# Patient Record
Sex: FEMALE | Race: WHITE | Employment: OTHER | URBAN - METROPOLITAN AREA
[De-identification: names, ages, dates, MRNs, and addresses within clinical notes are randomized per-mention and may not be internally consistent; named-entity substitution may affect disease eponyms.]

---

## 2015-05-21 LAB — TSH SERPL DL<=0.05 MIU/L-ACNC: NORMAL UIU/ML

## 2018-04-04 ENCOUNTER — OFFICE VISIT (OUTPATIENT)
Dept: CARDIOLOGY CLINIC | Age: 78
End: 2018-04-04
Payer: MEDICARE

## 2018-04-04 VITALS
DIASTOLIC BLOOD PRESSURE: 72 MMHG | HEART RATE: 78 BPM | HEIGHT: 62 IN | RESPIRATION RATE: 14 BRPM | WEIGHT: 185 LBS | BODY MASS INDEX: 34.04 KG/M2 | SYSTOLIC BLOOD PRESSURE: 134 MMHG

## 2018-04-04 DIAGNOSIS — I51.9 HEART PROBLEM: Primary | ICD-10-CM

## 2018-04-04 PROCEDURE — 93000 ELECTROCARDIOGRAM COMPLETE: CPT | Performed by: INTERNAL MEDICINE

## 2018-04-04 PROCEDURE — G8400 PT W/DXA NO RESULTS DOC: HCPCS | Performed by: INTERNAL MEDICINE

## 2018-04-04 PROCEDURE — 4040F PNEUMOC VAC/ADMIN/RCVD: CPT | Performed by: INTERNAL MEDICINE

## 2018-04-04 PROCEDURE — 99213 OFFICE O/P EST LOW 20 MIN: CPT | Performed by: INTERNAL MEDICINE

## 2018-04-04 PROCEDURE — 1036F TOBACCO NON-USER: CPT | Performed by: INTERNAL MEDICINE

## 2018-04-04 PROCEDURE — G8417 CALC BMI ABV UP PARAM F/U: HCPCS | Performed by: INTERNAL MEDICINE

## 2018-04-04 PROCEDURE — 3017F COLORECTAL CA SCREEN DOC REV: CPT | Performed by: INTERNAL MEDICINE

## 2018-04-04 PROCEDURE — G8427 DOCREV CUR MEDS BY ELIG CLIN: HCPCS | Performed by: INTERNAL MEDICINE

## 2018-04-04 PROCEDURE — 1090F PRES/ABSN URINE INCON ASSESS: CPT | Performed by: INTERNAL MEDICINE

## 2018-04-04 PROCEDURE — 1123F ACP DISCUSS/DSCN MKR DOCD: CPT | Performed by: INTERNAL MEDICINE

## 2018-04-04 RX ORDER — NITROGLYCERIN 0.4 MG/1
TABLET SUBLINGUAL
Refills: 1 | COMMUNITY
Start: 2018-03-29 | End: 2020-04-20 | Stop reason: SDUPTHER

## 2018-10-08 NOTE — PROGRESS NOTES
NORA PATIÑO [facility]  Paolo Singh M.D. Nir Ortega. Andrea Ramires M.D. Marcus Whitmore Long   1940  Doyle Maria MD      This 77-year-old woman is seen for cardiac follow-up. Her history includes hypertension, diabetes mellitus and aortic valve stenosis. An echo in 01/2018 was consistent with low gradient, severe aortic valve stenosis. She states that she goes that all her activities without any chest discomfort, dyspnea or dizziness. Medical History:  1. Diabetes mellitus. 2. Hypertension. 3. Obesity. BMI 32.78 - 04/2018. 4. Breast biopsy, benign. 5. Surgeries: Hysterectomy and dental extraction. 6. No history of MI, CHF, CVA. 7. Lipid panel unavailable. 8. Lexiscan MPS, 03/31/2017, normal perfusion. Normal ejection fraction. Low risk/low probability. [address] [place] [address],[facility] 01/27/17: Normal EF. Stage I diastolic dysfunction. Moderate aortic stenosis and mild AI. Mean gradient 18. No ISRAEL supplied an report (Dr. Jason Miller). 10. Echocardiogram, 01/19/18. LVEF normal, Stage II diastolic dysfunction. Mild MR. Aortic valve sclerotic. Peak velocity 2.8M/S. AV DI = 0.32. ISRAEL=0.9. 11. Lexiscan stress perfusion study, [facility], 03/23/18. Functional capacity was not assessed. The radiology report indicates \"mild ischemia of the apex\". There is normal wall motion and ejection fraction (64%). Review of Systems:  Constitutional: negative for fever and chills  Respiratory: negative for cough and hemoptysis  Cardiovascular:   Gastrointestinal: negative for abdominal pain, diarrhea, nausea and vomiting  Genitourinary:negative for dysuria and hematuria  Derm: negative for rash and skin lesion(s)  Neurological: negative for seizures and tremors  Endocrine: negative for diabetic symptoms including polydipsia and polyuria  Musculoskeletal: negative for CTD  Psychiatric: negative for psychosis and major depression    On examination, alert, pleasant, oriented and in no distress.    Skin is warm and

## 2018-10-10 ENCOUNTER — OFFICE VISIT (OUTPATIENT)
Dept: CARDIOLOGY CLINIC | Age: 78
End: 2018-10-10
Payer: MEDICARE

## 2018-10-10 VITALS
WEIGHT: 177 LBS | HEIGHT: 62 IN | SYSTOLIC BLOOD PRESSURE: 133 MMHG | BODY MASS INDEX: 32.57 KG/M2 | HEART RATE: 84 BPM | DIASTOLIC BLOOD PRESSURE: 55 MMHG | RESPIRATION RATE: 16 BRPM

## 2018-10-10 DIAGNOSIS — I51.9 HEART PROBLEM: Primary | ICD-10-CM

## 2018-10-10 PROCEDURE — 1101F PT FALLS ASSESS-DOCD LE1/YR: CPT | Performed by: INTERNAL MEDICINE

## 2018-10-10 PROCEDURE — 3017F COLORECTAL CA SCREEN DOC REV: CPT | Performed by: INTERNAL MEDICINE

## 2018-10-10 PROCEDURE — 4040F PNEUMOC VAC/ADMIN/RCVD: CPT | Performed by: INTERNAL MEDICINE

## 2018-10-10 PROCEDURE — 99213 OFFICE O/P EST LOW 20 MIN: CPT | Performed by: INTERNAL MEDICINE

## 2018-10-10 PROCEDURE — 1123F ACP DISCUSS/DSCN MKR DOCD: CPT | Performed by: INTERNAL MEDICINE

## 2018-10-10 PROCEDURE — 1090F PRES/ABSN URINE INCON ASSESS: CPT | Performed by: INTERNAL MEDICINE

## 2018-10-10 PROCEDURE — G8484 FLU IMMUNIZE NO ADMIN: HCPCS | Performed by: INTERNAL MEDICINE

## 2018-10-10 PROCEDURE — 93000 ELECTROCARDIOGRAM COMPLETE: CPT | Performed by: INTERNAL MEDICINE

## 2018-10-10 PROCEDURE — 1036F TOBACCO NON-USER: CPT | Performed by: INTERNAL MEDICINE

## 2018-10-10 PROCEDURE — G8427 DOCREV CUR MEDS BY ELIG CLIN: HCPCS | Performed by: INTERNAL MEDICINE

## 2018-10-10 PROCEDURE — G8400 PT W/DXA NO RESULTS DOC: HCPCS | Performed by: INTERNAL MEDICINE

## 2018-10-10 PROCEDURE — G8417 CALC BMI ABV UP PARAM F/U: HCPCS | Performed by: INTERNAL MEDICINE

## 2019-02-11 LAB — DIABETIC RETINOPATHY: NEGATIVE

## 2019-02-13 LAB
CHOLESTEROL, TOTAL: NORMAL MG/DL
CHOLESTEROL/HDL RATIO: NORMAL
HDLC SERPL-MCNC: NORMAL MG/DL (ref 35–70)
LDL CHOLESTEROL CALCULATED: NORMAL MG/DL (ref 0–160)
TRIGL SERPL-MCNC: NORMAL MG/DL
VLDLC SERPL CALC-MCNC: NORMAL MG/DL

## 2019-03-20 ENCOUNTER — OFFICE VISIT (OUTPATIENT)
Dept: CARDIOLOGY CLINIC | Age: 79
End: 2019-03-20
Payer: MEDICARE

## 2019-03-20 VITALS
DIASTOLIC BLOOD PRESSURE: 80 MMHG | HEART RATE: 85 BPM | BODY MASS INDEX: 33.31 KG/M2 | HEIGHT: 62 IN | RESPIRATION RATE: 14 BRPM | SYSTOLIC BLOOD PRESSURE: 142 MMHG | WEIGHT: 181 LBS

## 2019-03-20 DIAGNOSIS — R06.02 SOB (SHORTNESS OF BREATH): Primary | ICD-10-CM

## 2019-03-20 PROCEDURE — G8427 DOCREV CUR MEDS BY ELIG CLIN: HCPCS | Performed by: INTERNAL MEDICINE

## 2019-03-20 PROCEDURE — 93000 ELECTROCARDIOGRAM COMPLETE: CPT | Performed by: INTERNAL MEDICINE

## 2019-03-20 PROCEDURE — G8484 FLU IMMUNIZE NO ADMIN: HCPCS | Performed by: INTERNAL MEDICINE

## 2019-03-20 PROCEDURE — 4040F PNEUMOC VAC/ADMIN/RCVD: CPT | Performed by: INTERNAL MEDICINE

## 2019-03-20 PROCEDURE — G8417 CALC BMI ABV UP PARAM F/U: HCPCS | Performed by: INTERNAL MEDICINE

## 2019-03-20 PROCEDURE — 1101F PT FALLS ASSESS-DOCD LE1/YR: CPT | Performed by: INTERNAL MEDICINE

## 2019-03-20 PROCEDURE — G8400 PT W/DXA NO RESULTS DOC: HCPCS | Performed by: INTERNAL MEDICINE

## 2019-03-20 PROCEDURE — 99213 OFFICE O/P EST LOW 20 MIN: CPT | Performed by: INTERNAL MEDICINE

## 2019-03-20 PROCEDURE — 1090F PRES/ABSN URINE INCON ASSESS: CPT | Performed by: INTERNAL MEDICINE

## 2019-03-20 PROCEDURE — 1123F ACP DISCUSS/DSCN MKR DOCD: CPT | Performed by: INTERNAL MEDICINE

## 2019-03-20 PROCEDURE — 1036F TOBACCO NON-USER: CPT | Performed by: INTERNAL MEDICINE

## 2019-03-20 RX ORDER — RISEDRONATE SODIUM 35 MG/1
TABLET, FILM COATED ORAL
COMMUNITY
Start: 2019-03-16 | End: 2019-05-22

## 2019-05-22 ENCOUNTER — OFFICE VISIT (OUTPATIENT)
Dept: FAMILY MEDICINE CLINIC | Age: 79
End: 2019-05-22
Payer: MEDICARE

## 2019-05-22 ENCOUNTER — HOSPITAL ENCOUNTER (OUTPATIENT)
Age: 79
Discharge: HOME | End: 2019-05-24
Payer: MEDICARE

## 2019-05-22 VITALS
TEMPERATURE: 98 F | HEIGHT: 60 IN | WEIGHT: 180 LBS | BODY MASS INDEX: 35.34 KG/M2 | SYSTOLIC BLOOD PRESSURE: 128 MMHG | DIASTOLIC BLOOD PRESSURE: 78 MMHG | HEART RATE: 76 BPM | RESPIRATION RATE: 12 BRPM

## 2019-05-22 DIAGNOSIS — E04.9 GOITER: ICD-10-CM

## 2019-05-22 DIAGNOSIS — E11.9 TYPE 2 DIABETES MELLITUS WITHOUT COMPLICATION, WITHOUT LONG-TERM CURRENT USE OF INSULIN (HCC): ICD-10-CM

## 2019-05-22 DIAGNOSIS — E11.9 TYPE 2 DIABETES MELLITUS WITHOUT COMPLICATION, WITHOUT LONG-TERM CURRENT USE OF INSULIN (HCC): Primary | ICD-10-CM

## 2019-05-22 DIAGNOSIS — I10 ESSENTIAL HYPERTENSION: ICD-10-CM

## 2019-05-22 DIAGNOSIS — E78.5 HYPERLIPIDEMIA, UNSPECIFIED HYPERLIPIDEMIA TYPE: ICD-10-CM

## 2019-05-22 LAB
ANION GAP SERPL CALCULATED.3IONS-SCNC: 9 MMOL/L (ref 7–16)
BUN BLDV-MCNC: 19 MG/DL (ref 8–23)
CALCIUM SERPL-MCNC: 9.6 MG/DL (ref 8.6–10.2)
CHLORIDE BLD-SCNC: 104 MMOL/L (ref 98–107)
CO2: 28 MMOL/L (ref 22–29)
CREAT SERPL-MCNC: 1.4 MG/DL (ref 0.5–1)
GFR AFRICAN AMERICAN: 44
GFR NON-AFRICAN AMERICAN: 37 ML/MIN/1.73
GLUCOSE BLD-MCNC: 86 MG/DL (ref 74–99)
HBA1C MFR BLD: 7.4 % (ref 4–5.6)
POTASSIUM SERPL-SCNC: 4.8 MMOL/L (ref 3.5–5)
SODIUM BLD-SCNC: 141 MMOL/L (ref 132–146)

## 2019-05-22 PROCEDURE — 99214 OFFICE O/P EST MOD 30 MIN: CPT | Performed by: INTERNAL MEDICINE

## 2019-05-22 PROCEDURE — 36415 COLL VENOUS BLD VENIPUNCTURE: CPT

## 2019-05-22 PROCEDURE — 80048 BASIC METABOLIC PNL TOTAL CA: CPT

## 2019-05-22 PROCEDURE — 83036 HEMOGLOBIN GLYCOSYLATED A1C: CPT

## 2019-05-22 RX ORDER — OXYCODONE HYDROCHLORIDE AND ACETAMINOPHEN 5; 325 MG/1; MG/1
1 TABLET ORAL EVERY 6 HOURS PRN
COMMUNITY
End: 2019-05-31

## 2019-05-22 RX ORDER — LANOLIN ALCOHOL/MO/W.PET/CERES
1000 CREAM (GRAM) TOPICAL EVERY OTHER DAY
COMMUNITY

## 2019-05-22 RX ORDER — AMLODIPINE BESYLATE 5 MG/1
5 TABLET ORAL DAILY
COMMUNITY
End: 2019-06-06 | Stop reason: ALTCHOICE

## 2019-05-22 ASSESSMENT — ENCOUNTER SYMPTOMS
SHORTNESS OF BREATH: 0
VOMITING: 0
RHINORRHEA: 0
NAUSEA: 0
CONSTIPATION: 0
DIARRHEA: 0
WHEEZING: 0
BLOOD IN STOOL: 0
ABDOMINAL PAIN: 0
COUGH: 0
SINUS PAIN: 0
BACK PAIN: 0

## 2019-05-22 ASSESSMENT — PATIENT HEALTH QUESTIONNAIRE - PHQ9
1. LITTLE INTEREST OR PLEASURE IN DOING THINGS: 0
SUM OF ALL RESPONSES TO PHQ QUESTIONS 1-9: 0
SUM OF ALL RESPONSES TO PHQ QUESTIONS 1-9: 0
2. FEELING DOWN, DEPRESSED OR HOPELESS: 0
SUM OF ALL RESPONSES TO PHQ9 QUESTIONS 1 & 2: 0

## 2019-05-22 NOTE — PROGRESS NOTES
[facility]     19  Yvonne Covington : 1940 Sex: female  Age: 70 y.o. Chief Complaint   Patient presents with    Hypertension    Hyperlipidemia    Diabetes   Multiple medical problems follow-up    HPI patient presents today for follow-up visit on problems listed reviewed last note. She was started on Actonel for osteoporosis which she again does not tolerate and stopped. She did not tolerate alendronate. She has had her gallbladder removed since I saw her last and is doing much better. Abdominal standpoint. I reviewed that pathology report. She did have a trip and fall recently bruised up her right elbow and high. She is healing doing better at this point. This was an extrinsic fall. She is up-to-date with consultants including cardiology ophthalmology endocrine for thyroid renal and ENT related to her thyroid. Blood pressures that she shows me up in clinic. Blood sugars most part outside of one week when she wasn't feeling well also look good. Weight has not changed. Review of Systems   Constitutional: Negative for activity change, appetite change, chills, diaphoresis, fatigue, fever and unexpected weight change. HENT: Negative for congestion, ear pain, hearing loss, postnasal drip, rhinorrhea and sinus pain. Respiratory: Negative for cough, shortness of breath and wheezing. Cardiovascular: Negative for chest pain, palpitations and leg swelling. Gastrointestinal: Negative for abdominal pain, blood in stool, constipation, diarrhea, nausea and vomiting. Symptoms will improve post cholecystectomy. Endocrine: Negative. Genitourinary: Negative for difficulty urinating, dysuria, frequency, hematuria and urgency. Musculoskeletal: Positive for arthralgias. Negative for back pain, gait problem and myalgias. She was complaining of muscle pain prior to stopping the Actonel   Skin: Negative.     Allergic/Immunologic: Negative for environmental allergies and immunocompromised state. Neurological: Negative for dizziness, weakness, light-headedness, numbness and headaches. Hematological: Negative. REST OF PERTINENT ROS GONE OVER AND WAS NEGATIVE. PMH:  Problem List: Knee pain, Non-toxic nodular goiter, Essential hypertension, Goiter, Hyperlipidemia, Benign  essential hypertension, Type II diabetes mellitus uncontrolled  Health Maintenance:  Bone Density Test Screening - (12/3/2018)  Bone Density Scan - (12/3/2018)  Influenza Vaccination - (10/29/2018)  Colonoscopy - (2018)  -due 23  Colonoscopy Screening - (2018)  Couseled on Home Safety - (2018)  Pneumonia Vaccination - (2018)  Mammogram - (2012)  Mammogram Screening - (2012)  Declining further  Tetanus Immunization - (2017)  Rectal Exam - 4/10,  Breast Exam - 4/10,  Hemmocult Cards - -neg x 3,-neg x3, -neg x 3  EKG - ,, 5/15, 3/17,,3/18  2D ECHO -  ,   Carotid Artery Study - -30-49% occlusion bilateral,   Stress Test - -neg,3/18-pos  Zoster/Shingles Vaccine -   Medical Problems:  Non Insulin Dependent Diabetes, Hypertension, Hyperlipidemia  Goiter - multinodular-bx neg-dr hooper  GERD, Hypothyroidism  Osteoporosis - Bisphosphonate intolerant  Glaucoma, Renal Insufficiency  Aortic And Mitral Valve Disorders - 2D echo-  VHD  Pulmonary HTN - mild  Macular Degeneration  WBCs in urine - Workup by urology-  Coronary Artery Disease (CAD) - Positive stress test 3/18  Diverticulosis, Diabetic Neuropathy, Vitamin B12 deficiency  Follows with - Cardiology, nephrology, ophthalmology, endocrine  Surgical Hx:  AKIN-right salpingo-oophorectomy - Fibroids  Appendectomy  Right cataract surgery - left also  Left knee arthroscopy, Removal of Gallbladder  FH:  Father:  MI.  Mother:  Cerebrovascular Accident (CVA). Brother 1:  Coronary Artery Disease (CAD) - 52's.   3 other brothers, 1 sister   SH:  Marital: grammatical errors.

## 2019-05-23 ENCOUNTER — TELEPHONE (OUTPATIENT)
Dept: FAMILY MEDICINE CLINIC | Age: 79
End: 2019-05-23

## 2019-05-29 NOTE — PROGRESS NOTES
[facility]O [facility]  [facility]. Gerson Oliva M.D., .A.C.C. [address]   1940  Mitesh Herman MD      This 77-year-old woman had outpatient cardiac follow-up today. She states that she feels well and is without abnormal activities. She denies chest pain or dyspnea. She has a history of obesity, hypertension, diabetes mellitus and aortic valve stenosis. An echo in January 2018 was consistent with low velocity severe aortic valve stenosis. She states there is been no change in her symptoms are functional capacity over the past year. She states she had a cholecystectomy earlier this year. Medical History:  1. Diabetes mellitus. 2. Hypertension. 3. Obesity. BMI 32.74 - 06/2019.  4. Breast biopsy, benign. 5. Surgeries: Hysterectomy and dental extraction. 6. No history of MI, CHF, CVA. 7. Lipid panel unavailable. 8. Lexiscan MPS, 03/31/2017, normal perfusion. Normal ejection fraction. Low risk/low probability. [address] [place] [address],[place] [address] 01/27/17: Normal EF. Stage I diastolic dysfunction. Moderate aortic stenosis and mild AI. Mean gradient 18. No [place]RA[place] supplied an report (Dr. Kristin Clemons). 10. Echocardiogram, 01/19/18. LVEF normal, Stage II diastolic dysfunction. Mild MR. Aortic valve sclerotic. Peak velocity 2.8M/S. AV DI = 0.32. ISRAEL=0.9. [address] [facility], 03/23/18. . The radiology report indicates \"mild ischemia of the apex\". There is normal wall motion and ejection fraction (64%).     Review of Systems:  Constitutional: negative for fever and chills  Respiratory: negative for cough and hemoptysis  Cardiovascular:   Gastrointestinal: negative for abdominal pain, diarrhea, nausea and vomiting  Genitourinary:negative for dysuria and hematuria  Derm: negative for rash and skin lesion(s)  Neurological: negative for seizures and tremors  Endocrine: negative for diabetic symptoms including polydipsia and polyuria  Musculoskeletal: negative for CTD  Psychiatric: negative for psychosis and major depression    On examination, she is an obese, elderly woman in no distress. Skin is warm and dry. Respirations are unlabored. /72   Pulse 80   Resp 14   Ht 5' 2\" (1.575 m)   Wt 179 lb (81.2 kg)   BMI 32.74 kg/m² . HEENT negative for scleral icterus. Extraocular muscles intact. No facial asymmetry or central cyanosis. Neck without masses or goiter. No bruit or JVD. Cardiac apex not displaced. Rhythm regular. Grade 2/3/6 LUIS upper sternal border. S2 intact. Diastole sign  Abdomen normal.  Extremities without edema. Lungs are clear. EKG today shows sinus rhythm 80 per minute. EKG normal.    The patient is a not particularly active and therefore somewhat difficult to assess. However, she denies any symptoms to suggest critical aortic valve stenosis. Exam also suggested critical aortic valve stenosis is not present. Therefore no medication changes are made today. She'll be asked to have an echocardiogram in 6 months. At completion of today's visit, medications include the following:    Current Outpatient Medications:     vitamin D (CHOLECALCIFEROL) 1000 UNIT TABS tablet, Take 1,000 Units by mouth daily, Disp: , Rfl:     vitamin B-12 (CYANOCOBALAMIN) 100 MCG tablet, Take 50 mcg by mouth daily, Disp: , Rfl:     nitroGLYCERIN (NITROSTAT) 0.4 MG SL tablet, DISSOLVE ONE TABLET UNDER TONGUE AS NEEDED FOR CHEST PAINS. MAY REPEAT IN 5 MINUTES.  IF SYMPTOMS PERSISTS CALL 911, Disp: , Rfl: 1    ramipril (ALTACE) 10 MG capsule, Take 10 mg by mouth daily, Disp: , Rfl:     pioglitazone (ACTOS) 30 MG tablet, Take 30 mg by mouth daily, Disp: , Rfl:     glipiZIDE (GLUCOTROL XL) 10 MG extended release tablet, Take 10 mg by mouth 2 times daily , Disp: , Rfl:     metFORMIN (GLUCOPHAGE) 1000 MG tablet, Take 1,000 mg by mouth 2 times daily (with meals) , Disp: , Rfl:     pravastatin (PRAVACHOL) 40 MG tablet, Take 40 mg by mouth every other day , Disp: , Rfl:     JANUVIA 50 MG tablet, Take 50 mg by mouth daily , Disp: , Rfl:     meclizine (ANTIVERT) 25 MG tablet, 1 po bid prn vertigo, Disp: 60 tablet, Rfl: 0      Yovanny Urena MD

## 2019-05-31 ENCOUNTER — OFFICE VISIT (OUTPATIENT)
Dept: FAMILY MEDICINE CLINIC | Age: 79
End: 2019-05-31
Payer: MEDICARE

## 2019-05-31 VITALS
HEART RATE: 92 BPM | SYSTOLIC BLOOD PRESSURE: 128 MMHG | TEMPERATURE: 98.4 F | RESPIRATION RATE: 18 BRPM | BODY MASS INDEX: 33.13 KG/M2 | OXYGEN SATURATION: 97 % | WEIGHT: 180 LBS | DIASTOLIC BLOOD PRESSURE: 64 MMHG | HEIGHT: 62 IN

## 2019-05-31 DIAGNOSIS — S62.101A CLOSED FRACTURE OF RIGHT WRIST, INITIAL ENCOUNTER: ICD-10-CM

## 2019-05-31 DIAGNOSIS — M79.674 PAIN OF RIGHT GREAT TOE: Primary | ICD-10-CM

## 2019-05-31 DIAGNOSIS — S41.101A WOUND OF RIGHT UPPER EXTREMITY, INITIAL ENCOUNTER: ICD-10-CM

## 2019-05-31 DIAGNOSIS — M25.531 PAIN IN RIGHT WRIST: ICD-10-CM

## 2019-05-31 PROCEDURE — 99213 OFFICE O/P EST LOW 20 MIN: CPT | Performed by: PHYSICIAN ASSISTANT

## 2019-05-31 NOTE — PROGRESS NOTES
Date: 19     Lorenza Patel   : 1940 Sex: female  Age: 70 y.o. Subjective:  Chief Complaint   Patient presents with    Toe Pain     right big toe pain.  Abrasion     tore skin on right hand       HPI:  Patient reports that she fell last night. Patient was in her house. She states she started to trip walking down the hallway and then put her arms out to catch herself. Patient complains of right great toe pain as well as right wrist pain. Patient also sustained an abrasion on her right upper wrist. Patient has been cleaning it and putting antibiotic ointment on it. Patient's uncertain when her last tetanus was. Patient  denies that she hit her head. She denies any loss consciousness neck pain nausea vomiting visual disturbance. Patient states this was a mechanical fall as she started to trip over something in her hallway. It was carpeted. Patient has not taken anything for the pain. She states she is able to ambulate without too much difficulty. Has been using her wrist but movement does cause increase pain. Patient denies other symptoms and presents for evaluation Patient comes to the urgent care for evaluation. ROS:  Positive and pertinent negatives as per HPI. All other systems are reviewed and negative. Current Outpatient Medications:     Tetanus-Diphth-Acell Pertussis (BOOSTRIX) 5-2.5-18.5 LF-MCG/0.5 injection, Inject 0.5 mLs into the muscle once for 1 dose, Disp: 1 each, Rfl: 0    vitamin D (CHOLECALCIFEROL) 1000 UNIT TABS tablet, Take 1,000 Units by mouth daily, Disp: , Rfl:     vitamin B-12 (CYANOCOBALAMIN) 100 MCG tablet, Take 50 mcg by mouth daily, Disp: , Rfl:     nitroGLYCERIN (NITROSTAT) 0.4 MG SL tablet, DISSOLVE ONE TABLET UNDER TONGUE AS NEEDED FOR CHEST PAINS. MAY REPEAT IN 5 MINUTES.  IF SYMPTOMS PERSISTS CALL 911, Disp: , Rfl: 1    ramipril (ALTACE) 10 MG capsule, Take 10 mg by mouth daily, Disp: , Rfl:     pioglitazone (ACTOS) 30 MG tablet, Take 30 mg by mouth daily, Disp: , Rfl:     glipiZIDE (GLUCOTROL XL) 10 MG extended release tablet, Take 10 mg by mouth 2 times daily , Disp: , Rfl:     meclizine (ANTIVERT) 25 MG tablet, Take 25 mg by mouth 3 times daily as needed , Disp: , Rfl:     metFORMIN (GLUCOPHAGE) 1000 MG tablet, Take 1,000 mg by mouth 2 times daily (with meals) , Disp: , Rfl:     pravastatin (PRAVACHOL) 40 MG tablet, Take 40 mg by mouth every other day , Disp: , Rfl:     JANUVIA 50 MG tablet, Take 50 mg by mouth daily , Disp: , Rfl:     amLODIPine (NORVASC) 5 MG tablet, Take 5 mg by mouth daily, Disp: , Rfl:    Allergies   Allergen Reactions    Naproxen     Pcn [Penicillins]         Past Medical History:   Diagnosis Date    Chest pain     Diabetes ([facility] [address]     Goiter     Hyperlipidemia     Hypertension      Family History   Problem Relation Age of Onset    Heart Disease Father 79        MI    Heart Disease Brother     Stroke Brother     Stroke Brother       Past Surgical History:   Procedure Laterality Date    BREAST BIOPSY Right     neg    HYSTERECTOMY      TOOTH EXTRACTION        Social History     Socioeconomic History    Marital status:      Spouse name: Not on file    Number of children: Not on file    Years of education: Not on file    Highest education level: Not on file   Occupational History    Not on file   Social Needs    Financial resource strain: Not on file    Food insecurity:     Worry: Not on file     Inability: Not on file    Transportation needs:     Medical: Not on file     Non-medical: Not on file   Tobacco Use    Smoking status: Never Smoker    Smokeless tobacco: Never Used   Substance and Sexual Activity    Alcohol use: No    Drug use: Not on file    Sexual activity: Not on file   Lifestyle    Physical activity:     Days per week: Not on file     Minutes per session: Not on file    Stress: Not on file   Relationships    Social connections:     Talks on phone: Not on file     Gets together: Not on file     Attends Sabianist service: Not on file     Active member of club or organization: Not on file     Attends meetings of clubs or organizations: Not on file     Relationship status: Not on file    Intimate partner violence:     Fear of current or ex partner: Not on file     Emotionally abused: Not on file     Physically abused: Not on file     Forced sexual activity: Not on file   Other Topics Concern    Not on file   Social History Narrative    Not on file        Objective:  Vitals:    05/31/19 1528   BP: 128/64   Site: Left Upper Arm   Position: Sitting   Cuff Size: Large Adult   Pulse: 92   Resp: 18   Temp: 98.4 °F (36.9 °C)   TempSrc: Temporal   SpO2: 97%   Weight: 180 lb (81.6 kg)   Height: 5' 2\" (1.575 m)        Exam:  Const: Appears healthy and well developed. No signs of acute distress present. Vitals reviewed per triage. Head/Face: Normocephalic, atraumatic. Facies is symmetric. ENMT:  Nares are patent. Buccal mucosa is moist.    Neck: Trachea midline. No cervical spine tenderness. Resp: No respiratory distress. Musculo: Gait is intact. Patient does have pain over her right great toe. No pain over the foot or the ankle. Full range of motion is noted. Neurovascularly she is intact. Patient with ecchymosis and bruising over the distal radius and ulna. Full range of motion of the hand and wrist wihtout difficulty. No obvious deformity.  strength is 5 out of 5. Neurovascular she is intact. Patient has sustained Two small skin tears over her posterior wrist as well. Skin: Skin is warm and dry. No Rashes. No erythema, heat. Neuro: Alert and oriented x3. Speech is articulate and fluent. Psych: Mood/Affect: Patient's mood and affect is appropriate to situation. [facility] was seen today for toe pain and abrasion. Diagnoses and all orders for this visit:    Pain of right great toe  -     XR TOE RIGHT (MIN 2 VIEWS);  Future    Wound of right upper extremity, initial encounter  - Cancel: Tetanus vaccine IM  Pts skin tears cleaned, steri strip applied    Pain in right wrist  -     XR WRIST RIGHT (MIN 3 VIEWS); Future    Other orders  -     Tetanus-Diphth-Acell Pertussis (BOOSTRIX) 5-2.5-18.5 LF-MCG/0.5 injection; Inject 0.5 mLs into the muscle once for 1 dose        Toe xray was negative for fracture, however wrist xray shows likely scaphoid fracture. Unfortunately since pt did not come through [facility] until after 3:30, I did not get xray results back until 5 pm from out patient radiology when all walk in was closed for weekend. Pt instructed only available option for wrist splinting at this time was through ED. Pt will follow up with orthopedics, referral will be done for her. .     Seen By:    Farhana Box PA-C

## 2019-06-03 ENCOUNTER — TELEPHONE (OUTPATIENT)
Dept: FAMILY MEDICINE CLINIC | Age: 79
End: 2019-06-03

## 2019-06-05 ENCOUNTER — OFFICE VISIT (OUTPATIENT)
Dept: CARDIOLOGY CLINIC | Age: 79
End: 2019-06-05
Payer: MEDICARE

## 2019-06-05 ENCOUNTER — TELEPHONE (OUTPATIENT)
Dept: ORTHOPEDIC SURGERY | Age: 79
End: 2019-06-05

## 2019-06-05 VITALS
HEIGHT: 62 IN | BODY MASS INDEX: 32.94 KG/M2 | SYSTOLIC BLOOD PRESSURE: 122 MMHG | HEART RATE: 80 BPM | RESPIRATION RATE: 14 BRPM | WEIGHT: 179 LBS | DIASTOLIC BLOOD PRESSURE: 72 MMHG

## 2019-06-05 DIAGNOSIS — R06.02 SOB (SHORTNESS OF BREATH): Primary | ICD-10-CM

## 2019-06-05 DIAGNOSIS — R42 VERTIGO: Primary | ICD-10-CM

## 2019-06-05 PROCEDURE — 4040F PNEUMOC VAC/ADMIN/RCVD: CPT | Performed by: INTERNAL MEDICINE

## 2019-06-05 PROCEDURE — G8417 CALC BMI ABV UP PARAM F/U: HCPCS | Performed by: INTERNAL MEDICINE

## 2019-06-05 PROCEDURE — 1036F TOBACCO NON-USER: CPT | Performed by: INTERNAL MEDICINE

## 2019-06-05 PROCEDURE — G8427 DOCREV CUR MEDS BY ELIG CLIN: HCPCS | Performed by: INTERNAL MEDICINE

## 2019-06-05 PROCEDURE — 93000 ELECTROCARDIOGRAM COMPLETE: CPT | Performed by: INTERNAL MEDICINE

## 2019-06-05 PROCEDURE — 1090F PRES/ABSN URINE INCON ASSESS: CPT | Performed by: INTERNAL MEDICINE

## 2019-06-05 PROCEDURE — 99213 OFFICE O/P EST LOW 20 MIN: CPT | Performed by: INTERNAL MEDICINE

## 2019-06-05 PROCEDURE — 1123F ACP DISCUSS/DSCN MKR DOCD: CPT | Performed by: INTERNAL MEDICINE

## 2019-06-05 PROCEDURE — G8400 PT W/DXA NO RESULTS DOC: HCPCS | Performed by: INTERNAL MEDICINE

## 2019-06-06 RX ORDER — MECLIZINE HYDROCHLORIDE 25 MG/1
TABLET ORAL
Qty: 60 TABLET | Refills: 0 | Status: SHIPPED | OUTPATIENT
Start: 2019-06-05 | End: 2019-08-22 | Stop reason: SDUPTHER

## 2019-08-21 ENCOUNTER — TELEPHONE (OUTPATIENT)
Dept: ADMINISTRATIVE | Age: 79
End: 2019-08-21

## 2019-08-22 ENCOUNTER — HOSPITAL ENCOUNTER (OUTPATIENT)
Age: 79
Discharge: HOME | End: 2019-08-24
Payer: MEDICARE

## 2019-08-22 ENCOUNTER — OFFICE VISIT (OUTPATIENT)
Dept: FAMILY MEDICINE CLINIC | Age: 79
End: 2019-08-22
Payer: MEDICARE

## 2019-08-22 ENCOUNTER — TELEPHONE (OUTPATIENT)
Dept: FAMILY MEDICINE CLINIC | Age: 79
End: 2019-08-22

## 2019-08-22 VITALS
WEIGHT: 181 LBS | HEART RATE: 92 BPM | DIASTOLIC BLOOD PRESSURE: 74 MMHG | BODY MASS INDEX: 33.11 KG/M2 | SYSTOLIC BLOOD PRESSURE: 136 MMHG | OXYGEN SATURATION: 97 %

## 2019-08-22 DIAGNOSIS — E11.9 TYPE 2 DIABETES MELLITUS WITHOUT COMPLICATION, WITHOUT LONG-TERM CURRENT USE OF INSULIN (HCC): ICD-10-CM

## 2019-08-22 DIAGNOSIS — R42 VERTIGO: ICD-10-CM

## 2019-08-22 DIAGNOSIS — D75.89 MACROCYTOSIS WITHOUT ANEMIA: Primary | ICD-10-CM

## 2019-08-22 DIAGNOSIS — I10 ESSENTIAL HYPERTENSION: Primary | ICD-10-CM

## 2019-08-22 DIAGNOSIS — E04.9 GOITER: ICD-10-CM

## 2019-08-22 DIAGNOSIS — M81.0 OSTEOPOROSIS, UNSPECIFIED OSTEOPOROSIS TYPE, UNSPECIFIED PATHOLOGICAL FRACTURE PRESENCE: ICD-10-CM

## 2019-08-22 DIAGNOSIS — E78.5 HYPERLIPIDEMIA, UNSPECIFIED HYPERLIPIDEMIA TYPE: ICD-10-CM

## 2019-08-22 LAB
ALBUMIN SERPL-MCNC: 4.1 G/DL (ref 3.5–5.2)
ALP BLD-CCNC: 76 U/L (ref 35–104)
ALT SERPL-CCNC: 13 U/L (ref 0–32)
ANION GAP SERPL CALCULATED.3IONS-SCNC: 13 MMOL/L (ref 7–16)
AST SERPL-CCNC: 17 U/L (ref 0–31)
BASOPHILS ABSOLUTE: 0.03 E9/L (ref 0–0.2)
BASOPHILS RELATIVE PERCENT: 0.6 % (ref 0–2)
BILIRUB SERPL-MCNC: 0.6 MG/DL (ref 0–1.2)
BUN BLDV-MCNC: 17 MG/DL (ref 8–23)
CALCIUM SERPL-MCNC: 9.5 MG/DL (ref 8.6–10.2)
CHLORIDE BLD-SCNC: 104 MMOL/L (ref 98–107)
CO2: 24 MMOL/L (ref 22–29)
CREAT SERPL-MCNC: 1.4 MG/DL (ref 0.5–1)
CREATININE URINE: 104 MG/DL (ref 29–226)
EOSINOPHILS ABSOLUTE: 0.2 E9/L (ref 0.05–0.5)
EOSINOPHILS RELATIVE PERCENT: 3.8 % (ref 0–6)
GFR AFRICAN AMERICAN: 44
GFR NON-AFRICAN AMERICAN: 36 ML/MIN/1.73
GLUCOSE BLD-MCNC: 130 MG/DL (ref 74–99)
HBA1C MFR BLD: 7.5 % (ref 4–5.6)
HCT VFR BLD CALC: 38.9 % (ref 34–48)
HEMOGLOBIN: 11.9 G/DL (ref 11.5–15.5)
IMMATURE GRANULOCYTES #: 0.01 E9/L
IMMATURE GRANULOCYTES %: 0.2 % (ref 0–5)
LYMPHOCYTES ABSOLUTE: 1.59 E9/L (ref 1.5–4)
LYMPHOCYTES RELATIVE PERCENT: 30.3 % (ref 20–42)
MCH RBC QN AUTO: 32.2 PG (ref 26–35)
MCHC RBC AUTO-ENTMCNC: 30.6 % (ref 32–34.5)
MCV RBC AUTO: 105.4 FL (ref 80–99.9)
MICROALBUMIN UR-MCNC: <12 MG/L
MICROALBUMIN/CREAT UR-RTO: ABNORMAL (ref 0–30)
MONOCYTES ABSOLUTE: 0.52 E9/L (ref 0.1–0.95)
MONOCYTES RELATIVE PERCENT: 9.9 % (ref 2–12)
NEUTROPHILS ABSOLUTE: 2.89 E9/L (ref 1.8–7.3)
NEUTROPHILS RELATIVE PERCENT: 55.2 % (ref 43–80)
PDW BLD-RTO: 14.4 FL (ref 11.5–15)
PLATELET # BLD: 228 E9/L (ref 130–450)
PMV BLD AUTO: 11.4 FL (ref 7–12)
POTASSIUM SERPL-SCNC: 4.6 MMOL/L (ref 3.5–5)
RBC # BLD: 3.69 E12/L (ref 3.5–5.5)
SODIUM BLD-SCNC: 141 MMOL/L (ref 132–146)
TOTAL PROTEIN: 7.3 G/DL (ref 6.4–8.3)
WBC # BLD: 5.2 E9/L (ref 4.5–11.5)

## 2019-08-22 PROCEDURE — 82044 UR ALBUMIN SEMIQUANTITATIVE: CPT

## 2019-08-22 PROCEDURE — 36415 COLL VENOUS BLD VENIPUNCTURE: CPT

## 2019-08-22 PROCEDURE — 85025 COMPLETE CBC W/AUTO DIFF WBC: CPT

## 2019-08-22 PROCEDURE — 99214 OFFICE O/P EST MOD 30 MIN: CPT | Performed by: INTERNAL MEDICINE

## 2019-08-22 PROCEDURE — 82570 ASSAY OF URINE CREATININE: CPT

## 2019-08-22 PROCEDURE — 83036 HEMOGLOBIN GLYCOSYLATED A1C: CPT

## 2019-08-22 PROCEDURE — 80053 COMPREHEN METABOLIC PANEL: CPT

## 2019-08-22 RX ORDER — SITAGLIPTIN 50 MG/1
50 TABLET, FILM COATED ORAL DAILY
Qty: 90 TABLET | Refills: 1 | Status: SHIPPED
Start: 2019-08-22 | End: 2020-02-26 | Stop reason: SDUPTHER

## 2019-08-22 RX ORDER — CALCITONIN SALMON 200 [IU]/.09ML
1 SPRAY, METERED NASAL DAILY
Qty: 1 BOTTLE | Refills: 5 | Status: SHIPPED
Start: 2019-08-22 | End: 2020-09-21 | Stop reason: ALTCHOICE

## 2019-08-22 RX ORDER — PRAVASTATIN SODIUM 40 MG
40 TABLET ORAL EVERY OTHER DAY
Qty: 45 TABLET | Refills: 1 | Status: SHIPPED | OUTPATIENT
Start: 2019-08-22 | End: 2019-11-25 | Stop reason: SDUPTHER

## 2019-08-22 RX ORDER — MECLIZINE HYDROCHLORIDE 25 MG/1
TABLET ORAL
Qty: 60 TABLET | Refills: 1 | Status: SHIPPED
Start: 2019-08-22 | End: 2020-05-22 | Stop reason: SDUPTHER

## 2019-08-22 RX ORDER — RAMIPRIL 10 MG/1
10 CAPSULE ORAL DAILY
Qty: 90 CAPSULE | Refills: 1 | Status: SHIPPED
Start: 2019-08-22 | End: 2020-02-26 | Stop reason: SDUPTHER

## 2019-08-22 RX ORDER — AMLODIPINE BESYLATE 5 MG/1
5 TABLET ORAL DAILY
COMMUNITY
End: 2019-11-25 | Stop reason: ALTCHOICE

## 2019-08-24 NOTE — PROGRESS NOTES
[facility]     19  Jose Covington : 1940 Sex: female  Age: 70 y.o. Chief Complaint   Patient presents with    Diabetes    Hypertension    Hyperlipidemia       HPI  Patient presents today for 3-month follow-up visit on her multiple medical problems. Since I have seen her last she has been seen for wrist pain secondary to fall initially thought it was fractured subsequently did not. It has healed. She has seen cardiology in the interim and will be seen again in a few months for repeat 2D echo related to significant aortic stenosis. Tells me blood pressures have been good at home shows me numbers to the same effect. Blood sugars have been good. Last hemoglobin A1c was mid 7 range. We did review her bone density status again and her intolerance of bisphosphonates. After discussion we elected to start her on Miacalcin nasal spray. She was not interested in injections with Prolia. Follow chronically with urology for her valvular heart disease, ophthalmology, endocrine for thyroid, renal for chronic kidney disease and ENT related to her thyroid (referred by endocrine). Review of Systems     Constitutional: Negative for activity change, appetite change, chills, diaphoresis, fatigue, fever and unexpected weight change. HENT: Negative for congestion, ear pain, hearing loss, postnasal drip, rhinorrhea and sinus pain. Respiratory: Negative for cough, shortness of breath and wheezing. Cardiovascular: Negative for chest pain, palpitations and leg swelling. Gastrointestinal: Negative for abdominal pain, blood in stool, constipation, diarrhea, nausea and vomiting. Symptoms  improved post cholecystectomy. Endocrine:  Type 2 diabetes, goiter   Genitourinary: Negative for difficulty urinating, dysuria, frequency, hematuria and urgency. Musculoskeletal: Positive for arthralgias. Negative for back pain, gait problem and myalgias.         She was complaining of muscle pain Relation Age of Onset    Heart Disease Father 79        MI    Heart Disease Brother     Stroke Brother     Stroke Brother      Social History     Socioeconomic History    Marital status:      Spouse name: Not on file    Number of children: Not on file    Years of education: Not on file    Highest education level: Not on file   Occupational History    Not on file   Social Needs    Financial resource strain: Not on file    Food insecurity:     Worry: Not on file     Inability: Not on file    Transportation needs:     Medical: Not on file     Non-medical: Not on file   Tobacco Use    Smoking status: Never Smoker    Smokeless tobacco: Never Used   Substance and Sexual Activity    Alcohol use: No    Drug use: Not on file    Sexual activity: Not on file   Lifestyle    Physical activity:     Days per week: Not on file     Minutes per session: Not on file    Stress: Not on file   Relationships    Social connections:     Talks on phone: Not on file     Gets together: Not on file     Attends Sabianist service: Not on file     Active member of club or organization: Not on file     Attends meetings of clubs or organizations: Not on file     Relationship status: Not on file    Intimate partner violence:     Fear of current or ex partner: Not on file     Emotionally abused: Not on file     Physically abused: Not on file     Forced sexual activity: Not on file   Other Topics Concern    Not on file   Social History Narrative    Not on file       Vitals:    08/22/19 1254   BP: 136/74   Pulse: 92   SpO2: 97%   Weight: 181 lb (82.1 kg)       Physical Exam  Constitutional: She is oriented to person, place, and time. She appears well-developed and well-nourished. No distress. Eyes: Pupils are equal, round, and reactive to light. Conjunctivae and EOM are normal.   She did have some periorbital bruising on the right side. Vision good   Neck: Normal range of motion. Neck supple. Thyromegaly present.

## 2019-08-26 NOTE — TELEPHONE ENCOUNTER
Unable to add specimen - has to be in light sensitive container. Would you like her to come in to be redrawn? Or check at next appt?

## 2019-08-27 NOTE — TELEPHONE ENCOUNTER
Alise calling to get a new script for Actos sent to [facility]. It got missed when she was in.

## 2019-08-28 ENCOUNTER — HOSPITAL ENCOUNTER (OUTPATIENT)
Age: 79
Discharge: HOME | End: 2019-08-30
Payer: MEDICARE

## 2019-08-28 ENCOUNTER — TELEPHONE (OUTPATIENT)
Dept: FAMILY MEDICINE CLINIC | Age: 79
End: 2019-08-28

## 2019-08-28 DIAGNOSIS — Z12.39 BREAST CANCER SCREENING: Primary | ICD-10-CM

## 2019-08-28 LAB
FOLATE: 8.2 NG/ML (ref 4.8–24.2)
VITAMIN B-12: 1488 PG/ML (ref 211–946)

## 2019-08-28 PROCEDURE — 82607 VITAMIN B-12: CPT

## 2019-08-28 PROCEDURE — 82746 ASSAY OF FOLIC ACID SERUM: CPT

## 2019-08-28 PROCEDURE — 36415 COLL VENOUS BLD VENIPUNCTURE: CPT

## 2019-08-28 RX ORDER — PIOGLITAZONEHYDROCHLORIDE 30 MG/1
30 TABLET ORAL DAILY
Qty: 90 TABLET | Refills: 1 | Status: SHIPPED | OUTPATIENT
Start: 2019-08-28 | End: 2019-11-25 | Stop reason: SDUPTHER

## 2019-09-13 LAB
BUN BLDV-MCNC: NORMAL MG/DL
CALCIUM SERPL-MCNC: NORMAL MG/DL
CHLORIDE BLD-SCNC: NORMAL MMOL/L
CO2: NORMAL MMOL/L
CREAT SERPL-MCNC: NORMAL MG/DL
GFR CALCULATED: NORMAL
GLUCOSE BLD-MCNC: NORMAL MG/DL
POTASSIUM SERPL-SCNC: NORMAL MMOL/L
SODIUM BLD-SCNC: NORMAL MMOL/L

## 2019-09-23 ENCOUNTER — HOSPITAL ENCOUNTER (OUTPATIENT)
Dept: MAMMOGRAPHY | Age: 79
Discharge: HOME | End: 2019-09-25
Payer: MEDICARE

## 2019-09-23 DIAGNOSIS — Z12.39 BREAST CANCER SCREENING: ICD-10-CM

## 2019-09-23 PROCEDURE — 77063 BREAST TOMOSYNTHESIS BI: CPT

## 2019-11-06 RX ORDER — LANCETS 30 GAUGE
1 EACH MISCELLANEOUS 2 TIMES DAILY
Qty: 200 EACH | Refills: 5 | Status: SHIPPED | OUTPATIENT
Start: 2019-11-06 | End: 2020-09-21 | Stop reason: SDUPTHER

## 2019-11-06 RX ORDER — GLUCOSAMINE HCL/CHONDROITIN SU 500-400 MG
CAPSULE ORAL
Qty: 200 STRIP | Refills: 5 | Status: SHIPPED | OUTPATIENT
Start: 2019-11-06 | End: 2020-09-21 | Stop reason: SDUPTHER

## 2019-11-25 ENCOUNTER — HOSPITAL ENCOUNTER (OUTPATIENT)
Age: 79
Discharge: HOME | End: 2019-11-27
Payer: MEDICARE

## 2019-11-25 ENCOUNTER — OFFICE VISIT (OUTPATIENT)
Dept: FAMILY MEDICINE CLINIC | Age: 79
End: 2019-11-25
Payer: MEDICARE

## 2019-11-25 VITALS
HEART RATE: 86 BPM | SYSTOLIC BLOOD PRESSURE: 146 MMHG | OXYGEN SATURATION: 96 % | BODY MASS INDEX: 32.19 KG/M2 | WEIGHT: 176 LBS | DIASTOLIC BLOOD PRESSURE: 80 MMHG

## 2019-11-25 DIAGNOSIS — N18.30 CHRONIC KIDNEY DISEASE, STAGE 3 (MODERATE): ICD-10-CM

## 2019-11-25 DIAGNOSIS — E78.5 HYPERLIPIDEMIA, UNSPECIFIED HYPERLIPIDEMIA TYPE: ICD-10-CM

## 2019-11-25 DIAGNOSIS — E04.9 GOITER: ICD-10-CM

## 2019-11-25 DIAGNOSIS — E11.40 TYPE 2 DIABETES MELLITUS WITH DIABETIC NEUROPATHY, WITHOUT LONG-TERM CURRENT USE OF INSULIN (HCC): ICD-10-CM

## 2019-11-25 DIAGNOSIS — M81.0 OSTEOPOROSIS, UNSPECIFIED OSTEOPOROSIS TYPE, UNSPECIFIED PATHOLOGICAL FRACTURE PRESENCE: ICD-10-CM

## 2019-11-25 DIAGNOSIS — I10 ESSENTIAL HYPERTENSION: Primary | ICD-10-CM

## 2019-11-25 DIAGNOSIS — I10 ESSENTIAL HYPERTENSION: ICD-10-CM

## 2019-11-25 LAB
ALBUMIN SERPL-MCNC: 4 G/DL (ref 3.5–5.2)
ALP BLD-CCNC: 76 U/L (ref 35–104)
ALT SERPL-CCNC: 12 U/L (ref 0–32)
ANION GAP SERPL CALCULATED.3IONS-SCNC: 13 MMOL/L (ref 7–16)
AST SERPL-CCNC: 16 U/L (ref 0–31)
BACTERIA: ABNORMAL /HPF
BASOPHILS ABSOLUTE: 0.02 E9/L (ref 0–0.2)
BASOPHILS RELATIVE PERCENT: 0.4 % (ref 0–2)
BILIRUB SERPL-MCNC: 0.7 MG/DL (ref 0–1.2)
BILIRUBIN URINE: NEGATIVE
BLOOD, URINE: ABNORMAL
BUN BLDV-MCNC: 23 MG/DL (ref 8–23)
CALCIUM SERPL-MCNC: 9.8 MG/DL (ref 8.6–10.2)
CHLORIDE BLD-SCNC: 106 MMOL/L (ref 98–107)
CHOLESTEROL, TOTAL: 199 MG/DL (ref 0–199)
CLARITY: CLEAR
CO2: 24 MMOL/L (ref 22–29)
COLOR: YELLOW
CREAT SERPL-MCNC: 1.5 MG/DL (ref 0.5–1)
CREATININE URINE: 123 MG/DL (ref 29–226)
CRYSTALS, UA: ABNORMAL
EOSINOPHILS ABSOLUTE: 0.18 E9/L (ref 0.05–0.5)
EOSINOPHILS RELATIVE PERCENT: 3.3 % (ref 0–6)
EPITHELIAL CELLS, UA: ABNORMAL /HPF
GFR AFRICAN AMERICAN: 41
GFR NON-AFRICAN AMERICAN: 34 ML/MIN/1.73
GLUCOSE BLD-MCNC: 92 MG/DL (ref 74–99)
GLUCOSE URINE: NEGATIVE MG/DL
HBA1C MFR BLD: 7.4 % (ref 4–5.6)
HCT VFR BLD CALC: 39.5 % (ref 34–48)
HDLC SERPL-MCNC: 58 MG/DL
HEMOGLOBIN: 12.4 G/DL (ref 11.5–15.5)
IMMATURE GRANULOCYTES #: 0.02 E9/L
IMMATURE GRANULOCYTES %: 0.4 % (ref 0–5)
KETONES, URINE: NEGATIVE MG/DL
LDL CHOLESTEROL CALCULATED: 118 MG/DL (ref 0–99)
LEUKOCYTE ESTERASE, URINE: ABNORMAL
LYMPHOCYTES ABSOLUTE: 2.02 E9/L (ref 1.5–4)
LYMPHOCYTES RELATIVE PERCENT: 37.1 % (ref 20–42)
MCH RBC QN AUTO: 32.3 PG (ref 26–35)
MCHC RBC AUTO-ENTMCNC: 31.4 % (ref 32–34.5)
MCV RBC AUTO: 102.9 FL (ref 80–99.9)
MICROALBUMIN UR-MCNC: <12 MG/L
MICROALBUMIN/CREAT UR-RTO: ABNORMAL (ref 0–30)
MONOCYTES ABSOLUTE: 0.55 E9/L (ref 0.1–0.95)
MONOCYTES RELATIVE PERCENT: 10.1 % (ref 2–12)
NEUTROPHILS ABSOLUTE: 2.65 E9/L (ref 1.8–7.3)
NEUTROPHILS RELATIVE PERCENT: 48.7 % (ref 43–80)
NITRITE, URINE: NEGATIVE
PDW BLD-RTO: 14.1 FL (ref 11.5–15)
PH UA: 5.5 (ref 5–9)
PLATELET # BLD: 238 E9/L (ref 130–450)
PMV BLD AUTO: 11.4 FL (ref 7–12)
POTASSIUM SERPL-SCNC: 4.3 MMOL/L (ref 3.5–5)
PROTEIN UA: NEGATIVE MG/DL
RBC # BLD: 3.84 E12/L (ref 3.5–5.5)
RBC UA: ABNORMAL /HPF (ref 0–2)
SODIUM BLD-SCNC: 143 MMOL/L (ref 132–146)
SPECIFIC GRAVITY UA: 1.02 (ref 1–1.03)
TOTAL PROTEIN: 7.5 G/DL (ref 6.4–8.3)
TRIGL SERPL-MCNC: 113 MG/DL (ref 0–149)
UROBILINOGEN, URINE: 0.2 E.U./DL
VLDLC SERPL CALC-MCNC: 23 MG/DL
WBC # BLD: 5.4 E9/L (ref 4.5–11.5)
WBC UA: ABNORMAL /HPF (ref 0–5)

## 2019-11-25 PROCEDURE — 4040F PNEUMOC VAC/ADMIN/RCVD: CPT | Performed by: INTERNAL MEDICINE

## 2019-11-25 PROCEDURE — 81003 URINALYSIS AUTO W/O SCOPE: CPT | Performed by: INTERNAL MEDICINE

## 2019-11-25 PROCEDURE — 85025 COMPLETE CBC W/AUTO DIFF WBC: CPT

## 2019-11-25 PROCEDURE — G8427 DOCREV CUR MEDS BY ELIG CLIN: HCPCS | Performed by: INTERNAL MEDICINE

## 2019-11-25 PROCEDURE — 83036 HEMOGLOBIN GLYCOSYLATED A1C: CPT

## 2019-11-25 PROCEDURE — 81001 URINALYSIS AUTO W/SCOPE: CPT

## 2019-11-25 PROCEDURE — G8484 FLU IMMUNIZE NO ADMIN: HCPCS | Performed by: INTERNAL MEDICINE

## 2019-11-25 PROCEDURE — 1123F ACP DISCUSS/DSCN MKR DOCD: CPT | Performed by: INTERNAL MEDICINE

## 2019-11-25 PROCEDURE — 82044 UR ALBUMIN SEMIQUANTITATIVE: CPT

## 2019-11-25 PROCEDURE — 82570 ASSAY OF URINE CREATININE: CPT

## 2019-11-25 PROCEDURE — G8400 PT W/DXA NO RESULTS DOC: HCPCS | Performed by: INTERNAL MEDICINE

## 2019-11-25 PROCEDURE — 1090F PRES/ABSN URINE INCON ASSESS: CPT | Performed by: INTERNAL MEDICINE

## 2019-11-25 PROCEDURE — 80061 LIPID PANEL: CPT

## 2019-11-25 PROCEDURE — 36415 COLL VENOUS BLD VENIPUNCTURE: CPT

## 2019-11-25 PROCEDURE — G8417 CALC BMI ABV UP PARAM F/U: HCPCS | Performed by: INTERNAL MEDICINE

## 2019-11-25 PROCEDURE — 1036F TOBACCO NON-USER: CPT | Performed by: INTERNAL MEDICINE

## 2019-11-25 PROCEDURE — 80053 COMPREHEN METABOLIC PANEL: CPT

## 2019-11-25 PROCEDURE — 99214 OFFICE O/P EST MOD 30 MIN: CPT | Performed by: INTERNAL MEDICINE

## 2019-11-25 RX ORDER — GLIPIZIDE 10 MG/1
10 TABLET, FILM COATED, EXTENDED RELEASE ORAL 2 TIMES DAILY
Qty: 180 TABLET | Refills: 1 | Status: SHIPPED
Start: 2019-11-25 | End: 2020-05-22 | Stop reason: SDUPTHER

## 2019-11-25 RX ORDER — PIOGLITAZONEHYDROCHLORIDE 30 MG/1
30 TABLET ORAL DAILY
Qty: 90 TABLET | Refills: 1 | Status: SHIPPED
Start: 2019-11-25 | End: 2020-02-26 | Stop reason: SDUPTHER

## 2019-11-25 RX ORDER — PRAVASTATIN SODIUM 40 MG
40 TABLET ORAL EVERY OTHER DAY
Qty: 45 TABLET | Refills: 1 | Status: SHIPPED
Start: 2019-11-25 | End: 2020-02-26 | Stop reason: SDUPTHER

## 2019-11-26 ENCOUNTER — TELEPHONE (OUTPATIENT)
Dept: FAMILY MEDICINE CLINIC | Age: 79
End: 2019-11-26

## 2019-11-26 DIAGNOSIS — R31.29 MICROSCOPIC HEMATURIA: Primary | ICD-10-CM

## 2019-12-10 ENCOUNTER — HOSPITAL ENCOUNTER (OUTPATIENT)
Age: 79
Discharge: HOME | End: 2019-12-12
Payer: MEDICARE

## 2019-12-10 ENCOUNTER — TELEPHONE (OUTPATIENT)
Dept: FAMILY MEDICINE CLINIC | Age: 79
End: 2019-12-10

## 2019-12-10 DIAGNOSIS — R31.29 MICROSCOPIC HEMATURIA: ICD-10-CM

## 2019-12-10 LAB
BILIRUBIN URINE: NEGATIVE
BLOOD, URINE: NEGATIVE
CLARITY: CLEAR
COLOR: YELLOW
GLUCOSE URINE: NEGATIVE MG/DL
KETONES, URINE: NEGATIVE MG/DL
LEUKOCYTE ESTERASE, URINE: ABNORMAL
NITRITE, URINE: NEGATIVE
PH UA: 6 (ref 5–9)
PROTEIN UA: NEGATIVE MG/DL
SPECIFIC GRAVITY UA: 1.02 (ref 1–1.03)
UROBILINOGEN, URINE: 0.2 E.U./DL

## 2019-12-10 PROCEDURE — 81001 URINALYSIS AUTO W/SCOPE: CPT

## 2019-12-11 LAB
BACTERIA: ABNORMAL /HPF
RBC UA: ABNORMAL /HPF (ref 0–2)
WBC UA: ABNORMAL /HPF (ref 0–5)

## 2019-12-18 ENCOUNTER — TELEPHONE (OUTPATIENT)
Dept: FAMILY MEDICINE CLINIC | Age: 79
End: 2019-12-18

## 2019-12-18 DIAGNOSIS — R82.90 ABNORMAL URINALYSIS: Primary | ICD-10-CM

## 2019-12-27 ENCOUNTER — TELEPHONE (OUTPATIENT)
Dept: FAMILY MEDICINE CLINIC | Age: 79
End: 2019-12-27

## 2020-01-03 ENCOUNTER — TELEPHONE (OUTPATIENT)
Dept: FAMILY MEDICINE CLINIC | Age: 80
End: 2020-01-03

## 2020-01-03 NOTE — TELEPHONE ENCOUNTER
Patient called in to inform you that she has an appointment with Dr. Thomas Griffin on 1/7/20 stated that was the soonest they could get her in.

## 2020-02-14 LAB — DIABETIC RETINOPATHY: NEGATIVE

## 2020-02-26 ENCOUNTER — OFFICE VISIT (OUTPATIENT)
Dept: FAMILY MEDICINE CLINIC | Age: 80
End: 2020-02-26
Payer: MEDICARE

## 2020-02-26 VITALS
BODY MASS INDEX: 34.45 KG/M2 | SYSTOLIC BLOOD PRESSURE: 140 MMHG | HEART RATE: 66 BPM | HEIGHT: 62 IN | DIASTOLIC BLOOD PRESSURE: 78 MMHG | TEMPERATURE: 97.9 F | OXYGEN SATURATION: 99 % | WEIGHT: 187.2 LBS

## 2020-02-26 LAB — HBA1C MFR BLD: 7.4 %

## 2020-02-26 PROCEDURE — 1090F PRES/ABSN URINE INCON ASSESS: CPT | Performed by: INTERNAL MEDICINE

## 2020-02-26 PROCEDURE — 4040F PNEUMOC VAC/ADMIN/RCVD: CPT | Performed by: INTERNAL MEDICINE

## 2020-02-26 PROCEDURE — 99214 OFFICE O/P EST MOD 30 MIN: CPT | Performed by: INTERNAL MEDICINE

## 2020-02-26 PROCEDURE — G8484 FLU IMMUNIZE NO ADMIN: HCPCS | Performed by: INTERNAL MEDICINE

## 2020-02-26 PROCEDURE — G8427 DOCREV CUR MEDS BY ELIG CLIN: HCPCS | Performed by: INTERNAL MEDICINE

## 2020-02-26 PROCEDURE — 1123F ACP DISCUSS/DSCN MKR DOCD: CPT | Performed by: INTERNAL MEDICINE

## 2020-02-26 PROCEDURE — 83036 HEMOGLOBIN GLYCOSYLATED A1C: CPT | Performed by: INTERNAL MEDICINE

## 2020-02-26 PROCEDURE — 1036F TOBACCO NON-USER: CPT | Performed by: INTERNAL MEDICINE

## 2020-02-26 PROCEDURE — 3051F HG A1C>EQUAL 7.0%<8.0%: CPT | Performed by: INTERNAL MEDICINE

## 2020-02-26 PROCEDURE — G8400 PT W/DXA NO RESULTS DOC: HCPCS | Performed by: INTERNAL MEDICINE

## 2020-02-26 PROCEDURE — G8417 CALC BMI ABV UP PARAM F/U: HCPCS | Performed by: INTERNAL MEDICINE

## 2020-02-26 RX ORDER — PRAVASTATIN SODIUM 40 MG
40 TABLET ORAL EVERY OTHER DAY
Qty: 45 TABLET | Refills: 1 | Status: SHIPPED
Start: 2020-02-26 | End: 2020-05-22 | Stop reason: SDUPTHER

## 2020-02-26 RX ORDER — RISEDRONATE SODIUM 35 MG/1
35 TABLET, FILM COATED ORAL
Qty: 4 TABLET | Refills: 3 | Status: SHIPPED
Start: 2020-02-26 | End: 2020-05-22 | Stop reason: SDUPTHER

## 2020-02-26 RX ORDER — AMPICILLIN TRIHYDRATE 250 MG
1 CAPSULE ORAL DAILY
COMMUNITY

## 2020-02-26 RX ORDER — RAMIPRIL 10 MG/1
10 CAPSULE ORAL DAILY
Qty: 90 CAPSULE | Refills: 1 | Status: SHIPPED
Start: 2020-02-26 | End: 2020-05-22 | Stop reason: SDUPTHER

## 2020-02-26 RX ORDER — SITAGLIPTIN 50 MG/1
50 TABLET, FILM COATED ORAL DAILY
Qty: 90 TABLET | Refills: 1 | Status: SHIPPED
Start: 2020-02-26 | End: 2020-05-22 | Stop reason: SDUPTHER

## 2020-02-26 RX ORDER — PIOGLITAZONEHYDROCHLORIDE 30 MG/1
30 TABLET ORAL DAILY
Qty: 90 TABLET | Refills: 1 | Status: SHIPPED
Start: 2020-02-26 | End: 2020-05-22 | Stop reason: SDUPTHER

## 2020-02-26 ASSESSMENT — PATIENT HEALTH QUESTIONNAIRE - PHQ9
SUM OF ALL RESPONSES TO PHQ QUESTIONS 1-9: 0
SUM OF ALL RESPONSES TO PHQ9 QUESTIONS 1 & 2: 0
SUM OF ALL RESPONSES TO PHQ QUESTIONS 1-9: 0
1. LITTLE INTEREST OR PLEASURE IN DOING THINGS: 0
2. FEELING DOWN, DEPRESSED OR HOPELESS: 0

## 2020-04-20 RX ORDER — NITROGLYCERIN 0.4 MG/1
TABLET SUBLINGUAL
Qty: 25 TABLET | Refills: 1 | Status: SHIPPED | OUTPATIENT
Start: 2020-04-20

## 2020-04-20 NOTE — TELEPHONE ENCOUNTER
Last Appointment:  2/26/2020  Future Appointments   Date Time Provider Dioni Darling   5/20/2020  2:45 PM Honey Buck  [address]

## 2020-05-22 ENCOUNTER — OFFICE VISIT (OUTPATIENT)
Dept: FAMILY MEDICINE CLINIC | Age: 80
End: 2020-05-22
Payer: MEDICARE

## 2020-05-22 ENCOUNTER — HOSPITAL ENCOUNTER (OUTPATIENT)
Age: 80
Discharge: HOME | End: 2020-05-24
Payer: MEDICARE

## 2020-05-22 VITALS
SYSTOLIC BLOOD PRESSURE: 134 MMHG | WEIGHT: 182 LBS | DIASTOLIC BLOOD PRESSURE: 72 MMHG | OXYGEN SATURATION: 98 % | TEMPERATURE: 98.6 F | HEART RATE: 78 BPM | HEIGHT: 62 IN | BODY MASS INDEX: 33.49 KG/M2

## 2020-05-22 LAB
ALBUMIN SERPL-MCNC: 4.2 G/DL (ref 3.5–5.2)
ALP BLD-CCNC: 74 U/L (ref 35–104)
ALT SERPL-CCNC: 11 U/L (ref 0–32)
ANION GAP SERPL CALCULATED.3IONS-SCNC: 13 MMOL/L (ref 7–16)
AST SERPL-CCNC: 15 U/L (ref 0–31)
BASOPHILS ABSOLUTE: 0.04 E9/L (ref 0–0.2)
BASOPHILS RELATIVE PERCENT: 0.6 % (ref 0–2)
BILIRUB SERPL-MCNC: 0.5 MG/DL (ref 0–1.2)
BUN BLDV-MCNC: 23 MG/DL (ref 8–23)
CALCIUM SERPL-MCNC: 9.8 MG/DL (ref 8.6–10.2)
CHLORIDE BLD-SCNC: 104 MMOL/L (ref 98–107)
CHOLESTEROL, TOTAL: 201 MG/DL (ref 0–199)
CO2: 23 MMOL/L (ref 22–29)
CREAT SERPL-MCNC: 1.4 MG/DL (ref 0.5–1)
CREATININE URINE: 124 MG/DL (ref 29–226)
EOSINOPHILS ABSOLUTE: 0.38 E9/L (ref 0.05–0.5)
EOSINOPHILS RELATIVE PERCENT: 6.1 % (ref 0–6)
GFR AFRICAN AMERICAN: 44
GFR NON-AFRICAN AMERICAN: 36 ML/MIN/1.73
GLUCOSE BLD-MCNC: 116 MG/DL (ref 74–99)
HBA1C MFR BLD: 7.6 % (ref 4–5.6)
HCT VFR BLD CALC: 39.2 % (ref 34–48)
HDLC SERPL-MCNC: 58 MG/DL
HEMOGLOBIN: 12.1 G/DL (ref 11.5–15.5)
IMMATURE GRANULOCYTES #: 0.03 E9/L
IMMATURE GRANULOCYTES %: 0.5 % (ref 0–5)
LDL CHOLESTEROL CALCULATED: 115 MG/DL (ref 0–99)
LYMPHOCYTES ABSOLUTE: 1.86 E9/L (ref 1.5–4)
LYMPHOCYTES RELATIVE PERCENT: 30 % (ref 20–42)
MCH RBC QN AUTO: 31.5 PG (ref 26–35)
MCHC RBC AUTO-ENTMCNC: 30.9 % (ref 32–34.5)
MCV RBC AUTO: 102.1 FL (ref 80–99.9)
MICROALBUMIN UR-MCNC: <12 MG/L
MICROALBUMIN/CREAT UR-RTO: ABNORMAL (ref 0–30)
MONOCYTES ABSOLUTE: 0.62 E9/L (ref 0.1–0.95)
MONOCYTES RELATIVE PERCENT: 10 % (ref 2–12)
NEUTROPHILS ABSOLUTE: 3.28 E9/L (ref 1.8–7.3)
NEUTROPHILS RELATIVE PERCENT: 52.8 % (ref 43–80)
PDW BLD-RTO: 14.5 FL (ref 11.5–15)
PLATELET # BLD: 244 E9/L (ref 130–450)
PMV BLD AUTO: 11.8 FL (ref 7–12)
POTASSIUM SERPL-SCNC: 4.9 MMOL/L (ref 3.5–5)
RBC # BLD: 3.84 E12/L (ref 3.5–5.5)
SODIUM BLD-SCNC: 140 MMOL/L (ref 132–146)
T4 FREE: 1.43 NG/DL (ref 0.93–1.7)
TOTAL PROTEIN: 7.4 G/DL (ref 6.4–8.3)
TRIGL SERPL-MCNC: 140 MG/DL (ref 0–149)
TSH SERPL DL<=0.05 MIU/L-ACNC: 0.34 UIU/ML (ref 0.27–4.2)
VITAMIN D 25-HYDROXY: 42 NG/ML (ref 30–100)
VLDLC SERPL CALC-MCNC: 28 MG/DL
WBC # BLD: 6.2 E9/L (ref 4.5–11.5)

## 2020-05-22 PROCEDURE — 82570 ASSAY OF URINE CREATININE: CPT

## 2020-05-22 PROCEDURE — 82306 VITAMIN D 25 HYDROXY: CPT

## 2020-05-22 PROCEDURE — 84439 ASSAY OF FREE THYROXINE: CPT

## 2020-05-22 PROCEDURE — 82044 UR ALBUMIN SEMIQUANTITATIVE: CPT

## 2020-05-22 PROCEDURE — G8427 DOCREV CUR MEDS BY ELIG CLIN: HCPCS | Performed by: INTERNAL MEDICINE

## 2020-05-22 PROCEDURE — 1123F ACP DISCUSS/DSCN MKR DOCD: CPT | Performed by: INTERNAL MEDICINE

## 2020-05-22 PROCEDURE — 84443 ASSAY THYROID STIM HORMONE: CPT

## 2020-05-22 PROCEDURE — 1036F TOBACCO NON-USER: CPT | Performed by: INTERNAL MEDICINE

## 2020-05-22 PROCEDURE — G8400 PT W/DXA NO RESULTS DOC: HCPCS | Performed by: INTERNAL MEDICINE

## 2020-05-22 PROCEDURE — 80053 COMPREHEN METABOLIC PANEL: CPT

## 2020-05-22 PROCEDURE — 4040F PNEUMOC VAC/ADMIN/RCVD: CPT | Performed by: INTERNAL MEDICINE

## 2020-05-22 PROCEDURE — G8417 CALC BMI ABV UP PARAM F/U: HCPCS | Performed by: INTERNAL MEDICINE

## 2020-05-22 PROCEDURE — 36415 COLL VENOUS BLD VENIPUNCTURE: CPT

## 2020-05-22 PROCEDURE — 99214 OFFICE O/P EST MOD 30 MIN: CPT | Performed by: INTERNAL MEDICINE

## 2020-05-22 PROCEDURE — 3051F HG A1C>EQUAL 7.0%<8.0%: CPT | Performed by: INTERNAL MEDICINE

## 2020-05-22 PROCEDURE — 85025 COMPLETE CBC W/AUTO DIFF WBC: CPT

## 2020-05-22 PROCEDURE — 83036 HEMOGLOBIN GLYCOSYLATED A1C: CPT

## 2020-05-22 PROCEDURE — 1090F PRES/ABSN URINE INCON ASSESS: CPT | Performed by: INTERNAL MEDICINE

## 2020-05-22 PROCEDURE — 80061 LIPID PANEL: CPT

## 2020-05-22 RX ORDER — RAMIPRIL 10 MG/1
10 CAPSULE ORAL DAILY
Qty: 90 CAPSULE | Refills: 1 | Status: SHIPPED
Start: 2020-05-22 | End: 2020-09-21 | Stop reason: SDUPTHER

## 2020-05-22 RX ORDER — MECLIZINE HYDROCHLORIDE 25 MG/1
TABLET ORAL
Qty: 60 TABLET | Refills: 1 | Status: ON HOLD
Start: 2020-05-22 | End: 2021-06-17 | Stop reason: HOSPADM

## 2020-05-22 RX ORDER — MAGNESIUM CHLORIDE 71.5 MG
2 TABLET, DELAYED RELEASE (ENTERIC COATED) ORAL DAILY
Qty: 90 TABLET | Refills: 1 | Status: SHIPPED
Start: 2020-05-22 | End: 2020-09-21 | Stop reason: SDUPTHER

## 2020-05-22 RX ORDER — SITAGLIPTIN 50 MG/1
50 TABLET, FILM COATED ORAL DAILY
Qty: 90 TABLET | Refills: 1 | Status: SHIPPED
Start: 2020-05-22 | End: 2020-09-21 | Stop reason: SDUPTHER

## 2020-05-22 RX ORDER — GLIPIZIDE 10 MG/1
10 TABLET, FILM COATED, EXTENDED RELEASE ORAL 2 TIMES DAILY
Qty: 180 TABLET | Refills: 1 | Status: SHIPPED
Start: 2020-05-22 | End: 2020-09-21 | Stop reason: SDUPTHER

## 2020-05-22 RX ORDER — PIOGLITAZONEHYDROCHLORIDE 30 MG/1
30 TABLET ORAL DAILY
Qty: 90 TABLET | Refills: 1 | Status: SHIPPED
Start: 2020-05-22 | End: 2020-09-21 | Stop reason: SDUPTHER

## 2020-05-22 RX ORDER — PRAVASTATIN SODIUM 40 MG
40 TABLET ORAL EVERY OTHER DAY
Qty: 45 TABLET | Refills: 1 | Status: SHIPPED
Start: 2020-05-22 | End: 2020-09-21 | Stop reason: SDUPTHER

## 2020-05-22 RX ORDER — RISEDRONATE SODIUM 35 MG/1
35 TABLET, FILM COATED ORAL
Qty: 4 TABLET | Refills: 3 | Status: SHIPPED
Start: 2020-05-22 | End: 2020-09-21 | Stop reason: SDUPTHER

## 2020-05-23 ENCOUNTER — TELEPHONE (OUTPATIENT)
Dept: FAMILY MEDICINE CLINIC | Age: 80
End: 2020-05-23

## 2020-05-23 NOTE — TELEPHONE ENCOUNTER
Hemoglobin A1c and rest of her labs are stable.   Send copy of these labs to her kidney doctor and endocrinologist.

## 2020-05-25 NOTE — PROGRESS NOTES
 Smokeless tobacco: Never Used   Substance and Sexual Activity    Alcohol use: No    Drug use: Not on file    Sexual activity: Not on file   Lifestyle    Physical activity     Days per week: Not on file     Minutes per session: Not on file    Stress: Not on file   Relationships    Social connections     Talks on phone: Not on file     Gets together: Not on file     Attends Hinduism service: Not on file     Active member of club or organization: Not on file     Attends meetings of clubs or organizations: Not on file     Relationship status: Not on file    Intimate partner violence     Fear of current or ex partner: Not on file     Emotionally abused: Not on file     Physically abused: Not on file     Forced sexual activity: Not on file   Other Topics Concern    Not on file   Social History Narrative    Not on file       Vitals:    05/22/20 1436   BP: 134/72   Pulse: 78   Temp: 98.6 °F (37 °C)   TempSrc: Temporal   SpO2: 98%   Weight: 182 lb (82.6 kg)   Height: 5' 2\" (1.575 m)       Physical Exam    Constitutional: She is oriented to person, place, and time. She appears well-developed and well-nourished. No distress. Eyes: Pupils are equal, round, and reactive to light. Conjunctivae and EOM are normal.      Neck: Normal range of motion. Neck supple. Thyromegaly present. Positive for goiter   Cardiovascular: Normal rate, regular rhythm and intact distal pulses. Exam reveals no gallop and no friction rub.   Murmur heard. Pulmonary/Chest: Effort normal and breath sounds normal. No respiratory distress. She has no wheezes. She has no rales. Abdominal: Soft. Bowel sounds are normal. She exhibits no distension and no mass. There is no tenderness.   Musculoskeletal: Normal range of motion.-Bilateral foot examination was performed.  Pulses were palpable.  She did have minimal decreased sensation to light touch to both feet.  She did have a corn to left second toe.   Lymphadenopathy:     She has no cervical adenopathy.     She has no axillary adenopathy.        Right: No inguinal adenopathy present.        Left: No inguinal adenopathy present. Neurological: She is alert and oriented to person, place, and time. She displays normal reflexes. No sensory deficit. She exhibits normal muscle tone. Coordination normal.   Skin: Skin is warm and dry. No rash noted. No erythema.   Psychiatric: She has a normal mood and affect. Her behavior is normal. Judgment and thought content normal.   Nursing note and vitals reviewed         Assessment and Plan: Micki was seen today for hypertension. Diagnoses and all orders for this visit:    Vitamin D deficiency  -     Vitamin D 25 Hydroxy; Future    Type 2 diabetes mellitus with diabetic neuropathy, without long-term current use of insulin (HCC)  -     glipiZIDE (GLUCOTROL XL) 10 MG extended release tablet; Take 1 tablet by mouth 2 times daily  -     pioglitazone (ACTOS) 30 MG tablet; Take 1 tablet by mouth daily  -     magnesium cl-calcium carbonate (NU-MAG) 71.5-119 MG TBEC tablet; Take 2 tablets by mouth daily  -     Hemoglobin A1C; Future  -     Microalbumin / Creatinine Urine Ratio; Future  Stable on current oral regimen    Vertigo  -     meclizine (ANTIVERT) 25 MG tablet; 1 po bid prn vertigo  Stable and controlled at this point on as needed meclizine. Was seen by audiology without much in the way of recommendations. Essential hypertension  -     ramipril (ALTACE) 10 MG capsule; Take 1 capsule by mouth daily  -     CBC Auto Differential; Future  -     Comprehensive Metabolic Panel; Future  Stable on medication    Hyperlipidemia, unspecified hyperlipidemia type  -     pravastatin (PRAVACHOL) 40 MG tablet; Take 1 tablet by mouth every other day  -     Lipid Panel; Future  Stable on statin    Osteoporosis without current pathological fracture, unspecified osteoporosis type  -     risedronate (ACTONEL) 35 MG tablet;  Take 1 tablet by mouth every 7 days Take once per week in the

## 2020-07-06 RX ORDER — MAGNESIUM CHLORIDE 71.5 MG
TABLET, DELAYED RELEASE (ENTERIC COATED) ORAL
Qty: 90 TABLET | Refills: 1 | OUTPATIENT
Start: 2020-07-06

## 2020-09-21 ENCOUNTER — OFFICE VISIT (OUTPATIENT)
Dept: FAMILY MEDICINE CLINIC | Age: 80
End: 2020-09-21
Payer: MEDICARE

## 2020-09-21 ENCOUNTER — HOSPITAL ENCOUNTER (OUTPATIENT)
Age: 80
Discharge: HOME | End: 2020-09-23
Payer: MEDICARE

## 2020-09-21 VITALS
OXYGEN SATURATION: 98 % | WEIGHT: 178.2 LBS | HEIGHT: 62 IN | TEMPERATURE: 97.4 F | BODY MASS INDEX: 32.79 KG/M2 | SYSTOLIC BLOOD PRESSURE: 124 MMHG | HEART RATE: 78 BPM | DIASTOLIC BLOOD PRESSURE: 60 MMHG

## 2020-09-21 LAB
ALBUMIN SERPL-MCNC: 4.1 G/DL (ref 3.5–5.2)
ALP BLD-CCNC: 77 U/L (ref 35–104)
ALT SERPL-CCNC: 14 U/L (ref 0–32)
ANION GAP SERPL CALCULATED.3IONS-SCNC: 11 MMOL/L (ref 7–16)
AST SERPL-CCNC: 18 U/L (ref 0–31)
BACTERIA: ABNORMAL /HPF
BASOPHILS ABSOLUTE: 0.02 E9/L (ref 0–0.2)
BASOPHILS RELATIVE PERCENT: 0.4 % (ref 0–2)
BILIRUB SERPL-MCNC: 0.6 MG/DL (ref 0–1.2)
BILIRUBIN URINE: NEGATIVE
BLOOD, URINE: NEGATIVE
BUN BLDV-MCNC: 19 MG/DL (ref 8–23)
CALCIUM SERPL-MCNC: 9.6 MG/DL (ref 8.6–10.2)
CHLORIDE BLD-SCNC: 100 MMOL/L (ref 98–107)
CHOLESTEROL, TOTAL: 200 MG/DL (ref 0–199)
CLARITY: CLEAR
CO2: 24 MMOL/L (ref 22–29)
COLOR: YELLOW
CREAT SERPL-MCNC: 1.5 MG/DL (ref 0.5–1)
CREATININE URINE: 176 MG/DL (ref 29–226)
EOSINOPHILS ABSOLUTE: 0.18 E9/L (ref 0.05–0.5)
EOSINOPHILS RELATIVE PERCENT: 3.2 % (ref 0–6)
GFR AFRICAN AMERICAN: 41
GFR NON-AFRICAN AMERICAN: 34 ML/MIN/1.73
GLUCOSE BLD-MCNC: 148 MG/DL (ref 74–99)
GLUCOSE URINE: NEGATIVE MG/DL
HBA1C MFR BLD: 7.2 % (ref 4–5.6)
HCT VFR BLD CALC: 39.1 % (ref 34–48)
HDLC SERPL-MCNC: 57 MG/DL
HEMOGLOBIN: 12.1 G/DL (ref 11.5–15.5)
IMMATURE GRANULOCYTES #: 0.02 E9/L
IMMATURE GRANULOCYTES %: 0.4 % (ref 0–5)
KETONES, URINE: NEGATIVE MG/DL
LDL CHOLESTEROL CALCULATED: 110 MG/DL (ref 0–99)
LEUKOCYTE ESTERASE, URINE: ABNORMAL
LYMPHOCYTES ABSOLUTE: 1.82 E9/L (ref 1.5–4)
LYMPHOCYTES RELATIVE PERCENT: 32.6 % (ref 20–42)
MCH RBC QN AUTO: 32.2 PG (ref 26–35)
MCHC RBC AUTO-ENTMCNC: 30.9 % (ref 32–34.5)
MCV RBC AUTO: 104 FL (ref 80–99.9)
MICROALBUMIN UR-MCNC: <12 MG/L
MICROALBUMIN/CREAT UR-RTO: ABNORMAL (ref 0–30)
MONOCYTES ABSOLUTE: 0.62 E9/L (ref 0.1–0.95)
MONOCYTES RELATIVE PERCENT: 11.1 % (ref 2–12)
NEUTROPHILS ABSOLUTE: 2.93 E9/L (ref 1.8–7.3)
NEUTROPHILS RELATIVE PERCENT: 52.3 % (ref 43–80)
NITRITE, URINE: NEGATIVE
PDW BLD-RTO: 14.4 FL (ref 11.5–15)
PH UA: 5 (ref 5–9)
PLATELET # BLD: 249 E9/L (ref 130–450)
PMV BLD AUTO: 11.7 FL (ref 7–12)
POTASSIUM SERPL-SCNC: 4.4 MMOL/L (ref 3.5–5)
PROTEIN UA: NEGATIVE MG/DL
RBC # BLD: 3.76 E12/L (ref 3.5–5.5)
RBC UA: ABNORMAL /HPF (ref 0–2)
SODIUM BLD-SCNC: 135 MMOL/L (ref 132–146)
SPECIFIC GRAVITY UA: >=1.03 (ref 1–1.03)
TOTAL PROTEIN: 7.2 G/DL (ref 6.4–8.3)
TRIGL SERPL-MCNC: 166 MG/DL (ref 0–149)
TSH SERPL DL<=0.05 MIU/L-ACNC: 0.41 UIU/ML (ref 0.27–4.2)
UROBILINOGEN, URINE: 0.2 E.U./DL
VLDLC SERPL CALC-MCNC: 33 MG/DL
WBC # BLD: 5.6 E9/L (ref 4.5–11.5)
WBC UA: ABNORMAL /HPF (ref 0–5)

## 2020-09-21 PROCEDURE — G8400 PT W/DXA NO RESULTS DOC: HCPCS | Performed by: INTERNAL MEDICINE

## 2020-09-21 PROCEDURE — 84443 ASSAY THYROID STIM HORMONE: CPT

## 2020-09-21 PROCEDURE — 1036F TOBACCO NON-USER: CPT | Performed by: INTERNAL MEDICINE

## 2020-09-21 PROCEDURE — 80061 LIPID PANEL: CPT

## 2020-09-21 PROCEDURE — 1090F PRES/ABSN URINE INCON ASSESS: CPT | Performed by: INTERNAL MEDICINE

## 2020-09-21 PROCEDURE — 3051F HG A1C>EQUAL 7.0%<8.0%: CPT | Performed by: INTERNAL MEDICINE

## 2020-09-21 PROCEDURE — 81001 URINALYSIS AUTO W/SCOPE: CPT

## 2020-09-21 PROCEDURE — 4040F PNEUMOC VAC/ADMIN/RCVD: CPT | Performed by: INTERNAL MEDICINE

## 2020-09-21 PROCEDURE — 80053 COMPREHEN METABOLIC PANEL: CPT

## 2020-09-21 PROCEDURE — 36415 COLL VENOUS BLD VENIPUNCTURE: CPT

## 2020-09-21 PROCEDURE — 81003 URINALYSIS AUTO W/O SCOPE: CPT | Performed by: INTERNAL MEDICINE

## 2020-09-21 PROCEDURE — 83036 HEMOGLOBIN GLYCOSYLATED A1C: CPT

## 2020-09-21 PROCEDURE — G8417 CALC BMI ABV UP PARAM F/U: HCPCS | Performed by: INTERNAL MEDICINE

## 2020-09-21 PROCEDURE — 99214 OFFICE O/P EST MOD 30 MIN: CPT | Performed by: INTERNAL MEDICINE

## 2020-09-21 PROCEDURE — 82044 UR ALBUMIN SEMIQUANTITATIVE: CPT

## 2020-09-21 PROCEDURE — 82570 ASSAY OF URINE CREATININE: CPT

## 2020-09-21 PROCEDURE — G8427 DOCREV CUR MEDS BY ELIG CLIN: HCPCS | Performed by: INTERNAL MEDICINE

## 2020-09-21 PROCEDURE — 85025 COMPLETE CBC W/AUTO DIFF WBC: CPT

## 2020-09-21 PROCEDURE — 1123F ACP DISCUSS/DSCN MKR DOCD: CPT | Performed by: INTERNAL MEDICINE

## 2020-09-21 RX ORDER — RISEDRONATE SODIUM 35 MG/1
35 TABLET, FILM COATED ORAL
Qty: 4 TABLET | Refills: 3 | Status: SHIPPED
Start: 2020-09-21 | End: 2021-03-22 | Stop reason: ALTCHOICE

## 2020-09-21 RX ORDER — GLIPIZIDE 10 MG/1
10 TABLET, FILM COATED, EXTENDED RELEASE ORAL 2 TIMES DAILY
Qty: 180 TABLET | Refills: 1 | Status: SHIPPED
Start: 2020-09-21 | End: 2021-03-15

## 2020-09-21 RX ORDER — SITAGLIPTIN 50 MG/1
50 TABLET, FILM COATED ORAL DAILY
Qty: 90 TABLET | Refills: 1 | Status: SHIPPED
Start: 2020-09-21 | End: 2020-09-22 | Stop reason: SDUPTHER

## 2020-09-21 RX ORDER — PRAVASTATIN SODIUM 40 MG
40 TABLET ORAL EVERY OTHER DAY
Qty: 45 TABLET | Refills: 1 | Status: SHIPPED
Start: 2020-09-21 | End: 2021-03-22 | Stop reason: SDUPTHER

## 2020-09-21 RX ORDER — LANCETS 30 GAUGE
1 EACH MISCELLANEOUS 2 TIMES DAILY
Qty: 200 EACH | Refills: 5 | Status: SHIPPED
Start: 2020-09-21 | End: 2021-01-25 | Stop reason: SDUPTHER

## 2020-09-21 RX ORDER — RAMIPRIL 10 MG/1
10 CAPSULE ORAL DAILY
Qty: 90 CAPSULE | Refills: 1 | Status: SHIPPED
Start: 2020-09-21 | End: 2020-12-21 | Stop reason: SDUPTHER

## 2020-09-21 RX ORDER — PIOGLITAZONEHYDROCHLORIDE 30 MG/1
30 TABLET ORAL DAILY
Qty: 90 TABLET | Refills: 1 | Status: SHIPPED
Start: 2020-09-21 | End: 2020-12-21 | Stop reason: SDUPTHER

## 2020-09-21 RX ORDER — GLUCOSAMINE HCL/CHONDROITIN SU 500-400 MG
CAPSULE ORAL
Qty: 200 STRIP | Refills: 5 | Status: SHIPPED
Start: 2020-09-21 | End: 2020-09-30 | Stop reason: SDUPTHER

## 2020-09-21 RX ORDER — MECLIZINE HYDROCHLORIDE 25 MG/1
TABLET ORAL
Qty: 60 TABLET | Refills: 1 | Status: CANCELLED | OUTPATIENT
Start: 2020-09-21

## 2020-09-21 RX ORDER — MAGNESIUM CHLORIDE 71.5 MG
2 TABLET, DELAYED RELEASE (ENTERIC COATED) ORAL DAILY
Qty: 90 TABLET | Refills: 1 | Status: SHIPPED
Start: 2020-09-21 | End: 2020-12-14

## 2020-09-21 NOTE — PROGRESS NOTES
myalgias.        She was complaining of muscle pain prior to stopping the Actonel-did not tolerate oral bisphosphonates-did wish to try Actonel 1 more time for now-was successful  Skin: Negative.    Allergic/Immunologic: Negative for environmental allergies and immunocompromised state. Neurological: Negative for dizziness, weakness, light-headedness, numbness and headaches. Hematological: Negative.         REST OF PERTINENT ROS GONE OVER AND WAS NEGATIVE.      PMH:  Problem List: Knee pain, Non-toxic nodular goiter, Essential hypertension, Goiter, Hyperlipidemia, Benign  essential hypertension, Type II diabetes mellitus uncontrolled  Health Maintenance:  Bone Density Test Screening - (12/3/2018)  Bone Density Scan - (12/3/2018)  Influenza Vaccination - (10/29/2018)  Colonoscopy - (7/25/2018)  7/18-due 23  Colonoscopy Screening - (7/25/2018)  Couseled on Home Safety - (6/13/2018)  Pneumonia Vaccination - (2/5/2018)  Mammogram - (12/20/2012)  Mammogram Screening - (12/20/2012)  Declining further  Tetanus Immunization - (1/18/2017)  Rectal Exam - 4/10,4/11  Breast Exam - 4/10,4/11  Hemmocult Cards - 9/12-neg x 3,5/14-neg x3, 6/16-neg x 3  EKG - 5/11,, 5/15, 3/17,5/17,3/18  2D ECHO - 1/17 , 1/18  Carotid Artery Study - 1/17-30-49% occlusion bilateral, 2/18  Stress Test - 4/17-neg,3/18-pos  Zoster/Shingles Vaccine - 2016  Medical Problems:  Non Insulin Dependent Diabetes, Hypertension, Hyperlipidemia  Goiter - multinodular-bx neg-dr hooper  GERD, Hypothyroidism  Osteoporosis - Bisphosphonate intolerant  Glaucoma, Renal Insufficiency  Aortic And Mitral Valve Disorders - 2D echo-1/17  VHD  Pulmonary HTN - mild  Macular Degeneration  WBCs in urine - Workup by urology-2017  Coronary Artery Disease (CAD) - Positive stress test 3/18  Diverticulosis, Diabetic Neuropathy, Vitamin B12 deficiency  Follows with - Cardiology, nephrology, ophthalmology, endocrine  Surgical Hx:  AKIN-right salpingo-oophorectomy - Fibroids  Appendectomy  Right cataract surgery - left also  Left knee arthroscopy, Removal of Gallbladder  FH:  Father:  MI.  Mother:  Cerebrovascular Accident (CVA). Brother 1:  Coronary Artery Disease (CAD) - 52's. 3 other brothers, 1 sister   SH:  Marital: . Personal Habits: Cigarette Use: Negative For current cigarette smoker. Alcohol: does not use  alcohol. 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 , Disp: 4 tablet, Rfl: 3    ramipril (ALTACE) 10 MG capsule, Take 1 capsule by mouth daily, Disp: 90 capsule, Rfl: 1    pravastatin (PRAVACHOL) 40 MG tablet, Take 1 tablet by mouth every other day, Disp: 45 tablet, Rfl: 1    pioglitazone (ACTOS) 30 MG tablet, Take 1 tablet by mouth daily, Disp: 90 tablet, Rfl: 1    metFORMIN (GLUCOPHAGE) 1000 MG tablet, Take 1 tablet by mouth 2 times daily (with meals), Disp: 180 tablet, Rfl: 1    magnesium cl-calcium carbonate (NU-MAG) 71.5-119 MG TBEC tablet, Take 2 tablets by mouth daily, Disp: 90 tablet, Rfl: 1    JANUVIA 50 MG tablet, Take 1 tablet by mouth daily, Disp: 90 tablet, Rfl: 1    glipiZIDE (GLUCOTROL XL) 10 MG extended release tablet, Take 1 tablet by mouth 2 times daily, Disp: 180 tablet, Rfl: 1    blood glucose monitor strips, Test 2 times a day & as needed for symptoms of irregular blood glucose. DX:  Type 2 DM, Disp: 200 strip, Rfl: 5    Lancets MISC, 1 each by Does not apply route 2 times daily Type 2 DM, Disp: 200 each, Rfl: 5    meclizine (ANTIVERT) 25 MG tablet, 1 po bid prn vertigo, Disp: 60 tablet, Rfl: 1    nitroGLYCERIN (NITROSTAT) 0.4 MG SL tablet, DISSOLVE ONE TABLET UNDER TONGUE AS NEEDED FOR CHEST PAINS.  MAY REPEAT IN 5 MINUTES.  IF SYMPTOMS PERSISTS CALL 911, Disp: 25 tablet, Rfl: 1    Cinnamon 500 MG CAPS, Take 1 capsule by mouth daily, Disp: , Rfl:     aspirin 81 MG tablet, Take 81 mg by mouth daily, Disp: , Rfl:     vitamin D (CHOLECALCIFEROL) 1000 UNIT TABS tablet, Take 1,000 Units by mouth daily, Disp: , Rfl:     vitamin B-12 (CYANOCOBALAMIN) 100 MCG tablet, Take 1,000 mcg by mouth daily , Disp: , Rfl:   Allergies   Allergen Reactions    Naproxen     Penicillins        Past Medical History:   Diagnosis Date    Aortic valve disorder     CAD (coronary artery disease)     Cancer [facility] [address]     glaucoma    Chest pain     [facility]     Diverticulosis     GERD (gastroesophageal reflux disease)     Goiter     Hyperlipidemia     Hypertension     Hypothyroidism     Macular degeneration     Mitral valve disorder     Neuropathy     Osteoporosis     VHD (valvular heart disease)     Vitamin B12 deficiency      Past Surgical History:   Procedure Laterality Date    APPENDECTOMY      BREAST BIOPSY Right     neg    CHOLECYSTECTOMY      EYE SURGERY Bilateral     cataract    HYSTERECTOMY      KNEE ARTHROPLASTY Left     TOOTH EXTRACTION       Family History   Problem Relation Age of Onset    Stroke Mother     Heart Disease Father 79        MI    Heart Attack Father     Heart Disease Brother     Coronary Art Dis Brother     Stroke Brother     Stroke Brother      Social History     Socioeconomic History    Marital status:      Spouse name: Not on file    Number of children: Not on file    Years of education: Not on file    Highest education level: Not on file   Occupational History    Not on file   Social Needs    Financial resource strain: Not on file    Food insecurity     Worry: Not on file     Inability: Not on file    Transportation needs     Medical: Not on file     Non-medical: Not on file   Tobacco Use    Smoking status: Never Smoker    Smokeless tobacco: Never Used Substance and Sexual Activity    Alcohol use: No    Drug use: Not on file    Sexual activity: Not on file   Lifestyle    Physical activity     Days per week: Not on file     Minutes per session: Not on file    Stress: Not on file   Relationships    Social connections     Talks on phone: Not on file     Gets together: Not on file     Attends Roman Catholic service: Not on file     Active member of club or organization: Not on file     Attends meetings of clubs or organizations: Not on file     Relationship status: Not on file    Intimate partner violence     Fear of current or ex partner: Not on file     Emotionally abused: Not on file     Physically abused: Not on file     Forced sexual activity: Not on file   Other Topics Concern    Not on file   Social History Narrative    Not on file       Vitals:    09/21/20 1423   BP: 124/60   Pulse: 78   Temp: 97.4 °F (36.3 °C)   TempSrc: Temporal   SpO2: 98%   Weight: 178 lb 3.2 oz (80.8 kg)   Height: 5' 2\" (1.575 m)       Physical Exam  Constitutional: She is oriented to person, place, and time. She appears well-developed and well-nourished. No distress. Eyes: Pupils are equal, round, and reactive to light. Conjunctivae and EOM are normal.      Neck: Normal range of motion. Neck supple. Thyromegaly present. Positive for goiter   Cardiovascular: Normal rate, regular rhythm and intact distal pulses. Exam reveals no gallop and no friction rub.   Murmur heard. Pulmonary/Chest: Effort normal and breath sounds normal. No respiratory distress. She has no wheezes. She has no rales. Abdominal: Soft. Bowel sounds are normal. She exhibits no distension and no mass. There is no tenderness.   Musculoskeletal: Normal range of motion.-Bilateral foot examination was performed.  Pulses were palpable.  She did have minimal decreased sensation to light touch to both feet.  She did have a corn to left second toe.   Lymphadenopathy:     She has no cervical adenopathy.     She has no axillary adenopathy.        Right: No inguinal adenopathy present.        Left: No inguinal adenopathy present. Neurological: She is alert and oriented to person, place, and time. She displays normal reflexes. No sensory deficit. She exhibits normal muscle tone. Coordination normal.   Skin: Skin is warm and dry. No rash noted. No erythema.   Psychiatric: She has a normal mood and affect. Her behavior is normal. Judgment and thought content normal.   Nursing note and vitals reviewed           Assessment and Plan: Marielle Ahmadi was seen today for hypertension. Diagnoses and all orders for this visit:    Type 2 diabetes mellitus with diabetic neuropathy, without long-term current use of insulin ([facility])  -     pioglitazone (ACTOS) 30 MG tablet; Take 1 tablet by mouth daily  -     magnesium cl-calcium carbonate (NU-MAG) 71.5-119 MG TBEC tablet; Take 2 tablets by mouth daily  -     glipiZIDE (GLUCOTROL XL) 10 MG extended release tablet; Take 1 tablet by mouth 2 times daily  -     Hemoglobin A1C; Future  -     Microalbumin / Creatinine Urine Ratio; Future    Essential hypertension  -     ramipril (ALTACE) 10 MG capsule; Take 1 capsule by mouth daily  -     CBC Auto Differential; Future  -     Comprehensive Metabolic Panel; Future    Osteoporosis without current pathological fracture, unspecified osteoporosis type  -     risedronate (ACTONEL) 35 MG tablet; Take 1 tablet by mouth every 7 days Take once per week in the morning with a full glass of water, on an empty stomach, and do not take anything else by mouth or lie down for the next 30 minutes. Hyperlipidemia, unspecified hyperlipidemia type  -     pravastatin (PRAVACHOL) 40 MG tablet; Take 1 tablet by mouth every other day  -     Lipid Panel; Future    Chronic kidney disease, stage 3 (moderate) ([facility])    Goiter  -     Cancel: T4, Free; Future  -     TSH without Reflex; Future    Other orders  -     metFORMIN (GLUCOPHAGE) 1000 MG tablet;  Take 1 tablet by mouth 2 times daily

## 2020-09-22 ENCOUNTER — TELEPHONE (OUTPATIENT)
Dept: FAMILY MEDICINE CLINIC | Age: 80
End: 2020-09-22

## 2020-09-22 RX ORDER — SITAGLIPTIN 50 MG/1
50 TABLET, FILM COATED ORAL DAILY
Qty: 90 TABLET | Refills: 1 | Status: SHIPPED
Start: 2020-09-22 | End: 2020-12-21 | Stop reason: SDUPTHER

## 2020-09-22 NOTE — TELEPHONE ENCOUNTER
Hemoglobin A1c down from 7.6-7.2. Rest of labs appear to be stable. Urine demonstrating site esterase and a few WBCs. Notify me if symptomatic.   Copy of labs to Dr. Juany Tomlin of endocrine and Dr. Tico Soto of renal

## 2020-09-22 NOTE — TELEPHONE ENCOUNTER
Tj Banda called about the script for Jakeuvia. It was sent to [facility] and needs to go to [facility]. I have this ready to send.

## 2020-09-30 ENCOUNTER — TELEPHONE (OUTPATIENT)
Dept: FAMILY MEDICINE CLINIC | Age: 80
End: 2020-09-30

## 2020-09-30 RX ORDER — GLUCOSAMINE HCL/CHONDROITIN SU 500-400 MG
CAPSULE ORAL
Qty: 200 STRIP | Refills: 5 | Status: SHIPPED
Start: 2020-09-30 | End: 2020-10-05 | Stop reason: SDUPTHER

## 2020-09-30 NOTE — TELEPHONE ENCOUNTER
Rizwan Thompson called about the glucose testing supplies that was sent to [facility]. She wants to get them from [facility] instead.

## 2020-10-05 RX ORDER — GLUCOSAMINE HCL/CHONDROITIN SU 500-400 MG
CAPSULE ORAL
Qty: 200 STRIP | Refills: 5 | Status: SHIPPED | OUTPATIENT
Start: 2020-10-05 | End: 2020-10-30 | Stop reason: SDUPTHER

## 2020-10-30 RX ORDER — GLUCOSAMINE HCL/CHONDROITIN SU 500-400 MG
CAPSULE ORAL
Qty: 100 STRIP | Refills: 5 | Status: SHIPPED | OUTPATIENT
Start: 2020-10-30 | End: 2021-01-25 | Stop reason: SDUPTHER

## 2020-12-21 ENCOUNTER — OFFICE VISIT (OUTPATIENT)
Dept: FAMILY MEDICINE CLINIC | Age: 80
End: 2020-12-21
Payer: MEDICARE

## 2020-12-21 VITALS
BODY MASS INDEX: 33.71 KG/M2 | HEIGHT: 62 IN | OXYGEN SATURATION: 98 % | WEIGHT: 183.2 LBS | DIASTOLIC BLOOD PRESSURE: 60 MMHG | SYSTOLIC BLOOD PRESSURE: 116 MMHG | HEART RATE: 84 BPM | TEMPERATURE: 98.1 F

## 2020-12-21 PROCEDURE — 1090F PRES/ABSN URINE INCON ASSESS: CPT | Performed by: INTERNAL MEDICINE

## 2020-12-21 PROCEDURE — 3051F HG A1C>EQUAL 7.0%<8.0%: CPT | Performed by: INTERNAL MEDICINE

## 2020-12-21 PROCEDURE — G8417 CALC BMI ABV UP PARAM F/U: HCPCS | Performed by: INTERNAL MEDICINE

## 2020-12-21 PROCEDURE — G8400 PT W/DXA NO RESULTS DOC: HCPCS | Performed by: INTERNAL MEDICINE

## 2020-12-21 PROCEDURE — G8484 FLU IMMUNIZE NO ADMIN: HCPCS | Performed by: INTERNAL MEDICINE

## 2020-12-21 PROCEDURE — 1123F ACP DISCUSS/DSCN MKR DOCD: CPT | Performed by: INTERNAL MEDICINE

## 2020-12-21 PROCEDURE — G0008 ADMIN INFLUENZA VIRUS VAC: HCPCS | Performed by: INTERNAL MEDICINE

## 2020-12-21 PROCEDURE — 99214 OFFICE O/P EST MOD 30 MIN: CPT | Performed by: INTERNAL MEDICINE

## 2020-12-21 PROCEDURE — G8427 DOCREV CUR MEDS BY ELIG CLIN: HCPCS | Performed by: INTERNAL MEDICINE

## 2020-12-21 PROCEDURE — 4040F PNEUMOC VAC/ADMIN/RCVD: CPT | Performed by: INTERNAL MEDICINE

## 2020-12-21 PROCEDURE — 1036F TOBACCO NON-USER: CPT | Performed by: INTERNAL MEDICINE

## 2020-12-21 PROCEDURE — 90694 VACC AIIV4 NO PRSRV 0.5ML IM: CPT | Performed by: INTERNAL MEDICINE

## 2020-12-21 RX ORDER — SITAGLIPTIN 50 MG/1
50 TABLET, FILM COATED ORAL DAILY
Qty: 90 TABLET | Refills: 1 | Status: SHIPPED
Start: 2020-12-21 | End: 2021-10-15 | Stop reason: SDUPTHER

## 2020-12-21 RX ORDER — RAMIPRIL 10 MG/1
10 CAPSULE ORAL DAILY
Qty: 90 CAPSULE | Refills: 1 | Status: ON HOLD
Start: 2020-12-21 | End: 2021-06-17 | Stop reason: HOSPADM

## 2020-12-21 RX ORDER — PIOGLITAZONEHYDROCHLORIDE 30 MG/1
30 TABLET ORAL DAILY
Qty: 90 TABLET | Refills: 1 | Status: SHIPPED
Start: 2020-12-21 | End: 2021-06-21 | Stop reason: SDUPTHER

## 2020-12-22 NOTE — PROGRESS NOTES
[address] [facility]     20  Daphne Covington : 1940 Sex: female  Age: 68 y.o. Chief Complaint   Patient presents with    Diabetes    Hypertension       HPI  Patient presents today for 3-month follow-up visit on her multiple medical problems. She is accompanied by her  today as she usually is. She brings in a list of her blood sugars and blood pressures which look to be in good range. Last hemoglobin A1c had come down to 7.2. This was one of her better numbers in a long time. She tends to run anywhere from 7.3-7.6 for quite some time now. I told her at this stage in time I felt that was adequate given age and other medical issues. Her lipids have been in a good range on her statin medication. She is tolerating this okay. Weight unfortunately is up 5 pounds from last visit. Her eyes and feet are up-to-date with her diabetes.  No retinopathy no foot sores  She is up-to-date with consultants including renal for chronic kidney disease, ophthalmology, endocrine for her thyroid, cardiology for her aortic stenosis and abnormal stress test for which they are recommending medical management onlly, and previously with ear nose and throat who recommended no thyroid biopsy at that point but simply ultrasound follow-up. She actually does need to see cardiology who she has not seen for a while       Review of Systems   Constitutional: Negative for activity change, appetite change, chills, diaphoresis, fatigue, fever and unexpected weight change. HENT: Negative for congestion, ear pain, hearing loss, postnasal drip, rhinorrhea and sinus pain.    Respiratory: Negative for cough, shortness of breath and wheezing.    Cardiovascular: Negative for chest pain, palpitations and leg swelling.    Gastrointestinal: Negative for abdominal pain, blood in stool, constipation, diarrhea, nausea and vomiting.        Symptoms  improved post cholecystectomy.   Endocrine:  Type 2 diabetes, goiter  Genitourinary: Negative for difficulty urinating, dysuria, frequency, hematuria and urgency. Musculoskeletal: Positive for arthralgias. Negative for back pain, gait problem and myalgias.        She was complaining of muscle pain prior to stopping the Actonel-did not tolerate oral bisphosphonates-did wish to try Actonel 1 more time for now-was successful  Skin: Negative.    Allergic/Immunologic: Negative for environmental allergies and immunocompromised state. Neurological: Negative for dizziness, weakness, light-headedness, numbness and headaches. Hematological: Negative. REST OF PERTINENT ROS GONE OVER AND WAS NEGATIVE.      PMH:  Problem List: Knee pain, Non-toxic nodular goiter, Essential hypertension, Goiter, Hyperlipidemia, Benign  essential hypertension, Type II diabetes mellitus uncontrolled  Health Maintenance:  Bone Density Test Screening - (12/3/2018)  Bone Density Scan - (12/3/2018)  Influenza Vaccination - (10/29/2018)  Colonoscopy - (7/25/2018)  7/18-due 23  Colonoscopy Screening - (7/25/2018)  Couseled on Home Safety - (6/13/2018)  Pneumonia Vaccination - (2/5/2018)  Mammogram - (12/20/2012)  Mammogram Screening - (12/20/2012)  Declining further  Tetanus Immunization - (1/18/2017)  Rectal Exam - 4/10,4/11  Breast Exam - 4/10,4/11  Hemmocult Cards - 9/12-neg x 3,5/14-neg x3, 6/16-neg x 3  EKG - 5/11,, 5/15, 3/17,5/17,3/18  2D ECHO - 1/17 , 1/18  Carotid Artery Study - 1/17-30-49% occlusion bilateral, 2/18  Stress Test - 4/17-neg,3/18-pos  Zoster/Shingles Vaccine - 2016  Medical Problems:  Non Insulin Dependent Diabetes, Hypertension, Hyperlipidemia  Goiter - multinodular-bx neg-dr hooper  GERD, Hypothyroidism  Osteoporosis - Bisphosphonate intolerant  Glaucoma, Renal Insufficiency  Aortic And Mitral Valve Disorders - 2D echo-1/17  VHD  Pulmonary HTN - mild  Macular Degeneration  WBCs in urine - Workup by urology-2017  Coronary Artery Disease (CAD) - Positive stress test 3/18 Diverticulosis, Diabetic Neuropathy, Vitamin B12 deficiency  Follows with - Cardiology, nephrology, ophthalmology, endocrine  Surgical Hx:  AKIN-right salpingo-oophorectomy - Fibroids  Appendectomy  Right cataract surgery - left also  Left knee arthroscopy, Removal of Gallbladder  FH:  Father:  MI.  Mother:  Cerebrovascular Accident (CVA). Brother 1:  Coronary Artery Disease (CAD) - 52's. 3 other brothers, 1 sister   SH:  Marital: . Personal Habits: Cigarette Use: Negative For current cigarette smoker. Alcohol: does not use  alcohol. 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               Current Outpatient Medications:     ramipril (ALTACE) 10 MG capsule, Take 1 capsule by mouth daily, Disp: 90 capsule, Rfl: 1    pioglitazone (ACTOS) 30 MG tablet, Take 1 tablet by mouth daily, Disp: 90 tablet, Rfl: 1    metFORMIN (GLUCOPHAGE) 1000 MG tablet, Take 1 tablet by mouth 2 times daily (with meals), Disp: 180 tablet, Rfl: 1    JANUVIA 50 MG tablet, Take 1 tablet by mouth daily, Disp: 90 tablet, Rfl: 1    NU-MAG 71.5-119 MG TBEC tablet, TAKE TWO TABLETS BY MOUTH DAILY, Disp: 180 tablet, Rfl: 1    blood glucose monitor strips, Test once a day & as needed for symptoms of irregular blood glucose. DX: Type 2 DM, Disp: 100 strip, Rfl: 5    risedronate (ACTONEL) 35 MG tablet, Take 1 tablet by mouth every 7 days Take once per week in the morning with a full glass of water, on an empty stomach, and do not take anything else by mouth or lie down for the next 30 minutes. , Disp: 4 tablet, Rfl: 3    pravastatin (PRAVACHOL) 40 MG tablet, Take 1 tablet by mouth every other day, Disp: 45 tablet, Rfl: 1    glipiZIDE (GLUCOTROL XL) 10 MG extended release tablet, Take 1 tablet by mouth 2 times daily, Disp: 180 tablet, Rfl: 1    Lancets MISC, 1 each by Does not apply route 2 times daily Type 2 DM, Disp: 200 each, Rfl: 5   meclizine (ANTIVERT) 25 MG tablet, 1 po bid prn vertigo, Disp: 60 tablet, Rfl: 1    nitroGLYCERIN (NITROSTAT) 0.4 MG SL tablet, DISSOLVE ONE TABLET UNDER TONGUE AS NEEDED FOR CHEST PAINS. MAY REPEAT IN 5 MINUTES.  IF SYMPTOMS PERSISTS CALL 911, Disp: 25 tablet, Rfl: 1    Cinnamon 500 MG CAPS, Take 1 capsule by mouth daily, Disp: , Rfl:     aspirin 81 MG tablet, Take 81 mg by mouth daily, Disp: , Rfl:     vitamin D (CHOLECALCIFEROL) 1000 UNIT TABS tablet, Take 1,000 Units by mouth daily, Disp: , Rfl:     vitamin B-12 (CYANOCOBALAMIN) 100 MCG tablet, Take 1,000 mcg by mouth daily , Disp: , Rfl:   Allergies   Allergen Reactions    Naproxen     Penicillins        Past Medical History:   Diagnosis Date    Aortic valve disorder     CAD (coronary artery disease)     Cancer ([facility] [address]     glaucoma    Chest pain     Diabetes ([facility]     Diverticulosis     GERD (gastroesophageal reflux disease)     Goiter     Hyperlipidemia     Hypertension     Hypothyroidism     Macular degeneration     Mitral valve disorder     Neuropathy     Osteoporosis     VHD (valvular heart disease)     Vitamin B12 deficiency      Past Surgical History:   Procedure Laterality Date    APPENDECTOMY      BREAST BIOPSY Right     neg    CHOLECYSTECTOMY      EYE SURGERY Bilateral     cataract    HYSTERECTOMY      KNEE ARTHROPLASTY Left     TOOTH EXTRACTION       Family History   Problem Relation Age of Onset    Stroke Mother     Heart Disease Father 79        MI    Heart Attack Father     Heart Disease Brother     Coronary Art Dis Brother     Stroke Brother     Stroke Brother      Social History     Socioeconomic History    Marital status:      Spouse name: Not on file    Number of children: Not on file    Years of education: Not on file    Highest education level: Not on file   Occupational History    Not on file   Social Needs    Financial resource strain: Not on file    Food insecurity Worry: Not on file     Inability: Not on file    Transportation needs     Medical: Not on file     Non-medical: Not on file   Tobacco Use    Smoking status: Never Smoker    Smokeless tobacco: Never Used   Substance and Sexual Activity    Alcohol use: No    Drug use: Not on file    Sexual activity: Not on file   Lifestyle    Physical activity     Days per week: Not on file     Minutes per session: Not on file    Stress: Not on file   Relationships    Social connections     Talks on phone: Not on file     Gets together: Not on file     Attends Spiritism service: Not on file     Active member of club or organization: Not on file     Attends meetings of clubs or organizations: Not on file     Relationship status: Not on file    Intimate partner violence     Fear of current or ex partner: Not on file     Emotionally abused: Not on file     Physically abused: Not on file     Forced sexual activity: Not on file   Other Topics Concern    Not on file   Social History Narrative    Not on file       Vitals:    12/21/20 1510   BP: 116/60   Pulse: 84   Temp: 98.1 °F (36.7 °C)   TempSrc: Temporal   SpO2: 98%   Weight: 183 lb 3.2 oz (83.1 kg)   Height: 5' 2\" (1.575 m)       Physical Exam    Constitutional: She is oriented to person, place, and time. She appears well-developed and well-nourished. No distress. Eyes: Pupils are equal, round, and reactive to light. Conjunctivae and EOM are normal.      Neck: Normal range of motion. Neck supple. Thyromegaly present. Positive for goiter   Cardiovascular: Normal rate, regular rhythm and intact distal pulses. Exam reveals no gallop and no friction rub.   Murmur heard. Pulmonary/Chest: Effort normal and breath sounds normal. No respiratory distress. She has no wheezes. She has no rales. Abdominal: Soft. Bowel sounds are normal. She exhibits no distension and no mass.  There is no tenderness.  Musculoskeletal: Normal range of motion.-Bilateral foot examination was performed.  Pulses were palpable.  She did have minimal decreased sensation to light touch to both feet.  She did have a corn to left second toe. Lymphadenopathy:     She has no cervical adenopathy.     She has no axillary adenopathy.        Right: No inguinal adenopathy present.        Left: No inguinal adenopathy present. Neurological: She is alert and oriented to person, place, and time. She displays normal reflexes. No sensory deficit. She exhibits normal muscle tone. Coordination normal.   Skin: Skin is warm and dry. No rash noted. No erythema.   Psychiatric: She has a normal mood and affect. Her behavior is normal. Judgment and thought content normal.   Nursing note and vitals reviewed           Assessment and Plan: Micki was seen today for diabetes and hypertension. Diagnoses and all orders for this visit:    Osteoporosis without current pathological fracture, unspecified osteoporosis type  Stable on bisphosphonate    Essential hypertension  -     ramipril (ALTACE) 10 MG capsule; Take 1 capsule by mouth daily  -     Cancel: CBC Auto Differential; Future  -     Cancel: Comprehensive Metabolic Panel; Future  -     CBC Auto Differential; Future  -     Comprehensive Metabolic Panel; Future  Stable on antihypertensive medication    Type 2 diabetes mellitus with diabetic neuropathy, without long-term current use of insulin (HCC)  -     pioglitazone (ACTOS) 30 MG tablet; Take 1 tablet by mouth daily  -     Cancel: Hemoglobin A1C; Future  -     Cancel: Microalbumin / Creatinine Urine Ratio; Future  -     Hemoglobin A1C; Future  -     Microalbumin / Creatinine Urine Ratio; Future  Stable on diabetic medication    Goiter  -     Cancel: TSH without Reflex; Future  -     TSH without Reflex; Future  Stable and following with endocrine    Hyperlipidemia, unspecified hyperlipidemia type  -     Cancel: Lipid Panel;  Future -     Lipid Panel; Future  Stable on statin medication    Stage 3b chronic kidney disease  Stable and following with renal    Other orders  -     metFORMIN (GLUCOPHAGE) 1000 MG tablet; Take 1 tablet by mouth 2 times daily (with meals)  -     JANUVIA 50 MG tablet; Take 1 tablet by mouth daily  -     INFLUENZA, QUADV, ADJUVANTED, 65 YRS =, IM, PF, PREFILL SYR, 0.5ML (FLUAD)    Plan: Continue to monitor blood pressure and blood sugar closely. Prescription management performed. She will have blood work done in [place] office in a couple weeks fasting to monitor disease progression and medication use. Follow-up with above consultants. I told her she needs to call cardiology and get back into see them again. I stressed compliance with this. Fall precautions. Form was filled out for her diabetic shoes also. Return in about 3 months (around 3/21/2021). Seen By:  Kalyn Russo MD      *Document was created using voice recognition software. Note was reviewed however may contain grammatical errors.

## 2021-01-06 ENCOUNTER — TELEPHONE (OUTPATIENT)
Dept: FAMILY MEDICINE CLINIC | Age: 81
End: 2021-01-06

## 2021-01-06 DIAGNOSIS — N18.9 CHRONIC KIDNEY DISEASE, UNSPECIFIED CKD STAGE: Primary | ICD-10-CM

## 2021-01-06 LAB
A/G RATIO: 1 RATIO (ref 1.1–2.2)
ALBUMIN SERPL-MCNC: 3.6 G/DL (ref 3.4–4.8)
ALP BLD-CCNC: 56 U/L (ref 42–121)
ALT SERPL-CCNC: 14 U/L (ref 10–54)
ANION GAP SERPL CALCULATED.3IONS-SCNC: 10 MEQ/L (ref 3–11)
AST SERPL-CCNC: 17 U/L (ref 10–41)
BASOPHILS ABSOLUTE: 0 K/UL (ref 0–0.2)
BASOPHILS RELATIVE PERCENT: 0.3 % (ref 0–1.5)
BILIRUB SERPL-MCNC: 1 MG/DL (ref 0.3–1.5)
BUN BLDV-MCNC: 27 MG/DL (ref 8–21)
CALCIUM SERPL-MCNC: 9 MG/DL (ref 8.5–10.5)
CHLORIDE BLD-SCNC: 103 MEQ/L (ref 98–107)
CHOLESTEROL: 222 MG/DL (ref 140–200)
CO2: 23 MEQ/L (ref 21–31)
CREAT SERPL-MCNC: 1.7 MG/DL (ref 0.4–1)
CREATININE + EGFR PANEL: 35 ML/MIN
CREATININE URINE: 123.9 MG/DL (ref 22–328)
EOSINOPHILS ABSOLUTE: 0.2 K/UL (ref 0–0.33)
EOSINOPHILS RELATIVE PERCENT: 3.7 % (ref 0–3)
ESTIMATED AVERAGE GLUCOSE: 171 MG/DL
GFR NON-AFRICAN AMERICAN: 29 ML/MIN
GLOBULIN: 3.5 G/DL (ref 1.9–3.9)
GLUCOSE BLD-MCNC: 122 MG/DL (ref 70–99)
HBA1C MFR BLD: 7.6 % (ref 4–6)
HCT VFR BLD CALC: 37.3 % (ref 36–44)
HDLC SERPL-MCNC: 56 MG/DL (ref 35–85)
HEMOGLOBIN: 12.2 G/DL (ref 12–15)
LDL CHOLESTEROL: 129 MG/DL
LDL/HDL RATIO: 2.3 RATIO
LYMPHOCYTES ABSOLUTE: 1.3 K/UL (ref 1.1–4.8)
LYMPHOCYTES RELATIVE PERCENT: 24.9 % (ref 24–44)
MCH RBC QN AUTO: 32.3 PG (ref 28–34)
MCHC RBC AUTO-ENTMCNC: 32.7 G/DL (ref 33–37)
MCV RBC AUTO: 98.8 FL (ref 80–100)
MICROALBUMIN UR-MCNC: 5.6 UG/ML
MICROALBUMIN/CREAT UR-RTO: 4.5 MG/G
MONOCYTES ABSOLUTE: 0.5 K/UL (ref 0.2–0.7)
MONOCYTES RELATIVE PERCENT: 9.6 % (ref 3.4–9)
NEUTROPHILS ABSOLUTE: 3.2 K/UL (ref 1.83–8.7)
PDW BLD-RTO: 13.8 % (ref 10.9–14.3)
PLATELET # BLD: 228 K/UL (ref 150–450)
PMV BLD AUTO: 8.9 FL (ref 7.4–10.4)
POTASSIUM SERPL-SCNC: 4.6 MEQ/L (ref 3.6–5)
RBC # BLD: 3.77 M/UL (ref 4–4.9)
SEGMENTED NEUTROPHILS RELATIVE PERCENT: 61.5 % (ref 40–74)
SODIUM BLD-SCNC: 136 MEQ/L (ref 135–145)
TOTAL PROTEIN: 7.1 G/DL (ref 5.9–7.8)
TRIGL SERPL-MCNC: 180 MG/DL (ref 41–189)
WBC # BLD: 5.2 K/UL (ref 4.5–11)

## 2021-01-16 DIAGNOSIS — M81.0 OSTEOPOROSIS WITHOUT CURRENT PATHOLOGICAL FRACTURE, UNSPECIFIED OSTEOPOROSIS TYPE: ICD-10-CM

## 2021-01-18 RX ORDER — RISEDRONATE SODIUM 35 MG/1
35 TABLET, FILM COATED ORAL
Qty: 4 TABLET | Refills: 0 | OUTPATIENT
Start: 2021-01-18

## 2021-01-18 NOTE — TELEPHONE ENCOUNTER
Last Appointment:  12/21/2020  Future Appointments   Date Time Provider Dioni Darling   3/22/2021  1:45 PM Mary Putnam  W [facility]

## 2021-01-25 ENCOUNTER — TELEPHONE (OUTPATIENT)
Dept: FAMILY MEDICINE CLINIC | Age: 81
End: 2021-01-25

## 2021-01-25 DIAGNOSIS — N18.9 CHRONIC KIDNEY DISEASE, UNSPECIFIED CKD STAGE: Primary | ICD-10-CM

## 2021-01-25 DIAGNOSIS — E11.9 TYPE 2 DIABETES MELLITUS WITHOUT COMPLICATION, UNSPECIFIED WHETHER LONG TERM INSULIN USE (HCC): ICD-10-CM

## 2021-01-25 DIAGNOSIS — N18.9 CHRONIC KIDNEY DISEASE, UNSPECIFIED CKD STAGE: ICD-10-CM

## 2021-01-25 LAB
ANION GAP SERPL CALCULATED.3IONS-SCNC: 12 MMOL/L (ref 7–16)
BUN BLDV-MCNC: 18 MG/DL (ref 8–23)
CALCIUM SERPL-MCNC: 9.5 MG/DL (ref 8.6–10.2)
CHLORIDE BLD-SCNC: 105 MMOL/L (ref 98–107)
CO2: 25 MMOL/L (ref 22–29)
CREAT SERPL-MCNC: 1.4 MG/DL (ref 0.5–1)
GFR AFRICAN AMERICAN: 44
GFR NON-AFRICAN AMERICAN: 36 ML/MIN/1.73
GLUCOSE BLD-MCNC: 183 MG/DL (ref 74–99)
MAGNESIUM: 2.2 MG/DL (ref 1.6–2.6)
POTASSIUM SERPL-SCNC: 4.5 MMOL/L (ref 3.5–5)
SODIUM BLD-SCNC: 142 MMOL/L (ref 132–146)

## 2021-01-25 RX ORDER — GLUCOSAMINE HCL/CHONDROITIN SU 500-400 MG
CAPSULE ORAL
Qty: 100 STRIP | Refills: 5 | Status: ON HOLD | OUTPATIENT
Start: 2021-01-25 | End: 2021-06-22

## 2021-01-25 RX ORDER — LANCETS 30 GAUGE
1 EACH MISCELLANEOUS 2 TIMES DAILY
Qty: 200 EACH | Refills: 5 | Status: ON HOLD | OUTPATIENT
Start: 2021-01-25 | End: 2021-06-22

## 2021-01-26 NOTE — TELEPHONE ENCOUNTER
Kidney function has improved. I will continue to keep her off of Metformin and Actonel for now.   Continue to monitor blood sugars

## 2021-02-11 ENCOUNTER — TELEPHONE (OUTPATIENT)
Dept: FAMILY MEDICINE CLINIC | Age: 81
End: 2021-02-11

## 2021-02-11 DIAGNOSIS — F41.9 ANXIETY: Primary | ICD-10-CM

## 2021-02-11 RX ORDER — ALPRAZOLAM 0.25 MG/1
0.25 TABLET ORAL 2 TIMES DAILY PRN
Qty: 30 TABLET | Refills: 0 | Status: SHIPPED | OUTPATIENT
Start: 2021-02-11 | End: 2021-03-13

## 2021-02-11 NOTE — TELEPHONE ENCOUNTER
Candie Avila calling her  Carisa Rutledge has passed away. Will you give her a short rx to take the edge off to [facility] ?

## 2021-02-25 ENCOUNTER — TELEPHONE (OUTPATIENT)
Dept: FAMILY MEDICINE CLINIC | Age: 81
End: 2021-02-25

## 2021-02-25 DIAGNOSIS — Z12.31 OTHER SCREENING MAMMOGRAM: Primary | ICD-10-CM

## 2021-02-25 NOTE — TELEPHONE ENCOUNTER
[facility] calling will you order annual mammogram? She wants to get at our office on 03/22.

## 2021-03-15 DIAGNOSIS — E11.40 TYPE 2 DIABETES MELLITUS WITH DIABETIC NEUROPATHY, WITHOUT LONG-TERM CURRENT USE OF INSULIN (HCC): ICD-10-CM

## 2021-03-15 RX ORDER — GLIPIZIDE 10 MG/1
TABLET, FILM COATED, EXTENDED RELEASE ORAL
Qty: 180 TABLET | Refills: 1 | Status: SHIPPED
Start: 2021-03-15 | End: 2021-09-27 | Stop reason: SDUPTHER

## 2021-03-15 NOTE — TELEPHONE ENCOUNTER
Last Appointment:  12/21/2020  Future Appointments   Date Time Provider Dioni Menesesi   3/22/2021  1:45 PM Eulogio Becerra  W [address] [place] [address]   3/22/2021  3:00 PM CHANELL Funez [facility]   4/8/2021  2:40 PM Makayla Garcia MD [address] [facility]

## 2021-03-22 ENCOUNTER — OFFICE VISIT (OUTPATIENT)
Dept: FAMILY MEDICINE CLINIC | Age: 81
End: 2021-03-22
Payer: MEDICARE

## 2021-03-22 VITALS
HEART RATE: 90 BPM | WEIGHT: 180 LBS | TEMPERATURE: 97.5 F | BODY MASS INDEX: 33.13 KG/M2 | DIASTOLIC BLOOD PRESSURE: 48 MMHG | SYSTOLIC BLOOD PRESSURE: 122 MMHG | OXYGEN SATURATION: 98 % | HEIGHT: 62 IN

## 2021-03-22 DIAGNOSIS — I10 ESSENTIAL HYPERTENSION: ICD-10-CM

## 2021-03-22 DIAGNOSIS — N18.9 CHRONIC KIDNEY DISEASE, UNSPECIFIED CKD STAGE: ICD-10-CM

## 2021-03-22 DIAGNOSIS — E11.40 TYPE 2 DIABETES MELLITUS WITH DIABETIC NEUROPATHY, WITHOUT LONG-TERM CURRENT USE OF INSULIN (HCC): ICD-10-CM

## 2021-03-22 DIAGNOSIS — Z00.00 ROUTINE GENERAL MEDICAL EXAMINATION AT A HEALTH CARE FACILITY: Primary | ICD-10-CM

## 2021-03-22 DIAGNOSIS — E78.5 HYPERLIPIDEMIA, UNSPECIFIED HYPERLIPIDEMIA TYPE: ICD-10-CM

## 2021-03-22 DIAGNOSIS — M81.0 OSTEOPOROSIS, UNSPECIFIED OSTEOPOROSIS TYPE, UNSPECIFIED PATHOLOGICAL FRACTURE PRESENCE: ICD-10-CM

## 2021-03-22 DIAGNOSIS — E04.9 GOITER: ICD-10-CM

## 2021-03-22 PROCEDURE — 4040F PNEUMOC VAC/ADMIN/RCVD: CPT | Performed by: INTERNAL MEDICINE

## 2021-03-22 PROCEDURE — G0438 PPPS, INITIAL VISIT: HCPCS | Performed by: INTERNAL MEDICINE

## 2021-03-22 PROCEDURE — 1123F ACP DISCUSS/DSCN MKR DOCD: CPT | Performed by: INTERNAL MEDICINE

## 2021-03-22 PROCEDURE — G8417 CALC BMI ABV UP PARAM F/U: HCPCS | Performed by: INTERNAL MEDICINE

## 2021-03-22 PROCEDURE — 1090F PRES/ABSN URINE INCON ASSESS: CPT | Performed by: INTERNAL MEDICINE

## 2021-03-22 PROCEDURE — 1036F TOBACCO NON-USER: CPT | Performed by: INTERNAL MEDICINE

## 2021-03-22 PROCEDURE — 3051F HG A1C>EQUAL 7.0%<8.0%: CPT | Performed by: INTERNAL MEDICINE

## 2021-03-22 PROCEDURE — G8427 DOCREV CUR MEDS BY ELIG CLIN: HCPCS | Performed by: INTERNAL MEDICINE

## 2021-03-22 PROCEDURE — 99214 OFFICE O/P EST MOD 30 MIN: CPT | Performed by: INTERNAL MEDICINE

## 2021-03-22 PROCEDURE — G8400 PT W/DXA NO RESULTS DOC: HCPCS | Performed by: INTERNAL MEDICINE

## 2021-03-22 PROCEDURE — G8484 FLU IMMUNIZE NO ADMIN: HCPCS | Performed by: INTERNAL MEDICINE

## 2021-03-22 RX ORDER — PRAVASTATIN SODIUM 40 MG
40 TABLET ORAL EVERY OTHER DAY
Qty: 45 TABLET | Refills: 1 | Status: ON HOLD
Start: 2021-03-22 | End: 2021-06-17 | Stop reason: HOSPADM

## 2021-03-22 ASSESSMENT — LIFESTYLE VARIABLES
HOW OFTEN DO YOU HAVE A DRINK CONTAINING ALCOHOL: NEVER
AUDIT-C TOTAL SCORE: INCOMPLETE
AUDIT TOTAL SCORE: INCOMPLETE

## 2021-03-22 ASSESSMENT — PATIENT HEALTH QUESTIONNAIRE - PHQ9
SUM OF ALL RESPONSES TO PHQ9 QUESTIONS 1 & 2: 0
1. LITTLE INTEREST OR PLEASURE IN DOING THINGS: 0
SUM OF ALL RESPONSES TO PHQ QUESTIONS 1-9: 0
SUM OF ALL RESPONSES TO PHQ9 QUESTIONS 1 & 2: 0
2. FEELING DOWN, DEPRESSED OR HOPELESS: 0
2. FEELING DOWN, DEPRESSED OR HOPELESS: 0
SUM OF ALL RESPONSES TO PHQ QUESTIONS 1-9: 0
1. LITTLE INTEREST OR PLEASURE IN DOING THINGS: 0

## 2021-03-22 NOTE — PATIENT INSTRUCTIONS
Personalized Preventive Plan for Chriss Sarabia - 3/22/2021  Medicare offers a range of preventive health benefits. Some of the tests and screenings are paid in full while other may be subject to a deductible, co-insurance, and/or copay. Some of these benefits include a comprehensive review of your medical history including lifestyle, illnesses that may run in your family, and various assessments and screenings as appropriate. After reviewing your medical record and screening and assessments performed today your provider may have ordered immunizations, labs, imaging, and/or referrals for you. A list of these orders (if applicable) as well as your Preventive Care list are included within your After Visit Summary for your review. Other Preventive Recommendations:    · A preventive eye exam performed by an eye specialist is recommended every 1-2 years to screen for glaucoma; cataracts, macular degeneration, and other eye disorders. · A preventive dental visit is recommended every 6 months. · Try to get at least 150 minutes of exercise per week or 10,000 steps per day on a pedometer . · Order or download the FREE \"Exercise & Physical Activity: Your Everyday Guide\" from [employer]. Call 3-350.519.7712 or search [employer] online. · You need 1792-9867 mg of calcium and 1380-4440 IU of vitamin D per day. It is possible to meet your calcium requirement with diet alone, but a vitamin D supplement is usually necessary to meet this goal.  · When exposed to the sun, use a sunscreen that protects against both UVA and UVB radiation with an SPF of 30 or greater. Reapply every 2 to 3 hours or after sweating, drying off with a towel, or swimming. · Always wear a seat belt when traveling in a car. Always wear a helmet when riding a bicycle or motorcycle.

## 2021-03-22 NOTE — PROGRESS NOTES
Medicare Annual Wellness Visit  Name: Rufina Carrasco Date: 3/22/2021   MRN: [id number] Sex: Female   Age: 68 y.o. Ethnicity: Non-/Non    : 1940 Race: Nora Covington is here for Medicare AWV    Screenings for behavioral, psychosocial and functional/safety risks, and cognitive dysfunction are all negative except as indicated below. These results, as well as other patient data from the [address] form, are documented in Flowsheets linked to this Encounter. Allergies   Allergen Reactions    Naproxen     Penicillins        Prior to Visit Medications    Medication Sig Taking? Authorizing Provider   pravastatin (PRAVACHOL) 40 MG tablet Take 1 tablet by mouth every other day  Dot Graves MD   glipiZIDE (GLUCOTROL XL) 10 MG extended release tablet TAKE ONE TABLET BY MOUTH TWO TIMES A DAY  oDt Graves MD   blood glucose monitor strips Test once a day & as needed for symptoms of irregular blood glucose. DX: Type 2 DM  Dot Graves MD   Lancets MISC 1 each by Does not apply route 2 times daily Type 2 DM  Dot Graves MD   ramipril (ALTACE) 10 MG capsule Take 1 capsule by mouth daily  Dot Graves MD   pioglitazone (ACTOS) 30 MG tablet Take 1 tablet by mouth daily  Dot Graves MD   JANUVIA 50 MG tablet Take 1 tablet by mouth daily  Dot Graves, MD   NU-MAG 71.5-119 MG TBEC tablet TAKE TWO TABLETS BY MOUTH DAILY  Dot Graves MD   meclizine (ANTIVERT) 25 MG tablet 1 po bid prn vertigo  Dot Graves MD   nitroGLYCERIN (NITROSTAT) 0.4 MG SL tablet DISSOLVE ONE TABLET UNDER TONGUE AS NEEDED FOR CHEST PAINS. MAY REPEAT IN 5 MINUTES.  IF SYMPTOMS PERSISTS CALL Lindsey Watt MD   Cinnamon 500 MG CAPS Take 1 capsule by mouth daily  Historical Provider, MD   aspirin 81 MG tablet Take 81 mg by mouth daily  Historical Provider, MD   vitamin D (CHOLECALCIFEROL) 1000 UNIT TABS tablet Take 1,000 Units by mouth daily  Historical Provider, MD   vitamin B-12 (CYANOCOBALAMIN) 100 MCG tablet Take 1,000 mcg by mouth daily   Historical Provider, MD       Past Medical History:   Diagnosis Date    Aortic valve disorder     CAD (coronary artery disease)     Cancer ([facility] [address]     glaucoma    Chest pain     Diabetes ([address]     Diverticulosis     GERD (gastroesophageal reflux disease)     Goiter     Hyperlipidemia     Hypertension     Hypothyroidism     Macular degeneration     Mitral valve disorder     Neuropathy     Osteoporosis     VHD (valvular heart disease)     Vitamin B12 deficiency        Past Surgical History:   Procedure Laterality Date    APPENDECTOMY      BREAST BIOPSY Right     neg    CHOLECYSTECTOMY      EYE SURGERY Bilateral     cataract    HYSTERECTOMY      KNEE ARTHROPLASTY Left     TOOTH EXTRACTION         Family History   Problem Relation Age of Onset    Stroke Mother     Heart Disease Father 79        MI    Heart Attack Father     Heart Disease Brother     Coronary Art Dis Brother     Stroke Brother     Stroke Brother        CareTeam (Including outside providers/suppliers regularly involved in providing care):   Patient Care Team:  Tian Best MD as PCP - General (Internal Medicine)  Tian Best MD as PCP - [facility] Empaneled Provider  Daisy Green MD as Consulting Physician (Cardiology)  Flora Doll MD (General Surgery)    Wt Readings from Last 3 Encounters:   03/22/21 180 lb (81.6 kg)   12/21/20 183 lb 3.2 oz (83.1 kg)   09/21/20 178 lb 3.2 oz (80.8 kg)     There were no vitals filed for this visit. There is no height or weight on file to calculate BMI. Based upon direct observation of the patient, evaluation of cognition reveals recent and remote memory intact. Patient's complete Health Risk Assessment and screening values have been reviewed and are found in Flowsheets. The following problems were reviewed today and where indicated follow up appointments were made and/or referrals ordered.     Positive Risk Factor Screenings with Interventions:          General Health and ACP:  General  In general, how would you say your health is?: Very Good  In the past 7 days, have you experienced any of the following? New or Increased Pain, New or Increased Fatigue, Loneliness, Social Isolation, Stress or Anger?: None of These  Do you get the social and emotional support that you need?: Yes  Do you have a Living Will?: Yes  Advance Directives     Power of Alis Garcia Will ACP-Advance Directive ACP-Power of     Not on File Not on File Not on File Not on File      General Health Risk Interventions:  · No issues    Health Habits/Nutrition:  Health Habits/Nutrition  Do you exercise for at least 20 minutes 2-3 times per week?: (!) No  Have you lost any weight without trying in the past 3 months?: No  Do you eat only one meal per day?: No  Have you seen the dentist within the past year?: N/A - wear dentures     Health Habits/Nutrition Interventions:  · That she will become more active and get outdoors now that the weather is getting nicer.        Personalized Preventive Plan   Current Health Maintenance Status  Immunization History   Administered Date(s) Administered    COVID-19, Moderna, PF, 100mcg/0.5mL 02/04/2021, 03/04/2021    Influenza Virus Vaccine 12/13/2005, 10/16/2008, 09/02/2009, 09/12/2012, 10/17/2013, 09/17/2014, 10/14/2015, 10/20/2016, 09/06/2017    Influenza, High Dose (Fluzone 65 yrs and older) 10/29/2018    Influenza, Quadv, adjuvanted, 65 yrs +, IM, PF (Fluad) 12/21/2020    Pneumococcal Conjugate 13-valent ([id number]) 02/01/2017    Pneumococcal Polysaccharide ([id number]) 12/13/2005, 03/06/2018    Tdap (Boostrix, Adacel) 02/01/2017, 05/31/2019    Zoster Live (Zostavax) 10/09/2012        Health Maintenance   Topic Date Due    Hepatitis C screen  Never done    Shingles Vaccine (2 of 3) 12/04/2012    Annual Wellness Visit (AWV)  Never done    Lipid screen  01/06/2022    Potassium monitoring  01/25/2022    Creatinine monitoring  01/25/2022    DTaP/Tdap/Td vaccine (3 - Td) 05/31/2029    DEXA (modify frequency per FRAX score)  Completed    Flu vaccine  Completed    Pneumococcal 65+ years Vaccine  Completed    COVID-19 Vaccine  Completed    Hepatitis A vaccine  Aged Out    Hib vaccine  Aged Out    Meningococcal (ACWY) vaccine  Aged Out     Recommendations for [facility] Due: see orders and patient instructions/AVS.  . Recommended screening schedule for the next 5-10 years is provided to the patient in written form: see Patient Instructions/AVS.    There are no diagnoses linked to this encounter.

## 2021-03-23 SDOH — ECONOMIC STABILITY: INCOME INSECURITY: HOW HARD IS IT FOR YOU TO PAY FOR THE VERY BASICS LIKE FOOD, HOUSING, MEDICAL CARE, AND HEATING?: NOT ASKED

## 2021-03-23 SDOH — ECONOMIC STABILITY: FOOD INSECURITY: WITHIN THE PAST 12 MONTHS, THE FOOD YOU BOUGHT JUST DIDN'T LAST AND YOU DIDN'T HAVE MONEY TO GET MORE.: NOT ASKED

## 2021-03-23 NOTE — PROGRESS NOTES
[address] [facility] [employer]     3/23/21  Nida Covington : 1940 Sex: female  Age: 68 y.o. Chief Complaint   Patient presents with    Diabetes       HPI    Patient presents today for 3-month follow-up visit on her multiple medical problems. Since I have seen this patient she has lost her . This has been recent. She was very teary-eyed throughout the visit understandably. She does have family support. I did discuss possible antidepressant medications and she has declined at this point. She is not suicidal.  States she is getting through this okay. She tells me her blood pressures have been in a good range. She does bring in numbers for me to visualize. Likewise blood sugars have been okay and does bring in numbers also. Her lipids have been in a good range on her statin medication which she is tolerating okay. Weight is down 3 pounds from last visit. In the interim I did hold her Metformin and Actonel related to decreased kidney function. Repeat numbers did show some improvement but I kept her off the medication for the time being. Her eyes and feet are up-to-date with her diabetes.  No retinopathy no foot sores  She is up-to-date with consultants including renal for chronic kidney disease, ophthalmology, endocrine for her thyroid, cardiology for her aortic stenosis and abnormal stress test for which they are recommending medical management onlly, and previously with ear nose and throat who recommended no thyroid biopsy at that point but simply ultrasound follow-up. She actually does need to see cardiology who she has not seen for a while     Review of Systems     Constitutional: Negative for activity change, appetite change, chills, diaphoresis, fatigue, fever and unexpected weight change.    HENT: Negative for congestion, ear pain, hearing loss, postnasal drip, rhinorrhea and sinus pain.    Respiratory: Negative for cough, shortness of breath and wheezing.    Cardiovascular: Negative for chest pain, palpitations and leg swelling. Gastrointestinal: Negative for abdominal pain, blood in stool, constipation, diarrhea, nausea and vomiting.          Endocrine:  Type 2 diabetes, goiter   Genitourinary: Negative for difficulty urinating, dysuria, frequency, hematuria and urgency. Musculoskeletal: Positive for arthralgias. Negative for back pain, gait problem and myalgias.        She was complaining of muscle pain prior to stopping the Actonel-did not tolerate oral bisphosphonates-did wish to try Actonel 1 more time for now-was successful-currently holding  Skin: Negative.    Allergic/Immunologic: Negative for environmental allergies and immunocompromised state. Neurological: Negative for dizziness, weakness, light-headedness, numbness and headaches. Hematological: Negative.         REST OF PERTINENT ROS GONE OVER AND WAS NEGATIVE.      PMH:  Problem List: Knee pain, Non-toxic nodular goiter, Essential hypertension, Goiter, Hyperlipidemia, Benign  essential hypertension, Type II diabetes mellitus uncontrolled  Health Maintenance:  Bone Density Test Screening - (12/3/2018)  Bone Density Scan - (12/3/2018)  Influenza Vaccination - (10/29/2018)  Colonoscopy - (7/25/2018)  7/18-due 23  Colonoscopy Screening - (7/25/2018)  Couseled on Home Safety - (6/13/2018)  Pneumonia Vaccination - (2/5/2018)  Mammogram - (12/20/2012)  Mammogram Screening - (12/20/2012)  Declining further  Tetanus Immunization - (1/18/2017)  Rectal Exam - 4/10,4/11  Breast Exam - 4/10,4/11  Hemmocult Cards - 9/12-neg x 3,5/14-neg x3, 6/16-neg x 3  EKG - 5/11,, 5/15, 3/17,5/17,3/18  2D ECHO - 1/17 , 1/18  Carotid Artery Study - 1/17-30-49% occlusion bilateral, 2/18  Stress Test - 4/17-neg,3/18-pos  Zoster/Shingles Vaccine - 2016  Medical Problems:  Non Insulin Dependent Diabetes, Hypertension, Hyperlipidemia  Goiter - multinodular-bx neg-dr hooper  GERD, Hypothyroidism  Osteoporosis - Bisphosphonate intolerant  Glaucoma, Renal Insufficiency  Aortic And Mitral Valve Disorders - 2D echo-  VHD  Pulmonary HTN - mild  Macular Degeneration  WBCs in urine - Workup by urology-  Coronary Artery Disease (CAD) - Positive stress test 3/18  Diverticulosis, Diabetic Neuropathy, Vitamin B12 deficiency  Follows with - Cardiology, nephrology, ophthalmology, endocrine  Surgical Hx:  AKIN-right salpingo-oophorectomy - Fibroids  Appendectomy  Right cataract surgery - left also  Left knee arthroscopy, Removal of Gallbladder  FH:  Father:  MI.  Mother:  Cerebrovascular Accident (CVA). Brother 1:  Coronary Artery Disease (CAD) - 52's. 3 other brothers, 1 sister   SH:  Marital: . Personal Habits: Cigarette Use: Negative For current cigarette smoker. Alcohol: does not use  alcohol. 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 DX: Type 2 DM, Disp: 100 strip, Rfl: 5    Lancets MISC, 1 each by Does not apply route 2 times daily Type 2 DM, Disp: 200 each, Rfl: 5    ramipril (ALTACE) 10 MG capsule, Take 1 capsule by mouth daily, Disp: 90 capsule, Rfl: 1    pioglitazone (ACTOS) 30 MG tablet, Take 1 tablet by mouth daily, Disp: 90 tablet, Rfl: 1    JANUVIA 50 MG tablet, Take 1 tablet by mouth daily, Disp: 90 tablet, Rfl: 1    NU-MAG 71.5-119 MG TBEC tablet, TAKE TWO TABLETS BY MOUTH DAILY, Disp: 180 tablet, Rfl: 1    meclizine (ANTIVERT) 25 MG tablet, 1 po bid prn vertigo, Disp: 60 tablet, Rfl: 1    nitroGLYCERIN (NITROSTAT) 0.4 MG SL tablet, DISSOLVE ONE TABLET UNDER TONGUE AS NEEDED FOR CHEST PAINS. MAY REPEAT IN 5 MINUTES.  IF SYMPTOMS PERSISTS CALL 911, Disp: 25 tablet, Rfl: 1    Cinnamon 500 MG CAPS, Take 1 capsule by mouth daily, Disp: , Rfl:     aspirin 81 MG tablet, Take 81 mg by mouth daily, Disp: , Rfl:     vitamin D (CHOLECALCIFEROL) 1000 UNIT TABS tablet, Take 1,000 Units by mouth daily, Disp: , Rfl:     vitamin B-12 (CYANOCOBALAMIN) 100 MCG tablet, Take 1,000 mcg by mouth daily , Disp: , Rfl:   Allergies   Allergen Reactions    Naproxen     Penicillins        Past Medical History:   Diagnosis Date    Aortic valve disorder     CAD (coronary artery disease)     Cancer ([facility] [address]     glaucoma    Chest pain     [facility]     Diverticulosis     GERD (gastroesophageal reflux disease)     Goiter     Hyperlipidemia     Hypertension     Hypothyroidism     Macular degeneration     Mitral valve disorder     Neuropathy     Osteoporosis     VHD (valvular heart disease)     Vitamin B12 deficiency      Past Surgical History:   Procedure Laterality Date    APPENDECTOMY      BREAST BIOPSY Right     neg    CHOLECYSTECTOMY      EYE SURGERY Bilateral     cataract    HYSTERECTOMY      KNEE ARTHROPLASTY Left     TOOTH EXTRACTION       Family History   Problem Relation Age of Onset    Stroke Mother     Heart Disease Father 79        MI    Heart Attack Father     Heart Disease Brother     Coronary Art Dis Brother     Stroke Brother     Stroke Brother      Social History     Socioeconomic History    Marital status:       Spouse name: Not on file    Number of children: Not on file    Years of education: Not on file    Highest education level: Not on file   Occupational History    Not on file   Social Needs    Financial resource strain: Not on file    Food insecurity     Worry: Not on file     Inability: Not on file    Transportation needs     Medical: Not on file     Non-medical: Not on file   Tobacco Use    Smoking status: Never Smoker    Smokeless tobacco: Never Used   Substance and Sexual Activity  Alcohol use: No    Drug use: Not on file    Sexual activity: Not on file   Lifestyle    Physical activity     Days per week: Not on file     Minutes per session: Not on file    Stress: Not on file   Relationships    Social connections     Talks on phone: Not on file     Gets together: Not on file     Attends Hinduism service: Not on file     Active member of club or organization: Not on file     Attends meetings of clubs or organizations: Not on file     Relationship status: Not on file    Intimate partner violence     Fear of current or ex partner: Not on file     Emotionally abused: Not on file     Physically abused: Not on file     Forced sexual activity: Not on file   Other Topics Concern    Not on file   Social History Narrative    Not on file       Vitals:    03/22/21 1346   BP: (!) 122/48   Pulse: 90   Temp: 97.5 °F (36.4 °C)   TempSrc: Temporal   SpO2: 98%   Weight: 180 lb (81.6 kg)   Height: 5' 2\" (1.575 m)       Physical Exam    Constitutional: She is oriented to person, place, and time. She appears well-developed and well-nourished. No distress. Eyes: Pupils are equal, round, and reactive to light. Conjunctivae and EOM are normal.      Neck: Normal range of motion. Neck supple. Thyromegaly present. Positive for goiter   Cardiovascular: Normal rate, regular rhythm and intact distal pulses. Exam reveals no gallop and no friction rub.   Murmur heard. Pulmonary/Chest: Effort normal and breath sounds normal. No respiratory distress. She has no wheezes. She has no rales. Abdominal: Soft. Bowel sounds are normal. She exhibits no distension and no mass. There is no tenderness.   Musculoskeletal: Normal range of motion. Lymphadenopathy:     She has no cervical adenopathy.     She has no axillary adenopathy.        Right: No inguinal adenopathy present.        Left: No inguinal adenopathy present. Neurological: She is alert and oriented to person, place, and time. She displays normal reflexes. depression to let me know. Return in about 3 months (around 6/22/2021). Seen By:  Odilia Herman MD      *Document was created using voice recognition software. Note was reviewed however may contain grammatical errors.

## 2021-04-06 DIAGNOSIS — E04.9 GOITER: ICD-10-CM

## 2021-04-06 DIAGNOSIS — I10 ESSENTIAL HYPERTENSION: ICD-10-CM

## 2021-04-06 DIAGNOSIS — E78.5 HYPERLIPIDEMIA, UNSPECIFIED HYPERLIPIDEMIA TYPE: ICD-10-CM

## 2021-04-06 DIAGNOSIS — E11.40 TYPE 2 DIABETES MELLITUS WITH DIABETIC NEUROPATHY, WITHOUT LONG-TERM CURRENT USE OF INSULIN (HCC): ICD-10-CM

## 2021-04-06 LAB
ALBUMIN SERPL-MCNC: 3.9 G/DL (ref 3.5–5.2)
ALP BLD-CCNC: 69 U/L (ref 35–104)
ALT SERPL-CCNC: 10 U/L (ref 0–32)
ANION GAP SERPL CALCULATED.3IONS-SCNC: 13 MMOL/L (ref 7–16)
AST SERPL-CCNC: 18 U/L (ref 0–31)
BASOPHILS ABSOLUTE: 0.03 E9/L (ref 0–0.2)
BASOPHILS RELATIVE PERCENT: 0.5 % (ref 0–2)
BILIRUB SERPL-MCNC: 0.5 MG/DL (ref 0–1.2)
BUN BLDV-MCNC: 21 MG/DL (ref 8–23)
CALCIUM SERPL-MCNC: 9.8 MG/DL (ref 8.6–10.2)
CHLORIDE BLD-SCNC: 104 MMOL/L (ref 98–107)
CHOLESTEROL, TOTAL: 198 MG/DL (ref 0–199)
CO2: 22 MMOL/L (ref 22–29)
CREAT SERPL-MCNC: 1.6 MG/DL (ref 0.5–1)
CREATININE URINE: 130 MG/DL (ref 29–226)
EOSINOPHILS ABSOLUTE: 0.2 E9/L (ref 0.05–0.5)
EOSINOPHILS RELATIVE PERCENT: 3.3 % (ref 0–6)
GFR AFRICAN AMERICAN: 38
GFR NON-AFRICAN AMERICAN: 31 ML/MIN/1.73
GLUCOSE BLD-MCNC: 99 MG/DL (ref 74–99)
HBA1C MFR BLD: 7.1 % (ref 4–5.6)
HCT VFR BLD CALC: 39.7 % (ref 34–48)
HDLC SERPL-MCNC: 49 MG/DL
HEMOGLOBIN: 11.9 G/DL (ref 11.5–15.5)
IMMATURE GRANULOCYTES #: 0.02 E9/L
IMMATURE GRANULOCYTES %: 0.3 % (ref 0–5)
LDL CHOLESTEROL CALCULATED: 121 MG/DL (ref 0–99)
LYMPHOCYTES ABSOLUTE: 1.38 E9/L (ref 1.5–4)
LYMPHOCYTES RELATIVE PERCENT: 23.1 % (ref 20–42)
MAGNESIUM: 1.9 MG/DL (ref 1.6–2.6)
MCH RBC QN AUTO: 31.6 PG (ref 26–35)
MCHC RBC AUTO-ENTMCNC: 30 % (ref 32–34.5)
MCV RBC AUTO: 105.3 FL (ref 80–99.9)
MICROALBUMIN UR-MCNC: <12 MG/L
MICROALBUMIN/CREAT UR-RTO: ABNORMAL (ref 0–30)
MONOCYTES ABSOLUTE: 0.52 E9/L (ref 0.1–0.95)
MONOCYTES RELATIVE PERCENT: 8.7 % (ref 2–12)
NEUTROPHILS ABSOLUTE: 3.83 E9/L (ref 1.8–7.3)
NEUTROPHILS RELATIVE PERCENT: 64.1 % (ref 43–80)
PDW BLD-RTO: 14.5 FL (ref 11.5–15)
PLATELET # BLD: 227 E9/L (ref 130–450)
PMV BLD AUTO: 11.5 FL (ref 7–12)
POTASSIUM SERPL-SCNC: 5.2 MMOL/L (ref 3.5–5)
RBC # BLD: 3.77 E12/L (ref 3.5–5.5)
SODIUM BLD-SCNC: 139 MMOL/L (ref 132–146)
T4 FREE: 1.48 NG/DL (ref 0.93–1.7)
TOTAL PROTEIN: 7.1 G/DL (ref 6.4–8.3)
TRIGL SERPL-MCNC: 142 MG/DL (ref 0–149)
TSH SERPL DL<=0.05 MIU/L-ACNC: 0.27 UIU/ML (ref 0.27–4.2)
VLDLC SERPL CALC-MCNC: 28 MG/DL
WBC # BLD: 6 E9/L (ref 4.5–11.5)

## 2021-04-07 ENCOUNTER — TELEPHONE (OUTPATIENT)
Dept: FAMILY MEDICINE CLINIC | Age: 81
End: 2021-04-07

## 2021-04-08 ENCOUNTER — OFFICE VISIT (OUTPATIENT)
Dept: CARDIOLOGY CLINIC | Age: 81
End: 2021-04-08
Payer: MEDICARE

## 2021-04-08 VITALS
HEIGHT: 62 IN | RESPIRATION RATE: 16 BRPM | DIASTOLIC BLOOD PRESSURE: 62 MMHG | HEART RATE: 84 BPM | SYSTOLIC BLOOD PRESSURE: 134 MMHG | BODY MASS INDEX: 33.1 KG/M2 | OXYGEN SATURATION: 94 % | WEIGHT: 179.9 LBS

## 2021-04-08 DIAGNOSIS — I35.8 AORTIC VALVE SCLEROSIS: ICD-10-CM

## 2021-04-08 DIAGNOSIS — R09.89 BILATERAL CAROTID BRUITS: ICD-10-CM

## 2021-04-08 DIAGNOSIS — I35.0 AORTIC VALVE STENOSIS DETERMINED BY IMAGING: ICD-10-CM

## 2021-04-08 DIAGNOSIS — I10 ESSENTIAL HYPERTENSION: Primary | ICD-10-CM

## 2021-04-08 DIAGNOSIS — R06.02 SHORTNESS OF BREATH: ICD-10-CM

## 2021-04-08 PROCEDURE — G8400 PT W/DXA NO RESULTS DOC: HCPCS | Performed by: INTERNAL MEDICINE

## 2021-04-08 PROCEDURE — 1090F PRES/ABSN URINE INCON ASSESS: CPT | Performed by: INTERNAL MEDICINE

## 2021-04-08 PROCEDURE — 99213 OFFICE O/P EST LOW 20 MIN: CPT | Performed by: INTERNAL MEDICINE

## 2021-04-08 PROCEDURE — 4040F PNEUMOC VAC/ADMIN/RCVD: CPT | Performed by: INTERNAL MEDICINE

## 2021-04-08 PROCEDURE — G8427 DOCREV CUR MEDS BY ELIG CLIN: HCPCS | Performed by: INTERNAL MEDICINE

## 2021-04-08 PROCEDURE — 93000 ELECTROCARDIOGRAM COMPLETE: CPT | Performed by: INTERNAL MEDICINE

## 2021-04-08 PROCEDURE — 1036F TOBACCO NON-USER: CPT | Performed by: INTERNAL MEDICINE

## 2021-04-08 PROCEDURE — 1123F ACP DISCUSS/DSCN MKR DOCD: CPT | Performed by: INTERNAL MEDICINE

## 2021-04-08 PROCEDURE — G8417 CALC BMI ABV UP PARAM F/U: HCPCS | Performed by: INTERNAL MEDICINE

## 2021-04-08 NOTE — TELEPHONE ENCOUNTER
Letter sent to patient letting her know that her kidney function remains poor but stable. Attached a copy of the results for her records. Results sent to Dr Suarez January.

## 2021-04-08 NOTE — PROGRESS NOTES
polyuria  Musculoskeletal: negative for CTD  Psychiatric: negative for psychosis and major depression    On examination, she is an obese, elderly woman in no distress. Skin is warm and dry. Respirations are unlabored. /62   Pulse 84   Resp 16   Ht 5' 2\" (1.575 m)   Wt 179 lb 14.4 oz (81.6 kg)   SpO2 94%   BMI 32.90 kg/m² . HEENT negative for scleral icterus. Extraocular muscles intact. No facial asymmetry or central cyanosis. Neck without masses or goiter. No bruit or JVD. Cardiac apex not displaced. Rhythm regular. Grade 2/3/6 LUIS upper sternal border. S2 intact. Diastole sign  Abdomen normal.  Extremities without edema. Lungs are clear. EKG today per Dr. Tanya Dominique review: Normal sinus rhythm 84/min. KS interval 110. Mild nonspecific T abnormality inferolateral    The patient is a not particularly active and therefore somewhat difficult to assess. However, she denies most symptoms to suggest critical aortic valve stenosis. Exam also suggested critical aortic valve stenosis is not present. She may have a left the cervical bruit rather than a transmitted murmur. She will be reevaluated with a echo and carotid ultrasound. .  In view of her diabetes and a moderately intense statin approach is indicated to achieve LDL in the range of 70. Consideration of a more potent statin would therefore be appropriate such as Lipitor 40 mg/day or its Crestor equivalent    At completion of today's visit, medications include the following:    Current Outpatient Medications:     pravastatin (PRAVACHOL) 40 MG tablet, Take 1 tablet by mouth every other day, Disp: 45 tablet, Rfl: 1    glipiZIDE (GLUCOTROL XL) 10 MG extended release tablet, TAKE ONE TABLET BY MOUTH TWO TIMES A DAY, Disp: 180 tablet, Rfl: 1    blood glucose monitor strips, Test once a day & as needed for symptoms of irregular blood glucose.  DX: Type 2 DM, Disp: 100 strip, Rfl: 5    Lancets MISC, 1 each by Does not apply route 2 times daily Type 2 DM, Disp: 200 each, Rfl: 5    ramipril (ALTACE) 10 MG capsule, Take 1 capsule by mouth daily, Disp: 90 capsule, Rfl: 1    pioglitazone (ACTOS) 30 MG tablet, Take 1 tablet by mouth daily, Disp: 90 tablet, Rfl: 1    JANUVIA 50 MG tablet, Take 1 tablet by mouth daily, Disp: 90 tablet, Rfl: 1    NU-MAG 71.5-119 MG TBEC tablet, TAKE TWO TABLETS BY MOUTH DAILY, Disp: 180 tablet, Rfl: 1    meclizine (ANTIVERT) 25 MG tablet, 1 po bid prn vertigo, Disp: 60 tablet, Rfl: 1    nitroGLYCERIN (NITROSTAT) 0.4 MG SL tablet, DISSOLVE ONE TABLET UNDER TONGUE AS NEEDED FOR CHEST PAINS. MAY REPEAT IN 5 MINUTES. IF SYMPTOMS PERSISTS CALL 911, Disp: 25 tablet, Rfl: 1    Cinnamon 500 MG CAPS, Take 1 capsule by mouth daily, Disp: , Rfl:     aspirin 81 MG tablet, Take 81 mg by mouth daily, Disp: , Rfl:     vitamin D (CHOLECALCIFEROL) 1000 UNIT TABS tablet, Take 1,000 Units by mouth daily, Disp: , Rfl:     vitamin B-12 (CYANOCOBALAMIN) 100 MCG tablet, Take 1,000 mcg by mouth daily , Disp: , Rfl:       Yovanny Mcintyre MD

## 2021-04-26 ENCOUNTER — TELEPHONE (OUTPATIENT)
Dept: CARDIOLOGY CLINIC | Age: 81
End: 2021-04-26

## 2021-04-26 DIAGNOSIS — I35.8 AORTIC VALVE SCLEROSIS: ICD-10-CM

## 2021-04-26 DIAGNOSIS — R09.89 BILATERAL CAROTID BRUITS: ICD-10-CM

## 2021-04-26 DIAGNOSIS — R06.02 SHORTNESS OF BREATH: ICD-10-CM

## 2021-05-06 NOTE — TELEPHONE ENCOUNTER
Left message for patient to contact office. ----- Message from Rissa Cobos MD sent at 5/4/2021  8:48 PM EDT -----      Alejandrina Aguillon tell the patient that she has mild stenosis of the carotid arteries.   Further recommendations will follow her echo thanks hopefully this can get done before next year

## 2021-05-11 ENCOUNTER — TELEPHONE (OUTPATIENT)
Dept: CARDIOLOGY | Age: 81
End: 2021-05-11

## 2021-05-11 NOTE — TELEPHONE ENCOUNTER
SCHEDULED ECHO FOR 05-21-21. REVIEWED COVID CHECKLIST WITH PATIENT.     Electronically signed by Trang Duenas on 5/11/2021 at 10:40 AM

## 2021-05-21 ENCOUNTER — HOSPITAL ENCOUNTER (OUTPATIENT)
Dept: CARDIOLOGY | Age: 81
Discharge: HOME | End: 2021-05-21
Payer: MEDICARE

## 2021-05-21 DIAGNOSIS — I35.8 AORTIC VALVE SCLEROSIS: ICD-10-CM

## 2021-05-21 DIAGNOSIS — R06.02 SHORTNESS OF BREATH: ICD-10-CM

## 2021-05-21 LAB
LV EF: 60 %
LVEF MODALITY: NORMAL

## 2021-05-21 PROCEDURE — 93306 TTE W/DOPPLER COMPLETE: CPT

## 2021-05-26 ENCOUNTER — TELEPHONE (OUTPATIENT)
Dept: CARDIOLOGY CLINIC | Age: 81
End: 2021-05-26

## 2021-05-26 NOTE — TELEPHONE ENCOUNTER
Contacted patient with results and recommendations per Dr. Eugene Argueta. She verbalized understanding. Letter forwarded to Dr. Christo Vera.    ----- Message from Neeraj Richardson MD sent at 5/25/2021  9:16 PM EDT -----    Joi Kan please tell the patient that her pumping chamber is strong. Her aortic valve is restricted but we would probably not intervene unless she had symptoms.   I would like to see her back in 6 months, but sooner should any problems develop thanks

## 2021-06-08 ENCOUNTER — HOSPITAL ENCOUNTER (INPATIENT)
Age: 81
LOS: 9 days | Discharge: HOME | DRG: 215 | End: 2021-06-17
Attending: INTERNAL MEDICINE | Admitting: INTERNAL MEDICINE
Payer: MEDICARE

## 2021-06-08 DIAGNOSIS — N18.32 STAGE 3B CHRONIC KIDNEY DISEASE (HCC): ICD-10-CM

## 2021-06-08 DIAGNOSIS — I10 ESSENTIAL HYPERTENSION: Primary | ICD-10-CM

## 2021-06-08 PROBLEM — E66.9 OBESITY (BMI 30-39.9): Status: ACTIVE | Noted: 2021-06-08

## 2021-06-08 PROBLEM — E11.9 TYPE 2 DIABETES MELLITUS, WITHOUT LONG-TERM CURRENT USE OF INSULIN (HCC): Status: ACTIVE | Noted: 2021-06-08

## 2021-06-08 PROBLEM — I35.0 AORTIC VALVE STENOSIS, UNSPECIFIED ETIOLOGY: Status: ACTIVE | Noted: 2021-06-08

## 2021-06-08 LAB — METER GLUCOSE: 153 MG/DL (ref 74–99)

## 2021-06-08 PROCEDURE — 2580000003 HC RX 258: Performed by: NURSE PRACTITIONER

## 2021-06-08 PROCEDURE — 82962 GLUCOSE BLOOD TEST: CPT

## 2021-06-08 PROCEDURE — 6370000000 HC RX 637 (ALT 250 FOR IP): Performed by: NURSE PRACTITIONER

## 2021-06-08 PROCEDURE — 2140000000 HC CCU INTERMEDIATE R&B

## 2021-06-08 RX ORDER — MECLIZINE HCL 12.5 MG/1
25 TABLET ORAL EVERY 12 HOURS PRN
Status: DISCONTINUED | OUTPATIENT
Start: 2021-06-08 | End: 2021-06-17 | Stop reason: HOSPADM

## 2021-06-08 RX ORDER — VITAMIN B COMPLEX
1000 TABLET ORAL DAILY
Status: DISCONTINUED | OUTPATIENT
Start: 2021-06-09 | End: 2021-06-17 | Stop reason: HOSPADM

## 2021-06-08 RX ORDER — SODIUM CHLORIDE 9 MG/ML
25 INJECTION, SOLUTION INTRAVENOUS PRN
Status: DISCONTINUED | OUTPATIENT
Start: 2021-06-08 | End: 2021-06-11 | Stop reason: SDUPTHER

## 2021-06-08 RX ORDER — NICOTINE POLACRILEX 4 MG
15 LOZENGE BUCCAL PRN
Status: DISCONTINUED | OUTPATIENT
Start: 2021-06-08 | End: 2021-06-17 | Stop reason: HOSPADM

## 2021-06-08 RX ORDER — SODIUM CHLORIDE 0.9 % (FLUSH) 0.9 %
5-40 SYRINGE (ML) INJECTION EVERY 12 HOURS SCHEDULED
Status: DISCONTINUED | OUTPATIENT
Start: 2021-06-08 | End: 2021-06-11 | Stop reason: SDUPTHER

## 2021-06-08 RX ORDER — HYDRALAZINE HYDROCHLORIDE 20 MG/ML
5 INJECTION INTRAMUSCULAR; INTRAVENOUS EVERY 6 HOURS PRN
Status: DISCONTINUED | OUTPATIENT
Start: 2021-06-08 | End: 2021-06-17 | Stop reason: HOSPADM

## 2021-06-08 RX ORDER — SODIUM CHLORIDE 0.9 % (FLUSH) 0.9 %
5-40 SYRINGE (ML) INJECTION PRN
Status: DISCONTINUED | OUTPATIENT
Start: 2021-06-08 | End: 2021-06-11 | Stop reason: SDUPTHER

## 2021-06-08 RX ORDER — ASPIRIN 81 MG/1
81 TABLET, CHEWABLE ORAL DAILY
Status: DISCONTINUED | OUTPATIENT
Start: 2021-06-09 | End: 2021-06-17 | Stop reason: HOSPADM

## 2021-06-08 RX ORDER — DEXTROSE MONOHYDRATE 25 G/50ML
12.5 INJECTION, SOLUTION INTRAVENOUS PRN
Status: DISCONTINUED | OUTPATIENT
Start: 2021-06-08 | End: 2021-06-17 | Stop reason: HOSPADM

## 2021-06-08 RX ORDER — DEXTROSE MONOHYDRATE 50 MG/ML
100 INJECTION, SOLUTION INTRAVENOUS PRN
Status: DISCONTINUED | OUTPATIENT
Start: 2021-06-08 | End: 2021-06-17 | Stop reason: HOSPADM

## 2021-06-08 RX ORDER — RAMIPRIL 5 MG/1
10 CAPSULE ORAL DAILY
Status: DISCONTINUED | OUTPATIENT
Start: 2021-06-08 | End: 2021-06-17 | Stop reason: HOSPADM

## 2021-06-08 RX ORDER — NITROGLYCERIN 0.4 MG/1
0.4 TABLET SUBLINGUAL EVERY 5 MIN PRN
Status: DISCONTINUED | OUTPATIENT
Start: 2021-06-08 | End: 2021-06-17 | Stop reason: HOSPADM

## 2021-06-08 RX ORDER — LANOLIN ALCOHOL/MO/W.PET/CERES
1000 CREAM (GRAM) TOPICAL DAILY
Status: DISCONTINUED | OUTPATIENT
Start: 2021-06-09 | End: 2021-06-17 | Stop reason: HOSPADM

## 2021-06-08 RX ORDER — PRAVASTATIN SODIUM 20 MG
40 TABLET ORAL EVERY OTHER DAY
Status: DISCONTINUED | OUTPATIENT
Start: 2021-06-09 | End: 2021-06-14

## 2021-06-08 RX ORDER — POLYETHYLENE GLYCOL 3350 17 G/17G
17 POWDER, FOR SOLUTION ORAL DAILY PRN
Status: DISCONTINUED | OUTPATIENT
Start: 2021-06-08 | End: 2021-06-17 | Stop reason: HOSPADM

## 2021-06-08 RX ADMIN — SODIUM CHLORIDE, PRESERVATIVE FREE 10 ML: 5 INJECTION INTRAVENOUS at 22:40

## 2021-06-08 RX ADMIN — RAMIPRIL 10 MG: 5 CAPSULE ORAL at 22:46

## 2021-06-08 ASSESSMENT — PAIN SCALES - GENERAL
PAINLEVEL_OUTOF10: 0
PAINLEVEL_OUTOF10: 0

## 2021-06-09 PROBLEM — N18.32 STAGE 3B CHRONIC KIDNEY DISEASE (HCC): Status: ACTIVE | Noted: 2019-11-25

## 2021-06-09 LAB
ALBUMIN SERPL-MCNC: 3.3 G/DL (ref 3.5–5.2)
ALP BLD-CCNC: 69 U/L (ref 35–104)
ALT SERPL-CCNC: 9 U/L (ref 0–32)
ANION GAP SERPL CALCULATED.3IONS-SCNC: 9 MMOL/L (ref 7–16)
AST SERPL-CCNC: 11 U/L (ref 0–31)
BASOPHILS ABSOLUTE: 0.02 E9/L (ref 0–0.2)
BASOPHILS RELATIVE PERCENT: 0.3 % (ref 0–2)
BILIRUB SERPL-MCNC: 0.6 MG/DL (ref 0–1.2)
BUN BLDV-MCNC: 24 MG/DL (ref 6–23)
CALCIUM SERPL-MCNC: 9.3 MG/DL (ref 8.6–10.2)
CHLORIDE BLD-SCNC: 107 MMOL/L (ref 98–107)
CHOLESTEROL, TOTAL: 182 MG/DL (ref 0–199)
CO2: 25 MMOL/L (ref 22–29)
CREAT SERPL-MCNC: 1.4 MG/DL (ref 0.5–1)
EOSINOPHILS ABSOLUTE: 0.36 E9/L (ref 0.05–0.5)
EOSINOPHILS RELATIVE PERCENT: 5.7 % (ref 0–6)
GFR AFRICAN AMERICAN: 44
GFR NON-AFRICAN AMERICAN: 36 ML/MIN/1.73
GLUCOSE BLD-MCNC: 85 MG/DL (ref 74–99)
HBA1C MFR BLD: 7.4 % (ref 4–5.6)
HCT VFR BLD CALC: 36.8 % (ref 34–48)
HDLC SERPL-MCNC: 48 MG/DL
HEMOGLOBIN: 11.6 G/DL (ref 11.5–15.5)
IMMATURE GRANULOCYTES #: 0.03 E9/L
IMMATURE GRANULOCYTES %: 0.5 % (ref 0–5)
LDL CHOLESTEROL CALCULATED: 111 MG/DL (ref 0–99)
LYMPHOCYTES ABSOLUTE: 2.12 E9/L (ref 1.5–4)
LYMPHOCYTES RELATIVE PERCENT: 33.5 % (ref 20–42)
MCH RBC QN AUTO: 31.7 PG (ref 26–35)
MCHC RBC AUTO-ENTMCNC: 31.5 % (ref 32–34.5)
MCV RBC AUTO: 100.5 FL (ref 80–99.9)
METER GLUCOSE: 144 MG/DL (ref 74–99)
METER GLUCOSE: 264 MG/DL (ref 74–99)
METER GLUCOSE: 78 MG/DL (ref 74–99)
METER GLUCOSE: 89 MG/DL (ref 74–99)
MONOCYTES ABSOLUTE: 0.71 E9/L (ref 0.1–0.95)
MONOCYTES RELATIVE PERCENT: 11.2 % (ref 2–12)
NEUTROPHILS ABSOLUTE: 3.09 E9/L (ref 1.8–7.3)
NEUTROPHILS RELATIVE PERCENT: 48.8 % (ref 43–80)
PDW BLD-RTO: 14.6 FL (ref 11.5–15)
PLATELET # BLD: 218 E9/L (ref 130–450)
PMV BLD AUTO: 11.1 FL (ref 7–12)
POTASSIUM REFLEX MAGNESIUM: 4.1 MMOL/L (ref 3.5–5)
RBC # BLD: 3.66 E12/L (ref 3.5–5.5)
SODIUM BLD-SCNC: 141 MMOL/L (ref 132–146)
TOTAL PROTEIN: 6.6 G/DL (ref 6.4–8.3)
TRIGL SERPL-MCNC: 117 MG/DL (ref 0–149)
VLDLC SERPL CALC-MCNC: 23 MG/DL
WBC # BLD: 6.3 E9/L (ref 4.5–11.5)

## 2021-06-09 PROCEDURE — 6370000000 HC RX 637 (ALT 250 FOR IP): Performed by: NURSE PRACTITIONER

## 2021-06-09 PROCEDURE — 82962 GLUCOSE BLOOD TEST: CPT

## 2021-06-09 PROCEDURE — APPSS60 APP SPLIT SHARED TIME 46-60 MINUTES: Performed by: PHYSICIAN ASSISTANT

## 2021-06-09 PROCEDURE — 2580000003 HC RX 258: Performed by: NURSE PRACTITIONER

## 2021-06-09 PROCEDURE — 36415 COLL VENOUS BLD VENIPUNCTURE: CPT

## 2021-06-09 PROCEDURE — 85025 COMPLETE CBC W/AUTO DIFF WBC: CPT

## 2021-06-09 PROCEDURE — 2140000000 HC CCU INTERMEDIATE R&B

## 2021-06-09 PROCEDURE — 99223 1ST HOSP IP/OBS HIGH 75: CPT | Performed by: INTERNAL MEDICINE

## 2021-06-09 PROCEDURE — 83036 HEMOGLOBIN GLYCOSYLATED A1C: CPT

## 2021-06-09 PROCEDURE — 80053 COMPREHEN METABOLIC PANEL: CPT

## 2021-06-09 PROCEDURE — 80061 LIPID PANEL: CPT

## 2021-06-09 RX ADMIN — RAMIPRIL 10 MG: 5 CAPSULE ORAL at 21:56

## 2021-06-09 RX ADMIN — ASPIRIN 81 MG CHEWABLE TABLET 81 MG: 81 TABLET CHEWABLE at 09:57

## 2021-06-09 RX ADMIN — PRAVASTATIN SODIUM 40 MG: 20 TABLET ORAL at 21:56

## 2021-06-09 RX ADMIN — Medication 1000 UNITS: at 09:57

## 2021-06-09 RX ADMIN — CYANOCOBALAMIN TAB 1000 MCG 1000 MCG: 1000 TAB at 09:57

## 2021-06-09 RX ADMIN — SODIUM CHLORIDE, PRESERVATIVE FREE 10 ML: 5 INJECTION INTRAVENOUS at 09:57

## 2021-06-09 RX ADMIN — INSULIN LISPRO 2 UNITS: 100 INJECTION, SOLUTION INTRAVENOUS; SUBCUTANEOUS at 22:00

## 2021-06-09 RX ADMIN — SODIUM CHLORIDE, PRESERVATIVE FREE 10 ML: 5 INJECTION INTRAVENOUS at 21:56

## 2021-06-09 RX ADMIN — INSULIN LISPRO 1 UNITS: 100 INJECTION, SOLUTION INTRAVENOUS; SUBCUTANEOUS at 13:08

## 2021-06-09 ASSESSMENT — PAIN SCALES - GENERAL
PAINLEVEL_OUTOF10: 0

## 2021-06-09 NOTE — CONSULTS
Inpatient Cardiology Consultation      Reason for Consult:  Severe Aortic Stenosis, Near Syncope, Hypertension    Consulting Physician: Farida Navarro MD    Requesting Physician:  Aleena Kinney MD    Date of Consultation: 6/9/2021    HISTORY OF PRESENT ILLNESS OF Oanh Covington located in  room 6408/6408-A:     Veronica Brennan is a 68 y.o. female  known to Dr. Andrew De La Torre. Swetha Calle MD     She has h/o Severe low gradient aortic stenos ( AV peak gradient = 3.08 m/s,   AV mean gradient = 26.6 mmHG, AV DI= 0.33,  AV area= 0.93 cm2), mild MR, mild TR. diastolic dysfunction stage II, EF 55-65%, HTN, HLD, carotid atherosclerosis nonobstructive, DM, CKD, obesity    ECHO 05/21/2021     Summary     Stage II diastolic dysfunction. Ejection fraction is visually estimated at 55-65%. Mild concentric left ventricular hypertrophy. The left atrium is mildly dilated. Mitral leaflets normal   Mild mitral annular calcification. AV peak gradient = 3.08 m/s   AV mean gradient = 26.6 mmHG   AV DI= 0.33   AV area= 0.93 cm2   Severe low gradient aortic stenos   No pulmonary hypertension   No pericardial effusion. During the exam: patient was resting comfortable without any signs of distress; was cooperative; presenting no complaints at the moment of exam;  denied chest pain, shortness of breath at rest, shortness of breath at minimal effort, palpitations , lightheadedness, dizziness, cough, chills, fever, abdominal pains , nausea  and general tiredness. According to patient for the last 2 weeks she has had episodes of lightheadedness. A week before this week she had 1 episode of lightheadedness where she was sitting and watching TV, she just closed the eye and the episode resolved. This last week she has had 2-3 episodes of lightheadedness and they happen when she is sitting and spontaneously resolve. She told her primary care physician about this episodes and he advised her to go to emergency room for evaluation.   She denies any CP, shortness of breath, palpitations, dizziness, headaches. She has had episodes of vertigo in the past but what she is experiencing recently is a lightheadedness. Please note: past medical records were reviewed per electronic medical record (EMR) - see detailed reports under Past Medical/ Surgical History. Past Medical History:    History:  1. Diabetes mellitus. 2. Hypertension. 3. Obesity. BMI 32.74 - 06/2019.  4. Breast biopsy, benign. 5. Surgeries: Hysterectomy and dental extraction. 6. No history of MI, CHF, CVA. 7. Lipid panel 01/2021: Total cholesterol 222, triglycerides 180, , HDL 56  8. Lexiscan MPS, 03/31/2017, normal perfusion. Normal ejection fraction.  Low risk/low probability. [facility] 01/27/17: Normal EF. Stage I diastolic dysfunction. Moderate aortic stenosis and mild AI. Mean gradient 18. No ISRA[place] supplied an report (Dr. uHi Henry). 10. Echocardiogram, 01/19/18. LVEF normal, Stage II diastolic dysfunction. Mild MR. Aortic valve sclerotic. Peak velocity 2.8M/S. AV DI = 0.32. ISRAEL=0.9. [facility], 03/23/18. . Radiology report indicates \"mild ischemia of the apex\". There is normal wall motion and ejection fraction (64%). 12. Chronic kidney disease. BUN 18, creatinine 1.4.   GFR 36 (01/25/2021)     Past Medical History:   Diagnosis Date    Aortic valve disorder     CAD (coronary artery disease)     Cancer (HCC)     glaucoma    Chest pain     Diabetes ([address]     Diverticulosis     GERD (gastroesophageal reflux disease)     Goiter     Hyperlipidemia     Hypertension     Hypothyroidism     Macular degeneration     Mitral valve disorder     Neuropathy     Osteoporosis     VHD (valvular heart disease)     Vitamin B12 deficiency        Past Surgical History:    Past Surgical History:   Procedure Laterality Date    APPENDECTOMY      BREAST BIOPSY Right     neg    CHOLECYSTECTOMY      EYE SURGERY Bilateral     cataract    HYSTERECTOMY infusion, 25 mL, Intravenous, PRN  polyethylene glycol (GLYCOLAX) packet 17 g, 17 g, Oral, Daily PRN  trimethobenzamide (TIGAN) injection 200 mg, 200 mg, Intramuscular, Q6H PRN  glucose (GLUTOSE) 40 % oral gel 15 g, 15 g, Oral, PRN  dextrose 50 % IV solution, 12.5 g, Intravenous, PRN  glucagon (rDNA) injection 1 mg, 1 mg, Intramuscular, PRN  dextrose 5 % solution, 100 mL/hr, Intravenous, PRN  aspirin chewable tablet 81 mg, 81 mg, Oral, Daily  meclizine (ANTIVERT) tablet 25 mg, 25 mg, Oral, Q12H PRN  nitroGLYCERIN (NITROSTAT) SL tablet 0.4 mg, 0.4 mg, Sublingual, Q5 Min PRN  pravastatin (PRAVACHOL) tablet 40 mg, 40 mg, Oral, Every Other Day  ramipril (ALTACE) capsule 10 mg, 10 mg, Oral, Daily  vitamin B-12 (CYANOCOBALAMIN) tablet 1,000 mcg, 1,000 mcg, Oral, Daily  vitamin D (CHOLECALCIFEROL) tablet 1,000 Units, 1,000 Units, Oral, Daily  hydrALAZINE (APRESOLINE) injection 5 mg, 5 mg, Intravenous, Q6H PRN  insulin lispro (HUMALOG) injection vial 0-6 Units, 0-6 Units, Subcutaneous, TID WC  insulin lispro (HUMALOG) injection vial 0-3 Units, 0-3 Units, Subcutaneous, Nightly    Allergies:  Naproxen and Penicillins    Social History:    Social History     Socioeconomic History    Marital status:       Spouse name: Not on file    Number of children: Not on file    Years of education: Not on file    Highest education level: Not on file   Occupational History    Not on file   Tobacco Use    Smoking status: Never Smoker    Smokeless tobacco: Never Used   Substance and Sexual Activity    Alcohol use: No    Drug use: Not on file    Sexual activity: Not on file   Other Topics Concern    Not on file   Social History Narrative    Not on file     Social Determinants of Health     Financial Resource Strain:     Difficulty of Paying Living Expenses:    Food Insecurity:     Worried About Running Out of Food in the Last Year:     [address] in the Last Year:    Transportation Needs:     Lack of Transportation (Medical):  Lack of Transportation (Non-Medical):    Physical Activity:     Days of Exercise per Week:     Minutes of Exercise per Session:    Stress:     Feeling of Stress :    Social Connections:     Frequency of Communication with Friends and Family:     Frequency of Social Gatherings with Friends and Family:     Attends [employer] Services:     Active Member of Clubs or Organizations:     Attends Club or Organization Meetings:     Marital Status:    Intimate Partner Violence:     Fear of Current or Ex-Partner:     Emotionally Abused:     Physically Abused:     Sexually Abused:        Family History:   Family History   Problem Relation Age of Onset    Stroke Mother     Heart Disease Father 79        MI    Heart Attack Father     Heart Disease Brother     Coronary Art Dis Brother     Stroke Brother     Stroke Brother          REVIEW OF SYSTEMS:     Constitutional: Denies fatigue, fevers, chills, night sweats, miguel loss, miguel gain  HEENT: Denies headaches, nose bleeds, and blurred vision,oral pain, oral lesion. Neurological: Has episodic lightheadedness. Denies history of stroke, weakness, dizziness and, numbness and tingling  Cardiovascular: Denies chest pain,SOB at effort, SOB at rest, SOB when lying flat,  palpitations, and feelings of heart racing. Respiratory: Denies cough, wheezing,  use of supplementary oxygen, CPAP/BiPAP  Gastrointestinal: Denies heartburn, nausea, vomiting, diarrhea and constipation, black/bloody, and tarry stools. Genitourinary: Denies pain on  urination,  blood in urine, trouble starting urination, need to strain on urination, urinary urgency, urinary incontinence.    Hematologic:  Denies history of easy bruising, prolonged bleeding,  of blood clots in legs  Lymphatic: Denies lumps and bumps to neck, axilla, breast, and groin  Endocrine: Denies excessive thirst. Denies intolerance to heat or cold  Musculoskeletal: Denies falls, pain to BLE with ambulation and edema to BLE. Psychiatric: Denies anxiety and depression. PHYSICAL EXAM:   BP (!) 158/60   Pulse 76   Temp 97.1 °F (36.2 °C) (Temporal)   Resp 14   Ht 5' 2\" (1.575 m)   Wt 178 lb 1.6 oz (80.8 kg)   SpO2 99%   BMI 32.57 kg/m²   CONST: Overweight. Awake, alert, cooperative, no apparent distress  HEENT:   Head- Normocephalic, atraumatic   Eyes- Conjunctivae pink, anicteric  Throat- Oral mucosa pink and moist  Neck-  No stridor, trachea midline, no jugular venous distention. No adenopathy   CHEST: Chest symmetrical and non-tender to palpation. No accessory muscle use or intercostal retractions  RESPIRATORY:  Lung sounds - clear throughout fields;   CARDIOVASCULAR:     Bilateral carotid bruits. Heart Inspection- shows no noted pulsations  Heart Palpation- no heaves or thrills; PMI is non-displaced   Heart Ausculation- Regular rate and rhythm, 9-3/7 systolic murmur max right upper sternal border. No s3, s4 or rub   PV: No lower extremity edema. No varicosities. Pedal pulses palpable, no clubbing or cyanosis   ABDOMEN: Soft, non-tender to light palpation. Bowel sounds present. No palpable masses no organomegaly; no abdominal bruit  MS: Moves all extremities. Good muscle strength and tone. No atrophy or abnormal movements. : Deferred  SKIN: Warm and dry,  no stasis dermatitis or ulcers on legs  NEURO / PSYCH: Oriented to person, place and time. Speech clear and appropriate. Follows all commands. Pleasant affect     DATA:    ECG reviewed with Dr. Elio Garcia strips:   Diagnostic  Chest x Ray :No results found.     ECHO COMPLETE    Result Date: 5/21/2021  Transthoracic Echocardiography Report (TTE)  Demographics   Patient Name         [address] [facility] Gender                Female   Medical Record       [id number]    Room Number  Number   Account #            [de-identified]   Procedure Date        05/21/2021   Corporate ID                     Ordering Physician    Katlin Connolly MD   Accession Number     [id number] Velocity: 0.88 m/s  MV Deceleration                             PV Peak Gradient: 3.08 mmHg  Time: 207.8 msec     LVOT VTI: 25.1 cm      PV Mean Velocity: 0.59 m/s  MV P1/2t: 87.8 msec                         PV Mean Gradient: 1.7 mmHg  MVA by PHT:2.51 cm^2 Estimated PASP: 33.29  MV Area              mmHg                   [place] ED Velocity: 1.63 m/s  (continuity): 2.2  cm^2  [email or web address] [id number]      Labs:   CBC:   Recent Labs     06/08/21  0833 06/09/21  0520   WBC 5.6 6.3   HGB 11.7* 11.6   HCT 34.5* 36.8    218     BMP:   Recent Labs     06/08/21  0833 06/09/21  0520    141   K 4.6 4.1   CO2 24 25   BUN 28* 24*   CREATININE 1.7* 1.4*   LABGLOM 29 36   CALCIUM 8.8 9.3     Mag: No results for input(s): MG in the last 72 hours. Phos: No results for input(s): PHOS in the last 72 hours. TSH: No results for input(s): TSH in the last 72 hours.   HgA1c:   Lab Results   Component Value Date    LABA1C 7.4 (H) 06/09/2021     Lab Results   Component Value Date     01/06/2021     CARDIAC ENZYMES:  Recent Labs     06/08/21  0833   TROPONINI < 0.03     FASTING LIPID PANEL:  Lab Results   Component Value Date    CHOL 182 06/09/2021    HDL 48 06/09/2021    LDLCALC 111 06/09/2021    TRIG 117 06/09/2021     LIVER PROFILE:  Recent Labs     06/09/21  0520   AST 11   ALT 9   LABALBU 3.3*             ASSESSMENT:  Episodes of lightheadedness  h/o Severe low gradient aortic stenos ( AV peak gradient = 3.08 m/s,   AV mean gradient = 26.6 mmHG, AV DI= 0.33,  AV area= 0.93 cm2),   mild MR,   mild TR.   diastolic dysfunction stage II,   EF 55-65%,   HTN,   HLD,   Carotid atherosclerosis nonobstructive  DM,   CKD,   obesity        PLAN:  Continue Telemetry  VELIA for better evaluation of aortic stenosis  Continue current medication  Further recommendation upon the results of VELIA          Assessment and Plan to follow as per Dr. Rudy Walker MD    Electronically signed by BELIA Jauregui on 6/9/2021 at 8:18 AM     [id number] [facility] Cardiology Consult       Amadeo Henao    I have personally participated in a face-to-face and personally obtained history and performed physical exam on the date of service. I reviewed chart, vitals, labs and radiologic studies. I also participated in medical decision making with BELIA Jauregui on the date of service All of the assessments and recommendations are from me and I agree with all of the pertinent clinical information, assessment and treatment plan. I have reviewed and edited the note above based on my findings during my history, exam, and decision making. Please see my additional contributions to the history, physical exam, assessment, and recommendations below. HISTORY OF PRESENT ILLNESS:     Reviewed, as above      Past medical history:  Reviewed, as above. Past surgical history:  Reviewed, as above. Current medications:  Reviewed, as above    Allergies:  Reviewed, as above    Social history:  Reviewed, as above    Family medical history:  Reviewed, as above. REVIEW OF SYSTEMS:   Reviewed, as above. PHYSICAL EXAM:   CONSTITUTIONAL:  awake, alert, cooperative, no apparent distress, and appears stated age  EYES:  lids and lashes normal and pupils equal, round and reactive to light, anicteric sclerae  HEAD:  normocepalic, without obvious abnormality, atraumatic, pink, moist mucous membranes.   NECK:  Supple, symmetrical, trachea midline, no adenopathy, thyroid symmetric, not enlarged and no tenderness, skin normal  HEMATOLOGIC/LYMPHATICS:  no cervical lymphadenopathy and no supraclavicular lymphadenopathy  LUNGS:  No increased work of breathing, good air exchange, clear to auscultation bilaterally, no crackles or wheezing  CARDIOVASCULAR:  Normal apical impulse, regular rate and rhythm, normal S1 and S2, no S3 or S4, 3/6 mid peaking systolic ejection murmur at the right upper sternal border, no JVD, no carotid bruit, trace pedal edema, good carotid upstroke bilaterally. ABDOMEN:  Soft, nontender, no masses, no hepatomegaly or splenomegaly, BS+  CHEST: nontender to palpation, expands symmetrically  MUSCULOSKELETAL:  No clubbing no cyanosis. there is no redness, warmth, or swelling of the joints  full range of motion noted  NEUROLOGIC:  Alert, awake,oriented x3  SKIN:  no bruising or bleeding, normal skin color, texture, turgor and no redness, warmth, or swelling    BP (!) 142/63   Pulse 85   Temp 98.5 °F (36.9 °C) (Oral)   Resp 16   Ht 5' 2\" (1.575 m)   Wt 178 lb 1.6 oz (80.8 kg)   SpO2 99%   BMI 32.57 kg/m²       I/O last 3 completed shifts: In: 560 [P.O.:540; I.V.:20]  Out: -   No intake/output data recorded. DATA:   I personally reviewed the admission EKG with the following interpretation: Not available for review    ECHO: 5/21/2021,Summary   Stage II diastolic dysfunction. Ejection fraction is visually estimated at 55-65%. Mild concentric left ventricular hypertrophy. The left atrium is mildly dilated. Mitral leaflets normal   Mild mitral annular calcification. AV peak gradient = 3.08 m/s   AV mean gradient = 26.6 mmHG   AV DI= 0.33   AV area= 0.93 cm2   Severe low gradient aortic stenos   No pulmonary hypertension   No pericardial effusion.        Stress Test: Not performed to date  Angiography: Not performed to date  Cardiology Labs:   BMP:    Lab Results   Component Value Date     06/09/2021    K 4.1 06/09/2021     06/09/2021    CO2 25 06/09/2021    BUN 24 06/09/2021     CMP:    Lab Results   Component Value Date     06/09/2021    K 4.1 06/09/2021     06/09/2021    CO2 25 06/09/2021    BUN 24 06/09/2021    PROT 6.6 06/09/2021     CBC:    Lab Results   Component Value Date    WBC 6.3 06/09/2021    RBC 3.66 06/09/2021    HGB 11.6 06/09/2021    HCT 36.8 06/09/2021    .5 06/09/2021    RDW 14.6 06/09/2021

## 2021-06-09 NOTE — PROGRESS NOTES
Pharmacy Note    Alejo Covington was ordered Slow [facility]. As per the [address] [place] [address], herbals and certain dietary supplements will be discontinued.   The herbal or dietary supplement may be continued after discharge from the hospital.      Chuyita Meneses, PharmD, [facility], BCPS 6/8/2021 9:57 PM

## 2021-06-09 NOTE — H&P
[address] [facility]  History and Physical      CHIEF COMPLAINT:    Shortness of breath    Patient of Nikolas Francois MD presents with:  Aortic valve stenosis, unspecified etiology    History of Present Illness:   Patient is a 72-year-old female with past medical history of CAD, DM, GERD, HTN, and hyperlipidemia. Patient presented to [facility] ER with complaints of shortness of breath began approximately 2 weeks ago. Patient admits that this has been ongoing and is worsening over the past week. Patient states exertion increases her shortness of breath. Patient states she was diagnosed with aortic stenosis in May. Patient denies any chest pain, fever, chills, nausea, vomiting, dizziness, blurred vision, and headache. Patient is currently not requiring  O2. Patient was transferred to [facility] to determine if she is a TAVR candidate. REVIEW OF SYSTEMS:  Pertinent negatives are above in HPI. 10 point ROS otherwise negative.       Past Medical History:   Diagnosis Date    Aortic valve disorder     CAD (coronary artery disease)     Cancer (HCC)     glaucoma    Chest pain     Diabetes ([address]     Diverticulosis     GERD (gastroesophageal reflux disease)     Goiter     Hyperlipidemia     Hypertension     Hypothyroidism     Macular degeneration     Mitral valve disorder     Neuropathy     Osteoporosis     VHD (valvular heart disease)     Vitamin B12 deficiency          Past Surgical History:   Procedure Laterality Date    APPENDECTOMY      BREAST BIOPSY Right     neg    CHOLECYSTECTOMY      EYE SURGERY Bilateral     cataract    HYSTERECTOMY      KNEE ARTHROPLASTY Left     TOOTH EXTRACTION         Medications Prior to Admission:    Medications Prior to Admission: pravastatin (PRAVACHOL) 40 MG tablet, Take 1 tablet by mouth every other day  glipiZIDE (GLUCOTROL XL) 10 MG extended release tablet, TAKE ONE TABLET BY MOUTH TWO TIMES A DAY  ramipril following    Diabetes mellitus  -ISS  -Hypoglycemia protocol initiated    HTN  -Monitor BP and adjust medications as necessary  -Resume home medications    DVT prophylaxis  PT OT  Discharge planning  Case discussed with attending and agreed upon plan of care       Code status: Full  Requires inpatient level of care  Ronne Alpers, APRN - CNP    12:50 PM  6/9/2021     Admitted for evaluation of dizziness and lightheadedness per recommendation of PCP and patient with known recent diagnosis of severe aortic stenosis-no syncopal episode, no shortness of  Patient will likely be a candidate for TAVR  Await cardiology input  Not symptomatic at rest, only on exertion  If no plans for TAVR as inpatient, she may be discharged home later today  Resume home medications    I personally saw, examined and provided care for the patient. Radiographs, labs and medication list were reviewed by me independently. The case was discussed in detail and plans for care were established. Review of [address] [place] [id number], documentation was conducted and revisions were made as appropriate directly by me. I agree with the above documented exam, problem list, and plan of care.      Glenn White MD  1:16 PM  6/9/2021

## 2021-06-09 NOTE — PROGRESS NOTES
Occupational Therapy  Date:2021  Patient Name: Maira Graves  MRN: [id number]  : 1940  Room: 8591/2636-    OT consult received. Chart reviewed. Education provided regarding the purpose and benefits of skilled OT. Patient states she is at functional baseline with ADLs and functional mobility reporting no concerns regarding return to prior living situation. Patient observed completing functional mobility and simulated ADLs independently. Therefore, no skilled OT indicated. OT screen only. Will discontinue OT orders. Please re-consult if indicated.  NANCY Lopez, OTR/L #[id number]

## 2021-06-09 NOTE — CARE COORDINATION
Care Coordination: Pt  transfer from [address] [place] [address] to [facility] for further cardiac evaluation. Upon speaking to pt , she states the plan is possible discharge for today if okay with cardiology. She is also under impression she is waiting for echo to be completed. I confirmed with RN and do not see order unless already completed in [place]. She lives alone.   4 months ago today 2nd to receiving first Covid vaccine, very tearful. Follow with Dr Eric Meza, [address]  No hx of [facility], grabiel and has a cane and walker at home that are rarely used. No children but has many friends and family near by that assist. They will be here to visit today and if discharged they will take her home as well.  Nursing aware    Sathya Chu

## 2021-06-10 ENCOUNTER — ANESTHESIA (OUTPATIENT)
Dept: CARDIAC CATH/INVASIVE PROCEDURES | Age: 81
DRG: 215 | End: 2021-06-10
Payer: MEDICARE

## 2021-06-10 ENCOUNTER — ANESTHESIA EVENT (OUTPATIENT)
Dept: CARDIAC CATH/INVASIVE PROCEDURES | Age: 81
DRG: 215 | End: 2021-06-10
Payer: MEDICARE

## 2021-06-10 VITALS
SYSTOLIC BLOOD PRESSURE: 130 MMHG | RESPIRATION RATE: 20 BRPM | DIASTOLIC BLOOD PRESSURE: 60 MMHG | OXYGEN SATURATION: 100 %

## 2021-06-10 LAB
LV EF: 60 %
LVEF MODALITY: NORMAL
METER GLUCOSE: 122 MG/DL (ref 74–99)
METER GLUCOSE: 143 MG/DL (ref 74–99)
METER GLUCOSE: 149 MG/DL (ref 74–99)
METER GLUCOSE: 189 MG/DL (ref 74–99)

## 2021-06-10 PROCEDURE — 6370000000 HC RX 637 (ALT 250 FOR IP): Performed by: NURSE PRACTITIONER

## 2021-06-10 PROCEDURE — 93321 DOPPLER ECHO F-UP/LMTD STD: CPT

## 2021-06-10 PROCEDURE — 2140000000 HC CCU INTERMEDIATE R&B

## 2021-06-10 PROCEDURE — 93312 ECHO TRANSESOPHAGEAL: CPT

## 2021-06-10 PROCEDURE — 82962 GLUCOSE BLOOD TEST: CPT

## 2021-06-10 PROCEDURE — 2700000000 HC OXYGEN THERAPY PER DAY

## 2021-06-10 PROCEDURE — 93320 DOPPLER ECHO COMPLETE: CPT | Performed by: INTERNAL MEDICINE

## 2021-06-10 PROCEDURE — 93312 ECHO TRANSESOPHAGEAL: CPT | Performed by: INTERNAL MEDICINE

## 2021-06-10 PROCEDURE — 6360000002 HC RX W HCPCS: Performed by: ANESTHESIOLOGIST ASSISTANT

## 2021-06-10 PROCEDURE — 93325 DOPPLER ECHO COLOR FLOW MAPG: CPT | Performed by: INTERNAL MEDICINE

## 2021-06-10 PROCEDURE — 2709999900 HC NON-CHARGEABLE SUPPLY

## 2021-06-10 PROCEDURE — 2580000003 HC RX 258: Performed by: NURSE PRACTITIONER

## 2021-06-10 PROCEDURE — 93325 DOPPLER ECHO COLOR FLOW MAPG: CPT

## 2021-06-10 PROCEDURE — 2580000003 HC RX 258: Performed by: ANESTHESIOLOGIST ASSISTANT

## 2021-06-10 RX ORDER — SODIUM CHLORIDE 9 MG/ML
INJECTION, SOLUTION INTRAVENOUS CONTINUOUS PRN
Status: DISCONTINUED | OUTPATIENT
Start: 2021-06-10 | End: 2021-06-10 | Stop reason: SDUPTHER

## 2021-06-10 RX ORDER — PROPOFOL 10 MG/ML
INJECTION, EMULSION INTRAVENOUS PRN
Status: DISCONTINUED | OUTPATIENT
Start: 2021-06-10 | End: 2021-06-10 | Stop reason: SDUPTHER

## 2021-06-10 RX ADMIN — PROPOFOL 10 MG: 10 INJECTION, EMULSION INTRAVENOUS at 10:04

## 2021-06-10 RX ADMIN — ASPIRIN 81 MG CHEWABLE TABLET 81 MG: 81 TABLET CHEWABLE at 11:36

## 2021-06-10 RX ADMIN — PROPOFOL 10 MG: 10 INJECTION, EMULSION INTRAVENOUS at 10:00

## 2021-06-10 RX ADMIN — RAMIPRIL 10 MG: 5 CAPSULE ORAL at 22:11

## 2021-06-10 RX ADMIN — PROPOFOL 20 MG: 10 INJECTION, EMULSION INTRAVENOUS at 10:02

## 2021-06-10 RX ADMIN — INSULIN LISPRO 1 UNITS: 100 INJECTION, SOLUTION INTRAVENOUS; SUBCUTANEOUS at 12:42

## 2021-06-10 RX ADMIN — SODIUM CHLORIDE, PRESERVATIVE FREE 10 ML: 5 INJECTION INTRAVENOUS at 22:14

## 2021-06-10 RX ADMIN — SODIUM CHLORIDE: 9 INJECTION, SOLUTION INTRAVENOUS at 09:45

## 2021-06-10 RX ADMIN — Medication 1000 UNITS: at 11:36

## 2021-06-10 RX ADMIN — PROPOFOL 20 MG: 10 INJECTION, EMULSION INTRAVENOUS at 09:58

## 2021-06-10 RX ADMIN — PROPOFOL 20 MG: 10 INJECTION, EMULSION INTRAVENOUS at 09:56

## 2021-06-10 RX ADMIN — PROPOFOL 50 MG: 10 INJECTION, EMULSION INTRAVENOUS at 09:54

## 2021-06-10 RX ADMIN — CYANOCOBALAMIN TAB 1000 MCG 1000 MCG: 1000 TAB at 11:36

## 2021-06-10 ASSESSMENT — PAIN SCALES - GENERAL: PAINLEVEL_OUTOF10: 0

## 2021-06-10 NOTE — PROCEDURES
PRELIMINARY TRANSESOPHAGEAL ECHOCARDIOGRAPHY REPORT    Cardiologist: Dr. Bull Watson    Date of Procedure: 6/10/2021    Indications for study:   Aortic stenosis    Study performed using (Sedation): Per anesthesia    Complications: None    Preliminary findings:  Normal LV function   Mild-moderate MR  Mild TR  Paradoxical low-flow low-gradient severe aortic stenosis  No left atrial appendage thrombus  No interatrial shunting on bubble study     Al Schilder, MD  [facility]) Cardiology

## 2021-06-10 NOTE — PROGRESS NOTES
Physical Therapy    Date: 6/10/2021       Patient Name: Veronica Quintero  : 1940      MRN: [id number]    PT order received. Chart has been reviewed. PT eval attempted. Patient pleasantly declined PT eval she has been Ind in the room without device. Will D/C PT orders.       Antonio Quevedo, PT

## 2021-06-10 NOTE — ANESTHESIA PRE PROCEDURE
Department of Anesthesiology  Preprocedure Note       Name:  Laura Santos   Age:  68 y.o.  :  1940                                          MRN:  [id number]         Date:  6/10/2021      Surgeon: * No surgeons listed *    Procedure: * No procedures listed * VELIA    Medications prior to admission:   Prior to Admission medications    Medication Sig Start Date End Date Taking? Authorizing Provider   pravastatin (PRAVACHOL) 40 MG tablet Take 1 tablet by mouth every other day 3/22/21  Yes Kathreen Pallas, MD   glipiZIDE (GLUCOTROL XL) 10 MG extended release tablet TAKE ONE TABLET BY MOUTH TWO TIMES A DAY 3/15/21  Yes Kathreen Pallas, MD   ramipril (ALTACE) 10 MG capsule Take 1 capsule by mouth daily 20  Yes Kathreen Pallas, MD   pioglitazone (ACTOS) 30 MG tablet Take 1 tablet by mouth daily 20  Yes Kathreen Pallas, MD   JANUVIA 50 MG tablet Take 1 tablet by mouth daily 20  Yes Kathreen Pallas, MD   NU-MAG 71.5-119 MG TBEC tablet TAKE TWO TABLETS BY MOUTH DAILY 20  Yes Kathreen Pallas, MD   meclizine (ANTIVERT) 25 MG tablet 1 po bid prn vertigo 20  Yes Kathreen Pallas, MD   Cinnamon 500 MG CAPS Take 1 capsule by mouth daily   Yes Historical Provider, MD   aspirin 81 MG tablet Take 81 mg by mouth daily   Yes Historical Provider, MD   vitamin D (CHOLECALCIFEROL) 1000 UNIT TABS tablet Take 1,000 Units by mouth daily   Yes Historical Provider, MD   vitamin B-12 (CYANOCOBALAMIN) 100 MCG tablet Take 1,000 mcg by mouth daily    Yes Historical Provider, MD   blood glucose monitor strips Test once a day & as needed for symptoms of irregular blood glucose. DX: Type 2 DM 21   Kathreen Pallas, MD   Lancets MISC 1 each by Does not apply route 2 times daily Type 2 DM 21   Kathreen Pallas, MD   nitroGLYCERIN (NITROSTAT) 0.4 MG SL tablet DISSOLVE ONE TABLET UNDER TONGUE AS NEEDED FOR CHEST PAINS. MAY REPEAT IN 5 MINUTES.  IF SYMPTOMS PERSISTS CALL [phone]   Kathreen Pallas, MD       Current medications:    Current Facility-Administered Medications   Medication Dose Route Frequency Provider Last Rate Last Admin    sodium chloride flush 0.9 % injection 5-40 mL  5-40 mL Intravenous 2 times per day April LIAM Cano - CNP   10 mL at 06/09/21 2156    sodium chloride flush 0.9 % injection 5-40 mL  5-40 mL Intravenous PRN April Jerome, LIAM - CNP        0.9 % sodium chloride infusion  25 mL Intravenous PRN April Jerome, APRN - [place]        polyethylene glycol (GLYCOLAX) packet 17 g  17 g Oral Daily PRN April Jerome, APRN - CNP        trimethobenzamide Matthew Bijou) injection 200 mg  200 mg Intramuscular Q6H PRN April LIAM Cano - CNP        glucose (GLUTOSE) 40 % oral gel 15 g  15 g Oral PRN April Storey-Neil, APRN - CNP        dextrose 50 % IV solution  12.5 g Intravenous PRN April Windsor-Pickett, APRN - CNP        glucagon (rDNA) injection 1 mg  1 mg Intramuscular PRN April Francesca-Pickett, APRN - CNP        dextrose 5 % solution  100 mL/hr Intravenous PRN April Jerome, APRN - CNP        aspirin chewable tablet 81 mg  81 mg Oral Daily April Storey-LIAM Trejo - CNP   81 mg at 06/09/21 0957    meclizine (ANTIVERT) tablet 25 mg  25 mg Oral Q12H PRN April Windsor-CLAYTON TrejoN - CNP        nitroGLYCERIN (NITROSTAT) SL tablet 0.4 mg  0.4 mg Sublingual Q5 Min PRN April Nelyus, APRN - [place]        pravastatin (PRAVACHOL) tablet 40 mg  40 mg Oral Every Other Day April LIAM Cano - CNP   40 mg at 06/09/21 2156    ramipril (ALTACE) capsule 10 mg  10 mg Oral Daily April Storey-PickettLIAM - CNP   10 mg at 06/09/21 2156    vitamin B-12 (CYANOCOBALAMIN) tablet 1,000 mcg  1,000 mcg Oral Daily April Storey-NeilLIAM - CNP   1,000 mcg at 06/09/21 0957    vitamin D (CHOLECALCIFEROL) tablet 1,000 Units  1,000 Units Oral Daily April LIAM Cano CNP   1,000 Units at 06/09/21 0957    hydrALAZINE (APRESOLINE) injection 5 mg 5 mg Intravenous Q6H PRN April Jerome APRN - CNP        insulin lispro (HUMALOG) injection vial 0-6 Units  0-6 Units Subcutaneous TID [facility] April CLAYTON CanoN - CNP   1 Units at 06/09/21 1308    insulin lispro (HUMALOG) injection vial 0-3 Units  0-3 Units Subcutaneous Nightly April HumacaoCLAYTON TrejoN - CNP   2 Units at 06/09/21 2200       Allergies: Allergies   Allergen Reactions    Naproxen     Penicillins        Problem List:    Patient Active Problem List   Diagnosis Code    Type 2 diabetes mellitus with diabetic neuropathy, without long-term current use of insulin ([facility]) E11.40    Essential hypertension I10    Hyperlipidemia E78.5    Goiter E04.9    Osteoporosis M81.0    Stage 3b chronic kidney disease ([facility] [address] N18.32    Aortic valve stenosis, unspecified etiology I35.0    Type 2 diabetes mellitus, without long-term current use of insulin ([facility]) E11.9    Obesity (BMI 30-39. 9) E66.9    Dizziness R42    Nonrheumatic mitral valve regurgitation I34.0    Nonrheumatic tricuspid valve regurgitation I36.1       Past Medical History:        Diagnosis Date    Aortic valve disorder     CAD (coronary artery disease)     Cancer (HCC)     glaucoma    Chest pain     Diabetes (HCC)     Diverticulosis     GERD (gastroesophageal reflux disease)     Goiter     Hyperlipidemia     Hypertension     Hypothyroidism     Macular degeneration     Mitral valve disorder     Neuropathy     Osteoporosis     VHD (valvular heart disease)     Vitamin B12 deficiency        Past Surgical History:        Procedure Laterality Date    APPENDECTOMY      BREAST BIOPSY Right     neg    CHOLECYSTECTOMY      EYE SURGERY Bilateral     cataract    HYSTERECTOMY      KNEE ARTHROPLASTY Left     TOOTH EXTRACTION         Social History:    Social History     Tobacco Use    Smoking status: Never Smoker    Smokeless tobacco: Never Used   Substance Use Topics    Alcohol use:  No Counseling given: Not Answered      Vital Signs (Current):   Vitals:    06/09/21 2153 06/10/21 0128 06/10/21 0240 06/10/21 0851   BP: (!) 142/65 (!) 98/94  124/62   Pulse: 84 80  76   Resp:  16  16   Temp:  36.7 °C (98 °F)     TempSrc:  Oral     SpO2:  96%     Weight:   182 lb 12.2 oz (82.9 kg)    Height:                                                  BP Readings from Last 3 Encounters:   06/10/21 124/62   04/08/21 134/62   03/22/21 (!) 122/48       NPO Status:  > 8 hours                                                                               BMI:   Wt Readings from Last 3 Encounters:   06/10/21 182 lb 12.2 oz (82.9 kg)   04/08/21 179 lb 14.4 oz (81.6 kg)   03/22/21 180 lb (81.6 kg)     Body mass index is 33.43 kg/m². CBC:   Lab Results   Component Value Date    WBC 6.3 06/09/2021    RBC 3.66 06/09/2021    HGB 11.6 06/09/2021    HCT 36.8 06/09/2021    .5 06/09/2021    RDW 14.6 06/09/2021     06/09/2021       CMP:   Lab Results   Component Value Date     06/09/2021    K 4.1 06/09/2021     06/09/2021    CO2 25 06/09/2021    BUN 24 06/09/2021    CREATININE 1.4 06/09/2021    GFRAA 44 06/09/2021    AGRATIO 1.0 01/06/2021    LABGLOM 36 06/09/2021    GLUCOSE 85 06/09/2021    PROT 6.6 06/09/2021    CALCIUM 9.3 06/09/2021    BILITOT 0.6 06/09/2021    ALKPHOS 69 06/09/2021    AST 11 06/09/2021    ALT 9 06/09/2021       POC Tests: No results for input(s): POCGLU, POCNA, POCK, POCCL, POCBUN, POCHEMO, POCHCT in the last 72 hours.     Coags: No results found for: PROTIME, INR, APTT    HCG (If Applicable): No results found for: PREGTESTUR, PREGSERUM, HCG, HCGQUANT     ABGs: No results found for: PHART, PO2ART, ELM1UYO, VRV3XWV, BEART, B8PXHEAG     Type & Screen (If Applicable):  No results found for: LABABO, LABRH    Drug/Infectious Status (If Applicable):  No results found for: HIV, HEPCAB    COVID-19 Screening (If Applicable): No results found for: COVID19    EKG:   Sinus  Rhythm  (84 bpm)  -Short TN   [place] = 110  -Nonspecific ST depression  -Nondiagnostic. ABNORMAL     ECHO: 5/21/21   Findings      Left Ventricle   Normal left ventricle size. Mild concentric left ventricular hypertrophy. No wall motion abnormalities. Stage II diastolic dysfunction. Ejection fraction is visually estimated at 55-65%. Right Ventricle   Normal right ventricular size and function. Left Atrium   The left atrium is mildly dilated. Right Atrium   Normal right atrium size. Mitral Valve   Mitral leaflets normal   Mild mitral annular calcification. Mild mitral regurgitation . Tricuspid Valve   The tricuspid valve appears structurally normal.   Mild tricuspid regurgitation. RVSP is 33 mmHg. TR velocity = 2.75 m/s   No pulmonary hypertension      Aortic Valve   Aortic valve leaflets are restricted and calcified Trileaflet   AV peak gradient = 3.08 m/s   AV mean gradient = 26.6 mmHG   AV DI= 0.33   AV area= 0.93 cm2   Severe low gradient aortic stenosis. Pulmonic Valve   Pulmonic valve is structurally normal.      Pericardial Effusion   No pericardial effusion. Aorta   Aortic root dimension within normal limits. Conclusions      Summary   Stage II diastolic dysfunction. Ejection fraction is visually estimated at 55-65%. Mild concentric left ventricular hypertrophy. The left atrium is mildly dilated. Mitral leaflets normal   Mild mitral annular calcification. AV peak gradient = 3.08 m/s   AV mean gradient = 26.6 mmHG   AV DI= 0.33   AV area= 0.93 cm2   Severe low gradient aortic stenos   No pulmonary hypertension   No pericardial effusion.        Anesthesia Evaluation  Patient summary reviewed and Nursing notes reviewed  Airway: Mallampati: II        Dental:    (+) upper dentures and lower dentures      Pulmonary:Negative Pulmonary ROS                              Cardiovascular:    (+) hypertension:, valvular problems/murmurs (severe AS, TVR): AS, MR and AI,

## 2021-06-10 NOTE — ANESTHESIA PRE PROCEDURE
Facility-Administered Medications   Medication Dose Route Frequency Provider Last Rate Last Admin    sodium chloride flush 0.9 % injection 5-40 mL  5-40 mL Intravenous 2 times per day April LIAM Cano - CNP   10 mL at 06/09/21 2156    sodium chloride flush 0.9 % injection 5-40 mL  5-40 mL Intravenous PRN April Jerome, APRN - CNP        0.9 % sodium chloride infusion  25 mL Intravenous PRN April Jerome, APRN - [place]        polyethylene glycol (GLYCOLAX) packet 17 g  17 g Oral Daily PRN April Jerome, APRN - CNP        trimethobenzamide Jinger Moore) injection 200 mg  200 mg Intramuscular Q6H PRN April CLAYTON CanoN - CNP        glucose (GLUTOSE) 40 % oral gel 15 g  15 g Oral PRN April Gilmer-Neil, APRN - CNP        dextrose 50 % IV solution  12.5 g Intravenous PRN April Gilmer-Powell, APRN - CNP        glucagon (rDNA) injection 1 mg  1 mg Intramuscular PRN April Francesca-Powell, APRN - CNP        dextrose 5 % solution  100 mL/hr Intravenous PRN April Jerome, APRN - CNP        aspirin chewable tablet 81 mg  81 mg Oral Daily April Gilmer-LIAM Trejo - CNP   81 mg at 06/09/21 0957    meclizine (ANTIVERT) tablet 25 mg  25 mg Oral Q12H PRN April Gilmer-CLAYTON TrejoN - CNP        nitroGLYCERIN (NITROSTAT) SL tablet 0.4 mg  0.4 mg Sublingual Q5 Min PRN April CLAYTON CanoN - [place]        pravastatin (PRAVACHOL) tablet 40 mg  40 mg Oral Every Other Day April LIAM Cano - CNP   40 mg at 06/09/21 2156    ramipril (ALTACE) capsule 10 mg  10 mg Oral Daily April Storey-PowellLIAM - CNP   10 mg at 06/09/21 2156    vitamin B-12 (CYANOCOBALAMIN) tablet 1,000 mcg  1,000 mcg Oral Daily April Gilmer-NeilLIAM - CNP   1,000 mcg at 06/09/21 0957    vitamin D (CHOLECALCIFEROL) tablet 1,000 Units  1,000 Units Oral Daily April LIAM Cano CNP   1,000 Units at 06/09/21 0957    hydrALAZINE (APRESOLINE) injection 5 mg Counseling given: Not Answered      Vital Signs (Current):   Vitals:    06/09/21 2153 06/10/21 0128 06/10/21 0240 06/10/21 0851   BP: (!) 142/65 (!) 98/94  124/62   Pulse: 84 80  76   Resp:  16  16   Temp:  98 °F (36.7 °C)     TempSrc:  Oral     SpO2:  96%     Weight:   182 lb 12.2 oz (82.9 kg)    Height:                                                  BP Readings from Last 3 Encounters:   06/10/21 124/62   04/08/21 134/62   03/22/21 (!) 122/48       NPO Status:                                                                                 BMI:   Wt Readings from Last 3 Encounters:   06/10/21 182 lb 12.2 oz (82.9 kg)   04/08/21 179 lb 14.4 oz (81.6 kg)   03/22/21 180 lb (81.6 kg)     Body mass index is 33.43 kg/m². CBC:   Lab Results   Component Value Date    WBC 6.3 06/09/2021    RBC 3.66 06/09/2021    HGB 11.6 06/09/2021    HCT 36.8 06/09/2021    .5 06/09/2021    RDW 14.6 06/09/2021     06/09/2021       CMP:   Lab Results   Component Value Date     06/09/2021    K 4.1 06/09/2021     06/09/2021    CO2 25 06/09/2021    BUN 24 06/09/2021    CREATININE 1.4 06/09/2021    GFRAA 44 06/09/2021    AGRATIO 1.0 01/06/2021    LABGLOM 36 06/09/2021    GLUCOSE 85 06/09/2021    PROT 6.6 06/09/2021    CALCIUM 9.3 06/09/2021    BILITOT 0.6 06/09/2021    ALKPHOS 69 06/09/2021    AST 11 06/09/2021    ALT 9 06/09/2021       POC Tests: No results for input(s): POCGLU, POCNA, POCK, POCCL, POCBUN, POCHEMO, POCHCT in the last 72 hours.     Coags: No results found for: PROTIME, INR, APTT    HCG (If Applicable): No results found for: PREGTESTUR, PREGSERUM, HCG, HCGQUANT     ABGs: No results found for: PHART, PO2ART, JPU1EOB, NIL0EUJ, BEART, H6LFUXVY     Type & Screen (If Applicable):  No results found for: LABABO, LABRH    Drug/Infectious Status (If Applicable):  No results found for: HIV, HEPCAB    COVID-19 Screening (If Applicable): No results found for: COVID19        Anesthesia Evaluation Anesthesia Plan        Alison Cummings MD   6/10/2021

## 2021-06-10 NOTE — PROGRESS NOTES
Subjective: The patient is awake and alert. She feels well  Dizziness resolved  No shortness of breath or any acute complaints  Had VELIA this morning    Objective:    /87   Pulse 79   Temp 98 °F (36.7 °C) (Temporal)   Resp 18   Ht 5' 2\" (1.575 m)   Wt 182 lb 12.2 oz (82.9 kg)   SpO2 97%   BMI 33.43 kg/m²     In: 320 [P.O.:120; I.V.:200]  Out: -   In: 320   Out: -     General appearance: NAD, conversant  HEENT: AT/NC, MMM  Neck: FROM, supple  Lungs: Clear to auscultation  CV: RRR, no MRGs  Vasc: Radial pulses 2+  Abdomen: Soft, non-tender; no masses or HSM  Extremities: No peripheral edema or digital cyanosis  Skin: no rash, lesions or ulcers  Psych: Alert and oriented to person, place and time  Neuro: Alert and interactive     Recent Labs     06/08/21  0833 06/09/21  0520   WBC 5.6 6.3   HGB 11.7* 11.6   HCT 34.5* 36.8    218       Recent Labs     06/08/21  0833 06/09/21  0520    141   K 4.6 4.1    107   CO2 24 25   BUN 28* 24*   CREATININE 1.7* 1.4*   CALCIUM 8.8 9.3       Assessment:    Principal Problem: Aortic valve stenosis, unspecified etiology  Active Problems:    Essential hypertension    Hyperlipidemia    Stage 3b chronic kidney disease (HCC)    Type 2 diabetes mellitus, without long-term current use of insulin (HCC)    Obesity (BMI 30-39. 9)    Dizziness    Nonrheumatic mitral valve regurgitation    Nonrheumatic tricuspid valve regurgitation  Resolved Problems:    * No resolved hospital problems.  *      Plan:  Admitted for evaluation of dizziness and lightheadedness per recommendation of PCP and patient with known recent diagnosis of severe aortic stenosis-no syncopal episode, no shortness of breath  TAVR candidate  VELIA 6/10/2021 reveals severe aortic stenosis  Not symptomatic at rest, only on exertion  Okay for discharge from medicine standpoint based on cardiology input  Resume home medications       DVT Prophylaxis   PT/OT  Discharge [address], MD  3:14 PM  6/10/2021

## 2021-06-10 NOTE — ANESTHESIA POSTPROCEDURE EVALUATION
Department of Anesthesiology  Postprocedure Note    Patient: Veronica Brennan  MRN: [id number]  YOB: 1940  Date of evaluation: 6/10/2021  Time:  11:28 AM     Procedure Summary     Date: 06/10/21 Room / [facility]: [facility]; [facility] ECHO    Anesthesia Start: 8475 Anesthesia Stop: 1007    Procedure: SEY VELIA Diagnosis:     Scheduled Providers: Abbie Moody MD Responsible Provider: Abbie Moody MD    Anesthesia Type: MAC ASA Status: 4          Anesthesia Type: MAC    Regis Phase I:      Regis Phase II:      Last vitals: Reviewed and per EMR flowsheets.        Anesthesia Post Evaluation    Patient location during evaluation: PACU  Patient participation: complete - patient participated  Level of consciousness: awake  Pain score: 0  Airway patency: patent  Nausea & Vomiting: no nausea  Complications: no  Cardiovascular status: hemodynamically stable  Respiratory status: acceptable  Hydration status: stable

## 2021-06-11 LAB
ABO/RH: NORMAL
ANION GAP SERPL CALCULATED.3IONS-SCNC: 10 MMOL/L (ref 7–16)
ANTIBODY SCREEN: NORMAL
BASOPHILS ABSOLUTE: 0.03 E9/L (ref 0–0.2)
BASOPHILS RELATIVE PERCENT: 0.5 % (ref 0–2)
BUN BLDV-MCNC: 27 MG/DL (ref 6–23)
CALCIUM SERPL-MCNC: 9.6 MG/DL (ref 8.6–10.2)
CHLORIDE BLD-SCNC: 106 MMOL/L (ref 98–107)
CO2: 23 MMOL/L (ref 22–29)
CREAT SERPL-MCNC: 1.4 MG/DL (ref 0.5–1)
EOSINOPHILS ABSOLUTE: 0.26 E9/L (ref 0.05–0.5)
EOSINOPHILS RELATIVE PERCENT: 4.1 % (ref 0–6)
GFR AFRICAN AMERICAN: 44
GFR NON-AFRICAN AMERICAN: 36 ML/MIN/1.73
GLUCOSE BLD-MCNC: 130 MG/DL (ref 74–99)
HCT VFR BLD CALC: 37.7 % (ref 34–48)
HEMOGLOBIN: 11.9 G/DL (ref 11.5–15.5)
IMMATURE GRANULOCYTES #: 0.03 E9/L
IMMATURE GRANULOCYTES %: 0.5 % (ref 0–5)
LYMPHOCYTES ABSOLUTE: 1.88 E9/L (ref 1.5–4)
LYMPHOCYTES RELATIVE PERCENT: 29.4 % (ref 20–42)
MCH RBC QN AUTO: 31.7 PG (ref 26–35)
MCHC RBC AUTO-ENTMCNC: 31.6 % (ref 32–34.5)
MCV RBC AUTO: 100.5 FL (ref 80–99.9)
METER GLUCOSE: 117 MG/DL (ref 74–99)
METER GLUCOSE: 139 MG/DL (ref 74–99)
METER GLUCOSE: 159 MG/DL (ref 74–99)
METER GLUCOSE: 167 MG/DL (ref 74–99)
MONOCYTES ABSOLUTE: 0.62 E9/L (ref 0.1–0.95)
MONOCYTES RELATIVE PERCENT: 9.7 % (ref 2–12)
NEUTROPHILS ABSOLUTE: 3.57 E9/L (ref 1.8–7.3)
NEUTROPHILS RELATIVE PERCENT: 55.8 % (ref 43–80)
PDW BLD-RTO: 14.3 FL (ref 11.5–15)
PLATELET # BLD: 227 E9/L (ref 130–450)
PMV BLD AUTO: 10.8 FL (ref 7–12)
POTASSIUM SERPL-SCNC: 4.3 MMOL/L (ref 3.5–5)
RBC # BLD: 3.75 E12/L (ref 3.5–5.5)
SODIUM BLD-SCNC: 139 MMOL/L (ref 132–146)
WBC # BLD: 6.4 E9/L (ref 4.5–11.5)

## 2021-06-11 PROCEDURE — 2580000003 HC RX 258: Performed by: NURSE PRACTITIONER

## 2021-06-11 PROCEDURE — 2580000003 HC RX 258: Performed by: INTERNAL MEDICINE

## 2021-06-11 PROCEDURE — C1887 CATHETER, GUIDING: HCPCS

## 2021-06-11 PROCEDURE — 2709999900 HC NON-CHARGEABLE SUPPLY

## 2021-06-11 PROCEDURE — 2500000003 HC RX 250 WO HCPCS

## 2021-06-11 PROCEDURE — 86900 BLOOD TYPING SEROLOGIC ABO: CPT

## 2021-06-11 PROCEDURE — 6370000000 HC RX 637 (ALT 250 FOR IP): Performed by: NURSE PRACTITIONER

## 2021-06-11 PROCEDURE — C1760 CLOSURE DEV, VASC: HCPCS

## 2021-06-11 PROCEDURE — 2720000010 HC SURG SUPPLY STERILE

## 2021-06-11 PROCEDURE — 2140000000 HC CCU INTERMEDIATE R&B

## 2021-06-11 PROCEDURE — 86850 RBC ANTIBODY SCREEN: CPT

## 2021-06-11 PROCEDURE — 93454 CORONARY ARTERY ANGIO S&I: CPT | Performed by: INTERNAL MEDICINE

## 2021-06-11 PROCEDURE — 36415 COLL VENOUS BLD VENIPUNCTURE: CPT

## 2021-06-11 PROCEDURE — 86901 BLOOD TYPING SEROLOGIC RH(D): CPT

## 2021-06-11 PROCEDURE — 6360000002 HC RX W HCPCS: Performed by: NURSE PRACTITIONER

## 2021-06-11 PROCEDURE — 82962 GLUCOSE BLOOD TEST: CPT

## 2021-06-11 PROCEDURE — 85025 COMPLETE CBC W/AUTO DIFF WBC: CPT

## 2021-06-11 PROCEDURE — 80048 BASIC METABOLIC PNL TOTAL CA: CPT

## 2021-06-11 PROCEDURE — 6360000002 HC RX W HCPCS

## 2021-06-11 PROCEDURE — C1769 GUIDE WIRE: HCPCS

## 2021-06-11 PROCEDURE — C1894 INTRO/SHEATH, NON-LASER: HCPCS

## 2021-06-11 RX ORDER — METOPROLOL SUCCINATE 25 MG/1
25 TABLET, EXTENDED RELEASE ORAL DAILY
Status: DISCONTINUED | OUTPATIENT
Start: 2021-06-12 | End: 2021-06-15

## 2021-06-11 RX ORDER — AMLODIPINE BESYLATE 2.5 MG/1
2.5 TABLET ORAL DAILY
Status: DISCONTINUED | OUTPATIENT
Start: 2021-06-12 | End: 2021-06-15

## 2021-06-11 RX ORDER — SODIUM CHLORIDE 0.9 % (FLUSH) 0.9 %
5-40 SYRINGE (ML) INJECTION EVERY 12 HOURS SCHEDULED
Status: DISCONTINUED | OUTPATIENT
Start: 2021-06-11 | End: 2021-06-16

## 2021-06-11 RX ORDER — ACETAMINOPHEN 325 MG/1
650 TABLET ORAL EVERY 4 HOURS PRN
Status: DISCONTINUED | OUTPATIENT
Start: 2021-06-11 | End: 2021-06-17 | Stop reason: HOSPADM

## 2021-06-11 RX ORDER — SODIUM CHLORIDE 9 MG/ML
INJECTION, SOLUTION INTRAVENOUS CONTINUOUS
Status: DISCONTINUED | OUTPATIENT
Start: 2021-06-11 | End: 2021-06-11

## 2021-06-11 RX ORDER — SODIUM CHLORIDE 0.9 % (FLUSH) 0.9 %
5-40 SYRINGE (ML) INJECTION PRN
Status: DISCONTINUED | OUTPATIENT
Start: 2021-06-11 | End: 2021-06-16

## 2021-06-11 RX ORDER — SODIUM CHLORIDE 9 MG/ML
25 INJECTION, SOLUTION INTRAVENOUS PRN
Status: DISCONTINUED | OUTPATIENT
Start: 2021-06-11 | End: 2021-06-15

## 2021-06-11 RX ADMIN — SODIUM CHLORIDE, PRESERVATIVE FREE 10 ML: 5 INJECTION INTRAVENOUS at 09:09

## 2021-06-11 RX ADMIN — CYANOCOBALAMIN TAB 1000 MCG 1000 MCG: 1000 TAB at 09:09

## 2021-06-11 RX ADMIN — Medication 1000 UNITS: at 09:09

## 2021-06-11 RX ADMIN — RAMIPRIL 10 MG: 5 CAPSULE ORAL at 20:42

## 2021-06-11 RX ADMIN — ASPIRIN 81 MG CHEWABLE TABLET 81 MG: 81 TABLET CHEWABLE at 09:09

## 2021-06-11 RX ADMIN — HYDRALAZINE HYDROCHLORIDE 5 MG: 20 INJECTION INTRAMUSCULAR; INTRAVENOUS at 19:09

## 2021-06-11 RX ADMIN — PRAVASTATIN SODIUM 40 MG: 20 TABLET ORAL at 20:42

## 2021-06-11 RX ADMIN — Medication 10 ML: at 20:40

## 2021-06-11 ASSESSMENT — PAIN SCALES - GENERAL
PAINLEVEL_OUTOF10: 0

## 2021-06-11 NOTE — PROGRESS NOTES
Yana Ontiveros MD covering for Dr Gregor Gutierrez pt's BP has consistently been in the 180s post HC--going to give prn apresoline

## 2021-06-11 NOTE — PROCEDURES
angiography in multiple projections. ANGIOGRAPHIC FINDINGS:  1. The right coronary artery is a small vessel, nondominant  circulation, without any disease. The left main coronary artery has 60%  to 70% stenosis distally that is extending to the ostial LAD and ostial  left circumflex artery. The LAD is a large vessel, extends down to the  apex. It gives off a large diagonal branch. The LAD and its branches  have no significant CAD. 2.  There is a ramus/high OM branch. That has no CAD. The left  circumflex artery has at least 60% ostial disease. The rest of the  vessel does not have any significant disease. It gives off few OM  branches and left PLV, that has dominant circulation. At the end of the procedure, limited right iliofemoral angiography  confirmed the arteriotomy was in the right common femoral artery, so  6-Arabic Angio-Seal was deployed successfully in the right common  femoral artery with effective hemostasis. COMPLICATIONS:  None. ESTIMATED TOTAL BLOOD LOSS:  25 to 30 mL. MEDICATIONS USED DURING THE PROCEDURE:  We used intraarterial verapamil  to prevent vasospasm for the right radial approach. We used  intraarterial heparin for anticoagulation. CONSCIOUS SEDATION:  We used Versed and fentanyl for conscious sedation. First dose was given at [id number]. Procedure ended at [phone]. There were 33  minutes of direct face-to-face supervision during conscious sedation  administration. TOTAL CONTRAST USED:  60 mL. TOTAL FLUOROSCOPY TIME:  8.2 minutes. IMPRESSION:  1. Severe distal left main disease with involvement of ostial LAD and  ostial left circumflex artery. 2.  50% to 60% disease involving proximal high OM branch/ramus  intermedius artery. 3.  Small nondominant right coronary artery. 4.  Known severe aortic valve stenosis. RECOMMENDATIONS:  CT Surgery consultation for evaluation for possible  CABG and aortic valve replacement.         Nelson Wilson MD    D: 06/11/2021 17:54:30       T: 06/11/2021 18:37:47     [email or web address]  Job#: [id number]     Doc#: [id number]    CC:

## 2021-06-11 NOTE — PROGRESS NOTES
Subjective: The patient is awake and alert. She feels well  Dizziness resolved  No shortness of breath or any acute complaints  waiting for cath now     Objective:    /62   Pulse 73   Temp 97.9 °F (36.6 °C) (Oral)   Resp 18   Ht 5' 2\" (1.575 m)   Wt 177 lb 6.4 oz (80.5 kg)   SpO2 98%   BMI 32.45 kg/m²     In: 240 [P.O.:240]  Out: -   In: 240   Out: -     General appearance: NAD, conversant  HEENT: AT/NC, MMM  Neck: FROM, supple  Lungs: Clear to auscultation  CV: RRR, no MRGs  Vasc: Radial pulses 2+  Abdomen: Soft, non-tender; no masses or HSM  Extremities: No peripheral edema or digital cyanosis  Skin: no rash, lesions or ulcers  Psych: Alert and oriented to person, place and time  Neuro: Alert and interactive     Recent Labs     06/09/21  0520 06/11/21  1445   WBC 6.3 6.4   HGB 11.6 11.9   HCT 36.8 37.7    227       Recent Labs     06/09/21  0520 06/11/21  1445    139   K 4.1 4.3    106   CO2 25 23   BUN 24* 27*   CREATININE 1.4* 1.4*   CALCIUM 9.3 9.6       Assessment:    Principal Problem: Aortic valve stenosis, unspecified etiology  Active Problems:    Essential hypertension    Hyperlipidemia    Stage 3b chronic kidney disease (HCC)    Type 2 diabetes mellitus, without long-term current use of insulin (HCC)    Obesity (BMI 30-39. 9)    Dizziness    Nonrheumatic mitral valve regurgitation    Nonrheumatic tricuspid valve regurgitation  Resolved Problems:    * No resolved hospital problems.  *      Plan:  Admitted for evaluation of dizziness and lightheadedness per recommendation of PCP and patient with known recent diagnosis of severe aortic stenosis-no syncopal episode, no shortness of breath  TAVR candidate  VELIA 6/10/2021 reveals severe aortic stenosis  For [address] [facility] 6/11- pendind  Not symptomatic at rest, only on exertion  Okay for discharge from medicine standpoint based on cardiology input- after cath   Resume home medications       DVT Prophylaxis   PT/OT  Discharge planning

## 2021-06-11 NOTE — PROGRESS NOTES
Patient is seen in follow-up for aortic valve stenosis    Subjective:     Ms. Eric Alberto feels okay today  Sitting up in bed no apparent distress    ROS:  CONSTITUTIONAL:  negative for  fevers, chills  HEENT:  negative for earaches, nasal congestion and epistaxis  RESPIRATORY:  negative for  dry cough, cough with sputum,wheezing and hemoptysis  GASTROINTESTINAL:  negative for nausea, vomiting  MUSCULOSKELETAL:  negative for  myalgias, arthralgias  NEUROLOGICAL:  negative for visual disturbance, dysphagia    Medication side effects: none    Scheduled Meds:   sodium chloride flush  5-40 mL Intravenous 2 times per day    aspirin  81 mg Oral Daily    pravastatin  40 mg Oral Every Other Day    ramipril  10 mg Oral Daily    vitamin B-12  1,000 mcg Oral Daily    Vitamin D  1,000 Units Oral Daily    insulin lispro  0-6 Units Subcutaneous TID WC    insulin lispro  0-3 Units Subcutaneous Nightly     Continuous Infusions:   sodium chloride      dextrose       PRN Meds:sodium chloride flush, sodium chloride, polyethylene glycol, trimethobenzamide, glucose, dextrose, glucagon (rDNA), dextrose, meclizine, nitroGLYCERIN, hydrALAZINE    I/O last 3 completed shifts:   In: 56 [P.O.:540; I.V.:200]  Out: -   I/O this shift:  In: 360 [P.O.:360]  Out: -       Objective:      Physical Exam:   /87   Pulse 79   Temp 98 °F (36.7 °C) (Temporal)   Resp 18   Ht 5' 2\" (1.575 m)   Wt 182 lb 12.2 oz (82.9 kg)   SpO2 97%   BMI 33.43 kg/m²   CONSTITUTIONAL:  awake, alert, cooperative, no apparent distress, and appears stated age  HEAD:  normocepalic, without obvious abnormality, atraumatic  NECK:  Supple, symmetrical, trachea midline, no adenopathy, thyroid symmetric, not enlarged and no tenderness, skin normal  LUNGS:  No increased work of breathing, No accessory muscle use or intercostal retractions, good air exchange, clear to auscultation bilaterally, no crackles or wheezing  CARDIOVASCULAR:  Normal apical impulse, regular rate and rhythm, normal S1 and S2, no S3 or S4, 4/6 mid-to-late peaking systolic ejection murmur, no edema, no JVD, no carotid bruit. ABDOMEN:  Soft, nontender, no masses, no hepatomegaly, no splenomegaly, BS+  MUSCULOSKELETAL:  No clubbing no cyanosis. there is no redness, warmth, or swelling of the joints  full range of motion noted  NEUROLOGIC:  Alert, awake,oriented x3  SKIN:  no bruising or bleeding, normal skin color, texture, turgor and no redness, warmth, or swelling      Cardiographics  I personally reviewed the telemetry monitor strips with the following interpretation: Sinus rhythm    Echocardiogram: Summary   Normal left ventricular systolic function. Ejection fraction is visually estimated at > 60%. Normal right ventricular size and function. Moderate mitral regurgitation. The aortic valve is trileaflet / fibrocalcific changes present. Paradoxical low-flow low-gradient severe aortic stenosis. Mild aortic regurgitation or stenosis. Mild tricuspid regurgitation. RV-RA gradient is estimated at 48 mmHg. Imaging  No orders to display       Lab Review   Lab Results   Component Value Date     06/09/2021    K 4.1 06/09/2021     06/09/2021    CO2 25 06/09/2021    BUN 24 06/09/2021    CREATININE 1.4 06/09/2021    GLUCOSE 85 06/09/2021    CALCIUM 9.3 06/09/2021     Lab Results   Component Value Date    WBC 6.3 06/09/2021    HGB 11.6 06/09/2021    HCT 36.8 06/09/2021    .5 06/09/2021     06/09/2021     I have personally reviewed the laboratory, cardiac diagnostic and radiographic testing as outlined above:    Assessment:     1. Dizziness and lightheadedness: Better  2. Aortic valve stenosis: Severe on a VELIA done today, not interested in surgical valve replacement, interested in TAVR, will schedule for coronary angiography tomorrow for further evaluation  3. Mitral valve regurgitation: Moderate  4. Tricuspid valve regurgitation: Mild  5.  Hypertension: Controlled  6. Hyperlipidemia  7. Type 2 diabetes mellitus  8. Carotid artery disease  9. Stage III chronic kidney disease    Recommendations:     1. Continue current treatment  2. Cardiac catheterization: Indications, procedures, risks and benefits of cardiac catheterization were discussed with the patient with risks including, but not limited to, infection, vessel damage, bleeding, dye allergy, nephrotoxicity, dysrhythmia, MI, CVA and death as well as the potential need for emergent cardiac surgery. Patient verbalized understanding and is agreeable to proceed. 3.  Basic metabolic panel and CBC in a.m.  4.  Further cardiac recommendations will be forthcoming pending her clinical course and diagnostic test findings    Discussed with the patient  Discussed with nursing staff  Electronically signed by Frida Ozuna MD on 6/10/2021 at 10:09 PM  NOTE: This report was transcribed using voice recognition software.  Every effort was made to ensure accuracy; however, inadvertent computerized transcription errors may be present

## 2021-06-12 ENCOUNTER — APPOINTMENT (OUTPATIENT)
Dept: ULTRASOUND IMAGING | Age: 81
DRG: 215 | End: 2021-06-12
Attending: INTERNAL MEDICINE
Payer: MEDICARE

## 2021-06-12 ENCOUNTER — APPOINTMENT (OUTPATIENT)
Dept: CT IMAGING | Age: 81
DRG: 215 | End: 2021-06-12
Attending: INTERNAL MEDICINE
Payer: MEDICARE

## 2021-06-12 LAB
ANION GAP SERPL CALCULATED.3IONS-SCNC: 10 MMOL/L (ref 7–16)
BACTERIA: NORMAL /HPF
BILIRUBIN URINE: NEGATIVE
BLOOD, URINE: NEGATIVE
BUN BLDV-MCNC: 24 MG/DL (ref 6–23)
CALCIUM SERPL-MCNC: 9.2 MG/DL (ref 8.6–10.2)
CHLORIDE BLD-SCNC: 104 MMOL/L (ref 98–107)
CLARITY: CLEAR
CO2: 23 MMOL/L (ref 22–29)
COLOR: YELLOW
CREAT SERPL-MCNC: 1.3 MG/DL (ref 0.5–1)
GFR AFRICAN AMERICAN: 48
GFR NON-AFRICAN AMERICAN: 40 ML/MIN/1.73
GLUCOSE BLD-MCNC: 147 MG/DL (ref 74–99)
GLUCOSE URINE: 100 MG/DL
HCT VFR BLD CALC: 35.5 % (ref 34–48)
HEMOGLOBIN: 11.4 G/DL (ref 11.5–15.5)
KETONES, URINE: NEGATIVE MG/DL
LEUKOCYTE ESTERASE, URINE: ABNORMAL
MCH RBC QN AUTO: 31.9 PG (ref 26–35)
MCHC RBC AUTO-ENTMCNC: 32.1 % (ref 32–34.5)
MCV RBC AUTO: 99.4 FL (ref 80–99.9)
METER GLUCOSE: 107 MG/DL (ref 74–99)
METER GLUCOSE: 144 MG/DL (ref 74–99)
METER GLUCOSE: 222 MG/DL (ref 74–99)
NITRITE, URINE: NEGATIVE
PDW BLD-RTO: 14.3 FL (ref 11.5–15)
PH UA: 5.5 (ref 5–9)
PLATELET # BLD: 207 E9/L (ref 130–450)
PMV BLD AUTO: 10.7 FL (ref 7–12)
POTASSIUM SERPL-SCNC: 4.3 MMOL/L (ref 3.5–5)
PROTEIN UA: NEGATIVE MG/DL
RBC # BLD: 3.57 E12/L (ref 3.5–5.5)
RBC UA: NORMAL /HPF (ref 0–2)
SODIUM BLD-SCNC: 137 MMOL/L (ref 132–146)
SPECIFIC GRAVITY UA: 1.02 (ref 1–1.03)
UROBILINOGEN, URINE: 0.2 E.U./DL
WBC # BLD: 6.1 E9/L (ref 4.5–11.5)
WBC UA: NORMAL /HPF (ref 0–5)

## 2021-06-12 PROCEDURE — 93880 EXTRACRANIAL BILAT STUDY: CPT

## 2021-06-12 PROCEDURE — 2140000000 HC CCU INTERMEDIATE R&B

## 2021-06-12 PROCEDURE — 71250 CT THORAX DX C-: CPT

## 2021-06-12 PROCEDURE — 99222 1ST HOSP IP/OBS MODERATE 55: CPT | Performed by: THORACIC SURGERY (CARDIOTHORACIC VASCULAR SURGERY)

## 2021-06-12 PROCEDURE — 93880 EXTRACRANIAL BILAT STUDY: CPT | Performed by: RADIOLOGY

## 2021-06-12 PROCEDURE — 82962 GLUCOSE BLOOD TEST: CPT

## 2021-06-12 PROCEDURE — 36415 COLL VENOUS BLD VENIPUNCTURE: CPT

## 2021-06-12 PROCEDURE — 87088 URINE BACTERIA CULTURE: CPT

## 2021-06-12 PROCEDURE — 6370000000 HC RX 637 (ALT 250 FOR IP): Performed by: INTERNAL MEDICINE

## 2021-06-12 PROCEDURE — 6370000000 HC RX 637 (ALT 250 FOR IP): Performed by: NURSE PRACTITIONER

## 2021-06-12 PROCEDURE — 85027 COMPLETE CBC AUTOMATED: CPT

## 2021-06-12 PROCEDURE — 80048 BASIC METABOLIC PNL TOTAL CA: CPT

## 2021-06-12 PROCEDURE — 81001 URINALYSIS AUTO W/SCOPE: CPT

## 2021-06-12 PROCEDURE — 2580000003 HC RX 258: Performed by: INTERNAL MEDICINE

## 2021-06-12 PROCEDURE — 99232 SBSQ HOSP IP/OBS MODERATE 35: CPT | Performed by: INTERNAL MEDICINE

## 2021-06-12 RX ADMIN — Medication 10 ML: at 22:34

## 2021-06-12 RX ADMIN — AMLODIPINE BESYLATE 2.5 MG: 5 TABLET ORAL at 08:52

## 2021-06-12 RX ADMIN — CYANOCOBALAMIN TAB 1000 MCG 1000 MCG: 1000 TAB at 08:52

## 2021-06-12 RX ADMIN — METOPROLOL SUCCINATE 25 MG: 25 TABLET, EXTENDED RELEASE ORAL at 08:52

## 2021-06-12 RX ADMIN — Medication 1000 UNITS: at 08:52

## 2021-06-12 RX ADMIN — INSULIN LISPRO 2 UNITS: 100 INJECTION, SOLUTION INTRAVENOUS; SUBCUTANEOUS at 13:32

## 2021-06-12 RX ADMIN — ASPIRIN 81 MG CHEWABLE TABLET 81 MG: 81 TABLET CHEWABLE at 08:53

## 2021-06-12 RX ADMIN — RAMIPRIL 10 MG: 5 CAPSULE ORAL at 21:34

## 2021-06-12 RX ADMIN — Medication 10 ML: at 08:53

## 2021-06-12 ASSESSMENT — PAIN SCALES - GENERAL
PAINLEVEL_OUTOF10: 0
PAINLEVEL_OUTOF10: 0

## 2021-06-12 NOTE — PROGRESS NOTES
Date of Service: 6/12/2021    Chief Complaint: Follow-up for aortic stenosis, CAD    Subjective:     HPI: No new overnight cardiac complaints. Currently with no complaints of chest pain, SOB, or palpitations at rest. SR on telemetry. ROS:  Cardiac: As per HPI  General: No fever, chills  Pulmonary: As per HPI  HEENT: No visual disturbances, difficult swallowing  GI: No nausea, vomiting  : No dysuria, hematuria  Endocrine: No thyroid disease, +DM  Musculoskeletal: SAWYER x 4, no focal motor deficits  Skin: Intact, no rashes  Neuro: No headache, seizures  Psych: Currently with no depression, anxiety    Scheduled Meds:   sodium chloride flush  5-40 mL Intravenous 2 times per day    metoprolol succinate  25 mg Oral Daily    amLODIPine  2.5 mg Oral Daily    aspirin  81 mg Oral Daily    pravastatin  40 mg Oral Every Other Day    ramipril  10 mg Oral Daily    vitamin B-12  1,000 mcg Oral Daily    Vitamin D  1,000 Units Oral Daily    insulin lispro  0-6 Units Subcutaneous TID WC    insulin lispro  0-3 Units Subcutaneous Nightly     Continuous Infusions:   sodium chloride      dextrose       PRN Meds:sodium chloride flush, sodium chloride, acetaminophen, polyethylene glycol, trimethobenzamide, glucose, dextrose, glucagon (rDNA), dextrose, meclizine, nitroGLYCERIN, hydrALAZINE    I/O last 3 completed shifts: In: 250 [P.O.:240; I.V.:10]  Out: -   No intake/output data recorded.       Objective:      Physical Exam:   /73   Pulse 88   Temp 98.7 °F (37.1 °C) (Oral)   Resp 22   Ht 5' 2\" (1.575 m)   Wt 173 lb 9.6 oz (78.7 kg)   SpO2 99%   BMI 31.75 kg/m²   Appearance: Awake, alert and oriented x 3, no acute respiratory distress  Skin: Intact, no rash  Head: Normocephalic, atraumatic  Eyes: EOMI, no conjunctival erythema  ENMT: No pharyngeal erythema, MMM, no rhinorrhea, no dentures  Neck: Supple, no carotid bruits  Lungs: Decreased BS B/L, no wheezing  Cardiac: Regular rate and rhythm, 3/6 LUIS  Abdomen: Soft, nontender, +bowel sounds  Extremities: Moves all extremities x 4, no lower extremity edema  Neurologic: No focal motor deficits apparent, normal mood and affect, alert and oriented x 3    Imaging  US CAROTID ARTERY BILATERAL   Final Result   Atherosclerotic disease. Estimated stenosis by NASCET criteria in the proximal right carotid   artery is between 50% to 69% . Estimated stenosis by NASCET criteria in the proximal left carotid   artery is between 0%  to 49 %   Bilateral thyroid nodules are identified further evaluation with   thyroid ultrasound is suggested         VL PATY BILATERAL LIMITED 1-2 LEVELS    (Results Pending)   CT CHEST WO CONTRAST    (Results Pending)       Lab Review   Lab Results   Component Value Date     06/12/2021    K 4.3 06/12/2021    K 4.1 06/09/2021     06/12/2021    CO2 23 06/12/2021    BUN 24 06/12/2021    CREATININE 1.3 06/12/2021    GLUCOSE 147 06/12/2021    CALCIUM 9.2 06/12/2021     Lab Results   Component Value Date    WBC 6.1 06/12/2021    HGB 11.4 06/12/2021    HCT 35.5 06/12/2021    MCV 99.4 06/12/2021     06/12/2021     Telemetry: SR, rate 90's    Echocardiogram: 6/10/21 (Dr. Rodolfo Clemons)   Normal left ventricular systolic function. Ejection fraction is visually estimated at > 60%. Normal right ventricular size and function. Moderate mitral regurgitation. The aortic valve is trileaflet / fibrocalcific changes present. Paradoxical low-flow low-gradient severe aortic stenosis. Mild aortic regurgitation. Mild tricuspid regurgitation. RV-RA gradient is estimated at 48 mmHg. Cardiac catheterization: 6/11/21 (Dr. Merline Calabrese)  1. Severe distal left main disease with involvement of ostial LAD and  ostial left circumflex artery. 2.  50% to 60% disease involving proximal high OM branch/ramus  intermedius artery. 3.  Small nondominant right coronary artery. 4.  Known severe aortic valve stenosis. Assessment:     1.  Paradoxical low-flow

## 2021-06-13 LAB
METER GLUCOSE: 139 MG/DL (ref 74–99)
METER GLUCOSE: 143 MG/DL (ref 74–99)
METER GLUCOSE: 202 MG/DL (ref 74–99)

## 2021-06-13 PROCEDURE — 6370000000 HC RX 637 (ALT 250 FOR IP): Performed by: INTERNAL MEDICINE

## 2021-06-13 PROCEDURE — 99232 SBSQ HOSP IP/OBS MODERATE 35: CPT | Performed by: THORACIC SURGERY (CARDIOTHORACIC VASCULAR SURGERY)

## 2021-06-13 PROCEDURE — 82962 GLUCOSE BLOOD TEST: CPT

## 2021-06-13 PROCEDURE — 99232 SBSQ HOSP IP/OBS MODERATE 35: CPT | Performed by: INTERNAL MEDICINE

## 2021-06-13 PROCEDURE — 2140000000 HC CCU INTERMEDIATE R&B

## 2021-06-13 PROCEDURE — 6370000000 HC RX 637 (ALT 250 FOR IP): Performed by: NURSE PRACTITIONER

## 2021-06-13 PROCEDURE — 2580000003 HC RX 258: Performed by: INTERNAL MEDICINE

## 2021-06-13 RX ADMIN — AMLODIPINE BESYLATE 2.5 MG: 5 TABLET ORAL at 12:47

## 2021-06-13 RX ADMIN — INSULIN LISPRO 1 UNITS: 100 INJECTION, SOLUTION INTRAVENOUS; SUBCUTANEOUS at 06:53

## 2021-06-13 RX ADMIN — PRAVASTATIN SODIUM 40 MG: 20 TABLET ORAL at 22:12

## 2021-06-13 RX ADMIN — INSULIN LISPRO 2 UNITS: 100 INJECTION, SOLUTION INTRAVENOUS; SUBCUTANEOUS at 12:00

## 2021-06-13 RX ADMIN — CYANOCOBALAMIN TAB 1000 MCG 1000 MCG: 1000 TAB at 12:45

## 2021-06-13 RX ADMIN — METOPROLOL SUCCINATE 25 MG: 25 TABLET, EXTENDED RELEASE ORAL at 12:45

## 2021-06-13 RX ADMIN — Medication 10 ML: at 21:36

## 2021-06-13 RX ADMIN — Medication 1000 UNITS: at 12:44

## 2021-06-13 RX ADMIN — ASPIRIN 81 MG CHEWABLE TABLET 81 MG: 81 TABLET CHEWABLE at 12:44

## 2021-06-13 RX ADMIN — RAMIPRIL 10 MG: 5 CAPSULE ORAL at 22:12

## 2021-06-13 ASSESSMENT — PAIN SCALES - GENERAL: PAINLEVEL_OUTOF10: 0

## 2021-06-13 NOTE — CARE COORDINATION
Discharge order noted, chart reviewed, pt awaiting CTS consult for evaluation of CABG, aortic valve replacement, w/u in progress. CM/SW will follow for any discharge needs, currently plan is home with no needs, family or friends to provide transportation once medically stable.

## 2021-06-13 NOTE — PLAN OF CARE
Problem: Falls - Risk of:  Goal: Will remain free from falls  Description: Will remain free from falls  6/13/2021 0045 by Kam Martini RN  Outcome: Met This Shift  6/12/2021 2255 by Sophia Blackman RN  Outcome: Met This Shift  Goal: Absence of physical injury  Description: Absence of physical injury  6/13/2021 0045 by Kam Martini RN  Outcome: Met This Shift  6/12/2021 2255 by Sophia Blackman RN  Outcome: Met This Shift     Problem: Tissue Perfusion - Cardiopulmonary, Altered:  Goal: Absence of angina  Description: Absence of angina  6/13/2021 0045 by Kam Martini RN  Outcome: Met This Shift  6/12/2021 2255 by Sophia Blackman RN  Outcome: Met This Shift  Goal: Hemodynamic stability will improve  Description: Hemodynamic stability will improve  6/13/2021 0045 by Kam Martini RN  Outcome: Met This Shift  6/12/2021 2255 by Sophia Blackman RN  Outcome: Met This Shift

## 2021-06-13 NOTE — CONSULTS
CTS Consult    Patient name: Romain Becker    Reason for consult: AS, CAD    Referring Physician: Dr. Amanda Ken    Primary Care Physician: Jarocho Acuña MD    Date of service: 6/13/2021    Chief Complaint: SOB    HPI: 68year old female admitted with SOB. She had a VELIA and heart cath showing severe AS with significant CAD. Currently she denies CP, SOB, KONG, LE edema, N/V, F/C, orthopnea, PND and syncope. She is on room air. In reviewing her films it seems it is unlikely that she can be addressed percutaneously but that will have to be evaluated by the heart team.    Allergies:    Allergies   Allergen Reactions    Naproxen     Penicillins        Home medications:    Current Facility-Administered Medications   Medication Dose Route Frequency Provider Last Rate Last Admin    sodium chloride flush 0.9 % injection 5-40 mL  5-40 mL Intravenous 2 times per day Chanell Patel MD   10 mL at 06/12/21 2234    sodium chloride flush 0.9 % injection 5-40 mL  5-40 mL Intravenous PRN Chanell Patel MD        0.9 % sodium chloride infusion  25 mL Intravenous PRN Chanell Patel MD        acetaminophen (TYLENOL) tablet 650 mg  650 mg Oral Q4H PRN Chanell Patel MD        metoprolol succinate (TOPROL XL) extended release tablet 25 mg  25 mg Oral Daily Chanell Patel MD   25 mg at 06/12/21 0852    amLODIPine (NORVASC) tablet 2.5 mg  2.5 mg Oral Daily Chanell Patel MD   2.5 mg at 06/12/21 0852    polyethylene glycol (GLYCOLAX) packet 17 g  17 g Oral Daily PRN April Jerome, APRN - CNP        trimethobenzamide Karolee Meuse) injection 200 mg  200 mg Intramuscular Q6H PRN April Nelyus, APRN - CNP        glucose (GLUTOSE) 40 % oral gel 15 g  15 g Oral PRN April Nelyus, APRN - CNP        dextrose 50 % IV solution  12.5 g Intravenous PRN April Nelyus, APRN - CNP        glucagon (rDNA) injection 1 mg  1 mg Intramuscular PRN April Jerome, APRN - CNP        dextrose 5 % solution  100 mL/hr Intravenous PRN April LIAM Cano - CNP        aspirin chewable tablet 81 mg  81 mg Oral Daily April LIAM Cano CNP   81 mg at 06/12/21 0853    meclizine (ANTIVERT) tablet 25 mg  25 mg Oral Q12H PRN April CLAYTON CanoN - CNP        nitroGLYCERIN (NITROSTAT) SL tablet 0.4 mg  0.4 mg Sublingual Q5 Min PRN April CLAYTON CanoN - [place]        pravastatin (PRAVACHOL) tablet 40 mg  40 mg Oral Every Other Day April LIAM Cano CNP   40 mg at 06/11/21 2042    ramipril (ALTACE) capsule 10 mg  10 mg Oral Daily April LIAM Cano CNP   10 mg at 06/12/21 2134    vitamin B-12 (CYANOCOBALAMIN) tablet 1,000 mcg  1,000 mcg Oral Daily April LIAM Cano CNP   1,000 mcg at 06/12/21 0911    vitamin D (CHOLECALCIFEROL) tablet 1,000 Units  1,000 Units Oral Daily April LIAM Cano CNP   1,000 Units at 06/12/21 3433    hydrALAZINE (APRESOLINE) injection 5 mg  5 mg Intravenous Q6H PRN April LIAM Cano CNP   5 mg at 06/11/21 1909    insulin lispro (HUMALOG) injection vial 0-6 Units  0-6 Units Subcutaneous TID [facility] April LIAM Cano - CNP   1 Units at 06/13/21 4077    insulin lispro (HUMALOG) injection vial 0-3 Units  0-3 Units Subcutaneous Nightly April LIAM Cano CNP   2 Units at 06/09/21 2200       Past Medical History:  Past Medical History:   Diagnosis Date    Aortic valve disorder     CAD (coronary artery disease)     Cancer ([facility] [address]     glaucoma    Chest pain     Diabetes ([facility] [address]     Diverticulosis     GERD (gastroesophageal reflux disease)     Goiter     Hyperlipidemia     Hypertension     Hypothyroidism     Macular degeneration     Mitral valve disorder     Neuropathy     Osteoporosis     VHD (valvular heart disease)     Vitamin B12 deficiency        Past Surgical History:  Past Surgical History:   Procedure Laterality Date    APPENDECTOMY      BREAST BIOPSY Right     neg    CARDIAC CATHETERIZATION  06/11/2021    Dr Bridgett Ledezma Bilateral     cataract    HYSTERECTOMY      KNEE ARTHROPLASTY Left     TOOTH EXTRACTION      TRANSESOPHAGEAL ECHOCARDIOGRAM  06/10/2021    Dr Hauser [facility] History:  Social History     Socioeconomic History    Marital status:      Spouse name: Not on file    Number of children: Not on file    Years of education: Not on file    Highest education level: Not on file   Occupational History    Not on file   Tobacco Use    Smoking status: Never Smoker    Smokeless tobacco: Never Used   Substance and Sexual Activity    Alcohol use: No    Drug use: Not on file    Sexual activity: Not on file   Other Topics Concern    Not on file   Social History Narrative    Not on file     Social Determinants of Health     Financial Resource Strain:     Difficulty of Paying Living Expenses:    Food Insecurity:     Worried About Running Out of Food in the Last Year:     [address] in the Last Year:    Transportation Needs:     Lack of Transportation (Medical):      Lack of Transportation (Non-Medical):    Physical Activity:     Days of Exercise per Week:     Minutes of Exercise per Session:    Stress:     Feeling of Stress :    Social Connections:     Frequency of Communication with Friends and Family:     Frequency of Social Gatherings with Friends and Family:     Attends Voodoo Services:     Active Member of Clubs or Organizations:     Attends Club or Organization Meetings:     Marital Status:    Intimate Partner Violence:     Fear of Current or Ex-Partner:     Emotionally Abused:     Physically Abused:     Sexually Abused:        Family History:  Family History   Problem Relation Age of Onset    Stroke Mother     Heart Disease Father 79        MI    Heart Attack Father     Heart Disease Brother     Coronary Art Dis Brother     Stroke Brother     Stroke Brother        Review of

## 2021-06-13 NOTE — PROGRESS NOTES
Subjective: The patient is awake and alert. No acute complaints  Will be undergoing cardiothoracic intervention    Objective:    /79   Pulse 77   Temp 97.1 °F (36.2 °C) (Temporal)   Resp 16   Ht 5' 2\" (1.575 m)   Wt 173 lb 6.4 oz (78.7 kg)   SpO2 98%   BMI 31.72 kg/m²     In: 290 [P.O.:290]  Out: 1200   In: 290   Out: 1200 [Urine:1200]    General appearance: NAD, conversant  HEENT: AT/NC, MMM  Neck: FROM, supple  Lungs: Clear to auscultation  CV: RRR, no MRGs  Vasc: Radial pulses 2+  Abdomen: Soft, non-tender; no masses or HSM  Extremities: No peripheral edema or digital cyanosis  Skin: no rash, lesions or ulcers  Psych: Alert and oriented to person, place and time  Neuro: Alert and interactive     Recent Labs     06/11/21  1445 06/12/21  0657   WBC 6.4 6.1   HGB 11.9 11.4*   HCT 37.7 35.5    207       Recent Labs     06/11/21  1445 06/12/21  0657    137   K 4.3 4.3    104   CO2 23 23   BUN 27* 24*   CREATININE 1.4* 1.3*   CALCIUM 9.6 9.2       Assessment:    Principal Problem: Aortic valve stenosis, unspecified etiology  Active Problems:    Essential hypertension    Hyperlipidemia    Stage 3b chronic kidney disease (HCC)    Type 2 diabetes mellitus, without long-term current use of insulin (HCC)    Obesity (BMI 30-39. 9)    Dizziness    Nonrheumatic mitral valve regurgitation    Nonrheumatic tricuspid valve regurgitation  Resolved Problems:    * No resolved hospital problems.  *      Plan:  Admitted for evaluation of dizziness and lightheadedness per recommendation of PCP and patient with known recent diagnosis of severe aortic stenosis-no syncopal episode, no shortness of breath  TAVR candidate  VELIA 6/10/2021 reveals severe aortic stenosis  For [facility] 6/11-severe distal left main disease and 50 to 60% stenosis of obtuse marginal/ramus intermedius  Cardiothoracic surgery following-preop work-up underway  Not symptomatic at rest, only on exertion  Resume home

## 2021-06-13 NOTE — PROGRESS NOTES
CC:SOB    Brief HPI:  Awake, alert. No complaints. Past Medical History:   Diagnosis Date    Aortic valve disorder     CAD (coronary artery disease)     Cancer (HCC)     glaucoma    Chest pain     Diabetes ([address]     Diverticulosis     GERD (gastroesophageal reflux disease)     Goiter     Hyperlipidemia     Hypertension     Hypothyroidism     Macular degeneration     Mitral valve disorder     Neuropathy     Osteoporosis     VHD (valvular heart disease)     Vitamin B12 deficiency      Past Surgical History:   Procedure Laterality Date    APPENDECTOMY      BREAST BIOPSY Right     neg    CARDIAC CATHETERIZATION  06/11/2021    Dr Yemi Hardy Bilateral     cataract    HYSTERECTOMY      KNEE ARTHROPLASTY Left     TOOTH EXTRACTION      TRANSESOPHAGEAL ECHOCARDIOGRAM  06/10/2021    Dr Hand Postin History     Socioeconomic History    Marital status:      Spouse name: Not on file    Number of children: Not on file    Years of education: Not on file    Highest education level: Not on file   Occupational History    Not on file   Tobacco Use    Smoking status: Never Smoker    Smokeless tobacco: Never Used   Substance and Sexual Activity    Alcohol use: No    Drug use: Not on file    Sexual activity: Not on file   Other Topics Concern    Not on file   Social History Narrative    Not on file     Social Determinants of Health     Financial Resource Strain:     Difficulty of Paying Living Expenses:    Food Insecurity:     Worried About Running Out of Food in the Last Year:     [address] in the Last Year:    Transportation Needs:     Lack of Transportation (Medical):      Lack of Transportation (Non-Medical):    Physical Activity:     Days of Exercise per Week:     Minutes of Exercise per Session:    Stress:     Feeling of Stress :    Social Connections:     Frequency of Communication with Friends and Family:     Frequency of Social Gatherings with Friends and Family:     Attends Latter-day Services:     Active Member of Clubs or Organizations:     Attends Club or Organization Meetings:     Marital Status:    Intimate Partner Violence:     Fear of Current or Ex-Partner:     Emotionally Abused:     Physically Abused:     Sexually Abused:      Family History   Problem Relation Age of Onset    Stroke Mother     Heart Disease Father 79        MI    Heart Attack Father     Heart Disease Brother     Coronary Art Dis Brother     Stroke Brother     Stroke Brother        Vitals:    06/12/21 1600 06/13/21 0000 06/13/21 0520 06/13/21 0805   BP: (!) 142/82 (!) 115/55  112/79   Pulse: 76 64  77   Resp: 18 18  16   Temp: 96.8 °F (36 °C) 97.4 °F (36.3 °C)  97.1 °F (36.2 °C)   TempSrc: Temporal Temporal  Temporal   SpO2:  98%  98%   Weight:   173 lb 6.4 oz (78.7 kg)    Height:             Intake/Output Summary (Last 24 hours) at 6/13/2021 1138  Last data filed at 6/13/2021 0929  Gross per 24 hour   Intake 530 ml   Output 1200 ml   Net -670 ml       Recent Labs     06/11/21  1445 06/12/21  0657   WBC 6.4 6.1   HGB 11.9 11.4*   HCT 37.7 35.5    207      Recent Labs     06/11/21  1445 06/12/21  0657   BUN 27* 24*   CREATININE 1.4* 1.3*       ROS:   Negative for CP, palpitations, SOB at rest, dizziness/lightheadedness. Physical Exam   Constitutional: Oriented to person, place, and time. Appears well-developed. No distress. CARDIOVASCULAR:  Normal apical impulse, regular rate and rhythm, normal S1 and S2, no S3 or S4, and 2/6 murmur noted  Pulmonary/Chest: Effort normal. No respiratory distress. Abdominal: Soft. Bowel sounds are normal.   Musculoskeletal: Normal range of motion. Neurological: alert and oriented to person, place, and time. Skin: Skin is warm and dry. Psychiatric: normal mood and affect. ASSESSMENT: AS, CAD      PLAN:  Work up in progress. Films to be reviewed tomorrow. Still needs PFTs and ABIs.   Ok to discharge after that. Note: 25 minutes was spent providing face-to-face patient care, including:  and coordinating care, reviewing the chart, labs, and diagnostics, as well as medical decision making. Greater than 50% of this time was spent instructing and counseling the patient face to face regarding findings and recommendations.

## 2021-06-13 NOTE — PROGRESS NOTES
Date of Service: 6/13/2021    Chief Complaint: Follow-up for aortic stenosis, CAD    Subjective:     HPI: No new overnight cardiac complaints. Currently with no complaints of chest pain, SOB, or palpitations at rest. SR on telemetry. ROS:  Cardiac: As per HPI  General: No fever, chills  Pulmonary: As per HPI  HEENT: No visual disturbances, difficult swallowing  GI: No nausea, vomiting  : No dysuria, hematuria  Endocrine: No thyroid disease, +DM  Musculoskeletal: SAWYER x 4, no focal motor deficits  Skin: Intact, no rashes  Neuro: No headache, seizures  Psych: Currently with no depression, anxiety    Scheduled Meds:   sodium chloride flush  5-40 mL Intravenous 2 times per day    metoprolol succinate  25 mg Oral Daily    amLODIPine  2.5 mg Oral Daily    aspirin  81 mg Oral Daily    pravastatin  40 mg Oral Every Other Day    ramipril  10 mg Oral Daily    vitamin B-12  1,000 mcg Oral Daily    Vitamin D  1,000 Units Oral Daily    insulin lispro  0-6 Units Subcutaneous TID WC    insulin lispro  0-3 Units Subcutaneous Nightly     Continuous Infusions:   sodium chloride      dextrose       PRN Meds:sodium chloride flush, sodium chloride, acetaminophen, polyethylene glycol, trimethobenzamide, glucose, dextrose, glucagon (rDNA), dextrose, meclizine, nitroGLYCERIN, hydrALAZINE    I/O last 3 completed shifts: In: 540 [P.O.:530; I.V.:10]  Out: 1200 [Urine:1200]  No intake/output data recorded.       Objective:      Physical Exam:   /79   Pulse 77   Temp 97.1 °F (36.2 °C) (Temporal)   Resp 16   Ht 5' 2\" (1.575 m)   Wt 173 lb 6.4 oz (78.7 kg)   SpO2 98%   BMI 31.72 kg/m²   Appearance: Awake, alert and oriented x 3, no acute respiratory distress  Skin: Intact, no rash  Head: Normocephalic, atraumatic  Eyes: EOMI, no conjunctival erythema  ENMT: No pharyngeal erythema, MMM, no rhinorrhea, no dentures  Neck: Supple, no carotid bruits  Lungs: Decreased BS B/L, no wheezing  Cardiac: Regular rate and rhythm, 3/6 LUIS  Abdomen: Soft, nontender, +bowel sounds  Extremities: Moves all extremities x 4, no lower extremity edema  Neurologic: No focal motor deficits apparent, normal mood and affect, alert and oriented x 3    Imaging  CT CHEST WO CONTRAST   Final Result   Mild prominence of the left atrium. Central pulmonary arterial prominence consistent with mild pulmonary arterial   hypertension. A 1 cm ground-glass opacity within the right upper lobe is probably post   infectious or inflammatory but could be followed within 6 months. The   noncalcified nodule in the medial right lower lobe could also be followed on   subsequent exam.      Heterogeneous enlargement of the thyroid with substernal mass asymmetric to   the left. Correlation with follow-up ultrasound recommended. Other chronic appearing findings. US CAROTID ARTERY BILATERAL   Final Result   Atherosclerotic disease. Estimated stenosis by NASCET criteria in the proximal right carotid   artery is between 50% to 69% . Estimated stenosis by NASCET criteria in the proximal left carotid   artery is between 0%  to 49 %   Bilateral thyroid nodules are identified further evaluation with   thyroid ultrasound is suggested         VL PATY BILATERAL LIMITED 1-2 LEVELS    (Results Pending)       Lab Review   Lab Results   Component Value Date     06/12/2021    K 4.3 06/12/2021    K 4.1 06/09/2021     06/12/2021    CO2 23 06/12/2021    BUN 24 06/12/2021    CREATININE 1.3 06/12/2021    GLUCOSE 147 06/12/2021    CALCIUM 9.2 06/12/2021     Lab Results   Component Value Date    WBC 6.1 06/12/2021    HGB 11.4 06/12/2021    HCT 35.5 06/12/2021    MCV 99.4 06/12/2021     06/12/2021     Telemetry: SR, rate 90's    Echocardiogram: 6/10/21 (Dr. Kip Polanco)   Normal left ventricular systolic function. Ejection fraction is visually estimated at > 60%. Normal right ventricular size and function. Moderate mitral regurgitation.    The aortic

## 2021-06-14 ENCOUNTER — APPOINTMENT (OUTPATIENT)
Dept: INTERVENTIONAL RADIOLOGY/VASCULAR | Age: 81
DRG: 215 | End: 2021-06-14
Attending: INTERNAL MEDICINE
Payer: MEDICARE

## 2021-06-14 LAB
ANION GAP SERPL CALCULATED.3IONS-SCNC: 11 MMOL/L (ref 7–16)
BASOPHILS ABSOLUTE: 0.02 E9/L (ref 0–0.2)
BASOPHILS RELATIVE PERCENT: 0.3 % (ref 0–2)
BUN BLDV-MCNC: 30 MG/DL (ref 6–23)
CALCIUM SERPL-MCNC: 9.7 MG/DL (ref 8.6–10.2)
CHLORIDE BLD-SCNC: 102 MMOL/L (ref 98–107)
CO2: 23 MMOL/L (ref 22–29)
CREAT SERPL-MCNC: 1.4 MG/DL (ref 0.5–1)
EOSINOPHILS ABSOLUTE: 0.29 E9/L (ref 0.05–0.5)
EOSINOPHILS RELATIVE PERCENT: 4.7 % (ref 0–6)
GFR AFRICAN AMERICAN: 44
GFR NON-AFRICAN AMERICAN: 36 ML/MIN/1.73
GLUCOSE BLD-MCNC: 189 MG/DL (ref 74–99)
HCT VFR BLD CALC: 40.2 % (ref 34–48)
HEMOGLOBIN: 12.3 G/DL (ref 11.5–15.5)
IMMATURE GRANULOCYTES #: 0.02 E9/L
IMMATURE GRANULOCYTES %: 0.3 % (ref 0–5)
LYMPHOCYTES ABSOLUTE: 1.28 E9/L (ref 1.5–4)
LYMPHOCYTES RELATIVE PERCENT: 20.7 % (ref 20–42)
MCH RBC QN AUTO: 31.8 PG (ref 26–35)
MCHC RBC AUTO-ENTMCNC: 30.6 % (ref 32–34.5)
MCV RBC AUTO: 103.9 FL (ref 80–99.9)
METER GLUCOSE: 132 MG/DL (ref 74–99)
METER GLUCOSE: 146 MG/DL (ref 74–99)
METER GLUCOSE: 221 MG/DL (ref 74–99)
METER GLUCOSE: 289 MG/DL (ref 74–99)
MONOCYTES ABSOLUTE: 0.66 E9/L (ref 0.1–0.95)
MONOCYTES RELATIVE PERCENT: 10.7 % (ref 2–12)
NEUTROPHILS ABSOLUTE: 3.9 E9/L (ref 1.8–7.3)
NEUTROPHILS RELATIVE PERCENT: 63.3 % (ref 43–80)
PDW BLD-RTO: 14.4 FL (ref 11.5–15)
PLATELET # BLD: 218 E9/L (ref 130–450)
PMV BLD AUTO: 10.7 FL (ref 7–12)
POTASSIUM REFLEX MAGNESIUM: 4.1 MMOL/L (ref 3.5–5)
RBC # BLD: 3.87 E12/L (ref 3.5–5.5)
SODIUM BLD-SCNC: 136 MMOL/L (ref 132–146)
URINE CULTURE, ROUTINE: NORMAL
WBC # BLD: 6.2 E9/L (ref 4.5–11.5)

## 2021-06-14 PROCEDURE — 2580000003 HC RX 258: Performed by: INTERNAL MEDICINE

## 2021-06-14 PROCEDURE — 36415 COLL VENOUS BLD VENIPUNCTURE: CPT

## 2021-06-14 PROCEDURE — 82962 GLUCOSE BLOOD TEST: CPT

## 2021-06-14 PROCEDURE — 2140000000 HC CCU INTERMEDIATE R&B

## 2021-06-14 PROCEDURE — 6370000000 HC RX 637 (ALT 250 FOR IP): Performed by: INTERNAL MEDICINE

## 2021-06-14 PROCEDURE — 99233 SBSQ HOSP IP/OBS HIGH 50: CPT | Performed by: INTERNAL MEDICINE

## 2021-06-14 PROCEDURE — 94729 DIFFUSING CAPACITY: CPT

## 2021-06-14 PROCEDURE — 85025 COMPLETE CBC W/AUTO DIFF WBC: CPT

## 2021-06-14 PROCEDURE — 94726 PLETHYSMOGRAPHY LUNG VOLUMES: CPT

## 2021-06-14 PROCEDURE — 93922 UPR/L XTREMITY ART 2 LEVELS: CPT

## 2021-06-14 PROCEDURE — 80048 BASIC METABOLIC PNL TOTAL CA: CPT

## 2021-06-14 PROCEDURE — 6370000000 HC RX 637 (ALT 250 FOR IP): Performed by: NURSE PRACTITIONER

## 2021-06-14 PROCEDURE — 99232 SBSQ HOSP IP/OBS MODERATE 35: CPT | Performed by: THORACIC SURGERY (CARDIOTHORACIC VASCULAR SURGERY)

## 2021-06-14 RX ORDER — ROSUVASTATIN CALCIUM 20 MG/1
20 TABLET, COATED ORAL NIGHTLY
Status: DISCONTINUED | OUTPATIENT
Start: 2021-06-14 | End: 2021-06-17 | Stop reason: HOSPADM

## 2021-06-14 RX ORDER — CLOPIDOGREL BISULFATE 75 MG/1
75 TABLET ORAL DAILY
Status: DISCONTINUED | OUTPATIENT
Start: 2021-06-15 | End: 2021-06-17 | Stop reason: HOSPADM

## 2021-06-14 RX ORDER — CLOPIDOGREL 300 MG/1
300 TABLET, FILM COATED ORAL ONCE
Status: COMPLETED | OUTPATIENT
Start: 2021-06-14 | End: 2021-06-14

## 2021-06-14 RX ADMIN — Medication 10 ML: at 21:12

## 2021-06-14 RX ADMIN — INSULIN LISPRO 3 UNITS: 100 INJECTION, SOLUTION INTRAVENOUS; SUBCUTANEOUS at 11:19

## 2021-06-14 RX ADMIN — CLOPIDOGREL BISULFATE 300 MG: 300 TABLET, FILM COATED ORAL at 11:18

## 2021-06-14 RX ADMIN — Medication 1000 UNITS: at 08:12

## 2021-06-14 RX ADMIN — ASPIRIN 81 MG CHEWABLE TABLET 81 MG: 81 TABLET CHEWABLE at 08:12

## 2021-06-14 RX ADMIN — Medication 10 ML: at 08:13

## 2021-06-14 RX ADMIN — CYANOCOBALAMIN TAB 1000 MCG 1000 MCG: 1000 TAB at 08:12

## 2021-06-14 RX ADMIN — METOPROLOL SUCCINATE 25 MG: 25 TABLET, EXTENDED RELEASE ORAL at 08:12

## 2021-06-14 RX ADMIN — RAMIPRIL 10 MG: 5 CAPSULE ORAL at 21:11

## 2021-06-14 RX ADMIN — ROSUVASTATIN 20 MG: 20 TABLET, FILM COATED ORAL at 21:11

## 2021-06-14 RX ADMIN — AMLODIPINE BESYLATE 2.5 MG: 5 TABLET ORAL at 08:12

## 2021-06-14 ASSESSMENT — PAIN SCALES - GENERAL
PAINLEVEL_OUTOF10: 0

## 2021-06-14 NOTE — PROGRESS NOTES
Date of Service: 6/14/2021    Chief Complaint: Follow-up for aortic stenosis, CAD    Subjective:     HPI: No new overnight cardiac complaints. Currently with no complaints of chest pain, SOB, or palpitations at rest. SR on telemetry. ROS:  Cardiac: As per HPI  General: No fever, chills  Pulmonary: As per HPI  HEENT: No visual disturbances, difficult swallowing  GI: No nausea, vomiting  : No dysuria, hematuria  Endocrine: No thyroid disease, +DM  Musculoskeletal: SAWYER x 4, no focal motor deficits  Skin: Intact, no rashes  Neuro: No headache, seizures  Psych: Currently with no depression, anxiety    Scheduled Meds:   [START ON 6/15/2021] clopidogrel  75 mg Oral Daily    rosuvastatin  20 mg Oral Nightly    sodium chloride flush  5-40 mL Intravenous 2 times per day    metoprolol succinate  25 mg Oral Daily    amLODIPine  2.5 mg Oral Daily    aspirin  81 mg Oral Daily    ramipril  10 mg Oral Daily    vitamin B-12  1,000 mcg Oral Daily    Vitamin D  1,000 Units Oral Daily    insulin lispro  0-6 Units Subcutaneous TID WC    insulin lispro  0-3 Units Subcutaneous Nightly     Continuous Infusions:   sodium chloride      dextrose       PRN Meds:sodium chloride flush, sodium chloride, acetaminophen, polyethylene glycol, trimethobenzamide, glucose, dextrose, glucagon (rDNA), dextrose, meclizine, nitroGLYCERIN, hydrALAZINE    I/O last 3 completed shifts:   In: 5 [P.O.:720]  Out: 1200 [Urine:1200]  I/O this shift:  In: 360 [P.O.:360]  Out: -       Objective:      Physical Exam:   /63   Pulse 71   Temp 97.7 °F (36.5 °C) (Oral)   Resp 16   Ht 5' 2\" (1.575 m)   Wt 175 lb 4.8 oz (79.5 kg)   SpO2 95%   BMI 32.06 kg/m²   Appearance: Awake, alert and oriented x 3, no acute respiratory distress  Skin: Intact, no rash  Head: Normocephalic, atraumatic  Eyes: EOMI, no conjunctival erythema  ENMT: No pharyngeal erythema, MMM, no rhinorrhea, no dentures  Neck: Supple, no carotid bruits  Lungs: Decreased BS B/L, no wheezing  Cardiac: Regular rate and rhythm, 3/6 LUIS  Abdomen: Soft, nontender, +bowel sounds  Extremities: Moves all extremities x 4, no lower extremity edema  Neurologic: No focal motor deficits apparent, normal mood and affect, alert and oriented x 3    Imaging    CT Chest WO 6/12/2021: Mild prominence of the left atrium. Central pulmonary arterial prominence consistent with mild pulmonary arterial hypertension. 1 cm ground-glass opacity within the right upper lobe is probably post infectious or inflammatory but could be followed within 6 months.  The noncalcified nodule in the medial right lower lobe could also be followed on subsequent exam. Heterogeneous enlargement of the thyroid with substernal mass asymmetric to the left. Bilateral Carotid Dopplers 6/12/2021: 13-04% HECTOR. 1-07% LICA. Lab Review   Lab Results   Component Value Date     06/14/2021    K 4.1 06/14/2021     06/14/2021    CO2 23 06/14/2021    BUN 30 06/14/2021    CREATININE 1.4 06/14/2021    GLUCOSE 189 06/14/2021    CALCIUM 9.7 06/14/2021     Lab Results   Component Value Date    WBC 6.2 06/14/2021    HGB 12.3 06/14/2021    HCT 40.2 06/14/2021    .9 06/14/2021     06/14/2021     Telemetry: SR 60-70's    Echocardiogram: 6/10/21 Dr. [place]: Normal left ventricular systolic function. EF visually estimated at > 60%. Normal right ventricular size and function. Moderate MR. The aortic valve is trileaflet / fibrocalcific changes present. Paradoxical low-flow low-gradient severe aortic stenosis. Mild aortic regurgitation. Mild tricuspid regurgitation. RV-RA gradient is estimated at 48 mmHg. Cardiac catheterization: 6/11/21 Dr. Yeimi Singh  Severe distal left main disease with involvement of ostial LAD and ostial left circumflex artery. 50% to 60% disease involving proximal high OM branch/ramus intermedius artery. Small nondominant right coronary artery. Known severe aortic valve stenosis.     Assessment: 1. Paradoxical low-flow low-gradient severe aortic stenosis  2. CAD (including LM) -- results of 6/2021 cardiac catheterization outlined above  3. Moderate MR and mild TR.  4. HTN: controlled  5. HLD  6. T2DM  7. Carotid artery disease  8. Chronic kidney disease SCr 1.3-->1.4    Recommendations:     1. CTS declined due to substernal thyroid compressing her trachea and recommended PCI and staged TAVR  2. Load with 300 mg Plavix then 75 mg QD starting 6/15/2021  3. Plan for [address] [facility] on 6/15/2021 with Dr Manuel More   4. Rest of cardiac medications the same    Discussed with Dr Jason Gold. Nakia Kelvin, [address]    PHYSICIAN ADDENDUM:  I independently interviewed and examined the patient. I have reviewed the above documentation completed by the JUICE. Please see my additional contributions to the HPI, physical exam, and assessment / medical decision making. I independently reviewed the HPI, ROS, PMH, PSH, [address], SH, and medications independently and agree with above documentation. I discussed the assessment and plan with the patient/family at the bedside as well as the bedside nurse and the primary team.     · Case discussed with Dr. Latrice Ashley and with the patient and her family. Due to high surgical risk, percutaneous therapy was recommended. · Aortic stenosis is moderate to severe and by itself does not require intervention at this time. · There is a critical distal left main heavily calcified stenosis at a trifurcation. Given the patient's left dominant circulation with aortic stenosis and the likely need for atherectomy we will proceed with left main PCI using mechanical circulatory support. Risks/benefits/alternatives were discussed at length including death, MI, stroke, vessel complications, major bleeding. The patient and her nephew Brook Benedict are in agreement and would like to proceed.       Gregor Gambino MD, [facility] [place]  Interventional Cardiology/Structural Heart Disease  Office: [phone]

## 2021-06-14 NOTE — PLAN OF CARE
Problem: Falls - Risk of:  Goal: Will remain free from falls  Description: Will remain free from falls  Outcome: Met This Shift  Goal: Absence of physical injury  Description: Absence of physical injury  Outcome: Met This Shift     Problem: Tissue Perfusion - Cardiopulmonary, Altered:  Goal: Absence of angina  Description: Absence of angina  Outcome: Met This Shift  Goal: Hemodynamic stability will improve  Description: Hemodynamic stability will improve  Outcome: Met This Shift

## 2021-06-14 NOTE — PROGRESS NOTES
CC:SOB    Brief HPI:  Awake, alert. No complaints. Past Medical History:   Diagnosis Date    Aortic valve disorder     CAD (coronary artery disease)     Cancer (HCC)     glaucoma    Chest pain     Diabetes ([address]     Diverticulosis     GERD (gastroesophageal reflux disease)     Goiter     Hyperlipidemia     Hypertension     Hypothyroidism     Macular degeneration     Mitral valve disorder     Neuropathy     Osteoporosis     VHD (valvular heart disease)     Vitamin B12 deficiency      Past Surgical History:   Procedure Laterality Date    APPENDECTOMY      BREAST BIOPSY Right     neg    CARDIAC CATHETERIZATION  06/11/2021    Dr Lucila Gurrola Bilateral     cataract    HYSTERECTOMY      KNEE ARTHROPLASTY Left     TOOTH EXTRACTION      TRANSESOPHAGEAL ECHOCARDIOGRAM  06/10/2021    Dr Bello Kid History     Socioeconomic History    Marital status:      Spouse name: Not on file    Number of children: Not on file    Years of education: Not on file    Highest education level: Not on file   Occupational History    Not on file   Tobacco Use    Smoking status: Never Smoker    Smokeless tobacco: Never Used   Substance and Sexual Activity    Alcohol use: No    Drug use: Not on file    Sexual activity: Not on file   Other Topics Concern    Not on file   Social History Narrative    Not on file     Social Determinants of Health     Financial Resource Strain:     Difficulty of Paying Living Expenses:    Food Insecurity:     Worried About Running Out of Food in the Last Year:     [address] in the Last Year:    Transportation Needs:     Lack of Transportation (Medical):      Lack of Transportation (Non-Medical):    Physical Activity:     Days of Exercise per Week:     Minutes of Exercise per Session:    Stress:     Feeling of Stress :    Social Connections:     Frequency of Communication with Friends and Family:     Frequency of Social Gatherings with Friends and Family:     Attends Holiness Services:     Active Member of Clubs or Organizations:     Attends Club or Organization Meetings:     Marital Status:    Intimate Partner Violence:     Fear of Current or Ex-Partner:     Emotionally Abused:     Physically Abused:     Sexually Abused:      Family History   Problem Relation Age of Onset    Stroke Mother     Heart Disease Father 79        MI    Heart Attack Father     Heart Disease Brother     Coronary Art Dis Brother     Stroke Brother     Stroke Brother        Vitals:    06/13/21 1245 06/14/21 0035 06/14/21 0355 06/14/21 0800   BP: (!) 121/53 132/63     Pulse: 79 63  71   Resp: 16 16  16   Temp: 97.3 °F (36.3 °C) 97.9 °F (36.6 °C)  97.7 °F (36.5 °C)   TempSrc: Temporal Oral  Oral   SpO2: 98% 97%  95%   Weight:   175 lb 4.8 oz (79.5 kg)    Height:             Intake/Output Summary (Last 24 hours) at 6/14/2021 1034  Last data filed at 6/14/2021 0935  Gross per 24 hour   Intake 840 ml   Output 1200 ml   Net -360 ml       Recent Labs     06/11/21  1445 06/12/21  0657   WBC 6.4 6.1   HGB 11.9 11.4*   HCT 37.7 35.5    207      Recent Labs     06/11/21  1445 06/12/21  0657   BUN 27* 24*   CREATININE 1.4* 1.3*       ROS:   Negative for CP, palpitations, SOB at rest, dizziness/lightheadedness. Physical Exam   Constitutional: Oriented to person, place, and time. Appears well-developed. No distress. CARDIOVASCULAR:  Normal apical impulse, regular rate and rhythm, normal S1 and S2, no S3 or S4, and 1/6 murmur noted  Pulmonary/Chest: Effort normal. No respiratory distress. Abdominal: Soft. Bowel sounds are normal.   Musculoskeletal: Normal range of motion. Neurological: alert and oriented to person, place, and time. Skin: Skin is warm and dry. Psychiatric: normal mood and affect. ASSESSMENT: CAD, AS      PLAN:  CT reviewed. She has a large substernal thyroid that is compressing her trachea.   She would not be best

## 2021-06-14 NOTE — PROGRESS NOTES
Subjective: The patient is awake and alert. No acute complaints  In good spirits that she is not going to be having CABG   await [facility] pci    Objective:    /69   Pulse 68   Temp 98.1 °F (36.7 °C) (Oral)   Resp 17   Ht 5' 2\" (1.575 m)   Wt 175 lb 4.8 oz (79.5 kg)   SpO2 100%   BMI 32.06 kg/m²     In: 960 [P.O.:960]  Out: 1300   In: 960   Out: 1300 [Urine:1300]    General appearance: NAD, conversant  HEENT: AT/NC, MMM  Neck: FROM, supple  Lungs: Clear to auscultation  CV: RRR, no MRGs  Vasc: Radial pulses 2+  Abdomen: Soft, non-tender; no masses or HSM  Extremities: No peripheral edema or digital cyanosis  Skin: no rash, lesions or ulcers  Psych: Alert and oriented to person, place and time  Neuro: Alert and interactive     Recent Labs     06/12/21  0657 06/14/21  1200   WBC 6.1 6.2   HGB 11.4* 12.3   HCT 35.5 40.2    218       Recent Labs     06/12/21  0657 06/14/21  1200    136   K 4.3 4.1    102   CO2 23 23   BUN 24* 30*   CREATININE 1.3* 1.4*   CALCIUM 9.2 9.7       Assessment:    Principal Problem: Aortic valve stenosis, unspecified etiology  Active Problems:    Essential hypertension    Hyperlipidemia    Stage 3b chronic kidney disease (HCC)    Type 2 diabetes mellitus, without long-term current use of insulin (HCC)    Obesity (BMI 30-39. 9)    Dizziness    Nonrheumatic mitral valve regurgitation    Nonrheumatic tricuspid valve regurgitation  Resolved Problems:    * No resolved hospital problems.  *      Plan:  Admitted for evaluation of dizziness and lightheadedness per recommendation of PCP and patient with known recent diagnosis of severe aortic stenosis-no syncopal episode, no shortness of breath  TAVR candidate later on   VELIA 6/10/2021 reveals severe aortic stenosis  For [facility] 6/11-severe distal left main disease and 50 to 60% stenosis of obtuse marginal/ramus intermedius  Cardiothoracic surgery  Signed off  For [facility] with PCI in am 6/15 secondary to large thyroid - not a GA candidate   Not symptomatic at rest, only on exertion  Resume home medications         DVT Prophylaxis   PT/OT  Discharge planning-        Jason Eden MD  6:44 PM  6/14/2021

## 2021-06-15 LAB
ABO/RH: NORMAL
ANION GAP SERPL CALCULATED.3IONS-SCNC: 9 MMOL/L (ref 7–16)
ANTIBODY SCREEN: NORMAL
BASOPHILS ABSOLUTE: 0.02 E9/L (ref 0–0.2)
BASOPHILS RELATIVE PERCENT: 0.4 % (ref 0–2)
BUN BLDV-MCNC: 31 MG/DL (ref 6–23)
CALCIUM SERPL-MCNC: 9.3 MG/DL (ref 8.6–10.2)
CHLORIDE BLD-SCNC: 106 MMOL/L (ref 98–107)
CO2: 24 MMOL/L (ref 22–29)
CREAT SERPL-MCNC: 1.5 MG/DL (ref 0.5–1)
EOSINOPHILS ABSOLUTE: 0.32 E9/L (ref 0.05–0.5)
EOSINOPHILS RELATIVE PERCENT: 6.3 % (ref 0–6)
GFR AFRICAN AMERICAN: 41
GFR NON-AFRICAN AMERICAN: 34 ML/MIN/1.73
GLUCOSE BLD-MCNC: 144 MG/DL (ref 74–99)
HCT VFR BLD CALC: 35.8 % (ref 34–48)
HEMOGLOBIN: 11.3 G/DL (ref 11.5–15.5)
IMMATURE GRANULOCYTES #: 0.02 E9/L
IMMATURE GRANULOCYTES %: 0.4 % (ref 0–5)
LYMPHOCYTES ABSOLUTE: 1.71 E9/L (ref 1.5–4)
LYMPHOCYTES RELATIVE PERCENT: 33.7 % (ref 20–42)
MCH RBC QN AUTO: 31.8 PG (ref 26–35)
MCHC RBC AUTO-ENTMCNC: 31.6 % (ref 32–34.5)
MCV RBC AUTO: 100.8 FL (ref 80–99.9)
METER GLUCOSE: 167 MG/DL (ref 74–99)
METER GLUCOSE: 208 MG/DL (ref 74–99)
METER GLUCOSE: 274 MG/DL (ref 74–99)
MONOCYTES ABSOLUTE: 0.53 E9/L (ref 0.1–0.95)
MONOCYTES RELATIVE PERCENT: 10.4 % (ref 2–12)
NEUTROPHILS ABSOLUTE: 2.48 E9/L (ref 1.8–7.3)
NEUTROPHILS RELATIVE PERCENT: 48.8 % (ref 43–80)
PDW BLD-RTO: 14.3 FL (ref 11.5–15)
PLATELET # BLD: 205 E9/L (ref 130–450)
PMV BLD AUTO: 11.4 FL (ref 7–12)
POC ACT LR: 231 SECONDS
POC ACT LR: 297 SECONDS
POC ACT LR: 363 SECONDS
POC ACT LR: 365 SECONDS
POC ACT LR: 378 SECONDS
POC ACT LR: >400 SECONDS
POTASSIUM REFLEX MAGNESIUM: 4.4 MMOL/L (ref 3.5–5)
RBC # BLD: 3.55 E12/L (ref 3.5–5.5)
SODIUM BLD-SCNC: 139 MMOL/L (ref 132–146)
WBC # BLD: 5.1 E9/L (ref 4.5–11.5)

## 2021-06-15 PROCEDURE — C1724 CATH, TRANS ATHEREC,ROTATION: HCPCS

## 2021-06-15 PROCEDURE — C1874 STENT, COATED/COV W/DEL SYS: HCPCS

## 2021-06-15 PROCEDURE — 02C03ZZ EXTIRPATION OF MATTER FROM CORONARY ARTERY, ONE ARTERY, PERCUTANEOUS APPROACH: ICD-10-PCS | Performed by: INTERNAL MEDICINE

## 2021-06-15 PROCEDURE — 2500000003 HC RX 250 WO HCPCS

## 2021-06-15 PROCEDURE — 6360000002 HC RX W HCPCS

## 2021-06-15 PROCEDURE — 92978 ENDOLUMINL IVUS OCT C 1ST: CPT | Performed by: INTERNAL MEDICINE

## 2021-06-15 PROCEDURE — C1887 CATHETER, GUIDING: HCPCS

## 2021-06-15 PROCEDURE — C9600 PERC DRUG-EL COR STENT SING: HCPCS

## 2021-06-15 PROCEDURE — 92920 PRQ TRLUML C ANGIOP 1ART&/BR: CPT | Performed by: INTERNAL MEDICINE

## 2021-06-15 PROCEDURE — 6370000000 HC RX 637 (ALT 250 FOR IP): Performed by: NURSE PRACTITIONER

## 2021-06-15 PROCEDURE — 92933 PRQ TRLML C ATHRC ST ANGIOP1: CPT | Performed by: INTERNAL MEDICINE

## 2021-06-15 PROCEDURE — C1769 GUIDE WIRE: HCPCS

## 2021-06-15 PROCEDURE — 86850 RBC ANTIBODY SCREEN: CPT

## 2021-06-15 PROCEDURE — 86901 BLOOD TYPING SEROLOGIC RH(D): CPT

## 2021-06-15 PROCEDURE — 6360000002 HC RX W HCPCS: Performed by: INTERNAL MEDICINE

## 2021-06-15 PROCEDURE — 33990 INSJ PERQ VAD L HRT ARTERIAL: CPT | Performed by: INTERNAL MEDICINE

## 2021-06-15 PROCEDURE — 5A0221D ASSISTANCE WITH CARDIAC OUTPUT USING IMPELLER PUMP, CONTINUOUS: ICD-10-PCS | Performed by: INTERNAL MEDICINE

## 2021-06-15 PROCEDURE — 2580000003 HC RX 258: Performed by: INTERNAL MEDICINE

## 2021-06-15 PROCEDURE — 6370000000 HC RX 637 (ALT 250 FOR IP): Performed by: INTERNAL MEDICINE

## 2021-06-15 PROCEDURE — 2000000000 HC ICU R&B

## 2021-06-15 PROCEDURE — 02HA3RJ INSERTION OF SHORT-TERM EXTERNAL HEART ASSIST SYSTEM INTO HEART, INTRAOPERATIVE, PERCUTANEOUS APPROACH: ICD-10-PCS | Performed by: INTERNAL MEDICINE

## 2021-06-15 PROCEDURE — 92920 PRQ TRLUML C ANGIOP 1ART&/BR: CPT

## 2021-06-15 PROCEDURE — 2720000010 HC SURG SUPPLY STERILE

## 2021-06-15 PROCEDURE — 80048 BASIC METABOLIC PNL TOTAL CA: CPT

## 2021-06-15 PROCEDURE — 6A751Z5 ULTRASOUND THERAPY OF HEART, MULTIPLE: ICD-10-PCS | Performed by: INTERNAL MEDICINE

## 2021-06-15 PROCEDURE — 92978 ENDOLUMINL IVUS OCT C 1ST: CPT

## 2021-06-15 PROCEDURE — 92979 ENDOLUMINL IVUS OCT C EA: CPT | Performed by: INTERNAL MEDICINE

## 2021-06-15 PROCEDURE — 85347 COAGULATION TIME ACTIVATED: CPT

## 2021-06-15 PROCEDURE — C1753 CATH, INTRAVAS ULTRASOUND: HCPCS

## 2021-06-15 PROCEDURE — 92928 PRQ TCAT PLMT NTRAC ST 1 LES: CPT | Performed by: INTERNAL MEDICINE

## 2021-06-15 PROCEDURE — 2709999900 HC NON-CHARGEABLE SUPPLY

## 2021-06-15 PROCEDURE — 86900 BLOOD TYPING SEROLOGIC ABO: CPT

## 2021-06-15 PROCEDURE — 36415 COLL VENOUS BLD VENIPUNCTURE: CPT

## 2021-06-15 PROCEDURE — C1894 INTRO/SHEATH, NON-LASER: HCPCS

## 2021-06-15 PROCEDURE — C1760 CLOSURE DEV, VASC: HCPCS

## 2021-06-15 PROCEDURE — 0271356 DILATION OF CORONARY ARTERY, TWO ARTERIES, BIFURCATION, WITH TWO DRUG-ELUTING INTRALUMINAL DEVICES, PERCUTANEOUS APPROACH: ICD-10-PCS | Performed by: INTERNAL MEDICINE

## 2021-06-15 PROCEDURE — C9602 PERC D-E COR STENT ATHER S: HCPCS

## 2021-06-15 PROCEDURE — 33990 INSJ PERQ VAD L HRT ARTERIAL: CPT

## 2021-06-15 PROCEDURE — 82962 GLUCOSE BLOOD TEST: CPT

## 2021-06-15 PROCEDURE — 93458 L HRT ARTERY/VENTRICLE ANGIO: CPT

## 2021-06-15 PROCEDURE — 2500000003 HC RX 250 WO HCPCS: Performed by: INTERNAL MEDICINE

## 2021-06-15 PROCEDURE — C1725 CATH, TRANSLUMIN NON-LASER: HCPCS

## 2021-06-15 PROCEDURE — 85025 COMPLETE CBC W/AUTO DIFF WBC: CPT

## 2021-06-15 RX ORDER — ATROPINE SULFATE 0.4 MG/ML
0.5 AMPUL (ML) INJECTION
Status: ACTIVE | OUTPATIENT
Start: 2021-06-15 | End: 2021-06-15

## 2021-06-15 RX ORDER — SODIUM CHLORIDE 0.9 % (FLUSH) 0.9 %
5-40 SYRINGE (ML) INJECTION EVERY 12 HOURS SCHEDULED
Status: DISCONTINUED | OUTPATIENT
Start: 2021-06-15 | End: 2021-06-17 | Stop reason: HOSPADM

## 2021-06-15 RX ORDER — ONDANSETRON 2 MG/ML
4 INJECTION INTRAMUSCULAR; INTRAVENOUS EVERY 6 HOURS PRN
Status: DISCONTINUED | OUTPATIENT
Start: 2021-06-15 | End: 2021-06-17 | Stop reason: HOSPADM

## 2021-06-15 RX ORDER — SODIUM CHLORIDE 0.9 % (FLUSH) 0.9 %
5-40 SYRINGE (ML) INJECTION PRN
Status: DISCONTINUED | OUTPATIENT
Start: 2021-06-15 | End: 2021-06-17 | Stop reason: HOSPADM

## 2021-06-15 RX ORDER — AMLODIPINE BESYLATE 5 MG/1
5 TABLET ORAL DAILY
Status: DISCONTINUED | OUTPATIENT
Start: 2021-06-16 | End: 2021-06-17 | Stop reason: HOSPADM

## 2021-06-15 RX ORDER — CARVEDILOL 6.25 MG/1
6.25 TABLET ORAL 2 TIMES DAILY WITH MEALS
Status: DISCONTINUED | OUTPATIENT
Start: 2021-06-15 | End: 2021-06-17 | Stop reason: HOSPADM

## 2021-06-15 RX ORDER — 0.9 % SODIUM CHLORIDE 0.9 %
500 INTRAVENOUS SOLUTION INTRAVENOUS PRN
Status: DISCONTINUED | OUTPATIENT
Start: 2021-06-15 | End: 2021-06-17 | Stop reason: HOSPADM

## 2021-06-15 RX ORDER — SODIUM CHLORIDE 9 MG/ML
25 INJECTION, SOLUTION INTRAVENOUS PRN
Status: DISCONTINUED | OUTPATIENT
Start: 2021-06-15 | End: 2021-06-17 | Stop reason: HOSPADM

## 2021-06-15 RX ORDER — ACETAMINOPHEN 325 MG/1
650 TABLET ORAL EVERY 4 HOURS PRN
Status: DISCONTINUED | OUTPATIENT
Start: 2021-06-15 | End: 2021-06-15

## 2021-06-15 RX ADMIN — ASPIRIN 81 MG CHEWABLE TABLET 81 MG: 81 TABLET CHEWABLE at 08:09

## 2021-06-15 RX ADMIN — ROSUVASTATIN 20 MG: 20 TABLET, FILM COATED ORAL at 21:26

## 2021-06-15 RX ADMIN — CLOPIDOGREL 75 MG: 75 TABLET, FILM COATED ORAL at 08:09

## 2021-06-15 RX ADMIN — VANCOMYCIN HYDROCHLORIDE 1500 MG: 10 INJECTION, POWDER, LYOPHILIZED, FOR SOLUTION INTRAVENOUS at 09:55

## 2021-06-15 RX ADMIN — Medication 10 ML: at 21:27

## 2021-06-15 RX ADMIN — SODIUM CHLORIDE, PRESERVATIVE FREE 10 ML: 5 INJECTION INTRAVENOUS at 21:27

## 2021-06-15 RX ADMIN — INSULIN LISPRO 3 UNITS: 100 INJECTION, SOLUTION INTRAVENOUS; SUBCUTANEOUS at 16:48

## 2021-06-15 RX ADMIN — CARVEDILOL 6.25 MG: 6.25 TABLET, FILM COATED ORAL at 15:36

## 2021-06-15 RX ADMIN — INSULIN LISPRO 1 UNITS: 100 INJECTION, SOLUTION INTRAVENOUS; SUBCUTANEOUS at 21:32

## 2021-06-15 RX ADMIN — SODIUM CHLORIDE 5 MG/HR: 9 INJECTION, SOLUTION INTRAVENOUS at 14:00

## 2021-06-15 ASSESSMENT — PAIN SCALES - GENERAL
PAINLEVEL_OUTOF10: 0
PAINLEVEL_OUTOF10: 5
PAINLEVEL_OUTOF10: 0

## 2021-06-15 ASSESSMENT — PAIN DESCRIPTION - ORIENTATION: ORIENTATION: LEFT

## 2021-06-15 ASSESSMENT — PAIN DESCRIPTION - LOCATION: LOCATION: GROIN

## 2021-06-15 ASSESSMENT — PAIN DESCRIPTION - DESCRIPTORS: DESCRIPTORS: SORE

## 2021-06-15 ASSESSMENT — PAIN DESCRIPTION - ONSET: ONSET: ON-GOING

## 2021-06-15 ASSESSMENT — PAIN DESCRIPTION - PROGRESSION: CLINICAL_PROGRESSION: NOT CHANGED

## 2021-06-15 ASSESSMENT — PAIN DESCRIPTION - PAIN TYPE: TYPE: ACUTE PAIN

## 2021-06-15 ASSESSMENT — PAIN DESCRIPTION - FREQUENCY: FREQUENCY: INTERMITTENT

## 2021-06-15 NOTE — PROGRESS NOTES
Nutrition Assessment     Type and Reason for Visit: RD Nutrition Re-Screen/LOS, Initial (RD Re-Screen Negative)    Nutrition Assessment:  Pt assessed per LOS protocol. Chart reviewed. Pt currently eating ~75% of most meals and w/ no other significant nutritional issues noted at this time. Will follow-up per policy. Please consult if RD needed.     Electronically signed by Adina Nassar RD, LD on 6/15/21 at 3:13 PM EDT    Contact: ext [phone]

## 2021-06-15 NOTE — PROGRESS NOTES
Subjective: The patient is awake and alert. No acute complaints  await [facility] pci today    Objective:    /64   Pulse 65   Temp 98.6 °F (37 °C) (Tympanic)   Resp 18   Ht 5' 2\" (1.575 m)   Wt 184 lb (83.5 kg)   SpO2 99%   BMI 33.65 kg/m²     In: 610 [P.O.:600; I.V.:10]  Out: 1600   In: 610   Out: 1600 [Urine:1600]    General appearance: NAD, conversant  HEENT: AT/NC, MMM  Neck: FROM, supple  Lungs: Clear to auscultation  CV: RRR, no MRGs  Vasc: Radial pulses 2+  Abdomen: Soft, non-tender; no masses or HSM  Extremities: No peripheral edema or digital cyanosis  Skin: no rash, lesions or ulcers  Psych: Alert and oriented to person, place and time  Neuro: Alert and interactive     Recent Labs     06/14/21  1200 06/15/21  0515   WBC 6.2 5.1   HGB 12.3 11.3*   HCT 40.2 35.8    205       Recent Labs     06/14/21  1200 06/15/21  0515    139   K 4.1 4.4    106   CO2 23 24   BUN 30* 31*   CREATININE 1.4* 1.5*   CALCIUM 9.7 9.3       Assessment:    Principal Problem: Aortic valve stenosis, unspecified etiology  Active Problems:    Essential hypertension    Hyperlipidemia    Stage 3b chronic kidney disease (HCC)    Type 2 diabetes mellitus, without long-term current use of insulin (HCC)    Obesity (BMI 30-39. 9)    Dizziness    Nonrheumatic mitral valve regurgitation    Nonrheumatic tricuspid valve regurgitation  Resolved Problems:    * No resolved hospital problems.  *      Plan:  Admitted for evaluation of dizziness and lightheadedness per recommendation of PCP and patient with known recent diagnosis of severe aortic stenosis-no syncopal episode, no shortness of breath  TAVR candidate later on   VELIA 6/10/2021 reveals severe aortic stenosis  [facility] 6/11-severe distal left main disease and 50 to 60% stenosis of obtuse marginal/ramus intermedius  Cardiothoracic surgery  Signed off  For [facility] PCI6/15 secondary to large thyroid - not a GA candidate   Not symptomatic at rest, only on exertion  Resume home medications         DVT Prophylaxis   PT/OT  Discharge planning--okay for discharge after left heart cath if okay with other        Ulices Clemente MD  1:14 PM  6/15/2021

## 2021-06-15 NOTE — CARE COORDINATION
Care Coordination-  The patient is here due to a+ cardiac cath from 6/11 with severe Left main disease and she is for a Left Heart Cath this am. Recommending PCI with Tavr and start on plavix. Bun/creat this am-31/1.5 and Her plan is to return home with her close friends once she is medically stable and they will be the one to pick her up.  I will follow

## 2021-06-15 NOTE — PLAN OF CARE
Problem: Falls - Risk of:  Goal: Will remain free from falls  Description: Will remain free from falls  6/14/2021 2348 by González Mccormick RN  Outcome: Not Met This Shift  6/14/2021 1735 by Dean Lovell RN  Outcome: Met This Shift  Goal: Absence of physical injury  Description: Absence of physical injury  6/14/2021 2348 by González Mccormick RN  Outcome: Not Met This Shift  6/14/2021 1735 by Dean Lovell RN  Outcome: Met This Shift     Problem: Tissue Perfusion - Cardiopulmonary, Altered:  Goal: Absence of angina  Description: Absence of angina  6/14/2021 2348 by González Mccormick RN  Outcome: Not Met This Shift  6/14/2021 1735 by Dean Lovell RN  Outcome: Met This Shift  Goal: Hemodynamic stability will improve  Description: Hemodynamic stability will improve  6/14/2021 2348 by González Mccormick RN  Outcome: Not Met This Shift  6/14/2021 1735 by Dean Lovell RN  Outcome: Met This Shift     Problem: Falls - Risk of:  Goal: Will remain free from falls  Description: Will remain free from falls  6/14/2021 2348 by González Mccormick RN  Outcome: Not Met This Shift  6/14/2021 1735 by Dean Lovell RN  Outcome: Met This Shift     Problem: Falls - Risk of:  Goal: Absence of physical injury  Description: Absence of physical injury  6/14/2021 2348 by González Mccormick RN  Outcome: Not Met This Shift  6/14/2021 1735 by Dean Lovell RN  Outcome: Met This Shift     Problem: Tissue Perfusion - Cardiopulmonary, Altered:  Goal: Absence of angina  Description: Absence of angina  6/14/2021 2348 by González Mccormick RN  Outcome: Not Met This Shift  6/14/2021 1735 by Dean Lovell RN  Outcome: Met This Shift

## 2021-06-15 NOTE — PROCEDURES
CARDIAC CATHETERIZATION REPORT    : De Sorto MD     Date of procedure:06/15/21     Indication:  1. Severe left main disease, high risk for CABG, moderate to severe aortic stenosis    Procedures:  Placement and removal of percutaneous Impella CP  Rotational atherectomy of the left main  IVUS of the left main, LAD, circumflex  Stenting of the left main into LAD  Stenting of left main into circumflex  Balloon angioplasty of the ramus intermedius     Sedation/analgesia:  Midazolam IV, fentanyl IV    Time sedation was administered: 0936. I was present in the room when sedation was administered. Procedure end time: 8519  Time spent with face to face monitoring of moderate sedation: 255 minutes    Brief history:  81-year-old  female with history of moderate to severe aortic stenosis, type 2 diabetes, CKD 3B with baseline creatinine 1.5, hypertension, carotid stenosis, substernal thyroid, HFpEF who presented with shortness of breath and presyncope. She underwent a coronary angiogram on June 11 revealing critical distal left main heavily calcified stenosis in the left dominant circulation. She additionally underwent a VELAI revealing moderate to severe aortic stenosis with normal LVEF. She was referred for CABG/SAVR however thought to be at high risk given her thyroid compressing the trachea. Thus she was referred for left main PCI with consideration for TAVR in the future. Description of procedure: The patient presented to the Cath Lab in a(n) elective fashion. The patient was prepped and draped in a sterile manner. Timeout, airway and ASA assessment were completed. Local anesthesia with 1% lidocaine was administered and sedation/analgesia were administered as above. Access was obtained using the modified Seldinger technique and a micropuncture needle with ultrasound guidance in the left CFA with 8 Swiss sheath.   Procedure is described in detail as below    Coronary anatomy:  Detailed in the diagnostic angiogram June 11, 2021      Access and mechanical circulatory support:  Due to critical left main stenosis in the setting of a left dominant circulation with moderate to severe aortic stenosis with likely need for atherectomy, upfront mechanical circulatory support was planned. IV unfractionated heparin was given to achieve an ACT greater than 250. Pro-glide Perclose x2 were used in a preclose fashion at the left CFA. The access was then upsized using the Impella 14 Algerian sheath. The aortic valve was crossed using an AL-1 catheter and a straight wire. The AL-1 was exchanged for a pigtail catheter. LVEDP was 13. The Impella wire was placed in the left ventricle and an Impella CP was placed successfully in the left ventricle and placed on automatic support. Flow was 3.3 to 3.5 L/min. Through the 14 Algerian Impella sheath a 7 Algerian short sheath was placed using the single access technique. PCI proceeded via the 7 Algerian sheath. Percutaneous coronary intervention:  Anticoagulation: Unfractionated heparin with ACT>250 seconds  Aspirin administered Yes  P2Y12 inhibitor: clopidogrel 300 mg was administered yesterday and 75 mg this morning. IV tirofiban was not administered. 1. Lesion location: Distal left main; stenosis 95%; DUNG flow 3. Guiding catheter: JL 3.5, 7 Western Araceli. . Residual stenosis 0%; post-PCI DUNG flow 3. Complications: None    · The left main into the LAD was wired with a Cardiovascular Decisions exchanged for a [employer] wire using a Turn[employer] microcatheter. Rotational atherectomy was performed using a 1.75 mm yessenia at 160 K RPM for 5 passes. The RotaWire was then exchanged for the Cardiovascular Decisions. The ramus intermedius was wired with a BMW and the circumflex was wired with a Prowater. · The left main into LAD was ballooned with a 3.5 NC balloon at high pressure. The left main into circumflex was ballooned with a 3.5 NC balloon at high pressure.   IVUS was performed from the left main into the LAD and from the left main into circumflex. · The ramus intermedius was ballooned with a 2.0 compliant balloon at moderate pressure. · The left main into LAD was ballooned with a 4.5 NC balloon at high pressure. The left main to circumflex was ballooned with a 4.5 NC balloon at high pressure. · Stenting of the left main bifurcation then proceeded using the culotte technique. The left main into LAD was stented from the ostial left main to the proximal LAD with a 3.5 x 18 Xience Elvi RAIZA. This was postdilated with a 3.5 NC balloon at high pressure. The left main into circumflex was stented with a 4.0 x 18 Xience Elvi RAIZA postdilated with 4.5 NC at high pressure. The proximal end of both stents were aimed to exactly overlap. · Kissing balloon inflation of the left main into LAD and left main into circumflex was performed using 3.5 NC balloon in the LAD and 4.5 compliant balloon in the circumflex at moderate pressure. Proximal optimization in the left main was performed using a 5.0 NC balloon at high pressure. · Post PCI IVUS was performed from the left main into the LAD and from the left main into circumflex. Minimal stent area was as follows: Left main 13.2 mm², varun 18.8 mm², LAD 7.7 mm², circumflex 13 mm²  · The ostial ramus intermedius had focal 80% stenosis with DUNG-3 flow. · Impella CP was weaned progressively after left main KBI and was removed without incidence through its sheath. The predeployed proglide x2 were used successfully to achieve hemostasis at the left CFA access site. No protamine was administered. Hemodynamics:  LVEDP 13  Ao: 027/35    Complications: None  Estimated blood loss: 100 mL  Contrast used: 260 mL  Air kerma: 4555 mGy    Conclusions:  1. Successful left main bifurcation PCI using Impella for mechanical circulatory support in a single access fashion.   Culotte technique was used for left main PCI from the left main into

## 2021-06-15 NOTE — PLAN OF CARE
Problem: Tissue Perfusion - Cardiopulmonary, Altered:  Goal: Absence of angina  Description: Absence of angina  Outcome: Met This Shift  Goal: Hemodynamic stability will improve  Description: Hemodynamic stability will improve  Outcome: Met This Shift

## 2021-06-16 LAB
ANION GAP SERPL CALCULATED.3IONS-SCNC: 9 MMOL/L (ref 7–16)
BASOPHILS ABSOLUTE: 0.02 E9/L (ref 0–0.2)
BASOPHILS ABSOLUTE: 0.04 E9/L (ref 0–0.2)
BASOPHILS RELATIVE PERCENT: 0.3 % (ref 0–2)
BASOPHILS RELATIVE PERCENT: 0.5 % (ref 0–2)
BUN BLDV-MCNC: 26 MG/DL (ref 6–23)
CALCIUM SERPL-MCNC: 8.3 MG/DL (ref 8.6–10.2)
CHLORIDE BLD-SCNC: 108 MMOL/L (ref 98–107)
CO2: 21 MMOL/L (ref 22–29)
CREAT SERPL-MCNC: 1.4 MG/DL (ref 0.5–1)
EOSINOPHILS ABSOLUTE: 0.09 E9/L (ref 0.05–0.5)
EOSINOPHILS ABSOLUTE: 0.17 E9/L (ref 0.05–0.5)
EOSINOPHILS RELATIVE PERCENT: 1.2 % (ref 0–6)
EOSINOPHILS RELATIVE PERCENT: 1.9 % (ref 0–6)
GFR AFRICAN AMERICAN: 44
GFR NON-AFRICAN AMERICAN: 36 ML/MIN/1.73
GLUCOSE BLD-MCNC: 160 MG/DL (ref 74–99)
HCT VFR BLD CALC: 25.8 % (ref 34–48)
HCT VFR BLD CALC: 30.9 % (ref 34–48)
HEMOGLOBIN: 8.2 G/DL (ref 11.5–15.5)
HEMOGLOBIN: 9.3 G/DL (ref 11.5–15.5)
IMMATURE GRANULOCYTES #: 0.04 E9/L
IMMATURE GRANULOCYTES #: 0.06 E9/L
IMMATURE GRANULOCYTES %: 0.5 % (ref 0–5)
IMMATURE GRANULOCYTES %: 0.8 % (ref 0–5)
LYMPHOCYTES ABSOLUTE: 1.19 E9/L (ref 1.5–4)
LYMPHOCYTES ABSOLUTE: 1.27 E9/L (ref 1.5–4)
LYMPHOCYTES RELATIVE PERCENT: 13.5 % (ref 20–42)
LYMPHOCYTES RELATIVE PERCENT: 17.4 % (ref 20–42)
MCH RBC QN AUTO: 32.1 PG (ref 26–35)
MCH RBC QN AUTO: 32.3 PG (ref 26–35)
MCHC RBC AUTO-ENTMCNC: 30.1 % (ref 32–34.5)
MCHC RBC AUTO-ENTMCNC: 31.8 % (ref 32–34.5)
MCV RBC AUTO: 101.6 FL (ref 80–99.9)
MCV RBC AUTO: 106.6 FL (ref 80–99.9)
METER GLUCOSE: 177 MG/DL (ref 74–99)
METER GLUCOSE: 215 MG/DL (ref 74–99)
METER GLUCOSE: 220 MG/DL (ref 74–99)
METER GLUCOSE: 323 MG/DL (ref 74–99)
MONOCYTES ABSOLUTE: 0.75 E9/L (ref 0.1–0.95)
MONOCYTES ABSOLUTE: 0.82 E9/L (ref 0.1–0.95)
MONOCYTES RELATIVE PERCENT: 10.3 % (ref 2–12)
MONOCYTES RELATIVE PERCENT: 9.3 % (ref 2–12)
NEUTROPHILS ABSOLUTE: 5.09 E9/L (ref 1.8–7.3)
NEUTROPHILS ABSOLUTE: 6.56 E9/L (ref 1.8–7.3)
NEUTROPHILS RELATIVE PERCENT: 70 % (ref 43–80)
NEUTROPHILS RELATIVE PERCENT: 74.3 % (ref 43–80)
PDW BLD-RTO: 14.5 FL (ref 11.5–15)
PDW BLD-RTO: 14.6 FL (ref 11.5–15)
PLATELET # BLD: 164 E9/L (ref 130–450)
PLATELET # BLD: 175 E9/L (ref 130–450)
PMV BLD AUTO: 10.8 FL (ref 7–12)
PMV BLD AUTO: 11.2 FL (ref 7–12)
POTASSIUM REFLEX MAGNESIUM: 4.2 MMOL/L (ref 3.5–5)
RBC # BLD: 2.54 E12/L (ref 3.5–5.5)
RBC # BLD: 2.9 E12/L (ref 3.5–5.5)
SODIUM BLD-SCNC: 138 MMOL/L (ref 132–146)
WBC # BLD: 7.3 E9/L (ref 4.5–11.5)
WBC # BLD: 8.8 E9/L (ref 4.5–11.5)

## 2021-06-16 PROCEDURE — 2580000003 HC RX 258: Performed by: INTERNAL MEDICINE

## 2021-06-16 PROCEDURE — 6370000000 HC RX 637 (ALT 250 FOR IP): Performed by: INTERNAL MEDICINE

## 2021-06-16 PROCEDURE — 85025 COMPLETE CBC W/AUTO DIFF WBC: CPT

## 2021-06-16 PROCEDURE — 2140000000 HC CCU INTERMEDIATE R&B

## 2021-06-16 PROCEDURE — 6370000000 HC RX 637 (ALT 250 FOR IP): Performed by: NURSE PRACTITIONER

## 2021-06-16 PROCEDURE — 99232 SBSQ HOSP IP/OBS MODERATE 35: CPT | Performed by: PHYSICIAN ASSISTANT

## 2021-06-16 PROCEDURE — 80048 BASIC METABOLIC PNL TOTAL CA: CPT

## 2021-06-16 PROCEDURE — 36415 COLL VENOUS BLD VENIPUNCTURE: CPT

## 2021-06-16 PROCEDURE — 82962 GLUCOSE BLOOD TEST: CPT

## 2021-06-16 RX ORDER — SODIUM CHLORIDE 9 MG/ML
INJECTION, SOLUTION INTRAVENOUS CONTINUOUS
Status: ACTIVE | OUTPATIENT
Start: 2021-06-16 | End: 2021-06-16

## 2021-06-16 RX ADMIN — ASPIRIN 81 MG CHEWABLE TABLET 81 MG: 81 TABLET CHEWABLE at 09:06

## 2021-06-16 RX ADMIN — RAMIPRIL 10 MG: 5 CAPSULE ORAL at 20:13

## 2021-06-16 RX ADMIN — CYANOCOBALAMIN TAB 1000 MCG 1000 MCG: 1000 TAB at 09:07

## 2021-06-16 RX ADMIN — ROSUVASTATIN 20 MG: 20 TABLET, FILM COATED ORAL at 20:13

## 2021-06-16 RX ADMIN — SODIUM CHLORIDE, PRESERVATIVE FREE 10 ML: 5 INJECTION INTRAVENOUS at 20:13

## 2021-06-16 RX ADMIN — INSULIN LISPRO 1 UNITS: 100 INJECTION, SOLUTION INTRAVENOUS; SUBCUTANEOUS at 20:13

## 2021-06-16 RX ADMIN — INSULIN LISPRO 1 UNITS: 100 INJECTION, SOLUTION INTRAVENOUS; SUBCUTANEOUS at 08:00

## 2021-06-16 RX ADMIN — ACETAMINOPHEN 650 MG: 325 TABLET ORAL at 23:39

## 2021-06-16 RX ADMIN — INSULIN LISPRO 2 UNITS: 100 INJECTION, SOLUTION INTRAVENOUS; SUBCUTANEOUS at 16:37

## 2021-06-16 RX ADMIN — CARVEDILOL 6.25 MG: 6.25 TABLET, FILM COATED ORAL at 08:00

## 2021-06-16 RX ADMIN — INSULIN LISPRO 4 UNITS: 100 INJECTION, SOLUTION INTRAVENOUS; SUBCUTANEOUS at 11:38

## 2021-06-16 RX ADMIN — SODIUM CHLORIDE: 9 INJECTION, SOLUTION INTRAVENOUS at 06:45

## 2021-06-16 RX ADMIN — CLOPIDOGREL 75 MG: 75 TABLET, FILM COATED ORAL at 09:06

## 2021-06-16 RX ADMIN — Medication 1000 UNITS: at 09:07

## 2021-06-16 RX ADMIN — CARVEDILOL 6.25 MG: 6.25 TABLET, FILM COATED ORAL at 16:37

## 2021-06-16 ASSESSMENT — PAIN SCALES - GENERAL
PAINLEVEL_OUTOF10: 0
PAINLEVEL_OUTOF10: 0
PAINLEVEL_OUTOF10: 7
PAINLEVEL_OUTOF10: 0

## 2021-06-16 ASSESSMENT — PAIN DESCRIPTION - ONSET: ONSET: GRADUAL

## 2021-06-16 ASSESSMENT — PAIN DESCRIPTION - ORIENTATION: ORIENTATION: LEFT

## 2021-06-16 ASSESSMENT — PAIN DESCRIPTION - FREQUENCY: FREQUENCY: INTERMITTENT

## 2021-06-16 ASSESSMENT — PAIN DESCRIPTION - PROGRESSION
CLINICAL_PROGRESSION: NOT CHANGED
CLINICAL_PROGRESSION: NOT CHANGED

## 2021-06-16 ASSESSMENT — PAIN - FUNCTIONAL ASSESSMENT: PAIN_FUNCTIONAL_ASSESSMENT: ACTIVITIES ARE NOT PREVENTED

## 2021-06-16 ASSESSMENT — PAIN DESCRIPTION - DESCRIPTORS: DESCRIPTORS: SORE;ACHING;DISCOMFORT

## 2021-06-16 ASSESSMENT — PAIN DESCRIPTION - PAIN TYPE: TYPE: ACUTE PAIN;SURGICAL PAIN

## 2021-06-16 NOTE — PLAN OF CARE
Problem: Falls - Risk of:  Goal: Will remain free from falls  Description: Will remain free from falls  Outcome: Met This Shift  Goal: Absence of physical injury  Description: Absence of physical injury  Outcome: Met This Shift     Problem: Tissue Perfusion - Cardiopulmonary, Altered:  Goal: Absence of angina  Description: Absence of angina  6/16/2021 0156 by Vahe Cabrera RN  Outcome: Met This Shift  6/15/2021 1422 by Lily ePna RN  Outcome: Met This Shift  Goal: Hemodynamic stability will improve  Description: Hemodynamic stability will improve  6/16/2021 0156 by Vahe Cabrera RN  Outcome: Met This Shift  6/15/2021 1422 by Lily Pena RN  Outcome: Met This Shift     Problem: Skin Integrity:  Goal: Will show no infection signs and symptoms  Description: Will show no infection signs and symptoms  Outcome: Met This Shift  Goal: Absence of new skin breakdown  Description: Absence of new skin breakdown  Outcome: Met This Shift     Problem: Pain:  Goal: Pain level will decrease  Description: Pain level will decrease  Outcome: Met This Shift  Goal: Control of acute pain  Description: Control of acute pain  Outcome: Met This Shift

## 2021-06-16 NOTE — PROGRESS NOTES
Date of Service: 6/16/2021    Chief Complaint: Follow-up for aortic stenosis, CAD    Subjective:     HPI: resting comfortably in bed. No acute events overnight. Denies chest discomfort, dyspnea, palpitations, lightheadedness, or any other complaints. SBP  overnight. IVFs started this AM. Hgb 11 --> 8.2 today. ROS:  Cardiac: As per HPI  General: No fever, chills  Pulmonary: As per HPI  HEENT: No visual disturbances, difficult swallowing  GI: No nausea, vomiting  : No dysuria, hematuria  Endocrine: No thyroid disease, +DM  Musculoskeletal: SAWYER x 4, no focal motor deficits  Skin: Intact, no rashes  Neuro: No headache, seizures  Psych: Currently with no depression, anxiety    Scheduled Meds:   sodium chloride flush  5-40 mL Intravenous 2 times per day    amLODIPine  5 mg Oral Daily    carvedilol  6.25 mg Oral BID WC    clopidogrel  75 mg Oral Daily    rosuvastatin  20 mg Oral Nightly    sodium chloride flush  5-40 mL Intravenous 2 times per day    aspirin  81 mg Oral Daily    ramipril  10 mg Oral Daily    vitamin B-12  1,000 mcg Oral Daily    Vitamin D  1,000 Units Oral Daily    insulin lispro  0-6 Units Subcutaneous TID WC    insulin lispro  0-3 Units Subcutaneous Nightly     Continuous Infusions:   sodium chloride 150 mL/hr at 06/16/21 0645    niCARdipene (CARDENE) 50 mg in 250 mL 0.9 % sodium chloride infusion 5 mg/hr (06/15/21 1400)    sodium chloride      dextrose       PRN Meds:sodium chloride flush, sodium chloride, sodium chloride, ondansetron, sodium chloride flush, acetaminophen, polyethylene glycol, trimethobenzamide, glucose, dextrose, glucagon (rDNA), dextrose, meclizine, nitroGLYCERIN, hydrALAZINE    I/O last 3 completed shifts: In: 1320 [P.O.:480; I.V.:840]  Out: 2715 [Urine:2715]  No intake/output data recorded.       Objective:      Physical Exam:   BP (!) 114/44   Pulse 76   Temp 97.6 °F (36.4 °C) (Temporal)   Resp 15   Ht 5' 2\" (1.575 m)   Wt 182 lb 11.2 oz (82.9 kg) SpO2 98%   BMI 33.42 kg/m²   Appearance: Awake, alert and oriented x 3, no acute respiratory distress  Skin: Intact, no rash  Head: Normocephalic, atraumatic  Eyes: EOMI, no conjunctival erythema  ENMT: No pharyngeal erythema, MMM, no rhinorrhea, no dentures  Neck: Supple, no carotid bruits  Lungs: Decreased BS B/L, no wheezing  Cardiac: Regular rate and rhythm, 3/6 LUIS  Abdomen: Soft, nontender, +bowel sounds  Extremities: Moves all extremities x 4, no lower extremity edema  Neurologic: No focal motor deficits apparent, normal mood and affect, alert and oriented x 3  Left groin: soft, minimally tender to palpation, mild ecchymosis, no hematoma     Imaging    CT Chest WO 6/12/2021: Mild prominence of the left atrium. Central pulmonary arterial prominence consistent with mild pulmonary arterial hypertension. 1 cm ground-glass opacity within the right upper lobe is probably post infectious or inflammatory but could be followed within 6 months.  The noncalcified nodule in the medial right lower lobe could also be followed on subsequent exam. Heterogeneous enlargement of the thyroid with substernal mass asymmetric to the left. Bilateral Carotid Dopplers 6/12/2021: 50-98% HECTOR. 7-19% LICA. Lab Review   Lab Results   Component Value Date     06/16/2021    K 4.2 06/16/2021     06/16/2021    CO2 21 06/16/2021    BUN 26 06/16/2021    CREATININE 1.4 06/16/2021    GLUCOSE 160 06/16/2021    CALCIUM 8.3 06/16/2021     Lab Results   Component Value Date    WBC 7.3 06/16/2021    HGB 8.2 06/16/2021    HCT 25.8 06/16/2021    .6 06/16/2021     06/16/2021     Telemetry: SR 60-70's    Echocardiogram: 6/10/21 Dr. Bon Barger: Normal left ventricular systolic function. EF visually estimated at > 60%. Normal right ventricular size and function. Moderate MR. The aortic valve is trileaflet / fibrocalcific changes present. Paradoxical low-flow low-gradient severe aortic stenosis. Mild aortic regurgitation. Mild tricuspid regurgitation. RV-RA gradient is estimated at 48 mmHg. Cardiac catheterization: 6/11/21 Dr. Anahi Domínguez  Severe distal left main disease with involvement of ostial LAD and ostial left circumflex artery. 50% to 60% disease involving proximal high OM branch/ramus intermedius artery. Small nondominant right coronary artery. Known severe aortic valve stenosis. Assessment:     1. CAD (including LM) -- CTS declined due to substernal thyroid compressing her trachea and       recommended PCI. S/p LM rotational atherectomy, IVUS, and stenting 6/15/21  2. Moderate-severe aortic stenosis   3. Moderate MR and mild TR.  4. HTN: controlled  5. HLD  6. T2DM  7. Carotid artery disease  8. Chronic kidney disease SCr 1.3-->1.4    Recommendations:     1. Continue current medications  2. Repeat CBC at noon  3. Transfer to CSU  4. Anticipate discharge home in next 24 hours  5.  Structural heart clinic follow up in one month    Discussed with Dr Thea Cobian PA-C   [facility]) Cardiology

## 2021-06-16 NOTE — PLAN OF CARE
Problem: Falls - Risk of:  Goal: Will remain free from falls  Description: Will remain free from falls  6/16/2021 1720 by Tico Ken RN  Outcome: Met This Shift  6/16/2021 1359 by Cristhian Sanches RN  Outcome: Met This Shift  Goal: Absence of physical injury  Description: Absence of physical injury  Outcome: Met This Shift     Problem: Tissue Perfusion - Cardiopulmonary, Altered:  Goal: Absence of angina  Description: Absence of angina  Outcome: Met This Shift  Goal: Hemodynamic stability will improve  Description: Hemodynamic stability will improve  6/16/2021 1359 by Cristhian Sanches RN  Outcome: Met This Shift

## 2021-06-16 NOTE — PLAN OF CARE
Problem: Falls - Risk of:  Goal: Will remain free from falls  Description: Will remain free from falls  6/16/2021 1359 by Mike Lanza RN  Outcome: Met This Shift     Problem: Tissue Perfusion - Cardiopulmonary, Altered:  Goal: Hemodynamic stability will improve  Description: Hemodynamic stability will improve  6/16/2021 1359 by Mike Lanza RN  Outcome: Met This Shift

## 2021-06-16 NOTE — PROGRESS NOTES
Informed Dr. Chikis ECLAYA of drop in hgb from 11.3 to 8.2 and low BP with low UO. Orders placed to obtain another CBC at noon and follow up.

## 2021-06-17 VITALS
DIASTOLIC BLOOD PRESSURE: 56 MMHG | TEMPERATURE: 98.1 F | HEIGHT: 62 IN | OXYGEN SATURATION: 95 % | HEART RATE: 78 BPM | WEIGHT: 175 LBS | RESPIRATION RATE: 18 BRPM | SYSTOLIC BLOOD PRESSURE: 131 MMHG | BODY MASS INDEX: 32.2 KG/M2

## 2021-06-17 LAB
ANION GAP SERPL CALCULATED.3IONS-SCNC: 8 MMOL/L (ref 7–16)
BASOPHILS ABSOLUTE: 0.02 E9/L (ref 0–0.2)
BASOPHILS ABSOLUTE: 0.03 E9/L (ref 0–0.2)
BASOPHILS RELATIVE PERCENT: 0.3 % (ref 0–2)
BASOPHILS RELATIVE PERCENT: 0.4 % (ref 0–2)
BUN BLDV-MCNC: 27 MG/DL (ref 6–23)
CALCIUM SERPL-MCNC: 7.9 MG/DL (ref 8.6–10.2)
CHLORIDE BLD-SCNC: 109 MMOL/L (ref 98–107)
CO2: 21 MMOL/L (ref 22–29)
CREAT SERPL-MCNC: 1.6 MG/DL (ref 0.5–1)
EOSINOPHILS ABSOLUTE: 0.27 E9/L (ref 0.05–0.5)
EOSINOPHILS ABSOLUTE: 0.28 E9/L (ref 0.05–0.5)
EOSINOPHILS RELATIVE PERCENT: 3.8 % (ref 0–6)
EOSINOPHILS RELATIVE PERCENT: 4.2 % (ref 0–6)
GFR AFRICAN AMERICAN: 38
GFR NON-AFRICAN AMERICAN: 31 ML/MIN/1.73
GLUCOSE BLD-MCNC: 132 MG/DL (ref 74–99)
HCT VFR BLD CALC: 22.6 % (ref 34–48)
HCT VFR BLD CALC: 23.4 % (ref 34–48)
HEMOGLOBIN: 7.3 G/DL (ref 11.5–15.5)
HEMOGLOBIN: 7.4 G/DL (ref 11.5–15.5)
IMMATURE GRANULOCYTES #: 0.04 E9/L
IMMATURE GRANULOCYTES #: 0.06 E9/L
IMMATURE GRANULOCYTES %: 0.6 % (ref 0–5)
IMMATURE GRANULOCYTES %: 0.8 % (ref 0–5)
LYMPHOCYTES ABSOLUTE: 1.53 E9/L (ref 1.5–4)
LYMPHOCYTES ABSOLUTE: 1.79 E9/L (ref 1.5–4)
LYMPHOCYTES RELATIVE PERCENT: 20.8 % (ref 20–42)
LYMPHOCYTES RELATIVE PERCENT: 27.7 % (ref 20–42)
MCH RBC QN AUTO: 32.3 PG (ref 26–35)
MCH RBC QN AUTO: 32.3 PG (ref 26–35)
MCHC RBC AUTO-ENTMCNC: 31.6 % (ref 32–34.5)
MCHC RBC AUTO-ENTMCNC: 32.3 % (ref 32–34.5)
MCV RBC AUTO: 100 FL (ref 80–99.9)
MCV RBC AUTO: 102.2 FL (ref 80–99.9)
METER GLUCOSE: 148 MG/DL (ref 74–99)
METER GLUCOSE: 180 MG/DL (ref 74–99)
MONOCYTES ABSOLUTE: 0.75 E9/L (ref 0.1–0.95)
MONOCYTES ABSOLUTE: 0.77 E9/L (ref 0.1–0.95)
MONOCYTES RELATIVE PERCENT: 10.2 % (ref 2–12)
MONOCYTES RELATIVE PERCENT: 11.9 % (ref 2–12)
NEUTROPHILS ABSOLUTE: 3.58 E9/L (ref 1.8–7.3)
NEUTROPHILS ABSOLUTE: 4.72 E9/L (ref 1.8–7.3)
NEUTROPHILS RELATIVE PERCENT: 55.3 % (ref 43–80)
NEUTROPHILS RELATIVE PERCENT: 64 % (ref 43–80)
PDW BLD-RTO: 14.6 FL (ref 11.5–15)
PDW BLD-RTO: 14.6 FL (ref 11.5–15)
PLATELET # BLD: 138 E9/L (ref 130–450)
PLATELET # BLD: 139 E9/L (ref 130–450)
PMV BLD AUTO: 11.2 FL (ref 7–12)
PMV BLD AUTO: 11.3 FL (ref 7–12)
POTASSIUM REFLEX MAGNESIUM: 3.9 MMOL/L (ref 3.5–5)
RBC # BLD: 2.26 E12/L (ref 3.5–5.5)
RBC # BLD: 2.29 E12/L (ref 3.5–5.5)
SODIUM BLD-SCNC: 138 MMOL/L (ref 132–146)
WBC # BLD: 6.5 E9/L (ref 4.5–11.5)
WBC # BLD: 7.4 E9/L (ref 4.5–11.5)

## 2021-06-17 PROCEDURE — 6370000000 HC RX 637 (ALT 250 FOR IP): Performed by: NURSE PRACTITIONER

## 2021-06-17 PROCEDURE — 36415 COLL VENOUS BLD VENIPUNCTURE: CPT

## 2021-06-17 PROCEDURE — 82962 GLUCOSE BLOOD TEST: CPT

## 2021-06-17 PROCEDURE — 85025 COMPLETE CBC W/AUTO DIFF WBC: CPT

## 2021-06-17 PROCEDURE — 2580000003 HC RX 258: Performed by: INTERNAL MEDICINE

## 2021-06-17 PROCEDURE — 80048 BASIC METABOLIC PNL TOTAL CA: CPT

## 2021-06-17 PROCEDURE — 99232 SBSQ HOSP IP/OBS MODERATE 35: CPT | Performed by: PHYSICIAN ASSISTANT

## 2021-06-17 PROCEDURE — 6370000000 HC RX 637 (ALT 250 FOR IP): Performed by: INTERNAL MEDICINE

## 2021-06-17 RX ORDER — AMLODIPINE BESYLATE 5 MG/1
5 TABLET ORAL DAILY
Qty: 30 TABLET | Refills: 3 | Status: SHIPPED | OUTPATIENT
Start: 2021-06-18 | End: 2021-10-08

## 2021-06-17 RX ORDER — CLOPIDOGREL BISULFATE 75 MG/1
75 TABLET ORAL DAILY
Qty: 30 TABLET | Refills: 3 | Status: SHIPPED | OUTPATIENT
Start: 2021-06-18 | End: 2021-08-17 | Stop reason: SDUPTHER

## 2021-06-17 RX ORDER — CARVEDILOL 6.25 MG/1
6.25 TABLET ORAL 2 TIMES DAILY WITH MEALS
Qty: 60 TABLET | Refills: 3 | Status: SHIPPED | OUTPATIENT
Start: 2021-06-17 | End: 2021-10-08

## 2021-06-17 RX ORDER — ROSUVASTATIN CALCIUM 20 MG/1
20 TABLET, COATED ORAL NIGHTLY
Qty: 30 TABLET | Refills: 3 | Status: SHIPPED
Start: 2021-06-17 | End: 2021-10-15 | Stop reason: SDUPTHER

## 2021-06-17 RX ADMIN — SODIUM CHLORIDE, PRESERVATIVE FREE 10 ML: 5 INJECTION INTRAVENOUS at 09:15

## 2021-06-17 RX ADMIN — AMLODIPINE BESYLATE 5 MG: 5 TABLET ORAL at 09:14

## 2021-06-17 RX ADMIN — CLOPIDOGREL 75 MG: 75 TABLET, FILM COATED ORAL at 09:15

## 2021-06-17 RX ADMIN — INSULIN LISPRO 1 UNITS: 100 INJECTION, SOLUTION INTRAVENOUS; SUBCUTANEOUS at 12:15

## 2021-06-17 RX ADMIN — Medication 1000 UNITS: at 09:15

## 2021-06-17 RX ADMIN — CARVEDILOL 6.25 MG: 6.25 TABLET, FILM COATED ORAL at 09:15

## 2021-06-17 RX ADMIN — ASPIRIN 81 MG CHEWABLE TABLET 81 MG: 81 TABLET CHEWABLE at 09:14

## 2021-06-17 RX ADMIN — INSULIN LISPRO 1 UNITS: 100 INJECTION, SOLUTION INTRAVENOUS; SUBCUTANEOUS at 06:39

## 2021-06-17 RX ADMIN — CYANOCOBALAMIN TAB 1000 MCG 1000 MCG: 1000 TAB at 09:14

## 2021-06-17 ASSESSMENT — PAIN SCALES - GENERAL
PAINLEVEL_OUTOF10: 0
PAINLEVEL_OUTOF10: 0

## 2021-06-17 ASSESSMENT — PAIN DESCRIPTION - PROGRESSION: CLINICAL_PROGRESSION: NOT CHANGED

## 2021-06-17 NOTE — PROGRESS NOTES
CLINICAL PHARMACY NOTE: MEDS TO BEDS    Total # of Prescriptions Filled: 4   The following medications were delivered to the patient:  · Clopidogrel bisulfate 75 mg  · Amlodipine besylate 5 mg  · Rosuvastatin calcium 20 mg  · Carvedilol 6.25 mg    Additional Documentation:

## 2021-06-17 NOTE — PLAN OF CARE
Problem: Falls - Risk of:  Goal: Will remain free from falls  Description: Will remain free from falls  6/16/2021 2329 by Christin Leung RN  Outcome: Met This Shift  6/16/2021 1720 by Hanna Delarosa RN  Outcome: Met This Shift  6/16/2021 1359 by Navin Minor RN  Outcome: Met This Shift  Goal: Absence of physical injury  Description: Absence of physical injury  6/16/2021 1720 by Hanna Delarosa RN  Outcome: Met This Shift     Problem: Tissue Perfusion - Cardiopulmonary, Altered:  Goal: Absence of angina  Description: Absence of angina  6/16/2021 2329 by Christin Leung RN  Outcome: Met This Shift  6/16/2021 1720 by Hanna Delarosa RN  Outcome: Met This Shift  Goal: Hemodynamic stability will improve  Description: Hemodynamic stability will improve  6/16/2021 1359 by Navin Minor RN  Outcome: Met This Shift     Problem: Skin Integrity:  Goal: Will show no infection signs and symptoms  Description: Will show no infection signs and symptoms  Outcome: Met This Shift

## 2021-06-17 NOTE — PROGRESS NOTES
Date of Service: 6/17/2021    Chief Complaint: Follow-up for aortic stenosis, CAD    Subjective:     HPI: resting comfortably in bed. No acute events overnight. Denies chest discomfort, dyspnea, palpitations, lightheadedness, or any other complaints. SBP  overnight. Hgb 7.3 --> 7.4 today. Has not been ambulating much yet. ROS:  Cardiac: As per HPI  General: No fever, chills  Pulmonary: As per HPI  HEENT: No visual disturbances, difficult swallowing  GI: No nausea, vomiting  : No dysuria, hematuria  Endocrine: No thyroid disease, +DM  Musculoskeletal: SAWYER x 4, no focal motor deficits  Skin: Intact, no rashes  Neuro: No headache, seizures  Psych: Currently with no depression, anxiety    Scheduled Meds:   sodium chloride flush  5-40 mL Intravenous 2 times per day    amLODIPine  5 mg Oral Daily    carvedilol  6.25 mg Oral BID WC    clopidogrel  75 mg Oral Daily    rosuvastatin  20 mg Oral Nightly    aspirin  81 mg Oral Daily    ramipril  10 mg Oral Daily    vitamin B-12  1,000 mcg Oral Daily    Vitamin D  1,000 Units Oral Daily    insulin lispro  0-6 Units Subcutaneous TID WC    insulin lispro  0-3 Units Subcutaneous Nightly     Continuous Infusions:   niCARdipene (CARDENE) 50 mg in 250 mL 0.9 % sodium chloride infusion Stopped (06/16/21 1048)    sodium chloride      dextrose       PRN Meds:sodium chloride flush, sodium chloride, sodium chloride, ondansetron, acetaminophen, polyethylene glycol, trimethobenzamide, glucose, dextrose, glucagon (rDNA), dextrose, meclizine, nitroGLYCERIN, hydrALAZINE    I/O last 3 completed shifts: In: 1027.5 [P.O.:240; I.V.:787.5]  Out: 795 [Urine:795]  I/O this shift:   In: 480 [P.O.:480]  Out: 300 [Urine:300]      Objective:      Physical Exam:   BP (!) 109/55   Pulse 76   Temp 98.2 °F (36.8 °C) (Temporal)   Resp 18   Ht 5' 2\" (1.575 m)   Wt 175 lb (79.4 kg)   SpO2 97%   BMI 32.01 kg/m²   Appearance: Awake, alert and oriented x 3, no acute respiratory 6/11/21 Dr. Merline Calabrese  Severe distal left main disease with involvement of ostial LAD and ostial left circumflex artery. 50% to 60% disease involving proximal high OM branch/ramus intermedius artery. Small nondominant right coronary artery. Known severe aortic valve stenosis. Assessment:     1. CAD (including LM) -- CTS declined due to substernal thyroid compressing her trachea and       recommended PCI. S/p LM rotational atherectomy, IVUS, and stenting 6/15/21  2. Moderate-severe aortic stenosis   3. Moderate MR and mild TR.  4. HTN: controlled  5. HLD  6. T2DM  7. Carotid artery disease  8. Chronic kidney disease SCr 1.3-->1.4    Recommendations:     1. Continue current cardiac medications  2. Discontinue milligan catheter  3. CBC/BMP early next week  4. Ok for discharge from cardiac standpoint  5.  Structural heart clinic follow up in one month    Discussed with Dr Aruna Farley PASARA   [facility]) Cardiology

## 2021-06-17 NOTE — CONSULTS
Met with patient and discussed that their physician has ordered a referral to our outpatient Phase II Cardiac Rehabilitation program. Reviewed the benefits of cardiac rehabilitation based on their diagnosis and personal risk factors. Patient demonstrates moderate interest in Cardiac Rehabilitation at this time. Cardiac Rehabilitation brochure provided to patient/family. The Cardiac Rehabilitation Program has been provided the patient's referral information and pertinent patient details and history. The patient may call [facility] [place] at [phone] for additional information or questions. Contact information for [facility] [employer] and other choices close to the patient's residence have been provided in the discharge instructions so that the patient may call and schedule an appointment when cleared by their physician.  Thank you for the referral.

## 2021-06-17 NOTE — PATIENT CARE CONFERENCE
P Quality Flow/Interdisciplinary Rounds Progress Note        Quality Flow Rounds held on June 17, 2021    Disciplines Attending:  Bedside Nurse, ,  and Nursing Unit Laura was admitted on 6/8/2021  9:22 PM    Anticipated Discharge Date:  Expected Discharge Date: 06/22/21    Disposition:    Albert Score:  Albert Scale Score: 16    Readmission Risk              Risk of Unplanned Readmission:  16           Discussed patient goal for the day, patient clinical progression, and barriers to discharge.   The following Goal(s) of the Day/Commitment(s) have been identified:  Labs - Report Results      Elijah Patel RN  June 17, 2021

## 2021-06-17 NOTE — CARE COORDINATION
HGB 7.4 HEM 23.4  Met with pt and pt's nephew, Whitley Pink, in room. Discharge plan remains to home. Whitley Pink said he will along with his 2 sisters check on pt frequently. Pt said she has been up and about in room without any issues. Pt has a FWW at home if needed and denies any other needs. Discharge order on chart.

## 2021-06-17 NOTE — PROGRESS NOTES
Subjective: The patient is awake and alert. No acute complaints  It is post left heart cath with PCI 6/15/2021    Objective:    BP (!) 109/55   Pulse 76   Temp 98.2 °F (36.8 °C) (Temporal)   Resp 18   Ht 5' 2\" (1.575 m)   Wt 175 lb (79.4 kg)   SpO2 97%   BMI 32.01 kg/m²     In: 420 [P.O.:420]  Out: 475   In: 420   Out: 475 [Urine:475]    General appearance: NAD, conversant  HEENT: AT/NC, MMM  Neck: FROM, supple  Lungs: Clear to auscultation  CV: RRR, no MRGs  Vasc: Radial pulses 2+  Abdomen: Soft, non-tender; no masses or HSM  Extremities: No peripheral edema or digital cyanosis  Skin: no rash, lesions or ulcers  Psych: Alert and oriented to person, place and time  Neuro: Alert and interactive     Recent Labs     06/16/21  1225 06/17/21  0602 06/17/21  1115   WBC 8.8 6.5 7.4   HGB 9.3* 7.3* 7.4*   HCT 30.9* 22.6* 23.4*    138 139       Recent Labs     06/15/21  0515 06/16/21  0510 06/17/21  0601    138 138   K 4.4 4.2 3.9    108* 109*   CO2 24 21* 21*   BUN 31* 26* 27*   CREATININE 1.5* 1.4* 1.6*   CALCIUM 9.3 8.3* 7.9*       Assessment:    Principal Problem: Aortic valve stenosis, unspecified etiology  Active Problems:    Essential hypertension    Hyperlipidemia    Stage 3b chronic kidney disease (HCC)    Type 2 diabetes mellitus, without long-term current use of insulin ([facility])    Obesity (BMI 30-39. 9)    Dizziness    Nonrheumatic mitral valve regurgitation    Nonrheumatic tricuspid valve regurgitation  Resolved Problems:    * No resolved hospital problems.  *      Plan:  Admitted for evaluation of dizziness and lightheadedness per recommendation of PCP and patient with known recent diagnosis of severe aortic stenosis-no syncopal episode, no shortness of breath  TAVR candidate later on   VELIA 6/10/2021 reveals severe aortic stenosis  [facility] 6/11-severe distal left main disease and 50 to 60% stenosis of obtuse marginal/ramus intermedius  Cardiothoracic surgery  Signed off  [facility] PCI 6/15-tolerated well  On aspirin and Plavix  Transfuse PRBC 1 unit in patient with coronary artery disease for hemoglobin of 7.3  BUN/creatinine mildly elevated 27/1. 6-partly contrast nephropathy-should improve with volume  Continue new medications    From medical standpoint patient can be discharged home after PRBC transfusion , unless cardiology wants to continue to monitor for another day         DVT Prophylaxis   PT/OT  Discharge planning--okay for discharge after left heart cath if okay with other        Jason Eden MD  12:12 PM  6/17/2021

## 2021-06-18 ENCOUNTER — TELEPHONE (OUTPATIENT)
Dept: FAMILY MEDICINE CLINIC | Age: 81
End: 2021-06-18

## 2021-06-18 ENCOUNTER — CARE COORDINATION (OUTPATIENT)
Dept: CASE MANAGEMENT | Age: 81
End: 2021-06-18

## 2021-06-18 DIAGNOSIS — I35.0 AORTIC VALVE STENOSIS, UNSPECIFIED ETIOLOGY: Primary | ICD-10-CM

## 2021-06-18 NOTE — TELEPHONE ENCOUNTER
Received a note from the nurse making transitional call that the patient was having some oozing from catheterization site and a lump to the area. Please look over that note. I would recommend at this time to use cool compresses several times per day to the sites and see how she does over the next couple days. She has an appointment with me on Monday and I can check it then.

## 2021-06-18 NOTE — CARE COORDINATION
occasionally to area. CTN cautioned patient to avoid any straining, also reviewed bleeding precautions as patient is on aspirin/plavix. Bruising present to cath sites per patient. CTN discussed with patient course of action with any worsening of symptoms. CTN to route to provider to inform.   -Patient is aware of needed labs(BMP/CBC)and states they will be drawn at PCP visit next Monday(6/21/21.)  -Patient states she checks a FBS daily, logs readings and brings to her provider visit. Patient states her typical blood sugar reading range is 80-90.  -Patient denies any further needs, questions, or concerns at this time and is agreeable to continued follow up from CTN.     Follow Up  Future Appointments   Date Time Provider Dioni Darling   6/21/2021  1:45 PM Genesis Berkowitz MD [facility]   7/13/2021  9:00 AM [address] [place] CARD[facility]       Dane Pruitt RN

## 2021-06-20 PROBLEM — I25.10 LEFT MAIN CORONARY ARTERY DISEASE: Status: ACTIVE | Noted: 2021-06-20

## 2021-06-21 ENCOUNTER — OFFICE VISIT (OUTPATIENT)
Dept: CARDIOLOGY CLINIC | Age: 81
End: 2021-06-21
Payer: MEDICARE

## 2021-06-21 ENCOUNTER — OFFICE VISIT (OUTPATIENT)
Dept: FAMILY MEDICINE CLINIC | Age: 81
End: 2021-06-21
Payer: MEDICARE

## 2021-06-21 ENCOUNTER — TELEPHONE (OUTPATIENT)
Dept: CARDIOLOGY CLINIC | Age: 81
End: 2021-06-21

## 2021-06-21 ENCOUNTER — HOSPITAL ENCOUNTER (INPATIENT)
Age: 81
LOS: 4 days | Discharge: HOME | DRG: 863 | End: 2021-06-25
Attending: EMERGENCY MEDICINE | Admitting: INTERNAL MEDICINE
Payer: MEDICARE

## 2021-06-21 ENCOUNTER — APPOINTMENT (OUTPATIENT)
Dept: CT IMAGING | Age: 81
DRG: 863 | End: 2021-06-21
Payer: MEDICARE

## 2021-06-21 VITALS
RESPIRATION RATE: 20 BRPM | WEIGHT: 180.6 LBS | SYSTOLIC BLOOD PRESSURE: 122 MMHG | BODY MASS INDEX: 33.23 KG/M2 | HEART RATE: 84 BPM | DIASTOLIC BLOOD PRESSURE: 60 MMHG | OXYGEN SATURATION: 99 % | HEIGHT: 62 IN

## 2021-06-21 VITALS
TEMPERATURE: 97.2 F | DIASTOLIC BLOOD PRESSURE: 54 MMHG | SYSTOLIC BLOOD PRESSURE: 132 MMHG | BODY MASS INDEX: 33.31 KG/M2 | OXYGEN SATURATION: 98 % | WEIGHT: 181 LBS | HEIGHT: 62 IN | HEART RATE: 82 BPM

## 2021-06-21 DIAGNOSIS — I35.0 AORTIC VALVE STENOSIS, UNSPECIFIED ETIOLOGY: ICD-10-CM

## 2021-06-21 DIAGNOSIS — I25.10 LEFT MAIN CORONARY ARTERY DISEASE: ICD-10-CM

## 2021-06-21 DIAGNOSIS — L03.116 CELLULITIS OF LEFT LOWER EXTREMITY: Primary | ICD-10-CM

## 2021-06-21 DIAGNOSIS — I10 ESSENTIAL HYPERTENSION: ICD-10-CM

## 2021-06-21 DIAGNOSIS — G89.18 ACUTE POSTOPERATIVE PAIN OF LEFT GROIN: Primary | ICD-10-CM

## 2021-06-21 DIAGNOSIS — N18.9 CHRONIC KIDNEY DISEASE, UNSPECIFIED CKD STAGE: ICD-10-CM

## 2021-06-21 DIAGNOSIS — I35.0 MODERATE TO SEVERE AORTIC STENOSIS: ICD-10-CM

## 2021-06-21 DIAGNOSIS — Z95.5 STATUS POST INSERTION OF DRUG ELUTING CORONARY ARTERY STENT: ICD-10-CM

## 2021-06-21 DIAGNOSIS — E11.40 TYPE 2 DIABETES MELLITUS WITH DIABETIC NEUROPATHY, WITHOUT LONG-TERM CURRENT USE OF INSULIN (HCC): ICD-10-CM

## 2021-06-21 DIAGNOSIS — R06.02 SHORTNESS OF BREATH: ICD-10-CM

## 2021-06-21 DIAGNOSIS — S31.109A WOUND OF GROIN: ICD-10-CM

## 2021-06-21 DIAGNOSIS — R42 DIZZINESS: ICD-10-CM

## 2021-06-21 DIAGNOSIS — R10.32 ACUTE POSTOPERATIVE PAIN OF LEFT GROIN: Primary | ICD-10-CM

## 2021-06-21 DIAGNOSIS — I35.8 AORTIC VALVE SCLEROSIS: ICD-10-CM

## 2021-06-21 PROBLEM — D72.829 LEUKOCYTOSIS: Status: ACTIVE | Noted: 2021-06-21

## 2021-06-21 PROBLEM — D64.9 ANEMIA: Status: ACTIVE | Noted: 2021-06-21

## 2021-06-21 PROBLEM — L03.314 CELLULITIS OF LEFT GROIN: Status: ACTIVE | Noted: 2021-06-21

## 2021-06-21 PROBLEM — L03.90 CELLULITIS: Status: ACTIVE | Noted: 2021-06-21

## 2021-06-21 LAB
ALBUMIN SERPL-MCNC: 3.4 G/DL (ref 3.5–5.2)
ALP BLD-CCNC: 68 U/L (ref 35–104)
ALT SERPL-CCNC: 12 U/L (ref 0–32)
ANION GAP SERPL CALCULATED.3IONS-SCNC: 14 MMOL/L (ref 7–16)
APTT: <20 SEC (ref 24.5–35.1)
AST SERPL-CCNC: 15 U/L (ref 0–31)
BILIRUB SERPL-MCNC: 1.2 MG/DL (ref 0–1.2)
BUN BLDV-MCNC: 23 MG/DL (ref 6–23)
CALCIUM SERPL-MCNC: 9 MG/DL (ref 8.6–10.2)
CHLORIDE BLD-SCNC: 100 MMOL/L (ref 98–107)
CO2: 20 MMOL/L (ref 22–29)
CREAT SERPL-MCNC: 1.4 MG/DL (ref 0.5–1)
GFR AFRICAN AMERICAN: 44
GFR NON-AFRICAN AMERICAN: 36 ML/MIN/1.73
GLUCOSE BLD-MCNC: 196 MG/DL (ref 74–99)
HCT VFR BLD CALC: 25.9 % (ref 34–48)
HEMOGLOBIN: 8.1 G/DL (ref 11.5–15.5)
INR BLD: 1.2
LACTIC ACID, SEPSIS: 1.6 MMOL/L (ref 0.5–1.9)
MCH RBC QN AUTO: 32.1 PG (ref 26–35)
MCHC RBC AUTO-ENTMCNC: 31.3 % (ref 32–34.5)
MCV RBC AUTO: 102.8 FL (ref 80–99.9)
PDW BLD-RTO: 15.2 FL (ref 11.5–15)
PLATELET # BLD: 212 E9/L (ref 130–450)
PMV BLD AUTO: 11.5 FL (ref 7–12)
POTASSIUM SERPL-SCNC: 4.1 MMOL/L (ref 3.5–5)
PROTHROMBIN TIME: 13.3 SEC (ref 9.3–12.4)
RBC # BLD: 2.52 E12/L (ref 3.5–5.5)
SODIUM BLD-SCNC: 134 MMOL/L (ref 132–146)
TOTAL PROTEIN: 6.9 G/DL (ref 6.4–8.3)
WBC # BLD: 12.3 E9/L (ref 4.5–11.5)

## 2021-06-21 PROCEDURE — 36415 COLL VENOUS BLD VENIPUNCTURE: CPT

## 2021-06-21 PROCEDURE — 87186 SC STD MICRODIL/AGAR DIL: CPT

## 2021-06-21 PROCEDURE — G8417 CALC BMI ABV UP PARAM F/U: HCPCS | Performed by: INTERNAL MEDICINE

## 2021-06-21 PROCEDURE — 87070 CULTURE OTHR SPECIMN AEROBIC: CPT

## 2021-06-21 PROCEDURE — 1036F TOBACCO NON-USER: CPT | Performed by: INTERNAL MEDICINE

## 2021-06-21 PROCEDURE — 2580000003 HC RX 258: Performed by: RADIOLOGY

## 2021-06-21 PROCEDURE — 1111F DSCHRG MED/CURRENT MED MERGE: CPT | Performed by: INTERNAL MEDICINE

## 2021-06-21 PROCEDURE — 85730 THROMBOPLASTIN TIME PARTIAL: CPT

## 2021-06-21 PROCEDURE — 4040F PNEUMOC VAC/ADMIN/RCVD: CPT | Performed by: INTERNAL MEDICINE

## 2021-06-21 PROCEDURE — 80053 COMPREHEN METABOLIC PANEL: CPT

## 2021-06-21 PROCEDURE — 85610 PROTHROMBIN TIME: CPT

## 2021-06-21 PROCEDURE — 2580000003 HC RX 258: Performed by: STUDENT IN AN ORGANIZED HEALTH CARE EDUCATION/TRAINING PROGRAM

## 2021-06-21 PROCEDURE — 2060000000 HC ICU INTERMEDIATE R&B

## 2021-06-21 PROCEDURE — 87077 CULTURE AEROBIC IDENTIFY: CPT

## 2021-06-21 PROCEDURE — 87205 SMEAR GRAM STAIN: CPT

## 2021-06-21 PROCEDURE — 6360000004 HC RX CONTRAST MEDICATION: Performed by: RADIOLOGY

## 2021-06-21 PROCEDURE — 85027 COMPLETE CBC AUTOMATED: CPT

## 2021-06-21 PROCEDURE — 99496 TRANSJ CARE MGMT HIGH F2F 7D: CPT | Performed by: INTERNAL MEDICINE

## 2021-06-21 PROCEDURE — G1010 CDSM STANSON: HCPCS

## 2021-06-21 PROCEDURE — 1090F PRES/ABSN URINE INCON ASSESS: CPT | Performed by: INTERNAL MEDICINE

## 2021-06-21 PROCEDURE — G8427 DOCREV CUR MEDS BY ELIG CLIN: HCPCS | Performed by: INTERNAL MEDICINE

## 2021-06-21 PROCEDURE — G8400 PT W/DXA NO RESULTS DOC: HCPCS | Performed by: INTERNAL MEDICINE

## 2021-06-21 PROCEDURE — 99215 OFFICE O/P EST HI 40 MIN: CPT | Performed by: INTERNAL MEDICINE

## 2021-06-21 PROCEDURE — 1123F ACP DISCUSS/DSCN MKR DOCD: CPT | Performed by: INTERNAL MEDICINE

## 2021-06-21 PROCEDURE — 83605 ASSAY OF LACTIC ACID: CPT

## 2021-06-21 PROCEDURE — 99284 EMERGENCY DEPT VISIT MOD MDM: CPT

## 2021-06-21 PROCEDURE — 87040 BLOOD CULTURE FOR BACTERIA: CPT

## 2021-06-21 RX ORDER — PIOGLITAZONEHYDROCHLORIDE 30 MG/1
30 TABLET ORAL DAILY
Qty: 90 TABLET | Refills: 1 | Status: SHIPPED
Start: 2021-06-21 | End: 2021-08-17 | Stop reason: ALTCHOICE

## 2021-06-21 RX ORDER — SODIUM CHLORIDE 0.9 % (FLUSH) 0.9 %
10 SYRINGE (ML) INJECTION ONCE
Status: COMPLETED | OUTPATIENT
Start: 2021-06-21 | End: 2021-06-21

## 2021-06-21 RX ORDER — CLINDAMYCIN PHOSPHATE 600 MG/50ML
600 INJECTION INTRAVENOUS ONCE
Status: DISCONTINUED | OUTPATIENT
Start: 2021-06-21 | End: 2021-06-21

## 2021-06-21 RX ORDER — 0.9 % SODIUM CHLORIDE 0.9 %
500 INTRAVENOUS SOLUTION INTRAVENOUS ONCE
Status: COMPLETED | OUTPATIENT
Start: 2021-06-21 | End: 2021-06-21

## 2021-06-21 RX ORDER — DOXYCYCLINE HYCLATE 100 MG
100 TABLET ORAL 2 TIMES DAILY
Qty: 20 TABLET | Refills: 0 | Status: SHIPPED | OUTPATIENT
Start: 2021-06-21 | End: 2021-07-01

## 2021-06-21 RX ORDER — MAGNESIUM CHLORIDE 71.5 MG
TABLET, DELAYED RELEASE (ENTERIC COATED) ORAL
Qty: 180 TABLET | Refills: 1 | Status: SHIPPED
Start: 2021-06-21 | End: 2021-09-27 | Stop reason: SDUPTHER

## 2021-06-21 RX ADMIN — IOPAMIDOL 90 ML: 755 INJECTION, SOLUTION INTRAVENOUS at 21:14

## 2021-06-21 RX ADMIN — SODIUM CHLORIDE 500 ML: 9 INJECTION, SOLUTION INTRAVENOUS at 20:56

## 2021-06-21 RX ADMIN — Medication 10 ML: at 21:15

## 2021-06-21 ASSESSMENT — ENCOUNTER SYMPTOMS
COUGH: 0
BACK PAIN: 0
PHOTOPHOBIA: 0

## 2021-06-21 NOTE — ED TRIAGE NOTES
FIRST PROVIDER CONTACT ASSESSMENT NOTE        Department of Emergency Medicine            ED  First Provider Note            6/21/21  5:31 PM EDT    Chief Complaint: Wound Check (states she had stents placed last tuesday and now there is a possible infection of wound in left groin)      History of Present Illness: Delphine Benites is a 68 y.o. female who presents to the emergency department for sent for concerns for infection to left groin cath side  Focused Screening Exam:  Constitutional:  Alert, appears stated age and is in no distress.     *ALLERGIES*     Naproxen and Penicillins     ED Triage Vitals   BP Temp Temp src Pulse Resp SpO2 Height Weight   06/21/21 1534 06/21/21 1458 -- 06/21/21 1458 06/21/21 1534 06/21/21 1458 06/21/21 1534 06/21/21 1534   (!) 121/97 98.3 °F (36.8 °C)  83 18 98 % 5' 2\" (1.575 m) 180 lb (81.6 kg)        Initial Plan of Care:  Initiate Treatment-Testing, Proceed toTreatment Area When Bed Available for ED Attending/MLP to Continue Care    -----------------END OF FIRST PROVIDER CONTACT ASSESSMENT NOTE--------------  Electronically signed by Edris Severs, APRN - CNP   DD: 6/21/21

## 2021-06-21 NOTE — TELEPHONE ENCOUNTER
Dr. Sean Reed would like you to call to discuss the pt's care. She is in the office there now.  His number is [phone]

## 2021-06-21 NOTE — ED NOTES
Bed: 17B-17  Expected date: 6/21/21  Expected time: 7:25 PM  Means of arrival:   Comments:  Triage female     Jocelynn Sales RN  06/21/21 7443

## 2021-06-21 NOTE — PROCEDURES
[address]                  [address] [facility],  [facility]                               PULMONARY FUNCTION    PATIENT NAME: Rasheeda KING                        :        1940  MED REC NO:   [id number]                            ROOM:       2945  ACCOUNT NO:   [de-identified]                           ADMIT DATE: 2021  PROVIDER:     Alyson Moreno MD    DATE OF PROCEDURE:  2021    HEIGHT:  62. AGE:  68. This is spirometry with diffusion capacity. Start with FVC 2.07 which  is 86 of predicted. FEV1 1.68 which is 91 of predicted. FEV1/FVC 81  which is 105 of predicted. Moving to MVV 56 which is 73 of predicted. Slow vital capacity 1.94 which is 81 of predicted. ERV 1.11 which is  28. Diffusion capacity 10.92 which is 50% of predicted and corrects for  alveolar volume to 101. IMPRESSION:  This is normal spirometry, normal lung volume with decrease  in ERV indicative of obesity causing limitation, mild reduction in  diffusion capacity that corrects for alveolar volume. Clinical and  radiological correlation indicated.         Radha Ashton MD    D: 2021 10:57:20       T: 2021 11:06:40     [email or web address]  Job#: [id number]     Doc#: [id number]

## 2021-06-21 NOTE — ED PROVIDER NOTES
59-year-old female history of hypertension, diabetes, and CAD s/p heart cath last week presenting for wound check. She states that the site that was entered in her left groin has been draining light fluid the past few days. This is been moderate in severity. Not makes better. Nothing made it worse. She does endorse some pain which is described as aching, worse with palpation and movement. No alleviating factors. She was seen by her physician prior to arrival directed to the emergency department. Today it started draining dark fluid and she saw her family doctor who drained some of it and then referred her to cardiologist, who instructed her to come here. She complains of pain around the site with movement , relieved by rest.  She denies any fever, chills, chest pain, shortness breath, nausea, and diarrhea. Review of Systems   Constitutional: Negative for chills and fatigue. HENT: Negative for congestion. Eyes: Negative for photophobia, redness and visual disturbance. Respiratory: Negative for cough and shortness of breath. Cardiovascular: Negative for chest pain. Gastrointestinal: Negative for abdominal pain, nausea and vomiting. Genitourinary: Negative for dysuria and flank pain. Musculoskeletal: Negative for arthralgias and back pain. Skin: Positive for wound. Negative for rash. Neurological: Negative for dizziness and light-headedness. Psychiatric/Behavioral: Negative for confusion. All other systems reviewed and are negative. Physical Exam  Constitutional:       General: She is not in acute distress. Appearance: Normal appearance. She is not ill-appearing or toxic-appearing. HENT:      Head: Normocephalic and atraumatic. Nose: Nose normal.   Eyes:      Extraocular Movements: Extraocular movements intact. Conjunctiva/sclera: Conjunctivae normal.   Cardiovascular:      Rate and Rhythm: Normal rate and regular rhythm.    Pulmonary:      Effort: Pulmonary effort is normal.      Breath sounds: Normal breath sounds. Abdominal:      General: Abdomen is flat. There is no distension. Palpations: Abdomen is soft. Tenderness: There is no abdominal tenderness. Musculoskeletal:         General: No deformity. Cervical back: Normal range of motion and neck supple. Comments: Tenderness to palpation of left groin surrounding cath entry site   Skin:     General: Skin is warm. Comments: Approximately 7 cm area of induration with central incision site draining dark thin fluid  Extensive ecchymoses in left groin extending over suprapubic area   Neurological:      General: No focal deficit present. Mental Status: She is alert. Cranial Nerves: No cranial nerve deficit. Psychiatric:         Mood and Affect: Mood normal.         Thought Content: Thought content normal.          Procedures     MDM  Number of Diagnoses or Management Options  Cellulitis of left lower extremity  Diagnosis management comments: 74-year-old female presenting with soft tissue infection of left groin status post recent heart cath. Patient afebrile, vital signs stable. WBC 12.3. CT showed inflammatory changes/cellulitis in the left groin with tiny microabscesses in the inguinal region, as well as some inflammatory lymph nodes. Patient is on vancomycin in the ED. April from [facility] consulted and was agreeable to admission. Patient remained hemodynamically stable and was admitted for IV antibiotics.          ED Course as of Jun 21 2305   Mon Jun 21, 2021 2305 Spoke to April with [facility], she is agreeable to admission    [AP]      ED Course User Index  [AP] Brayan Muniz MD       --------------------------------------------- PAST HISTORY ---------------------------------------------  Past Medical History:  has a past medical history of Aortic valve disorder, CAD (coronary artery disease), Cancer ([facility] [address] Chest pain, [facility] Diverticulosis, GERD (gastroesophageal reflux disease), Goiter, Hyperlipidemia, Hypertension, Hypothyroidism, Macular degeneration, Mitral valve disorder, Neuropathy, Osteoporosis, VHD (valvular heart disease), and Vitamin B12 deficiency. Past Surgical History:  has a past surgical history that includes Hysterectomy; Breast biopsy (Right); Tooth Extraction; Cholecystectomy; Knee Arthroplasty (Left); eye surgery (Bilateral); Appendectomy; transesophageal echocardiogram (06/10/2021); Cardiac catheterization (06/11/2021); and Coronary angioplasty with stent (06/15/2021). Social History:  reports that she has never smoked. She has never used smokeless tobacco. She reports previous alcohol use. She reports that she does not use drugs. Family History: family history includes Coronary Art Dis in her brother; Heart Attack in her father; Heart Disease in her brother; Heart Disease (age of onset: 79) in her father; Stroke in her brother, brother, and mother. The patients home medications have been reviewed.     Allergies: Naproxen and Penicillins    -------------------------------------------------- RESULTS -------------------------------------------------    LABS:  Results for orders placed or performed during the hospital encounter of 06/21/21   CBC   Result Value Ref Range    WBC 12.3 (H) 4.5 - 11.5 E9/L    RBC 2.52 (L) 3.50 - 5.50 E12/L    Hemoglobin 8.1 (L) 11.5 - 15.5 g/dL    Hematocrit 25.9 (L) 34.0 - 48.0 %    .8 (H) 80.0 - 99.9 fL    MCH 32.1 26.0 - 35.0 pg    MCHC 31.3 (L) 32.0 - 34.5 %    RDW 15.2 (H) 11.5 - 15.0 fL    Platelets 578 633 - 686 E9/L    MPV 11.5 7.0 - 12.0 fL   Comprehensive Metabolic Panel   Result Value Ref Range    Sodium 134 132 - 146 mmol/L    Potassium 4.1 3.5 - 5.0 mmol/L    Chloride 100 98 - 107 mmol/L    CO2 20 (L) 22 - 29 mmol/L    Anion Gap 14 7 - 16 mmol/L    Glucose 196 (H) 74 - 99 mg/dL    BUN 23 6 - 23 mg/dL    CREATININE 1.4 (H) 0.5 - 1.0 mg/dL    GFR Non-African American 36 >=60 mL/min/1.73    GFR  44     Calcium 9.0 8.6 - 10.2 mg/dL    Total Protein 6.9 6.4 - 8.3 g/dL    Albumin 3.4 (L) 3.5 - 5.2 g/dL    Total Bilirubin 1.2 0.0 - 1.2 mg/dL    Alkaline Phosphatase 68 35 - 104 U/L    ALT 12 0 - 32 U/L    AST 15 0 - 31 U/L   Lactate, Sepsis   Result Value Ref Range    Lactic Acid, Sepsis 1.6 0.5 - 1.9 mmol/L   APTT   Result Value Ref Range    aPTT <20.0 (L) 24.5 - 35.1 sec   Protime-INR   Result Value Ref Range    Protime 13.3 (H) 9.3 - 12.4 sec    INR 1.2        RADIOLOGY:  CT ABDOMEN PELVIS W IV CONTRAST Additional Contrast? None   Final Result   Inflammatory changes/cellulitis in the left groin with  tiny microabscesses   in the inguinal region. There is some inflammatory lymph nodes. There is no   drainable abscess. Diverticulosis of colon with constipation. Fat containing lower anterior pelvic wall hernia.             ------------------------- NURSING NOTES AND VITALS REVIEWED ---------------------------  Date / Time Roomed:  6/21/2021  7:33 PM  ED Bed Assignment:  17B/17B-17    The nursing notes within the ED encounter and vital signs as below have been reviewed. Patient Vitals for the past 24 hrs:   BP Temp Pulse Resp SpO2 Height Weight   06/21/21 2207 (!) 118/47 -- 85 16 97 % -- --   06/21/21 2003 (!) 149/56 -- 78 16 98 % -- --   06/21/21 1534 (!) 121/97 -- -- 18 -- 5' 2\" (1.575 m) 180 lb (81.6 kg)   06/21/21 1458 -- 98.3 °F (36.8 °C) 83 -- 98 % -- --       Oxygen Saturation Interpretation: Normal    ------------------------------------------ PROGRESS NOTES ------------------------------------------    Counseling:  I have spoken with the patient and discussed todays results, in addition to providing specific details for the plan of care and counseling regarding the diagnosis and prognosis.   Their questions are answered at this time and they are agreeable with the plan of admission.    --------------------------------- ADDITIONAL PROVIDER NOTES ---------------------------------  This patient's ED course included: a personal history and physicial examination, re-evaluation prior to disposition, multiple bedside re-evaluations and IV medications    This patient has remained hemodynamically stable during their ED course. Diagnosis:  1. Cellulitis of left lower extremity        Disposition:  Patient's disposition: Admit to telemetry  Patient's condition is stable. Jamshid Samuel MD  Resident  06/21/21 8587    ATTENDING PROVIDER ATTESTATION:     Meghan Dalyney presented to the emergency department for evaluation of Wound Check (states she had stents placed last tuesday and now there is a possible infection of wound in left groin)    I have reviewed and discussed the case, including pertinent history (medical, surgical, family and social) and exam findings with the Resident and the Nurse assigned to Meghan Reich. I have personally performed and/or participated in the history, exam, medical decision making, and procedures and agree with all pertinent clinical information. I have reviewed my findings and recommendations with Meghan Reich and members of family present at the time of disposition. I, Dr. Rizwan Park am the primary physician of record for this patient. MDM: The patient is 68 y.o. female  with a past medical history of       Diagnosis Date    Abscess of groin, left 6/22/2021    Aortic valve disorder     CAD (coronary artery disease)     Cancer (HCC)     glaucoma    Carotid stenosis, right 6/22/2021    Chest pain     Diabetes ([address]     Diverticulosis     GERD (gastroesophageal reflux disease)     Goiter     Hyperlipidemia     Hypertension     Hypothyroidism     Macular degeneration     Mitral valve disorder     Neuropathy     Osteoporosis     VHD (valvular heart disease)     Vitamin B12 deficiency      presenting to the emergency department with a chief complaint of leg pain and wound.   Frontal diagnosis includes but not limited to, cellulitis, abscess, necrotizing fasciitis. Patient did have labs and imaging which were reviewed, CBC did demonstrate mild leukocytosis 12.3, CMP unremarkable, CT did demonstrate cellulitis. No evidence of necrotizing soft tissue infection. The patient will be admitted for further treatment and evaluation. My findings/plan: The primary encounter diagnosis was Cellulitis of left lower extremity. Diagnoses of Shortness of breath and Aortic valve sclerosis were also pertinent to this visit.   Current Discharge Medication List        Kelley Valenzuela, [address], DO  06/24/21 8233

## 2021-06-21 NOTE — ED NOTES
Attempt to collect set 1 blood cultures, unsuccessful at this time.  Multiple attempts made     Adolfo Kim RN  06/21/21 1474

## 2021-06-21 NOTE — PROGRESS NOTES
[address] [facility]   [facility] [place]   Clinic Note     Date:6/21/21   Patient Name:Alise Covington  YOB: 1940  Age: 68 y.o. Primary Care Provider: Nikolas Francois MD    Subjective     Very pleasant 29-year-old  female who returns for urgent follow-up. Following her discharge after an uncomplicated high risk left main PCI she has not had any chest pain or dyspnea orthopnea or PND or lower extremity edema or pathologic bleeding. However her left groin site over the past 2 to 3 days has become increasingly tender and swollen and has had ongoing drainage. She was evaluated by her PCP earlier today who graciously called me about the findings and thus she was brought in for examination. She denies fevers and chills. A focused history review includes:  1. Moderate severe aortic stenosis  2. Severe left main obstructive CAD  a. Status post Impella-supported, IVUS guided, rotational arthrectomy and PCI of the left main into LAD and circumflex using culotte technique via left femoral access (single access technique) on 6/15/2021  3. CKD 3B with baseline creatinine 1.5-1.6  4. Hypertension  5. Carotid stenosis  6. Substernal thyroid (basis for CABG turndown)  7.  HFpEF      Past History    Past Medical History:         Diagnosis Date    Aortic valve disorder     CAD (coronary artery disease)     Cancer (HCC)     glaucoma    Chest pain     Diabetes ([address]     Diverticulosis     GERD (gastroesophageal reflux disease)     Goiter     Hyperlipidemia     Hypertension     Hypothyroidism     Macular degeneration     Mitral valve disorder     Neuropathy     Osteoporosis     VHD (valvular heart disease)     Vitamin B12 deficiency        Past Surgical History:  Past Surgical History:   Procedure Laterality Date    APPENDECTOMY      BREAST BIOPSY Right     neg    CARDIAC CATHETERIZATION  06/11/2021    Dr Liset An WITH STENT PLACEMENT  06/15/2021    Dr Shah Gerardo - Xience 3.5 x 18 LM to LAD and 4.0 x18 Prox CX    EYE SURGERY Bilateral     cataract    HYSTERECTOMY      KNEE ARTHROPLASTY Left     TOOTH EXTRACTION      TRANSESOPHAGEAL ECHOCARDIOGRAM  06/10/2021    Dr Shital Palacios History:    Social History     Tobacco History     Smoking Status  Never Smoker    Smokeless Tobacco Use  Never Used          Alcohol History     Alcohol Use Status  Not Currently          Drug Use     Drug Use Status  Never          Sexual Activity     Sexually Active  Not Asked                    Family History:-      Problem Relation Age of Onset    Stroke Mother     Heart Disease Father 79        MI    Heart Attack Father     Heart Disease Brother     Coronary Art Dis Brother     Stroke Brother     Stroke Brother          Review of Systems   Negative except as in HPI        Medications     Current Outpatient Medications   Medication Sig Dispense Refill    magnesium cl-calcium carbonate (NU-MAG) 71.5-119 MG TBEC tablet TAKE TWO TABLETS BY MOUTH DAILY (Patient taking differently: Take 1 tablet by mouth daily ) 180 tablet 1    pioglitazone (ACTOS) 30 MG tablet Take 1 tablet by mouth daily 90 tablet 1    rosuvastatin (CRESTOR) 20 MG tablet Take 1 tablet by mouth nightly 30 tablet 3    clopidogrel (PLAVIX) 75 MG tablet Take 1 tablet by mouth daily 30 tablet 3    carvedilol (COREG) 6.25 MG tablet Take 1 tablet by mouth 2 times daily (with meals) 60 tablet 3    amLODIPine (NORVASC) 5 MG tablet Take 1 tablet by mouth daily 30 tablet 3    glipiZIDE (GLUCOTROL XL) 10 MG extended release tablet TAKE ONE TABLET BY MOUTH TWO TIMES A  tablet 1    JANUVIA 50 MG tablet Take 1 tablet by mouth daily 90 tablet 1    nitroGLYCERIN (NITROSTAT) 0.4 MG SL tablet DISSOLVE ONE TABLET UNDER TONGUE AS NEEDED FOR CHEST PAINS. MAY REPEAT IN 5 MINUTES.  IF SYMPTOMS PERSISTS CALL 911 25 tablet 1    Cinnamon 500 MG CAPS Take 1 capsule by mouth daily  aspirin 81 MG tablet Take 81 mg by mouth daily      vitamin D (CHOLECALCIFEROL) 1000 UNIT TABS tablet Take 1,000 Units by mouth daily      vitamin B-12 (CYANOCOBALAMIN) 100 MCG tablet Take 1,000 mcg by mouth daily       doxycycline hyclate (VIBRA-TABS) 100 MG tablet Take 1 tablet by mouth 2 times daily for 10 days (Patient not taking: Reported on 6/21/2021) 20 tablet 0    blood glucose monitor strips Test once a day & as needed for symptoms of irregular blood glucose. DX: Type 2  strip 5    Lancets MISC 1 each by Does not apply route 2 times daily Type 2  each 5     No current facility-administered medications for this visit. Physical Examination      /60 (Site: Right Upper Arm, Position: Sitting, Cuff Size: Medium Adult)   Pulse 84   Resp 20   Ht 5' 2\" (1.575 m)   Wt 180 lb 9.6 oz (81.9 kg)   SpO2 99%   BMI 33.03 kg/m²   Body surface area is 1.89 meters squared. General: No acute distress, appears as stated age, nonicteric  Head: Atraumatic, no gross abnormalities or bruises  Neck: Supple and nontender, no carotid bruits, no JVP  Lungs: Clear to auscultation bilaterally, no wheezes, rales, or rhonchi  Heart: Regular rate and rhythm, no murmurs, rubs, or gallops  Abdomen: Soft, nontender, nondistended, normal bowel sounds  Extremities: No obvious deformities, no cyanosis, no edema  Neurological: Alert and oriented x3, EOMI, moving all extremities x4  Psychological: Normal mood and affect, cooperative  Skin: Color, texture, and turgor normal for age    Left femoral access site: Diffuse ecchymosis. There is tenderness and induration. There is serosanguineous drainage and thrombus.       Labs/Imaging/Diagnostics   Personally reviewed:    Lab Results   Component Value Date     06/17/2021    K 3.9 06/17/2021     06/17/2021    CO2 21 06/17/2021    BUN 27 06/17/2021    CREATININE 1.6 06/17/2021    GLUCOSE 132 06/17/2021    CALCIUM 7.9 06/17/2021        Estimated Creatinine Clearance: 29 mL/min (A) (based on SCr of 1.6 mg/dL (H)). Lab Results   Component Value Date    WBC 7.4 06/17/2021    HGB 7.4 (L) 06/17/2021    HCT 23.4 (L) 06/17/2021    .2 (H) 06/17/2021     06/17/2021       Lab Results   Component Value Date    ALT 9 06/09/2021    AST 11 06/09/2021    ALKPHOS 69 06/09/2021    BILITOT 0.6 06/09/2021       Lab Results   Component Value Date    LABALBU 3.3 (L) 06/09/2021       Lab Results   Component Value Date    CHOL 182 06/09/2021    CHOL 198 04/06/2021    CHOL 222 (H) 01/06/2021     Lab Results   Component Value Date    TRIG 117 06/09/2021    TRIG 142 04/06/2021    TRIG 180 01/06/2021     Lab Results   Component Value Date    HDL 48 06/09/2021    HDL 49 04/06/2021    HDL 56 01/06/2021     Lab Results   Component Value Date    LDLCHOLESTEROL 129 (H) 01/06/2021    LDLCALC 111 (H) 06/09/2021    LDLCALC 121 (H) 04/06/2021    LDLCALC 110 (H) 09/21/2020     Lab Results   Component Value Date    LABVLDL 23 06/09/2021    LABVLDL 28 04/06/2021    LABVLDL 33 09/21/2020     No results found for: [facility]    Lab Results   Component Value Date    TROPONINI < 0.03 06/08/2021       No results found for: BNP      Lab Results   Component Value Date    LABA1C 7.4 (H) 06/09/2021     Lab Results   Component Value Date     01/06/2021         Assessment and Plan:        53-year-old  female with a history above. I am concerned about the possibility of access site infection despite intraprocedural antibiotics. Single access for Impella and PCI was obtained using ultrasound and preclosed x2 and there was successful immediate hemostasis at the end of the procedure without IV protamine. Diagnosis Orders   1. Acute postoperative pain of left groin     2. Left main coronary artery disease     3. Moderate to severe aortic stenosis     4.  Status post insertion of drug eluting coronary artery stent           To the emergency room for stat CBC, CRP, blood cultures, CT abdomen pelvis with IV contrast     Assessment and plan reviewed with the patient/family and their questions and concerns answered in full. We appreciate the opportunity to participate in Her care!       Roberta Dumont MD, [facility]  Interventional Cardiology/Structural Heart Disease  Office: [phone]  Fax: [phone]  Office Coordinators: Stormy Curry RN

## 2021-06-21 NOTE — PROGRESS NOTES
Post-Discharge Transitional Care Management Services or Hospital Follow Up      Laura Santos   YOB: 1940    Date of Office Visit:  6/21/2021  Date of Hospital Admission: 6/8/21  Date of Hospital Discharge: 6/17/21  Risk of hospital readmission (high >=14%. Medium >=10%) :Readmission Risk Score: 16      Care management risk score Rising risk (score 2-5) and Complex Care (Scores >=6): 2     Non face to face  following discharge, date last encounter closed (first attempt may have been earlier): 6/18/2021  3:54 PM    Call initiated 2 business days of discharge: Yes    Patient Active Problem List   Diagnosis    Type 2 diabetes mellitus with diabetic neuropathy, without long-term current use of insulin ([facility] [address]    Essential hypertension    Hyperlipidemia    Goiter    Osteoporosis    Stage 3b chronic kidney disease ([facility] [address]    Aortic valve stenosis, unspecified etiology    Type 2 diabetes mellitus, without long-term current use of insulin ([facility])    Obesity (BMI 30-39. 9)    Dizziness    Nonrheumatic mitral valve regurgitation    Nonrheumatic tricuspid valve regurgitation    Left main coronary artery disease       Allergies   Allergen Reactions    Naproxen     Penicillins        Medications listed as ordered at the time of discharge from hospital   [place]   Home Medication Instructions EFREN:    Printed on:06/21/21 1402   Medication Information                      amLODIPine (NORVASC) 5 MG tablet  Take 1 tablet by mouth daily             aspirin 81 MG tablet  Take 81 mg by mouth daily             blood glucose monitor strips  Test once a day & as needed for symptoms of irregular blood glucose.  DX: Type 2 DM             carvedilol (COREG) 6.25 MG tablet  Take 1 tablet by mouth 2 times daily (with meals)             Cinnamon 500 MG CAPS  Take 1 capsule by mouth daily             clopidogrel (PLAVIX) 75 MG tablet  Take 1 tablet by mouth daily             doxycycline hyclate (VIBRA-TABS) 100 MG BY MOUTH TWO TIMES A  tablet 1    blood glucose monitor strips Test once a day & as needed for symptoms of irregular blood glucose. DX: Type 2  strip 5    Lancets MISC 1 each by Does not apply route 2 times daily Type 2  each 5    JANUVIA 50 MG tablet Take 1 tablet by mouth daily 90 tablet 1    nitroGLYCERIN (NITROSTAT) 0.4 MG SL tablet DISSOLVE ONE TABLET UNDER TONGUE AS NEEDED FOR CHEST PAINS. MAY REPEAT IN 5 MINUTES. IF SYMPTOMS PERSISTS CALL 911 25 tablet 1    Cinnamon 500 MG CAPS Take 1 capsule by mouth daily      aspirin 81 MG tablet Take 81 mg by mouth daily      vitamin D (CHOLECALCIFEROL) 1000 UNIT TABS tablet Take 1,000 Units by mouth daily      vitamin B-12 (CYANOCOBALAMIN) 100 MCG tablet Take 1,000 mcg by mouth daily           Medications patient taking as of now reconciled against medications ordered at time of hospital discharge: Yes    Chief Complaint   Patient presents with    Follow-Up from Hospital     aortic valve stenosis; had 2 stents put in; the left side where they went in to put stent in and it is red/pink in color and seeping       History of Present illness - Follow up of Hospital diagnosis(es): Patient presents today for post hospital follow-up visit multiple medical problems. Was recently admitted to [facility] subsequently transferred to [facility]. She came in for dizziness weakness. Because of her severe aortic valve stenosis she was sent to [facility] and ended up having heart catheterization demonstrating severe left main disease. They did atherectomy and stent placement. She is feeling much better at this point. Big complaint today she is having significant bruising and drainage from her cath site on the left groin region. States drainage has become worse over the last 2 days. She is accompanied today by her niece who is taking care of her postoperatively. States she is no longer having any dizziness.   She does have a lot of pain to the cath site she states. Her depression has been stable. Blood sugar she tells me has been in a good range. Blood pressure she is not checking very often but has been good when checked in office. Her lipids have been good on statin medication. Inpatient course: Discharge summary reviewed- see chart. I have kept her off of her Metformin and Actonel because of her kidney function. In the interim I did hold her Metformin and Actonel related to decreased kidney function. Repeat numbers did show some improvement but I kept her off the medication for the time being. Her eyes and feet are up-to-date with her diabetes.  No retinopathy no foot sores  She is up-to-date with consultants including renal for chronic kidney disease, ophthalmology, endocrine for her thyroid, cardiology for her aortic stenosis and abnormal stress test  and previously with ear nose and throat who recommended no thyroid biopsy at that point but simply ultrasound follow-up. Interval history/Current status: See above    ROS    Constitutional: Negative for activity change, appetite change, chills, diaphoresis, fatigue, fever and unexpected weight change. HENT: Negative for congestion, ear pain, hearing loss, postnasal drip, rhinorrhea and sinus pain.    Respiratory: Negative for cough, shortness of breath and wheezing.    Cardiovascular: Negative for chest pain, palpitations and leg swelling. Gastrointestinal: Negative for abdominal pain, blood in stool, constipation, diarrhea, nausea and vomiting.          Endocrine:  Type 2 diabetes, goiter   Genitourinary: Negative for difficulty urinating, dysuria, frequency, hematuria and urgency. Musculoskeletal: Positive for arthralgias.  Negative for back pain, gait problem and myalgias.        She was complaining of muscle pain prior to stopping the Actonel-did not tolerate oral bisphosphonates-did wish to try Actonel 1 more time for now-was successful-currently holding  Skin: Left groin cath site pain and drainage. Allergic/Immunologic: Negative for environmental allergies and immunocompromised state. Neurological: Negative for dizziness (improved after cardiac stenting), weakness, light-headedness, numbness and headaches.    Hematological: Negative.         PMH:  Problem List: Knee pain, Non-toxic nodular goiter, Essential hypertension, Goiter, Hyperlipidemia, Benign  essential hypertension, Type II diabetes mellitus uncontrolled  Health Maintenance:  Bone Density Test Screening - (12/3/2018)  Bone Density Scan - (12/3/2018)  Influenza Vaccination - (10/29/2018)  Colonoscopy - (7/25/2018)  7/18-due 23  Colonoscopy Screening - (7/25/2018)  Couseled on Home Safety - (6/13/2018)  Pneumonia Vaccination - (2/5/2018)  Mammogram - (12/20/2012)  Mammogram Screening - (12/20/2012)  Declining further  Tetanus Immunization - (1/18/2017)  Rectal Exam - 4/10,4/11  Breast Exam - 4/10,4/11  Hemmocult Cards - 9/12-neg x 3,5/14-neg x3, 6/16-neg x 3  EKG - 5/11,, 5/15, 3/17,5/17,3/18  2D ECHO - 1/17 , 1/18  Carotid Artery Study - 1/17-30-49% occlusion bilateral, 2/18  Stress Test - 4/17-neg,3/18-pos  Zoster/Shingles Vaccine - 2016  Medical Problems:  Non Insulin Dependent Diabetes, Hypertension, Hyperlipidemia  Goiter - multinodular-bx neg-dr hooper  GERD, Hypothyroidism  Osteoporosis - Bisphosphonate intolerant  Glaucoma, Renal Insufficiency  Aortic And Mitral Valve Disorders - 2D echo-1/17  VHD  Pulmonary HTN - mild  Macular Degeneration  WBCs in urine - Workup by urology-2017  Coronary Artery Disease (CAD) - Positive stress test 3/18-heart cath-6/21-left main disease-stents  Diverticulosis, Diabetic Neuropathy, Vitamin B12 deficiency      Follows with - Cardiology, nephrology, ophthalmology, endocrine  Surgical Hx:  AKIN-right salpingo-oophorectomy - Fibroids  Appendectomy  Right cataract surgery - left also  Left knee arthroscopy, Removal of Gallbladder  Cardiac catheterization-severe left main disease-atherectomy/PCI/stents FH:  Father:  MI.  Mother:  Cerebrovascular Accident (CVA). Brother 1:  Coronary Artery Disease (CAD) - 52's. 3 other brothers, 1 sister   SH:  Marital: . Personal Habits: Cigarette Use: Negative For current cigarette smoker. Alcohol: does not use  alcohol. 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 Wt Readings from Last 3 Encounters:   21 180 lb 9.6 oz (81.9 kg)   21 181 lb (82.1 kg)   21 175 lb (79.4 kg)     BP Readings from Last 3 Encounters:   21 122/60   21 (!) 132/54   21 (!) 131/56        Physical Exam:  Constitutional: She is oriented to person, place, and time. She appears well-developed and well-nourished. No distress.         Neck: Normal range of motion. Neck supple. Thyromegaly present. Positive for goiter   Cardiovascular: Normal rate, regular rhythm and intact distal pulses. Exam reveals no gallop and no friction rub.   Murmur heard. Pulmonary/Chest: Effort normal and breath sounds normal. No respiratory distress. She has no wheezes. She has no rales. Abdominal: Soft. Bowel sounds are normal. She exhibits no distension and no mass. There is no tenderness.   Musculoskeletal: Normal range of motion. Neurological: She is alert and oriented to person, place, and time. She displays normal reflexes. No sensory deficit. She exhibits normal muscle tone. Coordination normal.   Skin: Skin is warm and dry. No rash noted. No erythema.  She does have post left heart cath site with oozing redness and pain on the left side. It does appear to be some VasoSeal at the opening. The drainage is bloody in appearance. The area around the site is reddened and hard. Tender to touch.   Psychiatric: Mood and affect normal  Nursing note and vitals reviewed       Assessment/Plan:  1. Type 2 diabetes mellitus with diabetic neuropathy, without long-term current use of insulin (HCC)  Stable on current medication    - magnesium cl-calcium carbonate (NU-MAG) 71.5-119 MG TBEC tablet; TAKE TWO TABLETS BY MOUTH DAILY (Patient taking differently: Take 1 tablet by mouth daily )  Dispense: 180 tablet; Refill: 1  - pioglitazone (ACTOS) 30 MG tablet; Take 1 tablet by mouth daily  Dispense: 90 tablet; Refill: 1    2. Wound of groin  Cultured and started on antibiotic. Spoke with Dr. Antoinette Brewer    3. Chronic kidney disease, unspecified CKD stage  - Basic Metabolic Panel; Future  Some worsening post cath initially now with improvement    4. Left main coronary artery disease  Status post atherectomy/PCI/stents    5. Dizziness  Improved post procedure    6. Aortic valve stenosis, unspecified etiology  Stable    7. Essential hypertension  Stable on current medications      Plan: I did personally speak with Dr. Antoinette Brewer of cardiology and explained the drainage and pain to her cath site and he was kind enough to see her today to evaluate. Blood work today to monitor disease progression and medication use. Will attempt to get the discharge summary to review. It appears as though they went nonsurgical route because of her enlarged goiter and tracheal compression. Will most likely be looking at TAVR down the road. Prescription management performed. We will see back in 1 month and as needed. Follow with above consultants. Fall precautions. Notify us of problems in the interim.   Medical Decision Making: high complexity

## 2021-06-22 PROBLEM — I73.9 PVD (PERIPHERAL VASCULAR DISEASE) (HCC): Status: ACTIVE | Noted: 2021-06-22

## 2021-06-22 PROBLEM — I65.21 CAROTID STENOSIS, RIGHT: Status: ACTIVE | Noted: 2021-06-22

## 2021-06-22 PROBLEM — L02.214 ABSCESS OF GROIN, LEFT: Status: ACTIVE | Noted: 2021-06-22

## 2021-06-22 LAB
ANION GAP SERPL CALCULATED.3IONS-SCNC: 10 MMOL/L (ref 7–16)
BASOPHILS ABSOLUTE: 0.02 E9/L (ref 0–0.2)
BASOPHILS RELATIVE PERCENT: 0.3 % (ref 0–2)
BUN BLDV-MCNC: 20 MG/DL (ref 6–23)
C-REACTIVE PROTEIN: 7.4 MG/DL (ref 0–0.4)
CALCIUM SERPL-MCNC: 8.7 MG/DL (ref 8.6–10.2)
CHLORIDE BLD-SCNC: 102 MMOL/L (ref 98–107)
CO2: 22 MMOL/L (ref 22–29)
CREAT SERPL-MCNC: 1.4 MG/DL (ref 0.5–1)
EOSINOPHILS ABSOLUTE: 0.33 E9/L (ref 0.05–0.5)
EOSINOPHILS RELATIVE PERCENT: 4.3 % (ref 0–6)
GFR AFRICAN AMERICAN: 44
GFR NON-AFRICAN AMERICAN: 36 ML/MIN/1.73
GLUCOSE BLD-MCNC: 105 MG/DL (ref 74–99)
HCT VFR BLD CALC: 22.7 % (ref 34–48)
HEMOGLOBIN: 7.1 G/DL (ref 11.5–15.5)
IMMATURE GRANULOCYTES #: 0.06 E9/L
IMMATURE GRANULOCYTES %: 0.8 % (ref 0–5)
LYMPHOCYTES ABSOLUTE: 1.36 E9/L (ref 1.5–4)
LYMPHOCYTES RELATIVE PERCENT: 17.5 % (ref 20–42)
MCH RBC QN AUTO: 32 PG (ref 26–35)
MCHC RBC AUTO-ENTMCNC: 31.3 % (ref 32–34.5)
MCV RBC AUTO: 102.3 FL (ref 80–99.9)
METER GLUCOSE: 122 MG/DL (ref 74–99)
METER GLUCOSE: 137 MG/DL (ref 74–99)
METER GLUCOSE: 234 MG/DL (ref 74–99)
METER GLUCOSE: 262 MG/DL (ref 74–99)
METER GLUCOSE: 264 MG/DL (ref 74–99)
METER GLUCOSE: 292 MG/DL (ref 74–99)
MONOCYTES ABSOLUTE: 0.79 E9/L (ref 0.1–0.95)
MONOCYTES RELATIVE PERCENT: 10.2 % (ref 2–12)
NEUTROPHILS ABSOLUTE: 5.2 E9/L (ref 1.8–7.3)
NEUTROPHILS RELATIVE PERCENT: 66.9 % (ref 43–80)
PDW BLD-RTO: 15.3 FL (ref 11.5–15)
PLATELET # BLD: 192 E9/L (ref 130–450)
PMV BLD AUTO: 11.3 FL (ref 7–12)
POTASSIUM REFLEX MAGNESIUM: 4.3 MMOL/L (ref 3.5–5)
RBC # BLD: 2.22 E12/L (ref 3.5–5.5)
SEDIMENTATION RATE, ERYTHROCYTE: 66 MM/HR (ref 0–20)
SODIUM BLD-SCNC: 134 MMOL/L (ref 132–146)
WBC # BLD: 7.8 E9/L (ref 4.5–11.5)

## 2021-06-22 PROCEDURE — 82962 GLUCOSE BLOOD TEST: CPT

## 2021-06-22 PROCEDURE — 6360000002 HC RX W HCPCS: Performed by: INTERNAL MEDICINE

## 2021-06-22 PROCEDURE — 97161 PT EVAL LOW COMPLEX 20 MIN: CPT

## 2021-06-22 PROCEDURE — 80048 BASIC METABOLIC PNL TOTAL CA: CPT

## 2021-06-22 PROCEDURE — 36415 COLL VENOUS BLD VENIPUNCTURE: CPT

## 2021-06-22 PROCEDURE — 85025 COMPLETE CBC W/AUTO DIFF WBC: CPT

## 2021-06-22 PROCEDURE — 2580000003 HC RX 258: Performed by: NURSE PRACTITIONER

## 2021-06-22 PROCEDURE — 86140 C-REACTIVE PROTEIN: CPT

## 2021-06-22 PROCEDURE — 6370000000 HC RX 637 (ALT 250 FOR IP): Performed by: NURSE PRACTITIONER

## 2021-06-22 PROCEDURE — 2580000003 HC RX 258: Performed by: STUDENT IN AN ORGANIZED HEALTH CARE EDUCATION/TRAINING PROGRAM

## 2021-06-22 PROCEDURE — 2580000003 HC RX 258: Performed by: INTERNAL MEDICINE

## 2021-06-22 PROCEDURE — 99222 1ST HOSP IP/OBS MODERATE 55: CPT | Performed by: SURGERY

## 2021-06-22 PROCEDURE — 97165 OT EVAL LOW COMPLEX 30 MIN: CPT

## 2021-06-22 PROCEDURE — 6360000002 HC RX W HCPCS: Performed by: STUDENT IN AN ORGANIZED HEALTH CARE EDUCATION/TRAINING PROGRAM

## 2021-06-22 PROCEDURE — 2060000000 HC ICU INTERMEDIATE R&B

## 2021-06-22 PROCEDURE — 85651 RBC SED RATE NONAUTOMATED: CPT

## 2021-06-22 RX ORDER — LANOLIN ALCOHOL/MO/W.PET/CERES
1000 CREAM (GRAM) TOPICAL DAILY
Status: DISCONTINUED | OUTPATIENT
Start: 2021-06-22 | End: 2021-06-25 | Stop reason: HOSPADM

## 2021-06-22 RX ORDER — NITROGLYCERIN 0.4 MG/1
0.4 TABLET SUBLINGUAL EVERY 5 MIN PRN
Status: DISCONTINUED | OUTPATIENT
Start: 2021-06-22 | End: 2021-06-25 | Stop reason: HOSPADM

## 2021-06-22 RX ORDER — CLOPIDOGREL BISULFATE 75 MG/1
75 TABLET ORAL DAILY
Status: DISCONTINUED | OUTPATIENT
Start: 2021-06-22 | End: 2021-06-25 | Stop reason: HOSPADM

## 2021-06-22 RX ORDER — MAGNESIUM CHLORIDE 64 MG
1 TABLET, DELAYED RELEASE (ENTERIC COATED) ORAL DAILY
Status: DISCONTINUED | OUTPATIENT
Start: 2021-06-22 | End: 2021-06-25 | Stop reason: HOSPADM

## 2021-06-22 RX ORDER — ONDANSETRON 2 MG/ML
4 INJECTION INTRAMUSCULAR; INTRAVENOUS EVERY 6 HOURS PRN
Status: DISCONTINUED | OUTPATIENT
Start: 2021-06-22 | End: 2021-06-25 | Stop reason: HOSPADM

## 2021-06-22 RX ORDER — SODIUM CHLORIDE 9 MG/ML
25 INJECTION, SOLUTION INTRAVENOUS PRN
Status: DISCONTINUED | OUTPATIENT
Start: 2021-06-22 | End: 2021-06-25 | Stop reason: HOSPADM

## 2021-06-22 RX ORDER — ACETAMINOPHEN 325 MG/1
650 TABLET ORAL EVERY 6 HOURS PRN
Status: DISCONTINUED | OUTPATIENT
Start: 2021-06-22 | End: 2021-06-25 | Stop reason: HOSPADM

## 2021-06-22 RX ORDER — DEXTROSE MONOHYDRATE 25 G/50ML
12.5 INJECTION, SOLUTION INTRAVENOUS PRN
Status: DISCONTINUED | OUTPATIENT
Start: 2021-06-22 | End: 2021-06-25 | Stop reason: HOSPADM

## 2021-06-22 RX ORDER — ONDANSETRON 4 MG/1
4 TABLET, ORALLY DISINTEGRATING ORAL EVERY 8 HOURS PRN
Status: DISCONTINUED | OUTPATIENT
Start: 2021-06-22 | End: 2021-06-25 | Stop reason: HOSPADM

## 2021-06-22 RX ORDER — CARVEDILOL 6.25 MG/1
6.25 TABLET ORAL 2 TIMES DAILY WITH MEALS
Status: DISCONTINUED | OUTPATIENT
Start: 2021-06-22 | End: 2021-06-25 | Stop reason: HOSPADM

## 2021-06-22 RX ORDER — SODIUM CHLORIDE 0.9 % (FLUSH) 0.9 %
5-40 SYRINGE (ML) INJECTION EVERY 12 HOURS SCHEDULED
Status: DISCONTINUED | OUTPATIENT
Start: 2021-06-22 | End: 2021-06-25 | Stop reason: HOSPADM

## 2021-06-22 RX ORDER — DEXTROSE MONOHYDRATE 50 MG/ML
100 INJECTION, SOLUTION INTRAVENOUS PRN
Status: DISCONTINUED | OUTPATIENT
Start: 2021-06-22 | End: 2021-06-25 | Stop reason: HOSPADM

## 2021-06-22 RX ORDER — NICOTINE POLACRILEX 4 MG
15 LOZENGE BUCCAL PRN
Status: DISCONTINUED | OUTPATIENT
Start: 2021-06-22 | End: 2021-06-25 | Stop reason: HOSPADM

## 2021-06-22 RX ORDER — VITAMIN B COMPLEX
1000 TABLET ORAL DAILY
Status: DISCONTINUED | OUTPATIENT
Start: 2021-06-22 | End: 2021-06-25 | Stop reason: HOSPADM

## 2021-06-22 RX ORDER — ACETAMINOPHEN 650 MG/1
650 SUPPOSITORY RECTAL EVERY 6 HOURS PRN
Status: DISCONTINUED | OUTPATIENT
Start: 2021-06-22 | End: 2021-06-25 | Stop reason: HOSPADM

## 2021-06-22 RX ORDER — AMLODIPINE BESYLATE 5 MG/1
5 TABLET ORAL DAILY
Status: DISCONTINUED | OUTPATIENT
Start: 2021-06-22 | End: 2021-06-25 | Stop reason: HOSPADM

## 2021-06-22 RX ORDER — POLYETHYLENE GLYCOL 3350 17 G/17G
17 POWDER, FOR SOLUTION ORAL DAILY PRN
Status: DISCONTINUED | OUTPATIENT
Start: 2021-06-22 | End: 2021-06-25 | Stop reason: HOSPADM

## 2021-06-22 RX ORDER — ASPIRIN 81 MG/1
81 TABLET, CHEWABLE ORAL DAILY
Status: DISCONTINUED | OUTPATIENT
Start: 2021-06-22 | End: 2021-06-25 | Stop reason: HOSPADM

## 2021-06-22 RX ORDER — ROSUVASTATIN CALCIUM 20 MG/1
20 TABLET, COATED ORAL NIGHTLY
Status: DISCONTINUED | OUTPATIENT
Start: 2021-06-22 | End: 2021-06-25 | Stop reason: HOSPADM

## 2021-06-22 RX ORDER — SODIUM CHLORIDE 0.9 % (FLUSH) 0.9 %
5-40 SYRINGE (ML) INJECTION PRN
Status: DISCONTINUED | OUTPATIENT
Start: 2021-06-22 | End: 2021-06-25 | Stop reason: HOSPADM

## 2021-06-22 RX ADMIN — ASPIRIN 81 MG CHEWABLE TABLET 81 MG: 81 TABLET CHEWABLE at 09:38

## 2021-06-22 RX ADMIN — SODIUM CHLORIDE, PRESERVATIVE FREE 10 ML: 5 INJECTION INTRAVENOUS at 23:52

## 2021-06-22 RX ADMIN — INSULIN LISPRO 3 UNITS: 100 INJECTION, SOLUTION INTRAVENOUS; SUBCUTANEOUS at 13:12

## 2021-06-22 RX ADMIN — VANCOMYCIN HYDROCHLORIDE 1750 MG: 10 INJECTION, POWDER, LYOPHILIZED, FOR SOLUTION INTRAVENOUS at 00:01

## 2021-06-22 RX ADMIN — CLOPIDOGREL 75 MG: 75 TABLET, FILM COATED ORAL at 09:38

## 2021-06-22 RX ADMIN — INSULIN LISPRO 2 UNITS: 100 INJECTION, SOLUTION INTRAVENOUS; SUBCUTANEOUS at 23:54

## 2021-06-22 RX ADMIN — INSULIN LISPRO 2 UNITS: 100 INJECTION, SOLUTION INTRAVENOUS; SUBCUTANEOUS at 01:49

## 2021-06-22 RX ADMIN — SODIUM CHLORIDE, PRESERVATIVE FREE 10 ML: 5 INJECTION INTRAVENOUS at 12:45

## 2021-06-22 RX ADMIN — SODIUM CHLORIDE: 9 INJECTION, SOLUTION INTRAVENOUS at 21:41

## 2021-06-22 RX ADMIN — ROSUVASTATIN 20 MG: 20 TABLET, FILM COATED ORAL at 23:49

## 2021-06-22 RX ADMIN — CEFEPIME HYDROCHLORIDE 2000 MG: 2 INJECTION, POWDER, FOR SOLUTION INTRAVENOUS at 12:53

## 2021-06-22 RX ADMIN — MAGNESIUM 64 MG (MAGNESIUM CHLORIDE) TABLET,DELAYED RELEASE 64 MG: at 09:38

## 2021-06-22 RX ADMIN — CARVEDILOL 6.25 MG: 6.25 TABLET, FILM COATED ORAL at 09:38

## 2021-06-22 RX ADMIN — Medication 1000 MCG: at 09:38

## 2021-06-22 RX ADMIN — CARVEDILOL 6.25 MG: 6.25 TABLET, FILM COATED ORAL at 17:41

## 2021-06-22 RX ADMIN — AMLODIPINE BESYLATE 5 MG: 5 TABLET ORAL at 09:38

## 2021-06-22 RX ADMIN — Medication 1000 UNITS: at 09:38

## 2021-06-22 RX ADMIN — SODIUM CHLORIDE, PRESERVATIVE FREE 10 ML: 5 INJECTION INTRAVENOUS at 09:37

## 2021-06-22 ASSESSMENT — PAIN DESCRIPTION - PROGRESSION: CLINICAL_PROGRESSION: NOT CHANGED

## 2021-06-22 ASSESSMENT — PAIN DESCRIPTION - PAIN TYPE: TYPE: ACUTE PAIN

## 2021-06-22 ASSESSMENT — PAIN DESCRIPTION - DESCRIPTORS: DESCRIPTORS: ACHING;TENDER

## 2021-06-22 ASSESSMENT — PAIN DESCRIPTION - ORIENTATION: ORIENTATION: LEFT

## 2021-06-22 ASSESSMENT — PAIN SCALES - GENERAL
PAINLEVEL_OUTOF10: 1
PAINLEVEL_OUTOF10: 0

## 2021-06-22 ASSESSMENT — PAIN DESCRIPTION - ONSET: ONSET: ON-GOING

## 2021-06-22 ASSESSMENT — PAIN DESCRIPTION - LOCATION: LOCATION: GROIN

## 2021-06-22 ASSESSMENT — PAIN DESCRIPTION - FREQUENCY: FREQUENCY: INTERMITTENT

## 2021-06-22 NOTE — PLAN OF CARE
Problem: Pain:  Goal: Pain level will decrease  Description: Pain level will decrease  Outcome: Met This Shift  Goal: Control of chronic pain  Description: Control of chronic pain  Outcome: Met This Shift

## 2021-06-22 NOTE — PROGRESS NOTES
to PT evaluation. Pt completed sit to stand transfer, presenting with good balance and steadiness. Pt ambulated around room extended distances, presenting with good balance and steadiness. Pt agreed that no skilled PT was required at this time. Pt reports that she is \"back to normal\" and has no needs to PT. Pt was left sitting in chair with all questions and concerns addressed at end of session. Treatment:  Patient practiced and was instructed in the following treatment:     Therapeutic Activities:  o Transfers: Pt completed sit to stand transfers, challenging strength and tolerance to activity/upright.  o Ambulation: Pt ambulated around room, challenging endurance, dynamic standing balance, and tolerance to activity/upright. PHYSICAL THERAPY PLAN OF CARE:    Based on examination, pt does not require skilled PT at this time. Pt is agrees with this PT's examination and does not believe she requires PT. Please re-consult if a functional decline arises. Thank you. Referring provider/PT Order:    Start   Ordering Provider    06/22/21 0130  PT eval and treat Start: 06/22/21 0130, End: 06/22/21 0130, ONE TIME, Standing Count: 1 Occurrences, R      Laura Cano, APRN - CNP      Diagnosis:  Cellulitis [L03.90]    Time in  1115  Time out  1125    Total Treatment Time  0 minutes     Evaluation Time includes thorough review of current medical information, gathering information on past medical history/social history and prior level of function, completion of standardized testing/informal observation of tasks, assessment of data and education on plan of care and goals.     CPT codes:  [x] Low Complexity PT evaluation 34886  [] Moderate Complexity PT evaluation 75128  [] High Complexity PT evaluation 64849  [] PT Re-evaluation 18183  [] Gait training 03533 0 minutes  [] Manual therapy 91948 0 minutes  [] Therapeutic activities 34836 0 minutes  [] Therapeutic exercises 18187 0 minutes  [] Neuromuscular reeducation 45651 0 minutes     Steph Patrick, PT, DPT  [id number]    Damian Client, SPT

## 2021-06-22 NOTE — CONSULTS
[place], APRN - CNP        0.9 % sodium chloride infusion  25 mL Intravenous PRN April Storey[place], APRN - CNP        ondansetron (ZOFRAN-ODT) disintegrating tablet 4 mg  4 mg Oral Q8H PRN April Francesca-[place], APRN - CNP        Or    ondansetron [facility]) injection 4 mg  4 mg Intravenous Q6H PRN April [place], APRN - CNP        polyethylene glycol (GLYCOLAX) packet 17 g  17 g Oral Daily PRN April [place], APRN - CNP        acetaminophen (TYLENOL) tablet 650 mg  650 mg Oral Q6H PRN April [place], LIAM - CNP        Or    acetaminophen (TYLENOL) suppository 650 mg  650 mg Rectal Q6H PRN April Francesca[place], APRN - CNP        glucose (GLUTOSE) 40 % oral gel 15 g  15 g Oral PRN April Storey-Neil, APRN - CNP        dextrose 50 % IV solution  12.5 g Intravenous PRN April Storey-Neil, APRN - CNP        glucagon (rDNA) injection 1 mg  1 mg Intramuscular PRN April [place], APRN - CNP        dextrose 5 % solution  100 mL/hr Intravenous PRN April Francesca-Neil, APRN - CNP        insulin lispro (HUMALOG) injection vial 0-6 Units  0-6 Units Subcutaneous TID [facility] April Storey-Neil, APRN - CNP        insulin lispro (HUMALOG) injection vial 0-3 Units  0-3 Units Subcutaneous Nightly April Storey-LIAM Trejo - CNP   2 Units at 06/22/21 0149    amLODIPine (NORVASC) tablet 5 mg  5 mg Oral Daily April Storey-Cornelius, APRN - CNP   5 mg at 06/22/21 8611    aspirin chewable tablet 81 mg  81 mg Oral Daily April Storey-Cornelius, APRN - CNP   81 mg at 06/22/21 2022    carvedilol (COREG) tablet 6.25 mg  6.25 mg Oral BID  April Storey-Cornelius, APRN - CNP   6.25 mg at 06/22/21 3646    clopidogrel (PLAVIX) tablet 75 mg  75 mg Oral Daily April Storey-Neil, APRN - CNP   75 mg at 06/22/21 6261    magnesium chloride (MAG DELAY) extended release tablet 64 mg  1 tablet Oral Daily April LIAM Cano CNP   64 mg at 06/22/21 2557    nitroGLYCERIN (NITROSTAT) SL tablet 0.4 mg  0.4 mg Sublingual Q5 Min PRN April CLAYTON CanoN - [place]        rosuvastatin (CRESTOR) tablet 20 mg  20 mg Oral Nightly April FrancescaVikash, APRN - CNP        vitamin B-12 (CYANOCOBALAMIN) tablet 1,000 mcg  1,000 mcg Oral Daily [facility]   1,000 mcg at 06/22/21 1049    vitamin D (CHOLECALCIFEROL) tablet 1,000 Units  1,000 Units Oral Daily April [facility], APRN - CNP   1,000 Units at 06/22/21 0290    perflutren lipid microspheres (DEFINITY) injection 1.65 mg  1.5 mL Intravenous ONCE PRN Alma Her MD           Allergies:  Naproxen and Penicillins    Social History:      Social History     Socioeconomic History    Marital status:      Spouse name: Not on file    Number of children: Not on file    Years of education: Not on file    Highest education level: Not on file   Occupational History    Not on file   Tobacco Use    Smoking status: Never Smoker    Smokeless tobacco: Never Used   Vaping Use    Vaping Use: Never used   Substance and Sexual Activity    Alcohol use: Not Currently    Drug use: Never    Sexual activity: Not on file   Other Topics Concern    Not on file   Social History Narrative    Not on file     Social Determinants of Health     Financial Resource Strain:     Difficulty of Paying Living Expenses:    Food Insecurity:     Worried About Running Out of Food in the Last Year:     [address] in the Last Year:    Transportation Needs:     Lack of Transportation (Medical):      Lack of Transportation (Non-Medical):    Physical Activity:     Days of Exercise per Week:     Minutes of Exercise per Session:    Stress:     Feeling of Stress :    Social Connections:     Frequency of Communication with Friends and Family:     Frequency of Social Gatherings with Friends and Family:     Attends Christianity Services:     Active Member of Clubs or Organizations:     Attends Club or Organization Meetings:     Marital Status:    Intimate Partner Violence:     Fear of Current or Ex-Partner:     Emotionally Abused:     Physically Abused:     Sexually Abused:          Family History:     Family History   Problem Relation Age of Onset    Stroke Mother     Heart Disease Father 79        MI    Heart Attack Father     Heart Disease Brother     Coronary Art Dis Brother     Stroke Brother     Stroke Brother        REVIEW OF SYSTEMS:    CONSTITUTIONAL:  Denies fever, chill or rigors, nausea or vomiting. HEENT: denies blurring of vision or double vision, denies hearing problem  RESPIRATORY: denies cough, shortness of breath, sputum expectoration, chest pain. CARDIOVASCULAR:  Denies palpitation  GASTROINTESTINAL:  Denies abdomen pain, diarrhea or constipation. GENITOURINARY:  Denies burning urination or frequency of urination  INTEGUMENT: left groin bruise   HEMATOLOGIC/LYMPHATIC:  Denies lymph node swelling, gum bleeding or easy bruising. MUSCULOSKELETAL:  Left groin pain, swelling drainage   NEUROLOGICAL:  Denies light headed, dizziness  PHYSICAL EXAM:      Vitals:     BP (!) 118/32   Pulse 88   Temp 98.1 °F (36.7 °C) (Oral)   Resp 16   Ht 5' 2\" (1.575 m)   Wt 257 lb (116.6 kg)   SpO2 94%   BMI 47.01 kg/m²     General Appearance:    Awake, alert , no acute distress. Head:    Normocephalic, atraumatic   Eyes:    No pallor, no icterus,   Ears:    No obvious deformity or drainage.    Nose:   No nasal drainage   Throat:   Mucosa moist, no oral thrush   Neck:   Supple, no lymphadenopathy   Back:     no CVA tenderness   Lungs:     Clear to auscultation bilaterally, no wheeze    Heart:    Regular rate and rhythm, no murmur, rub or gallop   Abdomen:     Soft, non-tender, bowel sounds present    Extremities:   No edema,  Left groin ecchymosis - tender swelling, with purulent drainage,    Pulses:   Dorsalis pedis palpable    Skin:   Ecchymosis      CBC with Differential:      Lab Results caution. Swab specimens of sterile fluids are inferior to   aspirate specimens for organism recovery. Abundant Polymorphonuclear leukocytes   Epithelial cells not seen   Moderate Gram negative rods    Narrative:               Radiology :      CT abdomen and pelvis     Inflammatory changes/cellulitis in the left groin with  tiny microabscesses   in the inguinal region. Jorden Farheen is some inflammatory lymph nodes.  There is no   drainable abscess. Diverticulosis of colon with constipation. Fat containing lower anterior pelvic wall hernia. IMPRESSION:       1. Left groin abscess, cellulitis s/p cardiac catheterization ( 1 week ago )   2. Leukocytosis sec to above       RECOMMENDATIONS:      1. Vancomycin 1 gram IV q 24 hrs, Cefepime 2 gram IV q 12 hrs   2.  Vascular surgery consult     Thank you Dr Jayro Stevens for the consult

## 2021-06-22 NOTE — CONSULTS
Chief Complaint: Patient seen for evaluation of abscess of the left groin      HPI: This patient underwent cardiac catheterization on 15 Cristina, with Perclose device placed through the left femoral artery and Impella device placed, for myocardial protection prior to atherectomy of the severely calcified coronary arteries, underwent rotational atherectomy of the left main coronary artery, LAD and circumflex with stenting of the left main into the LAD had good results, went home, subsequent noted some swelling of the groin area, with redness and drainage, came to the hospital, did undergo a CT scan abdomen pelvis revealed evidence of small abscess of the left groin, ID consultation was obtained, and today vascular service was called because of presence of abscess, status post vascular intervention for the left femoral artery done a week ago    Patient denies any fever or chills    Patient denies any abdominal pain or back pain    Patient denies any symptoms in the lower extremities      Patient denies any focal lateralizing neurological symptoms like loss of speech, vision or loss of function of extremity    Patient can walk short distance slowly because of discomfort in the groin area, and denies any symptoms of rest pain    Allergies   Allergen Reactions    Naproxen     Penicillins        Current Facility-Administered Medications   Medication Dose Route Frequency Provider Last Rate Last Admin    sodium chloride flush 0.9 % injection 5-40 mL  5-40 mL Intravenous 2 times per day April LIAM Cano - CNP   10 mL at 06/22/21 0937    sodium chloride flush 0.9 % injection 5-40 mL  5-40 mL Intravenous PRN April LIAM Cano - CNP   10 mL at 06/22/21 1245    0.9 % sodium chloride infusion  25 mL Intravenous PRN April LIAM Cano CNP        ondansetron (ZOFRAN-ODT) disintegrating tablet 4 mg  4 mg Oral Q8H PRN April LIAM Cano CNP        Or    ondansetron [facility] injection 4 mg  4 mg Intravenous Q6H PRN April Jerome, APRN - [place]        polyethylene glycol [facility]) packet 17 g  17 g Oral Daily PRN April LIAM Cano - [place]        acetaminophen (TYLENOL) tablet 650 mg  650 mg Oral Q6H PRN April LIAM Cano CNP        Or    acetaminophen (TYLENOL) suppository 650 mg  650 mg Rectal Q6H PRN April LIAM Cano - CNP        glucose (GLUTOSE) 40 % oral gel 15 g  15 g Oral PRN April Francesca-Niel, APRN - CNP        dextrose 50 % IV solution  12.5 g Intravenous PRN April LIAM Cano - CNP        glucagon (rDNA) injection 1 mg  1 mg Intramuscular PRN April Francesca-LIAM Trejo CNP        dextrose 5 % solution  100 mL/hr Intravenous PRN April LIAM Cano - CNP        insulin lispro (HUMALOG) injection vial 0-6 Units  0-6 Units Subcutaneous TID R Malgorzata Niranjan 23 April FrancescaLIAM Suh - CNP   3 Units at 06/22/21 1312    insulin lispro (HUMALOG) injection vial 0-3 Units  0-3 Units Subcutaneous Nightly April LIAM Cano CNP   2 Units at 06/22/21 0149    amLODIPine (NORVASC) tablet 5 mg  5 mg Oral Daily April Storey-LIAM Trejo CNP   5 mg at 06/22/21 1995    aspirin chewable tablet 81 mg  81 mg Oral Daily April LIAM Cano CNP   81 mg at 06/22/21 6628    carvedilol (COREG) tablet 6.25 mg  6.25 mg Oral BID  April [place]-Sl[place]LIAM perdomo - CNP   6.25 mg at 06/22/21 1741    clopidogrel (PLAVIX) tablet 75 mg  75 mg Oral Daily April LIAM Cano CNP   75 mg at 06/22/21 3124    magnesium chloride (MAG DELAY) extended release tablet 64 mg  1 tablet Oral Daily April CherokeeLIAM Suh CNP   64 mg at 06/22/21 9173    nitroGLYCERIN (NITROSTAT) SL tablet 0.4 mg  0.4 mg Sublingual Q5 Min PRN April Jerome, APRN - [place]        rosuvastatin (CRESTOR) tablet 20 mg  20 mg Oral Nightly April Jerome, LIAM - CNP        vitamin B-12 (CYANOCOBALAMIN) tablet 1,000 mcg  1,000 mcg Oral Daily April LIAM Cano CNP   1,000 mcg at 06/22/21 3564    vitamin D (CHOLECALCIFEROL) tablet 1,000 Units  1,000 Units Oral Daily April LIAM Cano CNP   1,000 Units at 06/22/21 1897    perflutren lipid microspheres (DEFINITY) injection 1.65 mg  1.5 mL Intravenous ONCE PRN Shelly Zavaleta MD        [START ON 6/23/2021] vancomycin 1000 mg IVPB in 250 mL D5W addavial  1,000 mg Intravenous Q24H Lincoln Mary MD        cefepime (MAXIPIME) 2000 mg IVPB extended (mini-bag)  2,000 mg Intravenous Q12H Lincoln Mary MD 12.5 mL/hr at 06/22/21 1253 2,000 mg at 06/22/21 1253    Cefepime Flush (0.9 % sodium chloride infusion)   Intravenous Q12H Jaylyn Bowles MD           Past Medical History:   Diagnosis Date    Aortic valve disorder     CAD (coronary artery disease)     Cancer ([facility]     glaucoma    Chest pain     Diabetes ([facility]     Diverticulosis     GERD (gastroesophageal reflux disease)     Goiter     Hyperlipidemia     Hypertension     Hypothyroidism     Macular degeneration     Mitral valve disorder     Neuropathy     Osteoporosis     VHD (valvular heart disease)     Vitamin B12 deficiency        Past Surgical History:   Procedure Laterality Date    APPENDECTOMY      BREAST BIOPSY Right     neg    CARDIAC CATHETERIZATION  06/11/2021    Dr Aniyah Holguin  06/15/2021    Dr Adolfo Head - [place] 3.5 x 18 LM to LAD and 4.0 x18 Prox CX    EYE SURGERY Bilateral     cataract    HYSTERECTOMY      KNEE ARTHROPLASTY Left     TOOTH EXTRACTION      TRANSESOPHAGEAL ECHOCARDIOGRAM  06/10/2021    Dr Mathew Apodaca       Family History   Problem Relation Age of Onset    Stroke Mother     Heart Disease Father 79        MI    Heart Attack Father     Heart Disease Brother     Coronary Art Dis Brother     Stroke Brother     Stroke Brother        Social History     Socioeconomic History    Marital status:   Spouse name: Not on file    Number of children: Not on file    Years of education: Not on file    Highest education level: Not on file   Occupational History    Not on file   Tobacco Use    Smoking status: Never Smoker    Smokeless tobacco: Never Used   Vaping Use    Vaping Use: Never used   Substance and Sexual Activity    Alcohol use: Not Currently    Drug use: Never    Sexual activity: Not on file   Other Topics Concern    Not on file   Social History Narrative    Not on file     Social Determinants of Health     Financial Resource Strain:     Difficulty of Paying Living Expenses:    Food Insecurity:     Worried About Running Out of Food in the Last Year:     [address] in the Last Year:    Transportation Needs:     Lack of Transportation (Medical):  Lack of Transportation (Non-Medical):    Physical Activity:     Days of Exercise per Week:     Minutes of Exercise per Session:    Stress:     Feeling of Stress :    Social Connections:     Frequency of Communication with Friends and Family:     Frequency of Social Gatherings with Friends and Family:     Attends [employer] Services:     Active Member of Clubs or Organizations:     Attends Club or Organization Meetings:     Marital Status:    Intimate Partner Violence:     Fear of Current or Ex-Partner:     Emotionally Abused:     Physically Abused:     Sexually Abused:        Review of Systems:  Skin:  No abnormal pigmentation or rash. Eyes:  No blurring, diplopia or vision loss. Ears/Nose/Throat:  No hearing loss or vertigo. Respiratory:  No cough, pleuritic chest pain, dyspnea, or wheezing. Cardiovascular: No angina, palpitations .   Coronary artery disease status post rotational atherectomy and angioplasty and stent placement of left main and LAD, hypertension, hyperlipidemia, valvular heart disease mainly involving the aortic valve    Gastrointestinal:  No nausea or vomiting; no abdominal pain or rectal bleeding. Musculoskeletal:  No arthritis or weakness. Neurologic:  No paralysis, paresis, seizures or headaches. Hematologic/Lymphatic/Immunologic:  No anemia, abnormal bleeding/bruising. Endocrine:  No heat or cold intolerance. No polyphagia, polydipsia or polyuria. Diabetes mellitus    Nephrology: Chronic kidney disease    Physical Exam:  BP (!) 113/50   Pulse 76   Temp 97.9 °F (36.6 °C) (Oral)   Resp 16   Ht 5' 2\" (1.575 m)   Wt 257 lb (116.6 kg)   SpO2 100%   BMI 47.01 kg/m²   General appearance:  Alert, awake, oriented x 3. No distress. Skin:  Warm and dry. Head:  Normocephalic. No masses, lesions or tenderness. Eyes:  Conjunctivae appear normal; PERRL. Ears:  External ears normal.  Nose/Sinuses:  Septum midline, mucosa normal; no drainage. Oropharynx:  Clear, no exudate noted. Neck:  No jugular venous distention, lymphadenopathy or thyromegaly. Bilateral carotid bruit noted      Lungs:  Clear to ausculation bilaterally. No rhonchi, crackles, wheezes. Heart:  Regular rate and rhythm. Grade 2 x 6 murmur. .    Abdomen:  Soft, non-tender. No masses, organomegaly. Musculoskeletal: No joint effusions, tenderness swelling or warmth. Neuro: Speech is intact. Moving all extremities. No focal motor or sensory deficits. Extremities:  Both feet are warm to touch.  The color of both feet is normal.      Patient does have slight ecchymosis of the left groin, with some slight swelling soft tissue over the medial aspect of the left groin, clinically consistent with resolving hematoma that was noticed in the CT scan with a small opening 3 mm 3 mm, unexpected pressure, blood stained purulent material noted, approximately 1 cc, Gram stain culture was done    Pulses Right  Left    Brachial 3 3    Radial    3=normal   Femoral 2 2  2=diminished   Popliteal    1=barely palpable   Dorsalis pedis 2 1  0=absent   Posterior tibial    4=aneurysmal           Other pertinent information:1. The past medical records were reviewed. 2.    Lab Results   Component Value Date    WBC 7.8 06/22/2021    HGB 7.1 (L) 06/22/2021    HCT 22.7 (L) 06/22/2021    .3 (H) 06/22/2021     06/22/2021      Lab Results   Component Value Date     06/22/2021    K 4.3 06/22/2021     06/22/2021    CO2 22 06/22/2021    BUN 20 06/22/2021    CREATININE 1.4 (H) 06/22/2021    GLUCOSE 105 (H) 06/22/2021    CALCIUM 8.7 06/22/2021    PROT 6.9 06/21/2021    LABALBU 3.4 (L) 06/21/2021    BILITOT 1.2 06/21/2021    ALKPHOS 68 06/21/2021    AST 15 06/21/2021    ALT 12 06/21/2021    LABGLOM 36 06/22/2021    GFRAA 44 06/22/2021    AGRATIO 1.0 (L) 01/06/2021    GLOB 3.5 01/06/2021     Lab Results   Component Value Date    APTT <20.0 (L) 06/21/2021      Lab Results   Component Value Date    INR 1.2 06/21/2021    PROTIME 13.3 (H) 06/21/2021        3. CT ABDOMEN PELVIS W IV CONTRAST Additional Contrast? None   Final Result   Inflammatory changes/cellulitis in the left groin with  tiny microabscesses   in the inguinal region. There is some inflammatory lymph nodes. There is no   drainable abscess. Diverticulosis of colon with constipation. Fat containing lower anterior pelvic wall hernia. 4. The cardiac catheterization report was reviewed, Impella protected coronary artery atherectomy angioplasty and stent placement    5. The history physical, ID consultation notes were reviewed    6. The CTA of abdomen pelvis was personally reviewed by me, at the site of the femoral arteries remain, no abdominal findings are noted, patient has a subcutaneous hematoma, of the medial aspect of the left groin, with a small abscess with small air pocket communicating with the skin surface, clinically correlates with area of abscess and drainage that I noticed on physical examination    Assessment:      1. Small infected hematoma with purulent material        2.   No evidence of any vascular pathology, at the site of the femoral arteries remains, no abnormal findings noted    3. Mild right carotid stenosis of 30 to 40%      4. Aortic valve disease, coronary artery disease status post Impella protected atherectomy of the left main coronary artery with angioplasty and stenting      Patient Active Problem List   Diagnosis    Type 2 diabetes mellitus with diabetic neuropathy, without long-term current use of insulin ([facility]    Essential hypertension    Hyperlipidemia    Goiter    Osteoporosis    Stage [facility]    Aortic valve stenosis, unspecified etiology    Type 2 diabetes mellitus, without long-term current use of insulin ([facility])    Obesity (BMI 30-39. 9)    Dizziness    Nonrheumatic mitral valve regurgitation    Nonrheumatic tricuspid valve regurgitation    Left main coronary artery disease    Acute postoperative pain of left groin    Status post insertion of drug eluting coronary artery stent    Cellulitis of left groin    CKD (chronic kidney disease)    Anemia    Leukocytosis    CAD (coronary artery disease)    Hx of heart artery stent            Plan:     Discussed the patient, all options, risks benefits and alternatives explained, at the site of the soft tissue swelling and small opening of the external pressure was applied, purulent material, bloodstained, about 1 cc came out, from which a specimen was sent for C&S, culture was taken    Discussed with nursing staff, send the specimen for C&S and Gram stain and dress the area daily with 4 x 4's and tape, and as needed    Patient reassured, was informed, that she does not need any vascular surgical intervention, continue the proper antibiotic therapy with ID consultants, any exploration of the area will be done only if there is no improvement antidote for the nursing staff should any increasing pain or swelling    The patient recommended follow-up evaluation of carotid artery in approximately 2 years and call as needed if any symptoms    All her questions were answered    Thank you for letting me participate in the care of your patient          Electronically signed by Emily Harrison MD on 6/22/2021 at 6:05 PM

## 2021-06-22 NOTE — ED NOTES
Radiology Procedure Waiver   Name: Adela Bartholomew  : 1940  MRN: [id number]    Date:  21    Time: 8:23 PM EDT    Benefits of immediately proceeding with radiology exam(s) without pre-testing outweigh the risks or are not indicated as specified below and therefore the following is/are being waived:    [x] Benefits of immediate radiology exam(s) outweigh any risk. OR    Pre-exam testing is not indicated for the following reason(s):  [] Pregnancy test   [] Patients LMP on-time and regular.   [] Patient had Tubal Ligation or has other Contraception Device. [] Patient  is Menopausal or Premenarcheal.    [] Patient had Full or Partial Hysterectomy. [] Protocol for CT contrast allegry   [] Patient has tolerated well previously   [] Patient does not have a true allergy    [] MRI Questionnaire     [] BUN/Creatinine   [] Patient age w/no hx of renal dysfunction. [] Patient on Dialysis. [] Recent Normal Labs.   Electronically signed by Julio Brown MD on 21 at 8:23 PM EDT               Julio Brown MD  Resident  [phone]

## 2021-06-22 NOTE — CARE COORDINATION
SW spoke with patient in her room. She is alert and oriented, sitting in chair at side of bed. She states she lives home alone in a 1 story home. She reports she has a walker and cane if needed, however does not use them currently. She reports being independent. She had a heart Cath last week in this hospital and was discharged home. She reports doing well at home since discharge and getting around independent. She states she sees Dr Lu Polanco in [place] and uses [facility] in [place]. No history of HHC or BELLO. She plans to return home on discharge. She states will need HHC ONLY IF DISCHARGING ON IV ABX. Will await final plan from ID.

## 2021-06-22 NOTE — PROGRESS NOTES
[address]     Date:2021  Patient Name: Holly Johnson  MRN: [id number]  : 1940  Room: [address] [place] [address]      Evaluating OT: Annie Carr, [address] OTR/L [id number]    Referring Provider: LIAM Madrigal - [place]    Specific Provider Orders/Date: OT evaluation and treatment 21    AM-PAC Daily Activity Raw Score:     Recommended Adaptive Equipment: tub bench    Diagnosis: Cellulitis of L groin       Pertinent Medical History: DM-II, HLD, Osteoprosis, Stage 2 CKD, GERD, Goiter, Hypothyroidism, Obesity, CAD, VHD, macular degeneration, Aoritic Valve Disorder, Mitral Valve Disorder, Neuropathy, Diverticulosis, hx of heart artery stent, anemia,    Precautions:  Falls,     Assessment of current deficits:  [] Functional mobility   []ADLs  [] Strength               []Cognition   [] Functional transfers   [] IADLs         [] Safety Awareness   []Endurance   [] Fine Coordination              [] Balance      [] Vision/perception   []Sensation    []Gross Motor Coordination  [] ROM  [] Delirium                   [] Motor Control     OT PLAN OF CARE   OT POC based on physician orders, patient diagnosis and results of clinical assessment    Frequency/Duration : NA  Specific OT Treatment to include: NA      Home Living: Pt lives alone in a 2 story with 4 step(s) to enter and 1 rail(s); bed/bath on second floor (full flight of stairs with 1 railing to access second floor. Bathroom setup: hand rails  Equipment owned: ww and cane (not using prior to admission)  Prior Level of Function: Independent  with ADLs , Independent  with IADLs; using no AD for ambulation.    Driving: yes    Pain Level: 3/10 in L groin  Cognition: A&O: 4/4; Follows multi step directions   Memory:  good    Sequencing:  good    Problem solving:  good    Judgement/safety:  good      Functional Assessment: Initial Eval Status  Date: 6/22/21   Feeding Independent    Grooming Independent    UB Dressing Independent    LB Dressing Independent    Bathing Independent  Educated on use of tub bench for safety with bathing   Toileting Independent    Bed Mobility  Supine to sit: Independent   Sit to supine: Independent    Functional Transfers Sit to stand: Independent   Stand to sit: Independent   Stand pivot: Independent    Functional Mobility Indep with no AD   Balance Sitting:     Static:  wfl    Dynamic:wfl  Standing: wfl   Activity Tolerance wfl   Visual/  Perceptual Glasses: no            UE ROM: RUE:  WFL  LUE:  WFL  Strength: RUE: grossly 5/5 LUE: grossly 5/5   Strength: B WFL  Fine Motor Coordination:  B WFL    Hearing: WFL  Sensation:  No c/o numbness or tingling  Tone:  WFL  Edema: None noted                            Comments/Treatment: Upon arrival, patient sitting in bedside chair with niece present. Pt demonstrating independence with ADLs/mobility and good understanding of education/techniques. Pt using no AD and SBA to ambulate to bathroom. Pt educated on slowing gait to prevent falls. Pt tested with various dynamic standing heights with no noted LOB. Pt no noted SOB or fatigue. Pt stated she feels she is at baseline and would like to return home when medically stable. Pt educated on tub bench due to tub/shower unit for safety with showers d/t returning home alone. At end of session, patient sitting in bedside chair and niece present with call light and phone within reach, all lines and tubes intact. Recommend d/c from OT d/t no acute skilled needs at this time. Evaluation time includes thorough review of current medical information, gathering information on past medical & social history & PLOF, completion of standardized testing, informal observation of tasks, consultation with other medical professions/disciplines, assessment of data & development of POC/goals.      Evaluation Complexity: Low  Time In: 0255  Time Out: 0310      Min Units   OT Eval Low 72866  x     OT Eval Medium 11965      OT Eval High [id number]       OT Re-Eval [id number]       Therapeutic Ex [id number]       Therapeutic Activities 40995       ADL/Self Care [id number]       Orthotic Management 69860       Neuro Re-Ed [id number]       Non-Billable Time           Creta Canavan, MSOT OTR/L #[id number]

## 2021-06-23 LAB
GRAM STAIN RESULT: NORMAL
METER GLUCOSE: 125 MG/DL (ref 74–99)
METER GLUCOSE: 153 MG/DL (ref 74–99)
METER GLUCOSE: 210 MG/DL (ref 74–99)
METER GLUCOSE: 223 MG/DL (ref 74–99)
WOUND/ABSCESS: NORMAL

## 2021-06-23 PROCEDURE — 2580000003 HC RX 258: Performed by: NURSE PRACTITIONER

## 2021-06-23 PROCEDURE — 82962 GLUCOSE BLOOD TEST: CPT

## 2021-06-23 PROCEDURE — 2580000003 HC RX 258: Performed by: INTERNAL MEDICINE

## 2021-06-23 PROCEDURE — 2060000000 HC ICU INTERMEDIATE R&B

## 2021-06-23 PROCEDURE — 6360000002 HC RX W HCPCS: Performed by: INTERNAL MEDICINE

## 2021-06-23 PROCEDURE — 6370000000 HC RX 637 (ALT 250 FOR IP): Performed by: NURSE PRACTITIONER

## 2021-06-23 RX ADMIN — INSULIN LISPRO 1 UNITS: 100 INJECTION, SOLUTION INTRAVENOUS; SUBCUTANEOUS at 09:19

## 2021-06-23 RX ADMIN — CEFEPIME HYDROCHLORIDE 2000 MG: 2 INJECTION, POWDER, FOR SOLUTION INTRAVENOUS at 11:56

## 2021-06-23 RX ADMIN — Medication 1000 MCG: at 09:17

## 2021-06-23 RX ADMIN — ASPIRIN 81 MG CHEWABLE TABLET 81 MG: 81 TABLET CHEWABLE at 09:18

## 2021-06-23 RX ADMIN — CLOPIDOGREL 75 MG: 75 TABLET, FILM COATED ORAL at 09:18

## 2021-06-23 RX ADMIN — Medication 1000 UNITS: at 09:18

## 2021-06-23 RX ADMIN — VANCOMYCIN HYDROCHLORIDE 1000 MG: 1 INJECTION, POWDER, LYOPHILIZED, FOR SOLUTION INTRAVENOUS at 00:02

## 2021-06-23 RX ADMIN — INSULIN LISPRO 2 UNITS: 100 INJECTION, SOLUTION INTRAVENOUS; SUBCUTANEOUS at 11:55

## 2021-06-23 RX ADMIN — ROSUVASTATIN 20 MG: 20 TABLET, FILM COATED ORAL at 22:09

## 2021-06-23 RX ADMIN — CARVEDILOL 6.25 MG: 6.25 TABLET, FILM COATED ORAL at 09:18

## 2021-06-23 RX ADMIN — INSULIN LISPRO 1 UNITS: 100 INJECTION, SOLUTION INTRAVENOUS; SUBCUTANEOUS at 22:10

## 2021-06-23 RX ADMIN — SODIUM CHLORIDE, PRESERVATIVE FREE 10 ML: 5 INJECTION INTRAVENOUS at 09:18

## 2021-06-23 RX ADMIN — AMLODIPINE BESYLATE 5 MG: 5 TABLET ORAL at 09:17

## 2021-06-23 RX ADMIN — MAGNESIUM 64 MG (MAGNESIUM CHLORIDE) TABLET,DELAYED RELEASE 64 MG: at 09:17

## 2021-06-23 RX ADMIN — SODIUM CHLORIDE, PRESERVATIVE FREE 10 ML: 5 INJECTION INTRAVENOUS at 22:09

## 2021-06-23 RX ADMIN — CEFEPIME HYDROCHLORIDE 2000 MG: 2 INJECTION, POWDER, FOR SOLUTION INTRAVENOUS at 04:39

## 2021-06-23 RX ADMIN — SODIUM CHLORIDE: 9 INJECTION, SOLUTION INTRAVENOUS at 17:08

## 2021-06-23 RX ADMIN — SODIUM CHLORIDE: 9 INJECTION, SOLUTION INTRAVENOUS at 09:18

## 2021-06-23 ASSESSMENT — PAIN SCALES - GENERAL
PAINLEVEL_OUTOF10: 0

## 2021-06-23 NOTE — PROGRESS NOTES
Department of Internal Medicine  Infectious Diseases  Progress  Note      C/C   Left groin abscess     Denies fever   Reports pain in the left groin   Afebrile       Current Facility-Administered Medications   Medication Dose Route Frequency Provider Last Rate Last Admin    sodium chloride flush 0.9 % injection 5-40 mL  5-40 mL Intravenous 2 times per day April LIAM Cano - CNP   10 mL at 06/23/21 2258    sodium chloride flush 0.9 % injection 5-40 mL  5-40 mL Intravenous PRN April CLAYTON CanoN - CNP   10 mL at 06/22/21 1245    0.9 % sodium chloride infusion  25 mL Intravenous PRN April LIAM Cano - [place]        ondansetron (ZOFRAN-ODT) disintegrating tablet 4 mg  4 mg Oral Q8H PRN April LIAM Cano CNP        Or    [facility]ron [facility] injection 4 mg  4 mg Intravenous Q6H PRN April LIAM Cano CNP        polyethylene glycol (GLYCOLAX) packet 17 g  17 g Oral Daily PRN April LIAM Cano - [place]        acetaminophen (TYLENOL) tablet 650 mg  650 mg Oral Q6H PRN April LIAM Cano CNP        Or    acetaminophen (TYLENOL) suppository 650 mg  650 mg Rectal Q6H PRN April LIAM Cano - CNP        glucose (GLUTOSE) 40 % oral gel 15 g  15 g Oral PRN April LIAM Cano - CNP        dextrose 50 % IV solution  12.5 g Intravenous PRN April LIAM Cano CNP        glucagon (rDNA) injection 1 mg  1 mg Intramuscular PRN April LIAM Cano CNP        dextrose 5 % solution  100 mL/hr Intravenous PRN April LIAM Cano CNP        insulin lispro (HUMALOG) injection vial 0-6 Units  0-6 Units Subcutaneous TID [facility] April LIAM Cano - CNP   2 Units at 06/23/21 1155    insulin lispro (HUMALOG) injection vial 0-3 Units  0-3 Units Subcutaneous Nightly April LIAM Cano CNP   2 Units at 06/22/21 7826    amLODIPine (NORVASC) tablet 5 mg  5 mg Oral Daily April Storey-MetzLIAM perdomo - CNP   5 mg at 06/23/21 0917    aspirin chewable tablet 81 mg  81 mg Oral Daily April FrancescaNeil, APRN - CNP   81 mg at 06/23/21 5815    carvedilol (COREG) tablet 6.25 mg  6.25 mg Oral BID WC April Francesca-Neil, APRN - CNP   6.25 mg at 06/23/21 9791    clopidogrel (PLAVIX) tablet 75 mg  75 mg Oral Daily [facility] - C[facility]   75 mg at 06/23/21 4912    magnesium chloride (MAG DELAY) extended release tablet 64 mg  1 tablet Oral Daily April [facility], APRN - CNP   64 mg at 06/23/21 6074    nitroGLYCERIN (NITROSTAT) SL tablet 0.4 mg  0.4 mg Sublingual Q5 Min PRN April Barren-CLAYTON TrejoN - [place]        rosuvastatin (CRESTOR) tablet 20 mg  20 mg Oral Nightly [facility] - CNP   20 mg at 06/22/21 2349    vitamin B-12 (CYANOCOBALAMIN) tablet 1,000 mcg  1,000 mcg Oral Daily April Storey-MetzLIAM - CNP   1,000 mcg at 06/23/21 2301    vitamin D (CHOLECALCIFEROL) tablet 1,000 Units  1,000 Units Oral Daily April Francesca-MetzLIAM - CNP   1,000 Units at 06/23/21 9862    perflutren lipid microspheres (DEFINITY) injection 1.65 mg  1.5 mL Intravenous ONCE PRN Elan Jorge MD        vancomycin 1000 mg IVPB in 250 mL D5W addavial  1,000 mg Intravenous Q24H Konrad Jameson MD   Stopped at 06/23/21 0115    cefepime (MAXIPIME) 2000 mg IVPB extended (mini-bag)  2,000 mg Intravenous Q12H Konrad Jameson MD   Stopped at 06/23/21 1601    Cefepime Flush (0.9 % sodium chloride infusion)   Intravenous Q12H Konrad Jameson MD   Stopped at 06/23/21 1153         REVIEW OF SYSTEMS:    CONSTITUTIONAL:  Denies fever, chill or rigors, nausea or vomiting. HEENT: denies blurring of vision or double vision, denies hearing problem  RESPIRATORY: denies cough, shortness of breath, sputum expectoration, chest pain. CARDIOVASCULAR:  Denies palpitation  GASTROINTESTINAL:  Denies abdomen pain, diarrhea or constipation.   GENITOURINARY:  Denies burning urination or frequency of urination  INTEGUMENT: left groin bruise   HEMATOLOGIC/LYMPHATIC:  Denies lymph node swelling, gum bleeding or easy bruising. MUSCULOSKELETAL:  Left groin pain, swelling drainage   NEUROLOGICAL:  Denies light headed, dizziness  PHYSICAL EXAM:      Vitals:     /64   Pulse 75   Temp 97 °F (36.1 °C) (Temporal)   Resp 14   Ht 5' 2\" (1.575 m)   Wt 257 lb (116.6 kg)   SpO2 97%   BMI 47.01 kg/m²     General Appearance:    Awake, alert , no acute distress. Head:    Normocephalic, atraumatic   Eyes:    No pallor, no icterus,   Ears:    No obvious deformity or drainage.    Nose:   No nasal drainage   Throat:   Mucosa moist, no oral thrush   Neck:   Supple, no lymphadenopathy   Back:     no CVA tenderness   Lungs:     Clear to auscultation bilaterally, no wheeze    Heart:    Regular rate and rhythm, no murmur, rub or gallop   Abdomen:     Soft, non-tender, bowel sounds present    Extremities:   No edema,  Left groin ecchymosis - tender swelling, with purulent drainage,    Pulses:   Dorsalis pedis palpable    Skin:   Ecchymosis      CBC with Differential:      Lab Results   Component Value Date    WBC 7.8 06/22/2021    RBC 2.22 06/22/2021    HGB 7.1 06/22/2021    HCT 22.7 06/22/2021     06/22/2021    .3 06/22/2021    MCH 32.0 06/22/2021    MCHC 31.3 06/22/2021    RDW 15.3 06/22/2021    SEGSPCT 70.7 06/08/2021    LYMPHOPCT 17.5 06/22/2021    MONOPCT 10.2 06/22/2021    BASOPCT 0.3 06/22/2021    MONOSABS 0.79 06/22/2021    LYMPHSABS 1.36 06/22/2021    EOSABS 0.33 06/22/2021    BASOSABS 0.02 06/22/2021       CMP     Lab Results   Component Value Date     06/22/2021    K 4.3 06/22/2021     06/22/2021    CO2 22 06/22/2021    BUN 20 06/22/2021    CREATININE 1.4 06/22/2021    GFRAA 44 06/22/2021    AGRATIO 1.0 01/06/2021    LABGLOM 36 06/22/2021    GLUCOSE 105 06/22/2021    PROT 6.9 06/21/2021    LABALBU 3.4 06/21/2021    CALCIUM 8.7 06/22/2021    BILITOT 1.2 06/21/2021 sec to above - improved       RECOMMENDATIONS:      1. Vancomycin 1 gram IV q 24 hrs, Cefepime 2 gram IV q 12 hrs   2.  Vascular surgery consult appreciated

## 2021-06-23 NOTE — DISCHARGE SUMMARY
Physician Discharge Summary     Patient ID:  Phil Figueroa  [id number]  89 y.o.  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 9)    Dizziness    Nonrheumatic mitral valve regurgitation    Nonrheumatic tricuspid valve regurgitation    Left main coronary artery disease    Acute postoperative pain of left groin    Status post insertion of drug eluting coronary artery stent    Cellulitis of left groin    CKD (chronic kidney disease)    Anemia    Leukocytosis    CAD (coronary artery disease)    Hx of heart artery stent    Carotid stenosis, right    PVD (peripheral vascular disease) (HCC)    Abscess of groin, left       Discharge Diagnoses: Aortic valve stenosis    Consults: cardiology    Procedures: Cardiac catheterization    Hospital Course: The patient is a 68 y.o. female of Raman Monreal MD with significant past medical history of CAD, DM, GERD, HTN, and hyperlipidemia who presents with shortness of breath that began approximately 2 weeks prior to presenting to [facility] ER. Patient was transferred to [facility] to determine if she is a TAVR candidate. Patient had VELIA on 6/10/2021 that revealed severe aortic stenosis. Left heart cath on 6/11 revealed severe distal left main disease and 50 to 60% stenosis of obtuse marginal ramus intermedius. Cardiothoracic surgery was consulted for possible CABG/valve replacement patient was determined not to be a good candidate at this time.   Patient had a left heart cath with PCI 6/15 secondary to large thyroid. Patient was discharged home. Patient was discharged on new medications in the form of amlodipine, Coreg, Plavix, and Crestor. Patient was instructed to follow-up with cardiology/PCP in 2 weeks. Recent Labs     06/21/21  1541 06/22/21  0751   WBC 12.3* 7.8   HGB 8.1* 7.1*   HCT 25.9* 22.7*    192       Recent Labs     06/21/21  1541 06/22/21  0751    134   K 4.1 4.3    102   CO2 20* 22   BUN 23 20   CREATININE 1.4* 1.4*   CALCIUM 9.0 8.7       CT ABDOMEN PELVIS W IV CONTRAST Additional Contrast? None    Result Date: 6/21/2021  EXAMINATION: CT OF THE ABDOMEN AND PELVIS WITH CONTRAST 6/21/2021 9:13 pm TECHNIQUE: CT of the abdomen and pelvis was performed with the administration of intravenous contrast. Multiplanar reformatted images are provided for review. Dose modulation, iterative reconstruction, and/or weight based adjustment of the mA/kV was utilized to reduce the radiation dose to as low as reasonably achievable. COMPARISON: None. HISTORY: ORDERING SYSTEM PROVIDED HISTORY: r/o L groin infection s/p cath TECHNOLOGIST PROVIDED HISTORY: Reason for exam:->r/o L groin infection s/p cath Additional Contrast?->None Decision Support Exception - unselect if not a suspected or confirmed emergency medical condition->Emergency Medical Condition (MA) What reading provider will be dictating this exam?->CRC FINDINGS: The lung bases are normal.  Small hiatal hernia  is present. The liver is normal.  Gallbladder is absent. Spleen, pancreas, the adrenals and the kidneys are normal.  There is severe calcification aorta. Compression fractures of L3 and L4 with nearly 40% loss of height is noted. Fat containing ventral hernia is identified in the lower anterior pelvic wall. Pelvis. Bladder is contracted with wall thickening. There is diverticulosis of the colon without diverticulitis. There is constipation.   Inflammatory changes are identified in the left groin/inguinal region with the strandy inflammatory changes concerning for cellulitis. Tiny air bubbles and microabscesses measuring up to 1.3 cm are identified in the left groin. Inflammatory lymph nodes measuring up to 9 mm are identified. Hernia     Inflammatory changes/cellulitis in the left groin with  tiny microabscesses in the inguinal region. There is some inflammatory lymph nodes. There is no drainable abscess. Diverticulosis of colon with constipation. Fat containing lower anterior pelvic wall hernia. Discharge Exam:    HEENT: NCAT,  PERRLA, No JVD  Heart:  RRR, no murmurs, gallops, or rubs. Lungs:  CTA bilaterally, no wheeze, rales or rhonchi  Abd: bowel sounds present, nontender, nondistended, no masses  Extrem:  No clubbing, cyanosis, or edema    Disposition: home     Patient Condition at Discharge: Stable    Patient Instructions:      Medication List      START taking these medications    amLODIPine 5 MG tablet  Commonly known as: NORVASC  Take 1 tablet by mouth daily     carvedilol 6.25 MG tablet  Commonly known as: COREG  Take 1 tablet by mouth 2 times daily (with meals)     clopidogrel 75 MG tablet  Commonly known as: PLAVIX  Take 1 tablet by mouth daily     rosuvastatin 20 MG tablet  Commonly known as: CRESTOR  Take 1 tablet by mouth nightly        CONTINUE taking these medications    aspirin 81 MG tablet     Cinnamon 500 MG Caps     glipiZIDE 10 MG extended release tablet  Commonly known as: GLUCOTROL XL  TAKE ONE TABLET BY MOUTH TWO TIMES A DAY     Januvia 50 MG tablet  Generic drug: SITagliptin  Take 1 tablet by mouth daily     nitroGLYCERIN 0.4 MG SL tablet  Commonly known as: NITROSTAT  DISSOLVE ONE TABLET UNDER TONGUE AS NEEDED FOR CHEST PAINS. MAY REPEAT IN 5 MINUTES.  IF SYMPTOMS PERSISTS CALL 911     vitamin B-12 100 MCG tablet  Commonly known as: CYANOCOBALAMIN     vitamin D 1000 UNIT Tabs tablet  Commonly known as: CHOLECALCIFEROL        STOP taking these medications    meclizine 25 MG tablet  Commonly known as: ANTIVERT     pravastatin 40 MG tablet  Commonly known as: PRAVACHOL     ramipril 10 MG capsule  Commonly known as: Altace           Where to Get Your Medications      These medications were sent to [facility] [address] [address]    Phone: [phone]   · amLODIPine 5 MG tablet  · carvedilol 6.25 MG tablet  · clopidogrel 75 MG tablet  · rosuvastatin 20 MG tablet       Activity: activity as tolerated  Diet: cardiac diet    Pt has been advised to: Follow-up with Denver Mcclure MD in 1 week.   Follow-up with consultants as recommended by them    Note that over 30 minutes was spent in preparing discharge papers, discussing discharge with patient, medication review, etc.    Signed:  Rashaad Cartagena MD  6/23/2021  3:43 PM

## 2021-06-23 NOTE — PROGRESS NOTES
Subjective: The patient is awake and alert. She indicates her groin feels much better  No purulent drainage  No chills  Does not feel feverish    Objective:    BP (!) 110/56   Pulse 77   Temp 96.7 °F (35.9 °C) (Temporal)   Resp 12   Ht 5' 2\" (1.575 m)   Wt 257 lb (116.6 kg)   SpO2 100%   BMI 47.01 kg/m²     In: 360 [P.O.:100; I.V.:10]  Out: -   In: 360   Out: -     General appearance: NAD, conversant  HEENT: AT/NC, MMM  Neck: FROM, supple  Lungs: Clear to auscultation  CV: RRR, no MRGs  Vasc: Radial pulses 2+  Abdomen: Soft, non-tender; no masses or HSM  Extremities: No peripheral edema or digital cyanosis  Skin: no rash, lesions or ulcers  Psych: Alert and oriented to person, place and time  Neuro: Alert and interactive     Recent Labs     06/21/21  1541 06/22/21  0751   WBC 12.3* 7.8   HGB 8.1* 7.1*   HCT 25.9* 22.7*    192       Recent Labs     06/21/21  1541 06/22/21  0751    134   K 4.1 4.3    102   CO2 20* 22   BUN 23 20   CREATININE 1.4* 1.4*   CALCIUM 9.0 8.7       Assessment:    Principal Problem:    Cellulitis of left groin  Active Problems:    Essential hypertension    Hyperlipidemia    Type 2 diabetes mellitus, without long-term current use of insulin ([facility])    Obesity (BMI 30-39. 9)    CKD (chronic kidney disease)    Anemia    Leukocytosis    CAD (coronary artery disease)    Hx of heart artery stent    Carotid stenosis, right    PVD (peripheral vascular disease) ([facility])    Abscess of groin, left  Resolved Problems:    * No resolved hospital problems.  *      Plan:       Pt known to our service from recent dc at which time she had lhc with pci and evaluation for TAVR for severe aortic stenosis  Now with left groin wound at puncture site from left heart cath-improved now  Continue vancomycin per infectious disease-switch to p.o. antibiotics in preparation for discharge  ID evaluation appreciated  No fevers or chills or keila   Check sed rate and crp -sed rate elevated to 66  No plans by vascular    DVT Prophylaxis   PT/OT  Discharge planning           Joie Ruvalcaba MD  10:15 AM  6/23/2021

## 2021-06-23 NOTE — PLAN OF CARE
Problem: Pain:  Goal: Pain level will decrease  Description: Pain level will decrease  Outcome: Met This Shift  Goal: Control of acute pain  Description: Control of acute pain  Outcome: Met This Shift  Goal: Control of chronic pain  Description: Control of chronic pain  Outcome: Met This Shift     Problem: Falls - Risk of:  Goal: Will remain free from falls  Description: Will remain free from falls  Outcome: Met This Shift  Goal: Absence of physical injury  Description: Absence of physical injury  Outcome: Met This Shift     Problem: Nutritional:  Goal: Nutritional status will improve  Description: Nutritional status will improve  Outcome: Met This Shift     Problem: Physical Regulation:  Goal: Diagnostic test results will improve  Description: Diagnostic test results will improve  Outcome: Met This Shift  Goal: Will remain free from infection  Description: Will remain free from infection  Outcome: Met This Shift  Goal: Ability to maintain vital signs within normal range will improve  Description: Ability to maintain vital signs within normal range will improve  Outcome: Met This Shift     Problem: Respiratory:  Goal: Ability to maintain normal respiratory secretions will improve  Description: Ability to maintain normal respiratory secretions will improve  Outcome: Met This Shift     Problem: Skin Integrity:  Goal: Demonstration of wound healing without infection will improve  Description: Demonstration of wound healing without infection will improve  Outcome: Met This Shift  Goal: Complications related to intravenous access or infusion will be avoided or minimized  Description: Complications related to intravenous access or infusion will be avoided or minimized  Outcome: Met This Shift

## 2021-06-24 LAB
METER GLUCOSE: 147 MG/DL (ref 74–99)
METER GLUCOSE: 174 MG/DL (ref 74–99)
METER GLUCOSE: 185 MG/DL (ref 74–99)
METER GLUCOSE: 224 MG/DL (ref 74–99)

## 2021-06-24 PROCEDURE — 6360000002 HC RX W HCPCS: Performed by: INTERNAL MEDICINE

## 2021-06-24 PROCEDURE — 2060000000 HC ICU INTERMEDIATE R&B

## 2021-06-24 PROCEDURE — 2580000003 HC RX 258: Performed by: INTERNAL MEDICINE

## 2021-06-24 PROCEDURE — 82962 GLUCOSE BLOOD TEST: CPT

## 2021-06-24 PROCEDURE — 6370000000 HC RX 637 (ALT 250 FOR IP): Performed by: NURSE PRACTITIONER

## 2021-06-24 PROCEDURE — 99232 SBSQ HOSP IP/OBS MODERATE 35: CPT | Performed by: SURGERY

## 2021-06-24 PROCEDURE — 2580000003 HC RX 258: Performed by: NURSE PRACTITIONER

## 2021-06-24 RX ADMIN — SODIUM CHLORIDE, PRESERVATIVE FREE 10 ML: 5 INJECTION INTRAVENOUS at 00:03

## 2021-06-24 RX ADMIN — CEFEPIME HYDROCHLORIDE 2000 MG: 2 INJECTION, POWDER, FOR SOLUTION INTRAVENOUS at 11:32

## 2021-06-24 RX ADMIN — ASPIRIN 81 MG CHEWABLE TABLET 81 MG: 81 TABLET CHEWABLE at 08:18

## 2021-06-24 RX ADMIN — INSULIN LISPRO 2 UNITS: 100 INJECTION, SOLUTION INTRAVENOUS; SUBCUTANEOUS at 12:34

## 2021-06-24 RX ADMIN — MAGNESIUM 64 MG (MAGNESIUM CHLORIDE) TABLET,DELAYED RELEASE 64 MG: at 08:18

## 2021-06-24 RX ADMIN — VANCOMYCIN HYDROCHLORIDE 1000 MG: 1 INJECTION, POWDER, LYOPHILIZED, FOR SOLUTION INTRAVENOUS at 23:32

## 2021-06-24 RX ADMIN — ROSUVASTATIN 20 MG: 20 TABLET, FILM COATED ORAL at 20:12

## 2021-06-24 RX ADMIN — INSULIN LISPRO 1 UNITS: 100 INJECTION, SOLUTION INTRAVENOUS; SUBCUTANEOUS at 08:18

## 2021-06-24 RX ADMIN — SODIUM CHLORIDE, PRESERVATIVE FREE 10 ML: 5 INJECTION INTRAVENOUS at 20:12

## 2021-06-24 RX ADMIN — CLOPIDOGREL 75 MG: 75 TABLET, FILM COATED ORAL at 08:18

## 2021-06-24 RX ADMIN — SODIUM CHLORIDE: 9 INJECTION, SOLUTION INTRAVENOUS at 15:47

## 2021-06-24 RX ADMIN — SODIUM CHLORIDE: 9 INJECTION, SOLUTION INTRAVENOUS at 04:25

## 2021-06-24 RX ADMIN — SODIUM CHLORIDE, PRESERVATIVE FREE 10 ML: 5 INJECTION INTRAVENOUS at 08:18

## 2021-06-24 RX ADMIN — Medication 1000 MCG: at 08:18

## 2021-06-24 RX ADMIN — VANCOMYCIN HYDROCHLORIDE 1000 MG: 1 INJECTION, POWDER, LYOPHILIZED, FOR SOLUTION INTRAVENOUS at 00:05

## 2021-06-24 RX ADMIN — INSULIN LISPRO 1 UNITS: 100 INJECTION, SOLUTION INTRAVENOUS; SUBCUTANEOUS at 18:01

## 2021-06-24 RX ADMIN — Medication 1000 UNITS: at 08:18

## 2021-06-24 RX ADMIN — CARVEDILOL 6.25 MG: 6.25 TABLET, FILM COATED ORAL at 18:01

## 2021-06-24 RX ADMIN — INSULIN LISPRO 1 UNITS: 100 INJECTION, SOLUTION INTRAVENOUS; SUBCUTANEOUS at 20:11

## 2021-06-24 RX ADMIN — CARVEDILOL 6.25 MG: 6.25 TABLET, FILM COATED ORAL at 08:18

## 2021-06-24 RX ADMIN — CEFEPIME HYDROCHLORIDE 2000 MG: 2 INJECTION, POWDER, FOR SOLUTION INTRAVENOUS at 01:25

## 2021-06-24 RX ADMIN — AMLODIPINE BESYLATE 5 MG: 5 TABLET ORAL at 08:18

## 2021-06-24 ASSESSMENT — ENCOUNTER SYMPTOMS
NAUSEA: 0
EYE REDNESS: 0
VOMITING: 0
SHORTNESS OF BREATH: 0
ABDOMINAL PAIN: 0

## 2021-06-24 ASSESSMENT — PAIN SCALES - GENERAL
PAINLEVEL_OUTOF10: 0

## 2021-06-24 NOTE — PROGRESS NOTES
Storey-LIAM Trejo - CNP   5 mg at 06/24/21 0818    aspirin chewable tablet 81 mg  81 mg Oral Daily April Storey-NeilLIAM perdomo CNP   81 mg at 06/24/21 0818    carvedilol (COREG) tablet 6.25 mg  6.25 mg Oral BID WC April Francesca-CLAYTON TrejoN - CNP   6.25 mg at 06/24/21 0818    clopidogrel (PLAVIX) tablet 75 mg  75 mg Oral Daily April Storey-LIAM Trejo - CNP   75 mg at 06/24/21 0818    magnesium chloride (MAG DELAY) extended release tablet 64 mg  1 tablet Oral Daily April Storey-Porter RanchLIAM fontana CNP   64 mg at 06/24/21 0818    nitroGLYCERIN (NITROSTAT) SL tablet 0.4 mg  0.4 mg Sublingual Q5 Min PRN April Archer-LIAM Trejo - [place]        rosuvastatin (CRESTOR) tablet 20 mg  20 mg Oral Nightly April Storey-LIAM Trejo CNP   20 mg at 06/23/21 2209    vitamin B-12 (CYANOCOBALAMIN) tablet 1,000 mcg  1,000 mcg Oral Daily April Storey-Porter RanchLIAM CNP   1,000 mcg at 06/24/21 0818    vitamin D (CHOLECALCIFEROL) tablet 1,000 Units  1,000 Units Oral Daily April Storey-Porter RanchLIAM fontana CNP   1,000 Units at 06/24/21 0818    perflutren lipid microspheres (DEFINITY) injection 1.65 mg  1.5 mL Intravenous ONCE PRN Kyle Babb MD        vancomycin 1000 mg IVPB in 250 mL D5W addavial  1,000 mg Intravenous Q24H Janeen Espinoza MD   Stopped at 06/24/21 0115    cefepime (MAXIPIME) 2000 mg IVPB extended (mini-bag)  2,000 mg Intravenous Q12H Lincoln Mary MD 12.5 mL/hr at 06/24/21 1132 2,000 mg at 06/24/21 1132    Cefepime Flush (0.9 % sodium chloride infusion)   Intravenous Q12H Janeen Espinoza MD   Stopped at 06/24/21 0545         REVIEW OF SYSTEMS:    CONSTITUTIONAL:  Denies fever, chill or rigors  HEENT: denies blurring of vision or double vision, denies hearing problem  RESPIRATORY: denies cough, shortness of breath, sputum expectoration, chest pain. CARDIOVASCULAR:  Denies palpitation  GASTROINTESTINAL:  Denies abdomen pain, diarrhea or constipation.   GENITOURINARY:  Denies burning urination or frequency of urination  INTEGUMENT: left groin bruise   HEMATOLOGIC/LYMPHATIC:  Denies lymph node swelling, gum bleeding or easy bruising. MUSCULOSKELETAL:  Left groin pain, swelling drainage   NEUROLOGICAL:  Denies light headed, dizziness  PHYSICAL EXAM:      Vitals:     BP (!) 116/52   Pulse 92   Temp 97 °F (36.1 °C) (Temporal)   Resp 16   Ht 5' 2\" (1.575 m)   Wt 257 lb (116.6 kg)   SpO2 96%   BMI 47.01 kg/m²     General Appearance:    Awake, alert , no acute distress. Head:    Normocephalic, atraumatic   Eyes:    No pallor, no icterus,   Ears:    No obvious deformity or drainage.    Nose:   No nasal drainage   Throat:   Mucosa moist, no oral thrush   Neck:   Supple, no lymphadenopathy   Back:     no CVA tenderness   Lungs:     Clear to auscultation bilaterally, no wheeze    Heart:    Regular rate and rhythm, no murmur,p   Abdomen:     Soft, non-tender, bowel sounds present    Extremities:   No edema,  Left groin ecchymosis -  Mod drainage    Pulses:   Dorsalis pedis palpable    Skin:   Ecchymosis      CBC with Differential:      Lab Results   Component Value Date    WBC 7.8 06/22/2021    RBC 2.22 06/22/2021    HGB 7.1 06/22/2021    HCT 22.7 06/22/2021     06/22/2021    .3 06/22/2021    MCH 32.0 06/22/2021    MCHC 31.3 06/22/2021    RDW 15.3 06/22/2021    SEGSPCT 70.7 06/08/2021    LYMPHOPCT 17.5 06/22/2021    MONOPCT 10.2 06/22/2021    BASOPCT 0.3 06/22/2021    MONOSABS 0.79 06/22/2021    LYMPHSABS 1.36 06/22/2021    EOSABS 0.33 06/22/2021    BASOSABS 0.02 06/22/2021       CMP     Lab Results   Component Value Date     06/22/2021    K 4.3 06/22/2021     06/22/2021    CO2 22 06/22/2021    BUN 20 06/22/2021    CREATININE 1.4 06/22/2021    GFRAA 44 06/22/2021    AGRATIO 1.0 01/06/2021    LABGLOM 36 06/22/2021    GLUCOSE 105 06/22/2021    PROT 6.9 06/21/2021    LABALBU 3.4 06/21/2021    CALCIUM 8.7 06/22/2021    BILITOT 1.2 06/21/2021    ALKPHOS 68 06/21/2021    AST 15 06/21/2021    ALT 12 06/21/2021         Hepatic Function Panel:    Lab Results   Component Value Date    ALKPHOS 68 06/21/2021    ALT 12 06/21/2021    AST 15 06/21/2021    PROT 6.9 06/21/2021    BILITOT 1.2 06/21/2021    LABALBU 3.4 06/21/2021       PT/INR:    Lab Results   Component Value Date    PROTIME 13.3 06/21/2021    INR 1.2 06/21/2021       TSH:    Lab Results   Component Value Date    TSH 0.271 04/06/2021       U/A:    Lab Results   Component Value Date    COLORU Yellow 06/12/2021    PHUR 5.5 06/12/2021    WBCUA 1-3 06/12/2021    RBCUA NONE 06/12/2021    BACTERIA NONE SEEN 06/12/2021    CLARITYU Clear 06/12/2021    SPECGRAV 1.025 06/12/2021    LEUKOCYTESUR TRACE 06/12/2021    UROBILINOGEN 0.2 06/12/2021    BILIRUBINUR Negative 06/12/2021    BLOODU Negative 06/12/2021    GLUCOSEU 100 06/12/2021       ABG:  No results found for: GGM0STE, BEART, B5IDLNAW, PHART, THGBART, XWV0OBY, PO2ART, PUO8MLY    MICROBIOLOGY:    Wound Culture -       Mixed malachi isolated. Further workup and sensitivity testing   is not routinely indicated and will not be performed. Mixed malachi isolated includes:   Mixed Gram negative rods   Escherichia coli   Gram positive cocci     Gram Stain Result Gram stain performed from swab, interpret results with   caution. Swab specimens of sterile fluids are inferior to   aspirate specimens for organism recovery. Abundant Polymorphonuclear leukocytes   Epithelial cells not seen   Moderate Gram negative rods    Narrative:         Radiology :      CT abdomen and pelvis     Inflammatory changes/cellulitis in the left groin with  tiny microabscesses   in the inguinal region. Juanetta Cornet is some inflammatory lymph nodes.  There is no   drainable abscess. Diverticulosis of colon with constipation. Fat containing lower anterior pelvic wall hernia. IMPRESSION:       1. Left groin abscess, cellulitis s/p cardiac catheterization ( 1 week ago )   2.  Leukocytosis sec to above - improved RECOMMENDATIONS:      1. Vancomycin 1 gram IV q 24 hrs, Cefepime 2 gram IV q 12 hrs   D/C on oral abx

## 2021-06-24 NOTE — PROGRESS NOTES
Subjective: The patient is awake and alert. She indicates her groin feels much better  No purulent drainage  No chills  Does not feel feverish    Objective:    BP (!) 116/52   Pulse 92   Temp 97 °F (36.1 °C) (Temporal)   Resp 16   Ht 5' 2\" (1.575 m)   Wt 257 lb (116.6 kg)   SpO2 96%   BMI 47.01 kg/m²     In: 270 [I.V.:20]  Out: -   In: 270   Out: -     General appearance: NAD, conversant  HEENT: AT/NC, MMM  Neck: FROM, supple  Lungs: Clear to auscultation  CV: RRR, no MRGs  Vasc: Radial pulses 2+  Abdomen: Soft, non-tender; no masses or HSM  Extremities: No peripheral edema or digital cyanosis  Skin: no rash, lesions or ulcers  Psych: Alert and oriented to person, place and time  Neuro: Alert and interactive     Recent Labs     06/21/21  1541 06/22/21  0751   WBC 12.3* 7.8   HGB 8.1* 7.1*   HCT 25.9* 22.7*    192       Recent Labs     06/21/21  1541 06/22/21  0751    134   K 4.1 4.3    102   CO2 20* 22   BUN 23 20   CREATININE 1.4* 1.4*   CALCIUM 9.0 8.7       Assessment:    Principal Problem:    Cellulitis of left groin  Active Problems:    Essential hypertension    Hyperlipidemia    Type 2 diabetes mellitus, without long-term current use of insulin ([facility])    Obesity (BMI 30-39. 9)    CKD (chronic kidney disease)    Anemia    Leukocytosis    CAD (coronary artery disease)    Hx of heart artery stent    Carotid stenosis, right    PVD (peripheral vascular disease) ([facility])    Abscess of groin, left  Resolved Problems:    * No resolved hospital problems.  *      Plan:       Pt known to our service from recent dc at which time she had lhc with pci and evaluation for TAVR for severe aortic stenosis  Now with left groin wound at puncture site from left heart cath-improved now  Continue vancomycin/cefepime -switch to p.o. antibiotics in preparation for discharge  ID following  No fevers or chills or keila   Check sed rate and crp -sed rate elevated to 66  No plans by vascular    DVT Prophylaxis PT/OT  Discharge planning       Jayjay Gagnon MD  9:33 AM  6/24/2021

## 2021-06-24 NOTE — PROGRESS NOTES
Vascular        Chief complaint : Patient seen for evaluation of vascular status of the left leg, abscess and infection at the left groin site, post cardiac catheterization and intervention including placement of Impella balloon    Please see the history of present illness from my consultation note on 22 June as outlined below      This patient underwent cardiac catheterization on 15 Cristina, with Perclose device placed through the left femoral artery and Impella device placed, for myocardial protection prior to atherectomy of the severely calcified coronary arteries, underwent rotational atherectomy of the left main coronary artery, LAD and circumflex with stenting of the left main into the LAD had good results, went home, subsequent noted some swelling of the groin area, with redness and drainage, came to the hospital, did undergo a CT scan abdomen pelvis revealed evidence of small abscess of the left groin, ID consultation was obtained, and today vascular service was called because of presence of abscess, status post vascular intervention for the left femoral artery done a week ago     Patient denies any fever or chills     Patient denies any abdominal pain or back pain     Patient denies any symptoms in the lower extremities        Patient denies any focal lateralizing neurological symptoms like loss of speech, vision or loss of function of extremity     Patient can walk short distance slowly because of discomfort in the groin area, and denies any symptoms of rest pain        6/24/2021  · Patient overall feeling better, denies any history of fever chills, less discomfort in the left groin area, still some drainage, still receiving IV antibiotics      Subjective: No new C/O, overall better    Objective:    BP (!) 119/52   Pulse 84   Temp 98 °F (36.7 °C) (Temporal)   Resp 16   Ht 5' 2\" (1.575 m)   Wt 257 lb (116.6 kg)   SpO2 97%   BMI 47.01 kg/m²     General: alert and oriented.     Neck:  Carotid bruits: Right No  Left No    Respiratory:  No rales or rhonchi . Cardiovascular:  Normal Sinus Rhythm. No murmur. Abdomen:  Soft, no masses palpable. No tenderness. Extremities:  No lower extremity edema. Both the feet are warm to touch. Patient's left groin site looks improved, less erythema, less tenderness, still has some drainage, seropurulent, but mainly serosanguineous, ecchymosis resolving, no evidence of any pulsating mass      Pulses Right  Left    Radial       Brachial 3 3  3=normal   Femoral 2 2  2=diminished   Popliteal    1=barely palpable   Dorsalis pedis 2 2  0=absent   Posterior tibial    4=aneurysmal         Neuro: The speech is intact. Moving all extremities. No evidence of any acute focal lateralizing neurological deficit. Other pertinent information:       .  1. The cardiac catheterization report was reviewed, Impella protected coronary artery atherectomy angioplasty and stent placement     2. The history physical, ID consultation notes were reviewed     3. The CTA of abdomen pelvis was personally reviewed by me, at the site of the femoral arteries remain, no abdominal findings are noted, patient has a subcutaneous hematoma, of the medial aspect of the left groin, with a small abscess with small air pocket communicating with the skin surface, clinically correlates with area of abscess and drainage that I noticed on physical examination    4. The wound cultures done on 24 June are pending     Assessment:        1. Small infected hematoma with purulent material, clinically improving today           2. No evidence of any vascular pathology, at the site of the femoral arteries remains, no abnormal findings noted     3. Mild right carotid stenosis of 30 to 40%        4.   Aortic valve disease, coronary artery disease status post Impella protected atherectomy of the left main coronary artery with angioplasty and stenting            Patient Active Problem List   Diagnosis    Type 2 diabetes

## 2021-06-25 ENCOUNTER — TELEPHONE (OUTPATIENT)
Dept: FAMILY MEDICINE CLINIC | Age: 81
End: 2021-06-25

## 2021-06-25 ENCOUNTER — TELEPHONE (OUTPATIENT)
Dept: VASCULAR SURGERY | Age: 81
End: 2021-06-25

## 2021-06-25 VITALS
TEMPERATURE: 97.8 F | HEIGHT: 62 IN | WEIGHT: 257 LBS | SYSTOLIC BLOOD PRESSURE: 102 MMHG | HEART RATE: 87 BPM | BODY MASS INDEX: 47.29 KG/M2 | DIASTOLIC BLOOD PRESSURE: 56 MMHG | RESPIRATION RATE: 20 BRPM | OXYGEN SATURATION: 91 %

## 2021-06-25 LAB
GRAM STAIN RESULT: ABNORMAL
METER GLUCOSE: 148 MG/DL (ref 74–99)
METER GLUCOSE: 239 MG/DL (ref 74–99)
ORGANISM: ABNORMAL
WOUND/ABSCESS: ABNORMAL

## 2021-06-25 PROCEDURE — 6360000002 HC RX W HCPCS: Performed by: INTERNAL MEDICINE

## 2021-06-25 PROCEDURE — 6370000000 HC RX 637 (ALT 250 FOR IP): Performed by: NURSE PRACTITIONER

## 2021-06-25 PROCEDURE — 82962 GLUCOSE BLOOD TEST: CPT

## 2021-06-25 PROCEDURE — 2580000003 HC RX 258: Performed by: NURSE PRACTITIONER

## 2021-06-25 PROCEDURE — 2580000003 HC RX 258: Performed by: INTERNAL MEDICINE

## 2021-06-25 PROCEDURE — 6360000002 HC RX W HCPCS: Performed by: NURSE PRACTITIONER

## 2021-06-25 RX ORDER — CIPROFLOXACIN 500 MG/1
500 TABLET, FILM COATED ORAL EVERY 12 HOURS SCHEDULED
Qty: 20 TABLET | Refills: 0 | Status: SHIPPED | OUTPATIENT
Start: 2021-06-25 | End: 2021-07-05

## 2021-06-25 RX ORDER — DOXYCYCLINE HYCLATE 100 MG/1
100 CAPSULE ORAL EVERY 12 HOURS SCHEDULED
Qty: 20 CAPSULE | Refills: 0 | Status: SHIPPED | OUTPATIENT
Start: 2021-06-25 | End: 2021-07-05

## 2021-06-25 RX ORDER — DOXYCYCLINE HYCLATE 100 MG/1
100 CAPSULE ORAL EVERY 12 HOURS SCHEDULED
Qty: 20 CAPSULE | Refills: 0 | Status: SHIPPED | OUTPATIENT
Start: 2021-06-25 | End: 2021-06-25

## 2021-06-25 RX ORDER — DOXYCYCLINE HYCLATE 100 MG/1
100 CAPSULE ORAL EVERY 12 HOURS SCHEDULED
Status: DISCONTINUED | OUTPATIENT
Start: 2021-06-25 | End: 2021-06-25 | Stop reason: HOSPADM

## 2021-06-25 RX ORDER — CIPROFLOXACIN 500 MG/1
500 TABLET, FILM COATED ORAL EVERY 12 HOURS SCHEDULED
Status: DISCONTINUED | OUTPATIENT
Start: 2021-06-25 | End: 2021-06-25 | Stop reason: HOSPADM

## 2021-06-25 RX ADMIN — AMLODIPINE BESYLATE 5 MG: 5 TABLET ORAL at 08:36

## 2021-06-25 RX ADMIN — INSULIN LISPRO 2 UNITS: 100 INJECTION, SOLUTION INTRAVENOUS; SUBCUTANEOUS at 11:42

## 2021-06-25 RX ADMIN — ONDANSETRON 4 MG: 2 INJECTION INTRAMUSCULAR; INTRAVENOUS at 08:36

## 2021-06-25 RX ADMIN — Medication 1000 UNITS: at 08:36

## 2021-06-25 RX ADMIN — MAGNESIUM 64 MG (MAGNESIUM CHLORIDE) TABLET,DELAYED RELEASE 64 MG: at 08:37

## 2021-06-25 RX ADMIN — ASPIRIN 81 MG CHEWABLE TABLET 81 MG: 81 TABLET CHEWABLE at 08:36

## 2021-06-25 RX ADMIN — CEFEPIME HYDROCHLORIDE 2000 MG: 2 INJECTION, POWDER, FOR SOLUTION INTRAVENOUS at 11:42

## 2021-06-25 RX ADMIN — CARVEDILOL 6.25 MG: 6.25 TABLET, FILM COATED ORAL at 08:36

## 2021-06-25 RX ADMIN — Medication 1000 MCG: at 08:37

## 2021-06-25 RX ADMIN — SODIUM CHLORIDE: 9 INJECTION, SOLUTION INTRAVENOUS at 04:08

## 2021-06-25 RX ADMIN — CLOPIDOGREL 75 MG: 75 TABLET, FILM COATED ORAL at 08:36

## 2021-06-25 RX ADMIN — SODIUM CHLORIDE, PRESERVATIVE FREE 10 ML: 5 INJECTION INTRAVENOUS at 08:36

## 2021-06-25 RX ADMIN — CEFEPIME HYDROCHLORIDE 2000 MG: 2 INJECTION, POWDER, FOR SOLUTION INTRAVENOUS at 00:34

## 2021-06-25 ASSESSMENT — PAIN SCALES - GENERAL
PAINLEVEL_OUTOF10: 0
PAINLEVEL_OUTOF10: 0

## 2021-06-25 NOTE — PLAN OF CARE
Problem: Falls - Risk of:    Goal: Will remain free from falls  Description: Will remain free from falls    Outcome: Met This Shift

## 2021-06-25 NOTE — PROGRESS NOTES
Dr. Mallory Allison office notified of difficulty getting through to Wound care clinic, they will follow through for appointment

## 2021-06-25 NOTE — PROGRESS NOTES
Wound care clinic called twice at [phone], complete information left  On answering machine with all patient information for appointment with Dr. Kaykay eBnites for appointment Monday or Tuesday morning for L groin abcess

## 2021-06-25 NOTE — PROGRESS NOTES
Department of Internal Medicine  Infectious Diseases  Progress  Note      C/C   Left groin abscess     Denies fever   Reports pain in the left groin   Afebrile       Current Facility-Administered Medications   Medication Dose Route Frequency Provider Last Rate Last Admin    sodium chloride flush 0.9 % injection 5-40 mL  5-40 mL Intravenous 2 times per day April LIAM Cano - CNP   10 mL at 06/25/21 0836    sodium chloride flush 0.9 % injection 5-40 mL  5-40 mL Intravenous PRN April LIAM Cano - CNP   10 mL at 06/24/21 0003    0.9 % sodium chloride infusion  25 mL Intravenous PRN April LIAM Cano - [place]        ondansetron (ZOFRAN-ODT) disintegrating tablet 4 mg  4 mg Oral Q8H PRN April LIAM Cano CNP        Or    ondansetron [facility]) injection 4 mg  4 mg Intravenous Q6H PRN April LIAM Cano CNP   4 mg at 06/25/21 0270    polyethylene glycol (GLYCOLAX) packet 17 g  17 g Oral Daily PRN April LIAM Cano - [place]        acetaminophen (TYLENOL) tablet 650 mg  650 mg Oral Q6H PRN April LIAM Cano CNP        Or    acetaminophen (TYLENOL) suppository 650 mg  650 mg Rectal Q6H PRN April LIAM Cano - CNP        glucose (GLUTOSE) 40 % oral gel 15 g  15 g Oral PRN April LIAM Cano CNP        dextrose 50 % IV solution  12.5 g Intravenous PRN April LIAM Cano CNP        glucagon (rDNA) injection 1 mg  1 mg Intramuscular PRN April LIAM Cano CNP        dextrose 5 % solution  100 mL/hr Intravenous PRN April LIAM Cano CNP        insulin lispro (HUMALOG) injection vial 0-6 Units  0-6 Units Subcutaneous TID [facility] April LIAM Cano - CNP   2 Units at 06/25/21 1142    insulin lispro (HUMALOG) injection vial 0-3 Units  0-3 Units Subcutaneous Nightly April LIAM Cano CNP   1 Units at 06/24/21 2011    amLODIPine (NORVASC) tablet 5 mg  5 mg Oral Daily April FrancescaLIAM Turner CNP   5 mg at 06/25/21 0836    aspirin chewable tablet 81 mg  81 mg Oral Daily April KyraLIAM Trejo CNP   81 mg at 06/25/21 0836    carvedilol (COREG) tablet 6.25 mg  6.25 mg Oral BID WC April CatoosaFaustoLIAM Trejo - CNP   6.25 mg at 06/25/21 0836    clopidogrel (PLAVIX) tablet 75 mg  75 mg Oral Daily April FrancescaLIAM Suh CNP   75 mg at 06/25/21 0836    magnesium chloride (MAG DELAY) extended release tablet 64 mg  1 tablet Oral Daily April [facility]   64 mg at 06/25/21 5106    nitroGLYCERIN (NITROSTAT) SL tablet 0.4 mg  0.4 mg Sublingual Q5 Min PRN April LIAM Cano CNP        rosuvastatin (CRESTOR) tablet 20 mg  20 mg Oral Nightly April CatoosaLIAM Suh CNP   20 mg at 06/24/21 2012    vitamin B-12 (CYANOCOBALAMIN) tablet 1,000 mcg  1,000 mcg Oral Daily April Francesca-LIAM Trejo CNP   1,000 mcg at 06/25/21 1097    vitamin D (CHOLECALCIFEROL) tablet 1,000 Units  1,000 Units Oral Daily April FrancescaLIAM Trejo CNP   1,000 Units at 06/25/21 8891    perflutren lipid microspheres (DEFINITY) injection 1.65 mg  1.5 mL Intravenous ONCE PRN Alma Her MD        vancomycin 1000 mg IVPB in 250 mL D5W addavial  1,000 mg Intravenous Q24H Myrna Saenz MD   Stopped at 06/25/21 0033    cefepime (MAXIPIME) 2000 mg IVPB extended (mini-bag)  2,000 mg Intravenous Q12H Myrna Saenz MD   Stopped at 06/25/21 1433    Cefepime Flush (0.9 % sodium chloride infusion)   Intravenous Q12H Myrna Saenz MD   Stopped at 06/25/21 0700         REVIEW OF SYSTEMS:    CONSTITUTIONAL:  Denies fever, chill or rigors  HEENT: denies blurring of vision or double vision, denies hearing problem  RESPIRATORY: denies cough, shortness of breath, sputum expectoration, chest pain. CARDIOVASCULAR:  Denies palpitation  GASTROINTESTINAL:  Denies abdomen pain, diarrhea or constipation.   GENITOURINARY:  Denies burning urination or frequency of urination  INTEGUMENT: left groin bruise   HEMATOLOGIC/LYMPHATIC:  Denies lymph node swelling, gum bleeding or easy bruising. MUSCULOSKELETAL:  Left groin pain, swelling drainage   NEUROLOGICAL:  Denies light headed, dizziness  PHYSICAL EXAM:      Vitals:     /89   Pulse 88   Temp 97.8 °F (36.6 °C) (Temporal)   Resp 21   Ht 5' 2\" (1.575 m)   Wt 257 lb (116.6 kg)   SpO2 92%   BMI 47.01 kg/m²     General Appearance:    Awake, alert , no acute distress. Head:    Normocephalic, atraumatic   Eyes:    No pallor, no icterus,   Ears:    No obvious deformity or drainage.    Nose:   No nasal drainage   Throat:   Mucosa moist, no oral thrush   Neck:   Supple, no lymphadenopathy   Back:     no CVA tenderness   Lungs:     Clear to auscultation bilaterally, no wheeze    Heart:    Regular rate and rhythm, no murmur,p   Abdomen:     Soft, non-tender, bowel sounds present    Extremities:   No edema,  Left groin ecchymosis -  Mod drainage    Pulses:   Dorsalis pedis palpable    Skin:   Ecchymosis      CBC with Differential:      Lab Results   Component Value Date    WBC 7.8 06/22/2021    RBC 2.22 06/22/2021    HGB 7.1 06/22/2021    HCT 22.7 06/22/2021     06/22/2021    .3 06/22/2021    MCH 32.0 06/22/2021    MCHC 31.3 06/22/2021    RDW 15.3 06/22/2021    SEGSPCT 70.7 06/08/2021    LYMPHOPCT 17.5 06/22/2021    MONOPCT 10.2 06/22/2021    BASOPCT 0.3 06/22/2021    MONOSABS 0.79 06/22/2021    LYMPHSABS 1.36 06/22/2021    EOSABS 0.33 06/22/2021    BASOSABS 0.02 06/22/2021       CMP     Lab Results   Component Value Date     06/22/2021    K 4.3 06/22/2021     06/22/2021    CO2 22 06/22/2021    BUN 20 06/22/2021    CREATININE 1.4 06/22/2021    GFRAA 44 06/22/2021    AGRATIO 1.0 01/06/2021    LABGLOM 36 06/22/2021    GLUCOSE 105 06/22/2021    PROT 6.9 06/21/2021    LABALBU 3.4 06/21/2021    CALCIUM 8.7 06/22/2021    BILITOT 1.2 06/21/2021    ALKPHOS 68 06/21/2021    AST 15 06/21/2021    ALT 12 and cefepime   2.  Oral cipro and doxycycline 100 mg po q 12 hrs for 10 days

## 2021-06-25 NOTE — DISCHARGE SUMMARY
Physician Discharge Summary     Patient ID:  Benito Kraus  [id number]  61 y.o.  1940    Admit date: 6/21/2021    Discharge date and time: 6/25/2021    Admission Diagnoses:   Patient Active Problem List   Diagnosis    Type 2 diabetes mellitus with diabetic neuropathy, without long-term current use of insulin ([facility]    Essential hypertension    Hyperlipidemia    Goiter    Osteoporosis    Stage 3b chronic kidney disease ([facility]    Aortic valve stenosis, unspecified etiology    Type 2 diabetes mellitus, without long-term current use of insulin ([facility]    Obesity (BMI 30-39. 9)    Dizziness    Nonrheumatic mitral valve regurgitation    Nonrheumatic tricuspid valve regurgitation    Left main coronary artery disease    Acute postoperative pain of left groin    Status post insertion of drug eluting coronary artery stent    Cellulitis of left groin    CKD (chronic kidney disease)    Anemia    Leukocytosis    CAD (coronary artery disease)    Hx of heart artery stent    Carotid stenosis, right    PVD (peripheral vascular disease) ([facility])    Abscess of groin, left       Discharge Diagnoses: Cellulitis    Consults: ID and vascular surgery    Procedures: None    Hospital Course: The patient is a 68 y.o. female of Cory Evans MD who presents with cellulitis to the left groin. Patient was recently discharged on 6/17 after having a left heart cath with PCI intervention and presented to the ER with cellulitis of the left groin. During that admission patient was also evaluated for TAVR for severe aortic stenosis. Left groin wound puncture site from left heart cath has improved. Patient was started on vancomycin per infectious disease and switched to p.o. antibiotics in preparation for discharge. Patient had no fevers during this admission. Patient was discharged on doxycycline 100 mg twice daily and instructed to follow-up with all consultants with an 14 days.     No results for input(s): WBC, HGB, HCT, PLT in the last 72 hours. No results for input(s): NA, K, CL, CO2, BUN, CREATININE, CALCIUM in the last 72 hours. Invalid input(s): GLU    CT ABDOMEN PELVIS W IV CONTRAST Additional Contrast? None    Result Date: 6/21/2021  EXAMINATION: CT OF THE ABDOMEN AND PELVIS WITH CONTRAST 6/21/2021 9:13 pm TECHNIQUE: CT of the abdomen and pelvis was performed with the administration of intravenous contrast. Multiplanar reformatted images are provided for review. Dose modulation, iterative reconstruction, and/or weight based adjustment of the mA/kV was utilized to reduce the radiation dose to as low as reasonably achievable. COMPARISON: None. HISTORY: ORDERING SYSTEM PROVIDED HISTORY: r/o L groin infection s/p cath TECHNOLOGIST PROVIDED HISTORY: Reason for exam:->r/o L groin infection s/p cath Additional Contrast?->None Decision Support Exception - unselect if not a suspected or confirmed emergency medical condition->Emergency Medical Condition (MA) What reading provider will be dictating this exam?->CRC FINDINGS: The lung bases are normal.  Small hiatal hernia  is present. The liver is normal.  Gallbladder is absent. Spleen, pancreas, the adrenals and the kidneys are normal.  There is severe calcification aorta. Compression fractures of L3 and L4 with nearly 40% loss of height is noted. Fat containing ventral hernia is identified in the lower anterior pelvic wall. Pelvis. Bladder is contracted with wall thickening. There is diverticulosis of the colon without diverticulitis. There is constipation. Inflammatory changes are identified in the left groin/inguinal region with the strandy inflammatory changes concerning for cellulitis. Tiny air bubbles and microabscesses measuring up to 1.3 cm are identified in the left groin. Inflammatory lymph nodes measuring up to 9 mm are identified. Hernia     Inflammatory changes/cellulitis in the left groin with  tiny microabscesses in the inguinal region.   There is some inflammatory lymph nodes. There is no drainable abscess. Diverticulosis of colon with constipation. Fat containing lower anterior pelvic wall hernia. Discharge Exam:    HEENT: NCAT,  PERRLA, No JVD  Heart:  RRR, no murmurs, gallops, or rubs. Lungs:  CTA bilaterally, no wheeze, rales or rhonchi  Abd: bowel sounds present, nontender, nondistended, no masses  Extrem:  No clubbing, cyanosis, or edema    Disposition: home     Patient Condition at Discharge: Stable    Patient Instructions:      Medication List      ASK your doctor about these medications    amLODIPine 5 MG tablet  Commonly known as: NORVASC  Take 1 tablet by mouth daily     aspirin 81 MG tablet     carvedilol 6.25 MG tablet  Commonly known as: COREG  Take 1 tablet by mouth 2 times daily (with meals)     Cinnamon 500 MG Caps     clopidogrel 75 MG tablet  Commonly known as: PLAVIX  Take 1 tablet by mouth daily     doxycycline hyclate 100 MG tablet  Commonly known as: VIBRA-TABS  Take 1 tablet by mouth 2 times daily for 10 days     glipiZIDE 10 MG extended release tablet  Commonly known as: GLUCOTROL XL  TAKE ONE TABLET BY MOUTH TWO TIMES A DAY     Januvia 50 MG tablet  Generic drug: SITagliptin  Take 1 tablet by mouth daily     nitroGLYCERIN 0.4 MG SL tablet  Commonly known as: NITROSTAT  DISSOLVE ONE TABLET UNDER TONGUE AS NEEDED FOR CHEST PAINS. MAY REPEAT IN 5 MINUTES. IF SYMPTOMS PERSISTS CALL 911     Nu-Mag 71.5-119 MG Tbec tablet  Generic drug: magnesium cl-calcium carbonate  TAKE TWO TABLETS BY MOUTH DAILY     pioglitazone 30 MG tablet  Commonly known as: ACTOS  Take 1 tablet by mouth daily     rosuvastatin 20 MG tablet  Commonly known as: CRESTOR  Take 1 tablet by mouth nightly     vitamin B-12 100 MCG tablet  Commonly known as: CYANOCOBALAMIN     vitamin D 1000 UNIT Tabs tablet  Commonly known as: CHOLECALCIFEROL          Activity: activity as tolerated  Diet: cardiac diet and renal diet    Pt has been advised to:     Follow-up with Daniel Hawkins MD Chidi in 1 week.   Follow-up with consultants as recommended by them    Note that over 30 minutes was spent in preparing discharge papers, discussing discharge with patient, medication review, etc.    Signed:  vAelino Vance MD  6/25/2021  10:26 AM

## 2021-06-25 NOTE — TELEPHONE ENCOUNTER
Left message on wound care center's voicemail to schedule this pt with Dr. Joel Oseguera either 6/28 or 6/29

## 2021-06-26 LAB — BLOOD CULTURE, ROUTINE: NORMAL

## 2021-06-27 LAB — CULTURE, BLOOD 2: NORMAL

## 2021-06-28 ENCOUNTER — CARE COORDINATION (OUTPATIENT)
Dept: CASE MANAGEMENT | Age: 81
End: 2021-06-28

## 2021-06-28 DIAGNOSIS — L03.314 CELLULITIS OF LEFT GROIN: Primary | ICD-10-CM

## 2021-06-28 NOTE — CARE COORDINATION
to patient's niece recommendation to have contact with PCP 1-2 weeks post hospital discharge. Patient's niece states appointment will be made with PCP after patient seen by vascular surgery. Scheduled appointment with Specialist-patient's niece states she was told by vascular surgery provider(Darren)that if they do not hear from office by 6/29/21 for scheduling of appointment they are to call office. Patient's niece states she has phone number for office. Obtained and reviewed discharge summary and/or continuity of care documents  Establishment or re-establishment of referrals-[facility]    Care Transitions 24 Hour Call    Schedule Follow Up Appointment with PCP: Declined  Do you have a copy of your discharge instructions?: Yes  Do you have all of your prescriptions and are they filled?: Yes  Have you been contacted by a [id number] Pharmacist?: No  Have you scheduled your follow up appointment?: No  Were you discharged with any Home Care or Post Acute Services: No  Do you feel like you have everything you need to keep you well at home?: Yes  Care Transitions Interventions     Other Services:  (Comment: 6/28/21-patient agreeable to care coordination, will route to [facility])        -Able to reach patient on her mobile number  for initial BPCI care transition call post hospital discharge. Patient states she will be staying at her sister in [address] [place] [address] for a period of time so best to call her mobile number. Patient known to this CTN from previous outreach.  -Patient then handed phone over to her niece Joey Cabrera to speak with CTN. Dominique Oh reports much improvement with her aunt. Joey Cabrera states patient \"is a little tired, a little winded with activity but is doing a lot better. \"  Left groin area much improved per Wendie-scant amount of drainage covered with small dressing, less bruised, pinpoint area of hardness(prior to admission was size of dime.)  -Joey Cabrera states a family member will provide transportation to any appointments.    -CTN requested Carlos Boxer use speaker phone so CTN can discuss BPCI program and care coordination service with patient. Patient is agreeable to both. -CTN will hand off to the Care Transition Central team to follow. -CTN will route to [address] [place] [address] Dr Vanegas,[facility] patient interest in enrolling in care coordination.           Follow Up  Future Appointments   Date Time Provider Department Center   7/13/2021  9:00 AM [address] CARDIO [facility]       Merlin Conine, RN

## 2021-06-28 NOTE — CARE COORDINATION
-CTN phoned patient back to inform that she will be followed by [facility] central care team only and not care coordination service. Patient verbalized understanding and is agreeable.

## 2021-06-30 ENCOUNTER — TELEPHONE (OUTPATIENT)
Dept: VASCULAR SURGERY | Age: 81
End: 2021-06-30

## 2021-06-30 DIAGNOSIS — L02.214 ABSCESS OF GROIN, LEFT: Primary | ICD-10-CM

## 2021-06-30 NOTE — TELEPHONE ENCOUNTER
Left message on [facility]'s voicemail to call patient to schedule appt with Dr. Marica Galeazzi.

## 2021-06-30 NOTE — PROGRESS NOTES
Physician Progress Note      PATIENTJhonathan CONCEPCION #:                  [id number]  :                       1940  ADMIT DATE:       2021 7:33 PM  [address] [place] DATE:        2021 4:04 PM  RESPONDING  PROVIDER #:        JAZMÍN Merritt MD          QUERY TEXT:    Dear Dr. Alexx Kelly,    [address] admitted with (L) groin abscess and cellulitis. Pt noted to be s/p cardiac   cath 1 week ago. If possible, please document in progress notes and discharge   summary:    The medical record reflects the following:  Risk Factors: One week post op  Clinical Indicators: Pt is s/p cardiac cath 1 week ago, Pt reports that   drainage and redness are at and around the site of the cath, Vascular   describes wound as \"Small infected hematoma with purulent material\"  Treatment: ID consult, Vanc, Cefepime    Thank you,  David BAÑUELOS, RN  Clinical Documentation Improvement  [email or web address]  Office #: [phone]  Cell #: [phone]  Options provided:  -- Abscess and cellulitis  of L(L) groin as a postoperative complication  -- Abscess and cellulitis  of L(L) groin as an expected/inherent condition   that occurred postoperatively and not a complication  -- Abscess and cellulitis  of L(L) groin related to other incidental risk   factor ##(Please document incidental risk factor) occurring following surgery   and not a complication, (Please document incidental risk factor). -- Other - I will add my own diagnosis  -- Disagree - Not applicable / Not valid  -- Disagree - Clinically unable to determine / Unknown  -- Refer to Clinical Documentation Reviewer    PROVIDER RESPONSE TEXT:    Patient has Abscess and cellulitis of L(L) groin as a postoperative   complication.     Query created by: Thai Payan on 2021 11:18 AM      Electronically signed by:  JAZMÍN Merritt MD 2021 6:23 AM

## 2021-07-01 ENCOUNTER — TELEPHONE (OUTPATIENT)
Dept: ADMINISTRATIVE | Age: 81
End: 2021-07-01

## 2021-07-01 NOTE — TELEPHONE ENCOUNTER
Norah Madrigal was d/c from [facility] 6/25/21 after admission for cellulitis of left groin d/t infection. Dr's schedule is full. Please call pt to schedule as appropriate.

## 2021-07-02 ENCOUNTER — CARE COORDINATION (OUTPATIENT)
Dept: CASE MANAGEMENT | Age: 81
End: 2021-07-02

## 2021-07-02 NOTE — CARE COORDINATION
Vasyl [facility] Transitions Follow Up Call    2021    Patient: Romain Becker  Patient : 1940   MRN: [id number]  Reason for Admission:   Discharge Date: 21 RARS: Readmission Risk Score: 14         Spoke with: Patient, AMEE FISCHER [facility] Transitions Subsequent and Final Call    Subsequent and Final Calls  Do you have any ongoing symptoms?: Yes  Patient-reported symptoms: Shortness of Breath  -Patient reports shortness of breath when laying in bed, reports this symptom is not new, and reports she sleeps in a recliner. Do you have any questions related to your medications?: No  Do you currently have any active services?: No  Do you have any needs or concerns that I can assist you with?: No  Identified Barriers: None  Care Transitions Interventions  No Identified Needs    Patient is pleasant in conversation and reports she is staying with her sister-in-law. -denies fever, chills, or chest discomfort   -Patient reports her niece performs daily dressing changes to left groin; reports scant amount of \"clear\" drainage   -reports good appetite   -normal bladder/bowel elimination   -utilizes a walker with ambulation; denies recent fall   -taking prescribed medications as ordered   -Patient reports she is waiting for Dr. Beverley Alejandro office to call back with appointment     Emotional support provided; discussed will continue to follow.       Follow Up  Future Appointments   Date Time Provider Dioni Darling   2021  9:00 AM [facility] [place] [place] CARDIO [facility]   2021 10:15 AM Eugenio Noe  [address]   2021  9:30 AM Lincoln Mullins MondayMD A[facility] INF       Eladia Cates RN

## 2021-07-06 NOTE — TELEPHONE ENCOUNTER
See if you can move a regular follow-up out a couple weeks and put this patient in their slot. Do not know what else to do.

## 2021-07-07 ENCOUNTER — TELEPHONE (OUTPATIENT)
Dept: WOUND CARE | Age: 81
End: 2021-07-07

## 2021-07-07 NOTE — TELEPHONE ENCOUNTER
Spoke with kelin Sanderson. Patient is residing with her in [facility] [place] at this time, and will call if they need to schedule.

## 2021-07-08 ENCOUNTER — CARE COORDINATION (OUTPATIENT)
Dept: CASE MANAGEMENT | Age: 81
End: 2021-07-08

## 2021-07-08 NOTE — CARE COORDINATION
Vasyl [facility] Transitions Follow Up Call    2021    Patient: Romain Becker  Patient : 1940   MRN: <[id number]>  Reason for Admission: cellulitis left groin  Discharge Date: 21 RARS: Readmission Risk Score: 15         Spoke with: Called to speak with patient for update with transition of care. Left HIPPA compliant voice message with contact information [phone] for a call  Back with an update. Care Transitions Subsequent and Final Call    Subsequent and Final Calls  Care Transitions Interventions  Other Interventions:            Follow Up  Future Appointments   Date Time Provider Department Center   2021  9:00 AM [facility] STRUCTURAL HEART SCHEDULE [place] CARD[facility]   2021 10:15 AM Samira Martines  [address]   2021  9:30 AM Ildefonso Miller MD [facility]   7/15/2021  4:15 PM Angela Sosa  [address] [place] [address]       Lucille Pepper LPN

## 2021-07-13 ENCOUNTER — TELEPHONE (OUTPATIENT)
Dept: CARDIOLOGY | Age: 81
End: 2021-07-13

## 2021-07-13 ENCOUNTER — HOSPITAL ENCOUNTER (OUTPATIENT)
Age: 81
Discharge: HOME | End: 2021-07-13
Payer: MEDICARE

## 2021-07-13 ENCOUNTER — TELEPHONE (OUTPATIENT)
Dept: VASCULAR SURGERY | Age: 81
End: 2021-07-13

## 2021-07-13 ENCOUNTER — OFFICE VISIT (OUTPATIENT)
Dept: CARDIOLOGY CLINIC | Age: 81
End: 2021-07-13
Payer: MEDICARE

## 2021-07-13 ENCOUNTER — HOSPITAL ENCOUNTER (OUTPATIENT)
Dept: WOUND CARE | Age: 81
Discharge: HOME | End: 2021-07-13
Payer: MEDICARE

## 2021-07-13 VITALS
RESPIRATION RATE: 20 BRPM | DIASTOLIC BLOOD PRESSURE: 74 MMHG | BODY MASS INDEX: 31.1 KG/M2 | HEIGHT: 62 IN | SYSTOLIC BLOOD PRESSURE: 136 MMHG | WEIGHT: 169 LBS | HEART RATE: 84 BPM | TEMPERATURE: 97.8 F

## 2021-07-13 VITALS
TEMPERATURE: 97.6 F | SYSTOLIC BLOOD PRESSURE: 116 MMHG | WEIGHT: 170 LBS | DIASTOLIC BLOOD PRESSURE: 52 MMHG | BODY MASS INDEX: 31.28 KG/M2 | HEART RATE: 92 BPM | HEIGHT: 62 IN

## 2021-07-13 DIAGNOSIS — I10 ESSENTIAL HYPERTENSION: ICD-10-CM

## 2021-07-13 DIAGNOSIS — R06.02 SHORTNESS OF BREATH: ICD-10-CM

## 2021-07-13 DIAGNOSIS — I70.209 FEMORAL-POPLITEAL ATHEROSCLEROSIS (HCC): ICD-10-CM

## 2021-07-13 DIAGNOSIS — R06.02 SHORTNESS OF BREATH: Primary | ICD-10-CM

## 2021-07-13 DIAGNOSIS — N18.32 STAGE 3B CHRONIC KIDNEY DISEASE (HCC): ICD-10-CM

## 2021-07-13 PROBLEM — R10.32 ACUTE POSTOPERATIVE PAIN OF LEFT GROIN: Status: RESOLVED | Noted: 2021-06-21 | Resolved: 2021-07-13

## 2021-07-13 PROBLEM — D72.829 LEUKOCYTOSIS: Status: RESOLVED | Noted: 2021-06-21 | Resolved: 2021-07-13

## 2021-07-13 PROBLEM — G89.18 ACUTE POSTOPERATIVE PAIN OF LEFT GROIN: Status: RESOLVED | Noted: 2021-06-21 | Resolved: 2021-07-13

## 2021-07-13 LAB
ANION GAP SERPL CALCULATED.3IONS-SCNC: 10 MMOL/L (ref 7–16)
BUN BLDV-MCNC: 21 MG/DL (ref 6–23)
CALCIUM SERPL-MCNC: 8.8 MG/DL (ref 8.6–10.2)
CHLORIDE BLD-SCNC: 105 MMOL/L (ref 98–107)
CO2: 22 MMOL/L (ref 22–29)
CREAT SERPL-MCNC: 1.3 MG/DL (ref 0.5–1)
GFR AFRICAN AMERICAN: 48
GFR NON-AFRICAN AMERICAN: 40 ML/MIN/1.73
GLUCOSE BLD-MCNC: 153 MG/DL (ref 74–99)
HCT VFR BLD CALC: 26.6 % (ref 34–48)
HEMOGLOBIN: 7.9 G/DL (ref 11.5–15.5)
MCH RBC QN AUTO: 30.4 PG (ref 26–35)
MCHC RBC AUTO-ENTMCNC: 29.7 % (ref 32–34.5)
MCV RBC AUTO: 102.3 FL (ref 80–99.9)
PDW BLD-RTO: 15.1 FL (ref 11.5–15)
PLATELET # BLD: 239 E9/L (ref 130–450)
PMV BLD AUTO: 10.8 FL (ref 7–12)
POTASSIUM SERPL-SCNC: 4.3 MMOL/L (ref 3.5–5)
RBC # BLD: 2.6 E12/L (ref 3.5–5.5)
SODIUM BLD-SCNC: 137 MMOL/L (ref 132–146)
WBC # BLD: 5.4 E9/L (ref 4.5–11.5)

## 2021-07-13 PROCEDURE — 4040F PNEUMOC VAC/ADMIN/RCVD: CPT | Performed by: PHYSICIAN ASSISTANT

## 2021-07-13 PROCEDURE — 99213 OFFICE O/P EST LOW 20 MIN: CPT

## 2021-07-13 PROCEDURE — 1123F ACP DISCUSS/DSCN MKR DOCD: CPT | Performed by: PHYSICIAN ASSISTANT

## 2021-07-13 PROCEDURE — 99215 OFFICE O/P EST HI 40 MIN: CPT | Performed by: SURGERY

## 2021-07-13 PROCEDURE — 80048 BASIC METABOLIC PNL TOTAL CA: CPT

## 2021-07-13 PROCEDURE — G8400 PT W/DXA NO RESULTS DOC: HCPCS | Performed by: PHYSICIAN ASSISTANT

## 2021-07-13 PROCEDURE — G8417 CALC BMI ABV UP PARAM F/U: HCPCS | Performed by: PHYSICIAN ASSISTANT

## 2021-07-13 PROCEDURE — 1036F TOBACCO NON-USER: CPT | Performed by: PHYSICIAN ASSISTANT

## 2021-07-13 PROCEDURE — 1090F PRES/ABSN URINE INCON ASSESS: CPT | Performed by: PHYSICIAN ASSISTANT

## 2021-07-13 PROCEDURE — 99214 OFFICE O/P EST MOD 30 MIN: CPT | Performed by: PHYSICIAN ASSISTANT

## 2021-07-13 PROCEDURE — 1111F DSCHRG MED/CURRENT MED MERGE: CPT | Performed by: PHYSICIAN ASSISTANT

## 2021-07-13 PROCEDURE — 36415 COLL VENOUS BLD VENIPUNCTURE: CPT

## 2021-07-13 PROCEDURE — G8428 CUR MEDS NOT DOCUMENT: HCPCS | Performed by: PHYSICIAN ASSISTANT

## 2021-07-13 PROCEDURE — 85027 COMPLETE CBC AUTOMATED: CPT

## 2021-07-13 RX ORDER — LIDOCAINE 40 MG/G
CREAM TOPICAL ONCE
Status: CANCELLED | OUTPATIENT
Start: 2021-07-13 | End: 2021-07-13

## 2021-07-13 RX ORDER — FUROSEMIDE 20 MG/1
20 TABLET ORAL DAILY
Qty: 30 TABLET | Refills: 1 | Status: SHIPPED | OUTPATIENT
Start: 2021-07-13 | End: 2021-07-22 | Stop reason: SDUPTHER

## 2021-07-13 RX ORDER — BETAMETHASONE DIPROPIONATE 0.05 %
OINTMENT (GRAM) TOPICAL ONCE
Status: CANCELLED | OUTPATIENT
Start: 2021-07-13 | End: 2021-07-13

## 2021-07-13 RX ORDER — PRAVASTATIN SODIUM 40 MG
40 TABLET ORAL EVERY OTHER DAY
COMMUNITY
Start: 2021-06-18 | End: 2021-08-17

## 2021-07-13 RX ORDER — LIDOCAINE HYDROCHLORIDE 20 MG/ML
JELLY TOPICAL ONCE
Status: CANCELLED | OUTPATIENT
Start: 2021-07-13 | End: 2021-07-13

## 2021-07-13 RX ORDER — BACITRACIN ZINC AND POLYMYXIN B SULFATE 500; 1000 [USP'U]/G; [USP'U]/G
OINTMENT TOPICAL ONCE
Status: CANCELLED | OUTPATIENT
Start: 2021-07-13 | End: 2021-07-13

## 2021-07-13 RX ORDER — CLOBETASOL PROPIONATE 0.5 MG/G
OINTMENT TOPICAL ONCE
Status: CANCELLED | OUTPATIENT
Start: 2021-07-13 | End: 2021-07-13

## 2021-07-13 RX ORDER — BACITRACIN, NEOMYCIN, POLYMYXIN B 400; 3.5; 5 [USP'U]/G; MG/G; [USP'U]/G
OINTMENT TOPICAL ONCE
Status: CANCELLED | OUTPATIENT
Start: 2021-07-13 | End: 2021-07-13

## 2021-07-13 RX ORDER — GENTAMICIN SULFATE 1 MG/G
OINTMENT TOPICAL ONCE
Status: CANCELLED | OUTPATIENT
Start: 2021-07-13 | End: 2021-07-13

## 2021-07-13 RX ORDER — LIDOCAINE 50 MG/G
OINTMENT TOPICAL ONCE
Status: CANCELLED | OUTPATIENT
Start: 2021-07-13 | End: 2021-07-13

## 2021-07-13 RX ORDER — LIDOCAINE HYDROCHLORIDE 40 MG/ML
SOLUTION TOPICAL ONCE
Status: CANCELLED | OUTPATIENT
Start: 2021-07-13 | End: 2021-07-13

## 2021-07-13 RX ORDER — GINSENG 100 MG
CAPSULE ORAL ONCE
Status: CANCELLED | OUTPATIENT
Start: 2021-07-13 | End: 2021-07-13

## 2021-07-13 ASSESSMENT — PAIN SCALES - GENERAL: PAINLEVEL_OUTOF10: 0

## 2021-07-13 NOTE — PLAN OF CARE
Problem: Pain:  Goal: Pain level will decrease  Description: Pain level will decrease  Outcome: Completed  Goal: Control of acute pain  Description: Control of acute pain  Outcome: Completed  Goal: Control of chronic pain  Description: Control of chronic pain  Outcome: Completed     Problem: Pain:  Goal: Pain level will decrease  Description: Pain level will decrease  Outcome: Completed  Goal: Control of acute pain  Description: Control of acute pain  Outcome: Completed  Goal: Control of chronic pain  Description: Control of chronic pain  Outcome: Completed     Problem: Falls - Risk of:  Goal: Will remain free from falls  Description: Will remain free from falls  Outcome: Completed  Goal: Absence of physical injury  Description: Absence of physical injury  Outcome: Completed

## 2021-07-13 NOTE — PROGRESS NOTES
3    carvedilol (COREG) 6.25 MG tablet Take 1 tablet by mouth 2 times daily (with meals) 60 tablet 3    amLODIPine (NORVASC) 5 MG tablet Take 1 tablet by mouth daily 30 tablet 3    glipiZIDE (GLUCOTROL XL) 10 MG extended release tablet TAKE ONE TABLET BY MOUTH TWO TIMES A  tablet 1    JANUVIA 50 MG tablet Take 1 tablet by mouth daily 90 tablet 1    Cinnamon 500 MG CAPS Take 1 capsule by mouth daily      aspirin 81 MG tablet Take 81 mg by mouth daily      vitamin D (CHOLECALCIFEROL) 1000 UNIT TABS tablet Take 1,000 Units by mouth daily      vitamin B-12 (CYANOCOBALAMIN) 100 MCG tablet Take 1,000 mcg by mouth daily       pravastatin (PRAVACHOL) 40 MG tablet Take 40 mg by mouth every other day      nitroGLYCERIN (NITROSTAT) 0.4 MG SL tablet DISSOLVE ONE TABLET UNDER TONGUE AS NEEDED FOR CHEST PAINS. MAY REPEAT IN 5 MINUTES. IF SYMPTOMS PERSISTS CALL 911 25 tablet 1     No current facility-administered medications for this encounter.        Past Medical History:   Diagnosis Date    Abscess of groin, left 6/22/2021    Aortic valve disorder     CAD (coronary artery disease)     Cancer (HCC)     glaucoma    Carotid stenosis, right 6/22/2021    Chest pain     Diabetes ([address]     Diverticulosis     Femoral-popliteal atherosclerosis ([address] 7/13/2021    GERD (gastroesophageal reflux disease)     Goiter     Hyperlipidemia     Hypertension     Hypothyroidism     Macular degeneration     Mitral valve disorder     Neuropathy     Osteoporosis     VHD (valvular heart disease)     Vitamin B12 deficiency        Past Surgical History:   Procedure Laterality Date    APPENDECTOMY      BREAST BIOPSY Right     neg    CARDIAC CATHETERIZATION  06/11/2021    Dr Arnel Perez  06/15/2021    Dr Cristiano Silverio - Jose 3.5 x 18 LM to LAD and 4.0 x18 Prox CX    EYE SURGERY Bilateral     cataract    HYSTERECTOMY      KNEE ARTHROPLASTY Left     TOOTH EXTRACTION      TRANSESOPHAGEAL ECHOCARDIOGRAM  06/10/2021    Dr Goldie Dumont       Family History   Problem Relation Age of Onset    Stroke Mother     Heart Disease Father 79        MI    Heart Attack Father     Heart Disease Brother     Coronary Art Dis Brother     Stroke Brother     Stroke Brother        Social History     Socioeconomic History    Marital status:      Spouse name: Not on file    Number of children: Not on file    Years of education: Not on file    Highest education level: Not on file   Occupational History    Not on file   Tobacco Use    Smoking status: Never Smoker    Smokeless tobacco: Never Used   Vaping Use    Vaping Use: Never used   Substance and Sexual Activity    Alcohol use: Not Currently    Drug use: Never    Sexual activity: Not on file   Other Topics Concern    Not on file   Social History Narrative    Not on file     Social Determinants of Health     Financial Resource Strain:     Difficulty of Paying Living Expenses:    Food Insecurity:     Worried About Running Out of Food in the Last Year:     [address] in the Last Year:    Transportation Needs:     Lack of Transportation (Medical):  Lack of Transportation (Non-Medical):    Physical Activity:     Days of Exercise per Week:     Minutes of Exercise per Session:    Stress:     Feeling of Stress :    Social Connections:     Frequency of Communication with Friends and Family:     Frequency of Social Gatherings with Friends and Family:     Attends Mormon Services:     Active Member of Clubs or Organizations:     Attends Club or Organization Meetings:     Marital Status:    Intimate Partner Violence:     Fear of Current or Ex-Partner:     Emotionally Abused:     Physically Abused:     Sexually Abused:        Review of Systems:  Skin:  No abnormal pigmentation or rash. Eyes:  No blurring, diplopia or vision loss. Ears/Nose/Throat:  No hearing loss or vertigo.     Respiratory:  No cough, pleuritic chest pain, dyspnea, or wheezing. Cardiovascular: No angina, palpitations . Coronary artery disease, rotational atherectomy of left main along with angioplasty and stenting of the LAD and circumflex done on 15 Cristina, through the Impella protected cardiac intervention, Impella placed through the left groin with a Perclose device, hypertension, hyperlipidemia    Gastrointestinal:  No nausea or vomiting; no abdominal pain or rectal bleeding. GERD    Musculoskeletal:  No arthritis or weakness. Neurologic:  No paralysis, paresis, seizures or headaches. Hematologic/Lymphatic/Immunologic:  No anemia, abnormal bleeding/bruising. Endocrine:  No heat or cold intolerance. No polyphagia, polydipsia or polyuria. Hypothyroidism      Physical Exam:  /74   Pulse 84   Temp 97.8 °F (36.6 °C) (Temporal)   Resp 20   Ht 5' 2\" (1.575 m)   Wt 169 lb (76.7 kg)   BMI 30.91 kg/m²   General appearance:  Alert, awake, oriented x 3. No distress. Skin:  Warm and dry. Head:  Normocephalic. No masses, lesions or tenderness. Eyes:  Conjunctivae appear normal; PERRL. Ears:  External ears normal.  Nose/Sinuses:  Septum midline, mucosa normal; no drainage. Oropharynx:  Clear, no exudate noted. Neck:  No jugular venous distention, lymphadenopathy or thyromegaly. Bilateral carotid bruit noted, patient known to have right carotid stenosis of 30 to 40% based upon the ultrasound test that was done      Lungs:  Clear to ausculation bilaterally. No rhonchi, crackles, wheezes. Heart:  Regular rate and rhythm. 2 x 6 murmur. .    Abdomen:  Soft, non-tender. No masses, organomegaly. Musculoskeletal: No joint effusions, tenderness swelling or warmth. Neuro: Speech is intact. Moving all extremities. No focal motor or sensory deficits. Extremities:  Both feet are warm to touch.  The color of both feet is normal.    Patient known to an ankle-brachial 0.84 on the right and 0.86 on the left    Patient has induration, with nonspecific lymphadenopathy left groin without erythema, no drainage, and external pressure minimal tenderness noted, significantly better compared to my last evaluation in the hospital      Pulses Right  Left    Brachial 3 3    Radial    3=normal   Femoral 2 2  2=diminished   Popliteal    1=barely palpable   Dorsalis pedis 1-2 1  0=absent   Posterior tibial    4=aneurysmal           Other pertinent information:1. The past medical records were reviewed. 2.    Lab Results   Component Value Date    WBC 7.8 06/22/2021    HGB 7.1 (L) 06/22/2021    HCT 22.7 (L) 06/22/2021    .3 (H) 06/22/2021     06/22/2021      Lab Results   Component Value Date     06/22/2021    K 4.3 06/22/2021     06/22/2021    CO2 22 06/22/2021    BUN 20 06/22/2021    CREATININE 1.4 (H) 06/22/2021    GLUCOSE 105 (H) 06/22/2021    CALCIUM 8.7 06/22/2021    PROT 6.9 06/21/2021    LABALBU 3.4 (L) 06/21/2021    BILITOT 1.2 06/21/2021    ALKPHOS 68 06/21/2021    AST 15 06/21/2021    ALT 12 06/21/2021    LABGLOM 36 06/22/2021    GFRAA 44 06/22/2021    AGRATIO 1.0 (L) 01/06/2021    GLOB 3.5 01/06/2021     Lab Results   Component Value Date    APTT <20.0 (L) 06/21/2021      Lab Results   Component Value Date    INR 1.2 06/21/2021    PROTIME 13.3 (H) 06/21/2021        3. The carotid ultrasound was personally reviewed, 30 to 40% stenosis on the right    4. The ankle-brachial index 0.84 on the left and 0.86 on the right        Assessment:      1. History of hematoma left groin, with infection that was drained with resolution clinically asymptomatic, without drainage, without erythema or tenderness    2. Right carotid stenosis 30 to 40%    3.   Bilateral femoral-popliteal arterial occlusive disease, with ankle-brachial 0.84 on the left and 0.86 on the right        Patient Active Problem List   Diagnosis    Type 2 diabetes mellitus with diabetic neuropathy, without long-term current use of insulin ([facility]    Essential hypertension    Hyperlipidemia    Goiter    Osteoporosis    Stage 3b chronic kidney disease ([facility]    Aortic valve stenosis, unspecified etiology    Type 2 diabetes mellitus, without long-term current use of insulin ([facility])    Obesity (BMI 30-39. 9)    Nonrheumatic mitral valve regurgitation    Nonrheumatic tricuspid valve regurgitation    Left main coronary artery disease    Status post insertion of drug eluting coronary artery stent    Cellulitis of left groin    CKD (chronic kidney disease)    Anemia    CAD (coronary artery disease)    Hx of heart artery stent    Carotid stenosis, right    PVD (peripheral vascular disease) ([facility])    Abscess of groin, left    Femoral-popliteal atherosclerosis ([facility])            Plan:             Discussed in detail with the patient and her cousin that came with the patient, options, risks benefits and alternatives were explained, as clinically patient is improved, patient does not need any wound debridement, instructed however, to call me if any, pain, swelling, erythema, drainage, fever chills etc.    Also recommended a follow-up evaluation see me in the office in 1 to 2 years for evaluation of mild peripheral vascular disease and mild carotid artery disease and to call.   If any symptoms    All their questions were answered        Electronically signed by Comer Goltz, MD on 7/13/2021 at 11:09 AM

## 2021-07-13 NOTE — PROGRESS NOTES
Patient name: Romain Becker    Reason for consult: follow up aortic stenosis     Primary Care Physician: Ansley Fontaine MD    Date of service: 7/13/2021    Chief Complaint: dyspnea, aortic stenosis     HPI: Mrs. Eliezer Stewart presents for follow up of aortic stenosis. She was admitted to [facility] in early June with complaints of lightheadedness. She was found to have severe, low flow, low gradient aortic stenosis, as well as significant CAD including left main disease. 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 Heart team discussion was had, and she was felt best served with PCI followed by eventual TAVR pending her symptom status. She underwent left main rotational atherectomy, IVUS and stenting on 6/15/21. She was readmitted post procedure with complaints of pain, swelling and drainage from the left groin access site. She was found to have an abscess and cellulitis treated with antibiotics and follow up with ID/wound clinic later today and tomorrow. Since most recent hospital discharge, she notes dyspnea on minimal exertion. She says can only walk about 3 feet before becoming short of breath. Her breathing improves at rest, and she is not experiencing orthopnea/PND (chronically sleeps elevated on multiple pillows). She has chronic LE edema, but states it has been progressively worse since hospitalizations. She has never taken diuretics in the past. She denies chest discomfort or syncope. Allergies:    Allergies   Allergen Reactions    Naproxen     Penicillins        Home medications:    Current Outpatient Medications   Medication Sig Dispense Refill    magnesium cl-calcium carbonate (NU-MAG) 71.5-119 MG TBEC tablet TAKE TWO TABLETS BY MOUTH DAILY (Patient taking differently: Take 1 tablet by mouth daily ) 180 tablet 1    pioglitazone (ACTOS) 30 MG tablet Take 1 tablet by mouth daily 90 tablet 1    rosuvastatin (CRESTOR) 20 MG tablet Take 1 tablet by mouth nightly 30 tablet 3    clopidogrel (PLAVIX) 75 MG tablet Take 1 tablet by mouth daily 30 tablet 3    carvedilol (COREG) 6.25 MG tablet Take 1 tablet by mouth 2 times daily (with meals) 60 tablet 3    amLODIPine (NORVASC) 5 MG tablet Take 1 tablet by mouth daily 30 tablet 3    glipiZIDE (GLUCOTROL XL) 10 MG extended release tablet TAKE ONE TABLET BY MOUTH TWO TIMES A  tablet 1    JANUVIA 50 MG tablet Take 1 tablet by mouth daily 90 tablet 1    nitroGLYCERIN (NITROSTAT) 0.4 MG SL tablet DISSOLVE ONE TABLET UNDER TONGUE AS NEEDED FOR CHEST PAINS. MAY REPEAT IN 5 MINUTES. IF SYMPTOMS PERSISTS CALL 911 25 tablet 1    Cinnamon 500 MG CAPS Take 1 capsule by mouth daily      aspirin 81 MG tablet Take 81 mg by mouth daily      vitamin D (CHOLECALCIFEROL) 1000 UNIT TABS tablet Take 1,000 Units by mouth daily      vitamin B-12 (CYANOCOBALAMIN) 100 MCG tablet Take 1,000 mcg by mouth daily       pravastatin (PRAVACHOL) 40 MG tablet Take 40 mg by mouth every other day       No current facility-administered medications for this visit.        Past Medical History:  Past Medical History:   Diagnosis Date    Abscess of groin, left 6/22/2021    Aortic valve disorder     CAD (coronary artery disease)     Cancer (HCC)     glaucoma    Carotid stenosis, right 6/22/2021    Chest pain     Diabetes ([facility]     Diverticulosis     Femoral-popliteal atherosclerosis ([facility] 7/13/2021    GERD (gastroesophageal reflux disease)     Goiter     Hyperlipidemia     Hypertension     Hypothyroidism     Macular degeneration     Mitral valve disorder     Neuropathy     Osteoporosis     VHD (valvular heart disease)     Vitamin B12 deficiency        Past Surgical History:  Past Surgical History:   Procedure Laterality Date    APPENDECTOMY      BREAST BIOPSY Right     neg    CARDIAC CATHETERIZATION  06/11/2021     [facility] 06/15/2021    Dr Sindhu Jean Baptiste 3.5 x 18 LM to LAD and 4.0 x18 Prox CX    EYE SURGERY Bilateral     cataract    HYSTERECTOMY      KNEE ARTHROPLASTY Left     TOOTH EXTRACTION      TRANSESOPHAGEAL ECHOCARDIOGRAM  06/10/2021    Dr Rafia Gonzalez History:  Social History     Socioeconomic History    Marital status:      Spouse name: Not on file    Number of children: Not on file    Years of education: Not on file    Highest education level: Not on file   Occupational History    Not on file   Tobacco Use    Smoking status: Never Smoker    Smokeless tobacco: Never Used   Vaping Use    Vaping Use: Never used   Substance and Sexual Activity    Alcohol use: Not Currently    Drug use: Never    Sexual activity: Not on file   Other Topics Concern    Not on file   Social History Narrative    Not on file     Social Determinants of Health     Financial Resource Strain:     Difficulty of Paying Living Expenses:    Food Insecurity:     Worried About Running Out of Food in the Last Year:     [address] in the Last Year:    Transportation Needs:     Lack of Transportation (Medical):      Lack of Transportation (Non-Medical):    Physical Activity:     Days of Exercise per Week:     Minutes of Exercise per Session:    Stress:     Feeling of Stress :    Social Connections:     Frequency of Communication with Friends and Family:     Frequency of Social Gatherings with Friends and Family:     Attends Sikh Services:     Active Member of Clubs or Organizations:     Attends Club or Organization Meetings:     Marital Status:    Intimate Partner Violence:     Fear of Current or Ex-Partner:     Emotionally Abused:     Physically Abused:     Sexually Abused:        Family History:  Family History   Problem Relation Age of Onset    Stroke Mother     Heart Disease Father 79        MI    Heart Attack Father     Heart Disease Brother     Coronary Art Dis Brother     Stroke Brother    [employer] Stroke Brother        Review of Systems:  Constitutional: Denies fevers, chills, or weight loss. HEENT: Denies visual changes or hearing loss. Heart: As per HPI. Lungs: Denies shortness of breath, cough, or wheezing. Gastrointestinal: Denies nausea, vomiting, constipation, or diarrhea. Genitourinary: dysuria or hematuria. Psychiatric: Patient denies anxiety or depression. Neurologic: Patient denies weakness of the extremities, dizziness, or headaches. All other ROS checked and found to be negative. Objective:  Vitals BP (!) 116/52 (Site: Right Upper Arm, Position: Sitting, Cuff Size: Medium Adult)   Pulse 92   Temp 97.6 °F (36.4 °C) (Oral)   Ht 5' 2\" (1.575 m)   Wt 170 lb (77.1 kg)   BMI 31.09 kg/m²   General Appearance: Pleasant 68y.o. year old female who appears stated age. Communicates well, no acute distress. HEENT: Head is normocephalic, atraumatic. EOMs intact, PERRL. Trachea midline. No JVD  Lungs: Normal respiratory rate and normal effort. She is not in respiratory distress. Breath sounds clear to auscultation. No wheezes. Heart: Normal rate. Regular rhythm. S1 normal and S2 normal. Positive for murmur. Chest: Symmetric chest wall expansion. Extremities: Normal range of motion. Moderate bilateral pitting edema. Neurological: Patient is alert and oriented to person, place and time. Skin: Warm and dry. Abdomen: Abdomen is soft and non-distended. Bowel sounds are normal.   Pulses: Distal pulses are intact. Skin: Warm and dry without lesions. Assessment:   1. Severe, symptomatic low-flow, low-gradient aortic stenosis   2. CAD s/p LM rotational atherectomy, IVUS and stenting (6/15/21)  3. Left groin abscess/cellulitis - antibiotic completed. Scheduled for wound clinic follow up later this morning and ID follow up tomorrow. 4. CKD stage IIIb (baseline Cr 1.5-1.6)  5. HTN - well controlled  6. Carotid atherosclerosis   7. HFpEF - class III currently  8.  Anemia - Hgb ranging 7.1-9.3 since hospital admissions      Plan:   1. TAVR CT scans  2. Will discuss with Heart Team tomorrow  3. Check BMPCBC today; add low dose diuretic therapy if Cr/electrolytes stable  4. Follow up with wound clinic and ID as scheduled  5.  Further recommendations pending      Electronically signed by Goldie Franks PA-C on 7/13/2021 at 11:42 AM

## 2021-07-13 NOTE — TELEPHONE ENCOUNTER
Called and informed patient that she has blockage carotid artery right side 30-40%, not bad,  Would like to see her in 2 years. Scheduled 2 year appointment with patient.

## 2021-07-15 ENCOUNTER — OFFICE VISIT (OUTPATIENT)
Dept: FAMILY MEDICINE CLINIC | Age: 81
End: 2021-07-15
Payer: MEDICARE

## 2021-07-15 VITALS
WEIGHT: 170 LBS | OXYGEN SATURATION: 97 % | HEART RATE: 86 BPM | TEMPERATURE: 98.1 F | DIASTOLIC BLOOD PRESSURE: 48 MMHG | SYSTOLIC BLOOD PRESSURE: 124 MMHG | BODY MASS INDEX: 31.28 KG/M2 | HEIGHT: 62 IN

## 2021-07-15 DIAGNOSIS — R60.9 EDEMA, UNSPECIFIED TYPE: ICD-10-CM

## 2021-07-15 DIAGNOSIS — R06.02 SOB (SHORTNESS OF BREATH): Primary | ICD-10-CM

## 2021-07-15 DIAGNOSIS — I35.0 AORTIC VALVE STENOSIS, UNSPECIFIED ETIOLOGY: ICD-10-CM

## 2021-07-15 DIAGNOSIS — S31.109A WOUND OF GROIN: ICD-10-CM

## 2021-07-15 DIAGNOSIS — I10 ESSENTIAL HYPERTENSION: ICD-10-CM

## 2021-07-15 DIAGNOSIS — I25.10 CORONARY ARTERY DISEASE INVOLVING NATIVE CORONARY ARTERY OF NATIVE HEART WITHOUT ANGINA PECTORIS: ICD-10-CM

## 2021-07-15 DIAGNOSIS — I50.30 HEART FAILURE WITH PRESERVED EJECTION FRACTION, UNSPECIFIED HF CHRONICITY (HCC): ICD-10-CM

## 2021-07-15 DIAGNOSIS — N18.9 CHRONIC KIDNEY DISEASE, UNSPECIFIED CKD STAGE: ICD-10-CM

## 2021-07-15 DIAGNOSIS — R05.9 COUGH: ICD-10-CM

## 2021-07-15 DIAGNOSIS — N18.32 STAGE 3B CHRONIC KIDNEY DISEASE (HCC): ICD-10-CM

## 2021-07-15 PROCEDURE — G8400 PT W/DXA NO RESULTS DOC: HCPCS | Performed by: INTERNAL MEDICINE

## 2021-07-15 PROCEDURE — G8417 CALC BMI ABV UP PARAM F/U: HCPCS | Performed by: INTERNAL MEDICINE

## 2021-07-15 PROCEDURE — 1111F DSCHRG MED/CURRENT MED MERGE: CPT | Performed by: INTERNAL MEDICINE

## 2021-07-15 PROCEDURE — 1090F PRES/ABSN URINE INCON ASSESS: CPT | Performed by: INTERNAL MEDICINE

## 2021-07-15 PROCEDURE — G8427 DOCREV CUR MEDS BY ELIG CLIN: HCPCS | Performed by: INTERNAL MEDICINE

## 2021-07-15 PROCEDURE — 1123F ACP DISCUSS/DSCN MKR DOCD: CPT | Performed by: INTERNAL MEDICINE

## 2021-07-15 PROCEDURE — 99215 OFFICE O/P EST HI 40 MIN: CPT | Performed by: INTERNAL MEDICINE

## 2021-07-15 PROCEDURE — 1036F TOBACCO NON-USER: CPT | Performed by: INTERNAL MEDICINE

## 2021-07-15 PROCEDURE — 4040F PNEUMOC VAC/ADMIN/RCVD: CPT | Performed by: INTERNAL MEDICINE

## 2021-07-15 NOTE — PROGRESS NOTES
[facility] "[employer]"      7/15/21  Capri Covington : 1940 Sex: female  Age: 68 y.o. Chief Complaint   Patient presents with    Follow-Up from Hospital     follow-up from when she was in the hospital getting her wound checked out; now she is going to be getting surgery and having a valve replaced       HPI  Patient presents today accompanied by her nephew post hospital follow-up visit. She was hospitalized last month after she came here to the office complaining of left groin pain which was a post cath site. We felt it to be infected and send her back over to cardiology to look at where she was then subsequently admitted to the hospital and started on IV antibiotics. Infectious disease and vascular were consulted. She improved on vancomycin and was discharged on oral antibiotics which she is now finished with. Since she has been out of the hospital over the last 2 weeks she has seen infectious disease twice and they have now released her. She is also seen vascular and cardiology. They are planning on doing TAVR surgery in the near future. Complaint today is that of shortness of breath and a dry cough. She is saturating at 97% on room air currently. She is not complaining of any chest discomfort. She shows me blood pressures and blood sugars which look good. I did review all documentation related to her hospital stay and post hospital stay  Patient does not have any further groin pain. No further bleeding. I did review recent blood work on her. She is anemic with hemoglobin of 7.9. Her depression has been stable  In the interim I did hold her Metformin and Actonel related to decreased kidney function.  Repeat numbers did show some improvement but I kept her off the medication for the time being.   Her eyes and feet are up-to-date with her diabetes.  No retinopathy no foot sores  She is up-to-date with consultants including renal for chronic kidney disease, ophthalmology, endocrine for her thyroid, cardiology for her aortic stenosis and recent left main stent placement and previously with ear nose and throat who recommended no thyroid biopsy at that point but simply ultrasound follow-up. Review of Systems   Constitutional: Negative for activity change, appetite change, chills, diaphoresis, fatigue, fever and unexpected weight change. HENT: Negative for congestion, ear pain, hearing loss, postnasal drip, rhinorrhea and sinus pain.    Respiratory: Negative for  wheezing.  Positive for cough and shortness of breath  Cardiovascular: Negative for chest pain, palpitations and leg swelling. Gastrointestinal: Negative for abdominal pain, blood in stool, constipation, diarrhea, nausea and vomiting.          Endocrine:  Type 2 diabetes, goiter   Genitourinary: Negative for difficulty urinating, dysuria, frequency, hematuria and urgency. Musculoskeletal: Positive for arthralgias. Negative for back pain, gait problem and myalgias.        She was complaining of muscle pain prior to stopping the Actonel-did not tolerate oral bisphosphonates-did wish to try Actonel 1 more time for now-was successful-currently holding  Skin: No open wounds   allergic/Immunologic: Negative for environmental allergies and immunocompromised state. Neurological: Negative for dizziness (improved after cardiac stenting), weakness, light-headedness, numbness and headaches. Hematological: Negative.           REST OF PERTINENT ROS GONE OVER AND WAS NEGATIVE.      PMH:  Problem List: Knee pain, Non-toxic nodular goiter, Essential hypertension, Goiter, Hyperlipidemia, Benign  essential hypertension, Type II diabetes mellitus uncontrolled  Health Maintenance:  Bone Density Test Screening - (12/3/2018)  Bone Density Scan - (12/3/2018)  Influenza Vaccination - (10/29/2018)  Colonoscopy - (7/25/2018)  7/18-due 23  Colonoscopy Screening - (7/25/2018)  Couseled on Home Safety - (6/13/2018)  Pneumonia Vaccination - (2/5/2018)  Mammogram - TABLETS BY MOUTH DAILY (Patient taking differently: Take 1 tablet by mouth daily ), Disp: 180 tablet, Rfl: 1    pioglitazone (ACTOS) 30 MG tablet, Take 1 tablet by mouth daily, Disp: 90 tablet, Rfl: 1    rosuvastatin (CRESTOR) 20 MG tablet, Take 1 tablet by mouth nightly, Disp: 30 tablet, Rfl: 3    clopidogrel (PLAVIX) 75 MG tablet, Take 1 tablet by mouth daily, Disp: 30 tablet, Rfl: 3    carvedilol (COREG) 6.25 MG tablet, Take 1 tablet by mouth 2 times daily (with meals), Disp: 60 tablet, Rfl: 3    amLODIPine (NORVASC) 5 MG tablet, Take 1 tablet by mouth daily, Disp: 30 tablet, Rfl: 3    glipiZIDE (GLUCOTROL XL) 10 MG extended release tablet, TAKE ONE TABLET BY MOUTH TWO TIMES A DAY, Disp: 180 tablet, Rfl: 1    JANUVIA 50 MG tablet, Take 1 tablet by mouth daily, Disp: 90 tablet, Rfl: 1    nitroGLYCERIN (NITROSTAT) 0.4 MG SL tablet, DISSOLVE ONE TABLET UNDER TONGUE AS NEEDED FOR CHEST PAINS. MAY REPEAT IN 5 MINUTES.  IF SYMPTOMS PERSISTS CALL 911, Disp: 25 tablet, Rfl: 1    Cinnamon 500 MG CAPS, Take 1 capsule by mouth daily, Disp: , Rfl:     aspirin 81 MG tablet, Take 81 mg by mouth daily, Disp: , Rfl:     vitamin D (CHOLECALCIFEROL) 1000 UNIT TABS tablet, Take 1,000 Units by mouth daily, Disp: , Rfl:     vitamin B-12 (CYANOCOBALAMIN) 100 MCG tablet, Take 1,000 mcg by mouth daily , Disp: , Rfl:   Allergies   Allergen Reactions    Naproxen     Penicillins        Past Medical History:   Diagnosis Date    Abscess of groin, left 6/22/2021    Aortic valve disorder     CAD (coronary artery disease)     Cancer (HCC)     glaucoma    Carotid stenosis, right 6/22/2021    Chest pain     Diabetes (HCC)     Diverticulosis     Femoral-popliteal atherosclerosis ([facility] [address] 7/13/2021    GERD (gastroesophageal reflux disease)     Goiter     Hyperlipidemia     Hypertension     Hypothyroidism     Macular degeneration     Mitral valve disorder     Neuropathy     Osteoporosis     VHD (valvular heart disease)  Vitamin B12 deficiency      Past Surgical History:   Procedure Laterality Date    APPENDECTOMY      BREAST BIOPSY Right     neg    CARDIAC CATHETERIZATION  06/11/2021    Dr Arnel Perez  06/15/2021    Dr Cristiano Conklin 3.5 x 18 LM to LAD and 4.0 x18 Prox CX    EYE SURGERY Bilateral     cataract    HYSTERECTOMY      KNEE ARTHROPLASTY Left     TOOTH EXTRACTION      TRANSESOPHAGEAL ECHOCARDIOGRAM  06/10/2021    Dr Christina Cornea     Family History   Problem Relation Age of Onset    Stroke Mother     Heart Disease Father 79        MI    Heart Attack Father     Heart Disease Brother     Coronary Art Dis Brother     Stroke Brother     Stroke Brother      Social History     Socioeconomic History    Marital status:      Spouse name: Not on file    Number of children: Not on file    Years of education: Not on file    Highest education level: Not on file   Occupational History    Not on file   Tobacco Use    Smoking status: Never Smoker    Smokeless tobacco: Never Used   Vaping Use    Vaping Use: Never used   Substance and Sexual Activity    Alcohol use: Not Currently    Drug use: Never    Sexual activity: Not on file   Other Topics Concern    Not on file   Social History Narrative    Not on file     Social Determinants of Health     Financial Resource Strain:     Difficulty of Paying Living Expenses:    Food Insecurity:     Worried About Running Out of Food in the Last Year:     [address] in the Last Year:    Transportation Needs:     Lack of Transportation (Medical):      Lack of Transportation (Non-Medical):    Physical Activity:     Days of Exercise per Week:     Minutes of Exercise per Session:    Stress:     Feeling of Stress :    Social Connections:     Frequency of Communication with Friends and Family:     Frequency of Social Gatherings with Friends and Family:     Attends Holiness Services:     Active Member of Clubs or Organizations:     Attends Club or Organization Meetings:     Marital Status:    Intimate Partner Violence:     Fear of Current or Ex-Partner:     Emotionally Abused:     Physically Abused:     Sexually Abused:        Vitals:    07/15/21 1609   BP: (!) 124/48   Pulse: 86   Temp: 98.1 °F (36.7 °C)   TempSrc: Temporal   SpO2: 97%   Weight: 170 lb (77.1 kg)   Height: 5' 2\" (1.575 m)       Physical Exam      Constitutional: She is oriented to person, place, and time. She appears well-developed and well-nourished. No distress. Neck: Normal range of motion. Neck supple. Thyromegaly present. Positive for goiter   No JVD noted. Cardiovascular: Normal rate, regular rhythm and intact distal pulses. Exam reveals no gallop and no friction rub.   Murmur heard. Patient has significant lower extremity edema bilaterally 2-3+. Pulmonary/Chest: No respiratory distress. She has no wheezes. Scattered rhonchi in the lower fields bilaterally. Abdominal: Soft. Bowel sounds are normal. She exhibits no distension and no mass. There is no tenderness.   Musculoskeletal: Normal range of motion. Neurological: She is alert and oriented to person, place, and time. She displays normal reflexes. No sensory deficit. She exhibits normal muscle tone. Coordination normal.   Skin: Skin is warm and dry. No rash noted. No erythema.    I did not examine the groin wound site as this has been checked by a couple physicians this week already. Psychiatric: Mood and affect normal  Nursing note and vitals reviewed    Assessment and Plan: [facility] was seen today for follow-up from hospital.    Diagnoses and all orders for this visit:    SOB (shortness of breath)  -     XR CHEST (2 VW); Future  Volume overload/heart failure    Cough  -     XR CHEST (2 VW);  Future    Wound of groin  Healing    Heart failure with preserved ejection fraction, unspecified HF chronicity (HCC)    Aortic valve stenosis, unspecified etiology  -     XR CHEST (2 VW); Future  For upcoming TAVR    Chronic kidney disease, unspecified CKD stage  Stable    Essential hypertension  Stable on medication    Coronary artery disease involving native coronary artery of native heart without angina pectoris  Stable post atherectomy/stent placement       Edema, unspecified type  Placed on furosemide by cardiology    Plan: Get a chest x-ray which I will visualize. She only picked up her Lasix prescription last evening and took 1 dose this morning so far. I encouraged her to continue using this. She does not appear to be in any acute respiratory distress and pulse oximetry looked adequate at this time. I do believe she is in heart failure/volume overload seems to be tolerating it at this time. We will give her a day or 2 on the Lasix and see how she does if there is any decompensation she is to go to the emergency room immediately. I want to see her again next week. Follow-up with above consultants closely. Notify us of problems in the interim. Very complicated visit. Encounter with face-to-face time and review of extensive information from hospital post hospital stay as well as post visit chart review and E HR documentation lasting 50 minutes  Return in about 1 week (around 7/22/2021). Seen By:  Margi Rojas MD      *Document was created using voice recognition software. Note was reviewed however may contain grammatical errors.

## 2021-07-17 ENCOUNTER — TELEPHONE (OUTPATIENT)
Dept: FAMILY MEDICINE CLINIC | Age: 81
End: 2021-07-17

## 2021-07-17 NOTE — TELEPHONE ENCOUNTER
I did personally speak with the patient regarding her x-ray findings and to see how she was doing. She states she is feeling better. She continues to take her Lasix. She has an appointment with me next week that she will keep. To the emergency room if any worsening.

## 2021-07-19 ENCOUNTER — CARE COORDINATION (OUTPATIENT)
Dept: CASE MANAGEMENT | Age: 81
End: 2021-07-19

## 2021-07-19 NOTE — CARE COORDINATION
[facility] Transitions Follow Up Call    2021    Patient: 52Stu Becker  Patient : 1940   MRN: [id number]  Reason for Admission:   Discharge Date: 21 RARS: Readmission Risk Score: 14    2021 Final  for admission 21-21. Patient no longer eligible for BPCI bundle due to excluded DRG.        Iram Hdez RN

## 2021-07-22 ENCOUNTER — OFFICE VISIT (OUTPATIENT)
Dept: FAMILY MEDICINE CLINIC | Age: 81
End: 2021-07-22
Payer: MEDICARE

## 2021-07-22 ENCOUNTER — TELEPHONE (OUTPATIENT)
Dept: FAMILY MEDICINE CLINIC | Age: 81
End: 2021-07-22

## 2021-07-22 VITALS
DIASTOLIC BLOOD PRESSURE: 52 MMHG | HEIGHT: 62 IN | TEMPERATURE: 97.3 F | WEIGHT: 169.8 LBS | BODY MASS INDEX: 31.25 KG/M2 | SYSTOLIC BLOOD PRESSURE: 132 MMHG | OXYGEN SATURATION: 98 % | HEART RATE: 87 BPM

## 2021-07-22 DIAGNOSIS — R60.9 EDEMA, UNSPECIFIED TYPE: ICD-10-CM

## 2021-07-22 DIAGNOSIS — I50.30 HEART FAILURE WITH PRESERVED EJECTION FRACTION, UNSPECIFIED HF CHRONICITY (HCC): Primary | ICD-10-CM

## 2021-07-22 DIAGNOSIS — N18.9 CHRONIC KIDNEY DISEASE, UNSPECIFIED CKD STAGE: ICD-10-CM

## 2021-07-22 DIAGNOSIS — I50.30 HEART FAILURE WITH PRESERVED EJECTION FRACTION, UNSPECIFIED HF CHRONICITY (HCC): ICD-10-CM

## 2021-07-22 DIAGNOSIS — I25.10 CORONARY ARTERY DISEASE INVOLVING NATIVE CORONARY ARTERY OF NATIVE HEART WITHOUT ANGINA PECTORIS: ICD-10-CM

## 2021-07-22 DIAGNOSIS — I35.0 AORTIC VALVE STENOSIS, UNSPECIFIED ETIOLOGY: ICD-10-CM

## 2021-07-22 DIAGNOSIS — E11.40 TYPE 2 DIABETES MELLITUS WITH DIABETIC NEUROPATHY, WITHOUT LONG-TERM CURRENT USE OF INSULIN (HCC): ICD-10-CM

## 2021-07-22 LAB
ALBUMIN SERPL-MCNC: 3.4 G/DL (ref 3.5–5.2)
ALP BLD-CCNC: 64 U/L (ref 35–104)
ALT SERPL-CCNC: 10 U/L (ref 0–32)
ANION GAP SERPL CALCULATED.3IONS-SCNC: 12 MMOL/L (ref 7–16)
AST SERPL-CCNC: 21 U/L (ref 0–31)
BASOPHILS ABSOLUTE: 0.03 E9/L (ref 0–0.2)
BASOPHILS RELATIVE PERCENT: 0.6 % (ref 0–2)
BILIRUB SERPL-MCNC: 0.7 MG/DL (ref 0–1.2)
BUN BLDV-MCNC: 14 MG/DL (ref 6–23)
CALCIUM SERPL-MCNC: 8.8 MG/DL (ref 8.6–10.2)
CHLORIDE BLD-SCNC: 99 MMOL/L (ref 98–107)
CO2: 25 MMOL/L (ref 22–29)
CREAT SERPL-MCNC: 1.6 MG/DL (ref 0.5–1)
EOSINOPHILS ABSOLUTE: 0.3 E9/L (ref 0.05–0.5)
EOSINOPHILS RELATIVE PERCENT: 5.8 % (ref 0–6)
GFR AFRICAN AMERICAN: 38
GFR NON-AFRICAN AMERICAN: 31 ML/MIN/1.73
GLUCOSE BLD-MCNC: 181 MG/DL (ref 74–99)
HCT VFR BLD CALC: 28.9 % (ref 34–48)
HEMOGLOBIN: 8.6 G/DL (ref 11.5–15.5)
IMMATURE GRANULOCYTES #: 0.02 E9/L
IMMATURE GRANULOCYTES %: 0.4 % (ref 0–5)
LYMPHOCYTES ABSOLUTE: 1.45 E9/L (ref 1.5–4)
LYMPHOCYTES RELATIVE PERCENT: 28.2 % (ref 20–42)
MCH RBC QN AUTO: 30.6 PG (ref 26–35)
MCHC RBC AUTO-ENTMCNC: 29.8 % (ref 32–34.5)
MCV RBC AUTO: 102.8 FL (ref 80–99.9)
MONOCYTES ABSOLUTE: 0.7 E9/L (ref 0.1–0.95)
MONOCYTES RELATIVE PERCENT: 13.6 % (ref 2–12)
NEUTROPHILS ABSOLUTE: 2.64 E9/L (ref 1.8–7.3)
NEUTROPHILS RELATIVE PERCENT: 51.4 % (ref 43–80)
PDW BLD-RTO: 14.6 FL (ref 11.5–15)
PLATELET # BLD: 240 E9/L (ref 130–450)
PMV BLD AUTO: 10.8 FL (ref 7–12)
POTASSIUM SERPL-SCNC: 4.1 MMOL/L (ref 3.5–5)
PRO-BNP: 4204 PG/ML (ref 0–450)
RBC # BLD: 2.81 E12/L (ref 3.5–5.5)
SODIUM BLD-SCNC: 136 MMOL/L (ref 132–146)
TOTAL PROTEIN: 6.8 G/DL (ref 6.4–8.3)
WBC # BLD: 5.1 E9/L (ref 4.5–11.5)

## 2021-07-22 PROCEDURE — 1036F TOBACCO NON-USER: CPT | Performed by: INTERNAL MEDICINE

## 2021-07-22 PROCEDURE — 1111F DSCHRG MED/CURRENT MED MERGE: CPT | Performed by: INTERNAL MEDICINE

## 2021-07-22 PROCEDURE — 1090F PRES/ABSN URINE INCON ASSESS: CPT | Performed by: INTERNAL MEDICINE

## 2021-07-22 PROCEDURE — 99214 OFFICE O/P EST MOD 30 MIN: CPT | Performed by: INTERNAL MEDICINE

## 2021-07-22 PROCEDURE — G8400 PT W/DXA NO RESULTS DOC: HCPCS | Performed by: INTERNAL MEDICINE

## 2021-07-22 PROCEDURE — 3051F HG A1C>EQUAL 7.0%<8.0%: CPT | Performed by: INTERNAL MEDICINE

## 2021-07-22 PROCEDURE — 4040F PNEUMOC VAC/ADMIN/RCVD: CPT | Performed by: INTERNAL MEDICINE

## 2021-07-22 PROCEDURE — G8417 CALC BMI ABV UP PARAM F/U: HCPCS | Performed by: INTERNAL MEDICINE

## 2021-07-22 PROCEDURE — 1123F ACP DISCUSS/DSCN MKR DOCD: CPT | Performed by: INTERNAL MEDICINE

## 2021-07-22 PROCEDURE — G8427 DOCREV CUR MEDS BY ELIG CLIN: HCPCS | Performed by: INTERNAL MEDICINE

## 2021-07-22 RX ORDER — FUROSEMIDE 20 MG/1
20 TABLET ORAL DAILY
Qty: 90 TABLET | Refills: 1 | Status: SHIPPED
Start: 2021-07-22 | End: 2021-07-29 | Stop reason: SDUPTHER

## 2021-07-23 NOTE — TELEPHONE ENCOUNTER
With the recent heart failure please have patient hold her Actos. I forgot to mention that yesterday at the visit. Monitor sugars closely as she has been doing. Kidney function also has dipped some. . We will recheck labs when I see her next week.  Rest of labs stable

## 2021-07-26 NOTE — TELEPHONE ENCOUNTER
Called and spoke with the patient about her results as well as holding her actos due to recent heart failure. Patient verbalized understanding.

## 2021-07-28 ENCOUNTER — TELEPHONE (OUTPATIENT)
Dept: FAMILY MEDICINE CLINIC | Age: 81
End: 2021-07-28

## 2021-07-28 DIAGNOSIS — I35.0 NODULAR CALCIFIC AORTIC VALVE STENOSIS: Primary | ICD-10-CM

## 2021-07-28 NOTE — TELEPHONE ENCOUNTER
Alise calling to let you know that she has not heard anything from the cardiologist yet. Please advise.

## 2021-07-28 NOTE — TELEPHONE ENCOUNTER
Have her call their office and find out when they want to see her. If no satisfaction with that let me know and I will call.

## 2021-07-28 NOTE — TELEPHONE ENCOUNTER
Called and spoke with patient. Let her know that she should call to see when they want to see her back Advised her that if no satisfaction to let us know at her appointment with Dr Edd Ortiz tomorrow and he said he will call.  Patient verbalized understanding

## 2021-07-29 ENCOUNTER — OFFICE VISIT (OUTPATIENT)
Dept: FAMILY MEDICINE CLINIC | Age: 81
End: 2021-07-29
Payer: MEDICARE

## 2021-07-29 VITALS
SYSTOLIC BLOOD PRESSURE: 104 MMHG | TEMPERATURE: 97.3 F | BODY MASS INDEX: 31.1 KG/M2 | OXYGEN SATURATION: 98 % | DIASTOLIC BLOOD PRESSURE: 52 MMHG | HEIGHT: 62 IN | WEIGHT: 169 LBS

## 2021-07-29 DIAGNOSIS — N18.9 CHRONIC KIDNEY DISEASE, UNSPECIFIED CKD STAGE: ICD-10-CM

## 2021-07-29 DIAGNOSIS — E11.40 TYPE 2 DIABETES MELLITUS WITH DIABETIC NEUROPATHY, WITHOUT LONG-TERM CURRENT USE OF INSULIN (HCC): ICD-10-CM

## 2021-07-29 DIAGNOSIS — R60.9 EDEMA, UNSPECIFIED TYPE: ICD-10-CM

## 2021-07-29 DIAGNOSIS — I10 ESSENTIAL HYPERTENSION: ICD-10-CM

## 2021-07-29 DIAGNOSIS — I25.10 CORONARY ARTERY DISEASE INVOLVING NATIVE CORONARY ARTERY OF NATIVE HEART WITHOUT ANGINA PECTORIS: ICD-10-CM

## 2021-07-29 DIAGNOSIS — I35.0 AORTIC VALVE STENOSIS, UNSPECIFIED ETIOLOGY: ICD-10-CM

## 2021-07-29 DIAGNOSIS — I50.30 HEART FAILURE WITH PRESERVED EJECTION FRACTION, UNSPECIFIED HF CHRONICITY (HCC): Primary | ICD-10-CM

## 2021-07-29 PROCEDURE — G8417 CALC BMI ABV UP PARAM F/U: HCPCS | Performed by: INTERNAL MEDICINE

## 2021-07-29 PROCEDURE — 99214 OFFICE O/P EST MOD 30 MIN: CPT | Performed by: INTERNAL MEDICINE

## 2021-07-29 PROCEDURE — 3051F HG A1C>EQUAL 7.0%<8.0%: CPT | Performed by: INTERNAL MEDICINE

## 2021-07-29 PROCEDURE — G8427 DOCREV CUR MEDS BY ELIG CLIN: HCPCS | Performed by: INTERNAL MEDICINE

## 2021-07-29 PROCEDURE — 1036F TOBACCO NON-USER: CPT | Performed by: INTERNAL MEDICINE

## 2021-07-29 PROCEDURE — 1123F ACP DISCUSS/DSCN MKR DOCD: CPT | Performed by: INTERNAL MEDICINE

## 2021-07-29 PROCEDURE — 4040F PNEUMOC VAC/ADMIN/RCVD: CPT | Performed by: INTERNAL MEDICINE

## 2021-07-29 PROCEDURE — 1090F PRES/ABSN URINE INCON ASSESS: CPT | Performed by: INTERNAL MEDICINE

## 2021-07-29 PROCEDURE — G8400 PT W/DXA NO RESULTS DOC: HCPCS | Performed by: INTERNAL MEDICINE

## 2021-07-29 RX ORDER — FUROSEMIDE 20 MG/1
TABLET ORAL
Qty: 135 TABLET | Refills: 1 | Status: SHIPPED
Start: 2021-07-29 | End: 2021-12-15 | Stop reason: SDUPTHER

## 2021-07-29 NOTE — PROGRESS NOTES
[address] [place]     21  Soledad Covington : 1940 Sex: female  Age: 68 y.o. Chief Complaint   Patient presents with    Other     1 week follow-up; did call cardiology and spoke with them; they are going to work on getting the CT scan approved and suppose to call her back within the next couple days;       HPI    Patient presents today for 1 week follow-up visit on her medical problems. She is accompanied by family member today. Reviewed last note. We did increase her Lasix last week from 20 mg daily to 20 mg alternating with 40 mg daily. She states she thinks this has helped some. She is breathing reasonably well. States she still gets short of breath with exertion. She is not having any chest pain syncope or presyncope with her aortic stenosis. She had not heard from cardiology so we had her call them yesterday and apparently there was some mixup but is now getting straightened out and they are attempting to get preauthorization for testing procedures. They stated they were going to call her in the next couple days and I told her if they do not please let me know as I will have to call and speak to them. She shows me her blood sugar numbers which have been good off of her Metformin which we deleted because of her kidney function. Shows me blood pressures which are in good range. Marginally low today. Asymptomatic with this. I did hold her Metformin and Actonel related to decreased kidney function.  Repeat numbers did show some improvement but I kept her off the medication for the time being.   Her eyes and feet are up-to-date with her diabetes.  No retinopathy no foot sores  She is up-to-date with consultants including renal for chronic kidney disease, ophthalmology, endocrine for her thyroid, cardiology for her aortic stenosis and recent left main stent placement and previously with ear nose and throat who recommended no thyroid biopsy at that point but simply ultrasound Immunization - (2017)  Rectal Exam - 4/10,  Breast Exam - 4/10,  Hemmocult Cards - -neg x 3,-neg x3, -neg x 3  EKG - ,, 5/15, 3/17,,3/18  2D ECHO -  ,   Carotid Artery Study - -30-49% occlusion bilateral,   Stress Test - -neg,3/18-pos  Zoster/Shingles Vaccine -   Medical Problems:  Non Insulin Dependent Diabetes, Hypertension, Hyperlipidemia  Goiter - multinodular-bx neg-dr hooper  GERD, Hypothyroidism  Osteoporosis - Bisphosphonate intolerant  Glaucoma, Renal Insufficiency  Aortic And Mitral Valve Disorders - 2D echo-  VHD  Pulmonary HTN - mild  Macular Degeneration  WBCs in urine - Workup by urology-  Coronary Artery Disease (CAD) - Positive stress test 3/18-heart cath--left main disease-stents  Diverticulosis, Diabetic Neuropathy, Vitamin B12 deficiency  Hospitalization for infected left groin cath site-  HFpEF      Follows with - Cardiology, nephrology, ophthalmology, endocrine  Surgical Hx:  AKIN-right salpingo-oophorectomy - Fibroids  Appendectomy  Right cataract surgery - left also  Left knee arthroscopy, Removal of Gallbladder  Cardiac catheterization-severe left main disease-atherectomy/PCI/stents     FH: Father:  MI.  Mother:  Cerebrovascular Accident (CVA). Brother 1:  Coronary Artery Disease (CAD) - 52's. 3 other brothers, 1 sister   SH:  Marital: . Personal Habits: Cigarette Use: Negative For current cigarette smoker. Alcohol: does not use  alcohol. 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               Current Outpatient Medications:     furosemide (LASIX) 20 MG tablet, 1 pill po alternating with 2 pills daily, Disp: 135 tablet, Rfl: 1    pravastatin (PRAVACHOL) 40 MG tablet, Take 40 mg by mouth every other day, Disp: , Rfl:     magnesium cl-calcium carbonate (NU-MAG) 71.5-119 MG TBEC tablet, TAKE TWO TABLETS BY MOUTH DAILY (Patient taking differently: Take 1 tablet by mouth daily ), Disp: 180 tablet, Rfl: 1    pioglitazone (ACTOS) 30 MG tablet, Take 1 tablet by mouth daily, Disp: 90 tablet, Rfl: 1    rosuvastatin (CRESTOR) 20 MG tablet, Take 1 tablet by mouth nightly, Disp: 30 tablet, Rfl: 3    clopidogrel (PLAVIX) 75 MG tablet, Take 1 tablet by mouth daily, Disp: 30 tablet, Rfl: 3    carvedilol (COREG) 6.25 MG tablet, Take 1 tablet by mouth 2 times daily (with meals), Disp: 60 tablet, Rfl: 3    amLODIPine (NORVASC) 5 MG tablet, Take 1 tablet by mouth daily, Disp: 30 tablet, Rfl: 3    glipiZIDE (GLUCOTROL XL) 10 MG extended release tablet, TAKE ONE TABLET BY MOUTH TWO TIMES A DAY, Disp: 180 tablet, Rfl: 1    JANUVIA 50 MG tablet, Take 1 tablet by mouth daily, Disp: 90 tablet, Rfl: 1    nitroGLYCERIN (NITROSTAT) 0.4 MG SL tablet, DISSOLVE ONE TABLET UNDER TONGUE AS NEEDED FOR CHEST PAINS. MAY REPEAT IN 5 MINUTES.  IF SYMPTOMS PERSISTS CALL 911, Disp: 25 tablet, Rfl: 1    Cinnamon 500 MG CAPS, Take 1 capsule by mouth daily, Disp: , Rfl:     aspirin 81 MG tablet, Take 81 mg by mouth daily, Disp: , Rfl:     vitamin D (CHOLECALCIFEROL) 1000 UNIT TABS tablet, Take 1,000 Units by mouth daily, Disp: , Rfl:     vitamin B-12 (CYANOCOBALAMIN) 100 MCG tablet, Take 1,000 mcg by mouth daily , Disp: , Rfl:   Allergies   Allergen Reactions    Naproxen     Penicillins        Past Medical History:   Diagnosis Date    Abscess of groin, left 6/22/2021    Aortic valve disorder     CAD (coronary artery disease)     Cancer (HCC)     glaucoma    Carotid stenosis, right 6/22/2021    Chest pain     Diabetes (HCC)     Diverticulosis     Femoral-popliteal atherosclerosis ([facility] [address] 7/13/2021    GERD (gastroesophageal reflux disease)     Goiter     Hyperlipidemia     Hypertension     Hypothyroidism     Macular degeneration     Mitral valve disorder     Neuropathy     Osteoporosis     VHD (valvular heart disease)     Vitamin B12 deficiency      Past Surgical History:   Procedure Laterality Date    APPENDECTOMY      BREAST BIOPSY Right     neg    CARDIAC CATHETERIZATION  06/11/2021    Dr Laila Samson  06/15/2021    Dr Toby Omalley - [facility]a 3.5 x 18 LM to LAD and 4.0 x18 Prox CX    EYE SURGERY Bilateral     cataract    HYSTERECTOMY      KNEE ARTHROPLASTY Left     TOOTH EXTRACTION      TRANSESOPHAGEAL ECHOCARDIOGRAM  06/10/2021    Dr Agapito Powell     Family History   Problem Relation Age of Onset    Stroke Mother     Heart Disease Father 79        MI    Heart Attack Father     Heart Disease Brother     Coronary Art Dis Brother     Stroke Brother     Stroke Brother      Social History     Socioeconomic History    Marital status:      Spouse name: Not on file    Number of children: Not on file    Years of education: Not on file    Highest education level: Not on file   Occupational History    Not on file   Tobacco Use    Smoking status: Never Smoker    Smokeless tobacco: Never Used   Vaping Use    Vaping Use: Never used   Substance and Sexual Activity    Alcohol use: Not Currently    Drug use: Never    Sexual activity: Not on file   Other Topics Concern    Not on file   Social History Narrative    Not on file     Social Determinants of Health     Financial Resource Strain:     Difficulty of Paying Living Expenses:    Food Insecurity:     Worried About Running Out of Food in the Last Year:     [address] in the Last Year:    Transportation Needs:     Lack of Transportation (Medical):      Lack of Transportation (Non-Medical):    Physical Activity:     Days of Exercise per Week:     Minutes of Exercise per Session:    Stress:     Feeling of Stress :    Social Connections:     Frequency of Communication with Friends and Family:     Frequency of Social Gatherings with Friends and Family:     Attends Muslim Services:     Active Member of Clubs or Organizations:     Attends Club or Organization Meetings:     Marital Status:    Intimate Partner Violence:     Fear of Current or Ex-Partner:     Emotionally Abused:     Physically Abused:     Sexually Abused:        Vitals:    07/29/21 1638   BP: (!) 104/52   Temp: 97.3 °F (36.3 °C)   TempSrc: Temporal   SpO2: 98%   Weight: 169 lb (76.7 kg)   Height: 5' 2\" (1.575 m)       Physical Exam  Constitutional: She is oriented to person, place, and time. She appears well-developed and well-nourished. No distress.    Neck: Normal range of motion. Neck supple. Thyromegaly present. Positive for goiter   No JVD noted. Cardiovascular: Normal rate, regular rhythm and intact distal pulses. Exam reveals no gallop and no friction rub.   Murmur heard.  Patient has significant lower extremity edema bilaterally 2+  Pulmonary/Chest: No respiratory distress. She has no wheezes.  Scattered rhonchi in the lower fields bilaterally. Appears to have good air exchange today  Abdominal: Soft. Bowel sounds are normal. She exhibits no distension and no mass. There is no tenderness.   Musculoskeletal: Normal range of motion. Neurological: She is alert and oriented to person, place, and time. She displays normal reflexes. No sensory deficit. She exhibits normal muscle tone. Coordination normal.   Skin: Skin is warm and dry. No rash noted. No erythema.      Psychiatric: Mood and affect normal  Nursing note and vitals reviewed             Assessment and Plan: Lory Pizarro was seen today for other. Diagnoses and all orders for this visit:    Heart failure with preserved ejection fraction, unspecified HF chronicity ([address]  -     BRAIN NATRIURETIC PEPTIDE; Future  -     Basic Metabolic Panel; Future  -     Magnesium; Future  Appears stable at this point.     Aortic valve stenosis, unspecified etiology  Stable-upcoming TAVR    Coronary artery disease involving native coronary artery of native heart without angina pectoris  Stable recent stent placement    Chronic kidney disease, unspecified CKD stage  Stable still holding Metformin and Actonel    Edema, unspecified type  -     BRAIN NATRIURETIC PEPTIDE; Future  -     Magnesium; Future    Type 2 diabetes mellitus with diabetic neuropathy, without long-term current use of insulin (HCC)  -     Hemoglobin A1C; Future  Stable on current medication. Essential hypertension  Stable on current antihypertensive medicine    Other orders  -     furosemide (LASIX) 20 MG tablet; 1 pill po alternating with 2 pills daily    Plan: See above body report. She should hear from cardiology in the next couple days and if not to call me. Monitor blood pressure and blood sugar. I gave her order for labs to be done at [facility] in the next couple days. Tentatively put her down for 1 month but she is supposed to have testing and valve replacement up soon. She is to call me with problems in the interim. Return in about 1 month (around 8/29/2021). Seen By:  Andrey Short MD      *Document was created using voice recognition software. Note was reviewed however may contain grammatical errors.

## 2021-07-30 NOTE — PROGRESS NOTES
Patient notified of TAVR CT scans scheduled 8/12/21. Instructed to enter main lobby and report to registration 0745, CT 0800. NPO after midnight. Further recommendations pending results. She is edentulous. Provided by direct phone number if questions or concerns arise in the meantime.

## 2021-07-31 ENCOUNTER — TELEPHONE (OUTPATIENT)
Dept: FAMILY MEDICINE CLINIC | Age: 81
End: 2021-07-31

## 2021-07-31 LAB
ANION GAP SERPL CALCULATED.3IONS-SCNC: 8 MEQ/L (ref 3–11)
B-TYPE NATRIURETIC PEPTIDE: 438 PG/ML (ref 0–100)
BUN BLDV-MCNC: 17 MG/DL (ref 8–21)
CALCIUM SERPL-MCNC: 9 MG/DL (ref 8.5–10.5)
CHLORIDE BLD-SCNC: 103 MEQ/L (ref 98–107)
CO2: 26 MEQ/L (ref 21–31)
CREAT SERPL-MCNC: 1.6 MG/DL (ref 0.4–1)
CREATININE + EGFR PANEL: 38 ML/MIN
GFR NON-AFRICAN AMERICAN: 31 ML/MIN
GLUCOSE BLD-MCNC: 239 MG/DL (ref 70–99)
MAGNESIUM: 1.5 MEQ/L (ref 1.6–2.6)
POTASSIUM SERPL-SCNC: 3.6 MEQ/L (ref 3.6–5)
SODIUM BLD-SCNC: 137 MEQ/L (ref 135–145)

## 2021-08-02 LAB
ESTIMATED AVERAGE GLUCOSE: 140 MG/DL
HBA1C MFR BLD: 6.5 % (ref 4–6)

## 2021-08-12 ENCOUNTER — HOSPITAL ENCOUNTER (OUTPATIENT)
Dept: CT IMAGING | Age: 81
Discharge: HOME | End: 2021-08-14
Payer: MEDICARE

## 2021-08-12 DIAGNOSIS — I35.0 NODULAR CALCIFIC AORTIC VALVE STENOSIS: ICD-10-CM

## 2021-08-12 PROCEDURE — 74174 CTA ABD&PLVS W/CONTRAST: CPT

## 2021-08-12 PROCEDURE — 6360000004 HC RX CONTRAST MEDICATION: Performed by: RADIOLOGY

## 2021-08-12 PROCEDURE — 75572 CT HRT W/3D IMAGE: CPT

## 2021-08-12 PROCEDURE — 2580000003 HC RX 258: Performed by: RADIOLOGY

## 2021-08-12 PROCEDURE — 71275 CT ANGIOGRAPHY CHEST: CPT

## 2021-08-12 RX ORDER — SODIUM CHLORIDE 0.9 % (FLUSH) 0.9 %
10 SYRINGE (ML) INJECTION
Status: COMPLETED | OUTPATIENT
Start: 2021-08-12 | End: 2021-08-12

## 2021-08-12 RX ADMIN — Medication 10 ML: at 08:04

## 2021-08-12 RX ADMIN — IOPAMIDOL 50 ML: 755 INJECTION, SOLUTION INTRAVENOUS at 08:04

## 2021-08-17 ENCOUNTER — OFFICE VISIT (OUTPATIENT)
Dept: CARDIOLOGY CLINIC | Age: 81
End: 2021-08-17
Payer: MEDICARE

## 2021-08-17 VITALS
DIASTOLIC BLOOD PRESSURE: 60 MMHG | HEIGHT: 62 IN | RESPIRATION RATE: 12 BRPM | BODY MASS INDEX: 29.74 KG/M2 | WEIGHT: 161.6 LBS | HEART RATE: 77 BPM | SYSTOLIC BLOOD PRESSURE: 102 MMHG

## 2021-08-17 DIAGNOSIS — N18.32 STAGE 3B CHRONIC KIDNEY DISEASE (HCC): ICD-10-CM

## 2021-08-17 DIAGNOSIS — I50.32 CHRONIC HEART FAILURE WITH PRESERVED EJECTION FRACTION (HFPEF) (HCC): ICD-10-CM

## 2021-08-17 DIAGNOSIS — I25.10 LEFT MAIN CORONARY ARTERY DISEASE: ICD-10-CM

## 2021-08-17 DIAGNOSIS — I35.0 MODERATE TO SEVERE AORTIC STENOSIS: Primary | ICD-10-CM

## 2021-08-17 DIAGNOSIS — Z95.5 S/P DRUG ELUTING CORONARY STENT PLACEMENT: ICD-10-CM

## 2021-08-17 PROCEDURE — 93000 ELECTROCARDIOGRAM COMPLETE: CPT | Performed by: INTERNAL MEDICINE

## 2021-08-17 PROCEDURE — G8427 DOCREV CUR MEDS BY ELIG CLIN: HCPCS | Performed by: INTERNAL MEDICINE

## 2021-08-17 PROCEDURE — 99214 OFFICE O/P EST MOD 30 MIN: CPT | Performed by: INTERNAL MEDICINE

## 2021-08-17 PROCEDURE — 1090F PRES/ABSN URINE INCON ASSESS: CPT | Performed by: INTERNAL MEDICINE

## 2021-08-17 PROCEDURE — 1123F ACP DISCUSS/DSCN MKR DOCD: CPT | Performed by: INTERNAL MEDICINE

## 2021-08-17 PROCEDURE — 1036F TOBACCO NON-USER: CPT | Performed by: INTERNAL MEDICINE

## 2021-08-17 PROCEDURE — G8400 PT W/DXA NO RESULTS DOC: HCPCS | Performed by: INTERNAL MEDICINE

## 2021-08-17 PROCEDURE — 4040F PNEUMOC VAC/ADMIN/RCVD: CPT | Performed by: INTERNAL MEDICINE

## 2021-08-17 PROCEDURE — G8417 CALC BMI ABV UP PARAM F/U: HCPCS | Performed by: INTERNAL MEDICINE

## 2021-08-17 RX ORDER — ASPIRIN 81 MG/1
81 TABLET ORAL DAILY
Qty: 90 TABLET | Refills: 3 | Status: SHIPPED
Start: 2021-08-17 | End: 2022-03-14 | Stop reason: SDUPTHER

## 2021-08-17 RX ORDER — CLOPIDOGREL BISULFATE 75 MG/1
75 TABLET ORAL DAILY
Qty: 90 TABLET | Refills: 3 | Status: SHIPPED
Start: 2021-08-17 | End: 2022-03-14 | Stop reason: SDUPTHER

## 2021-08-17 NOTE — PATIENT INSTRUCTIONS
1. Keep medications as is including aspirin and Plavix  2. Blood work as soon as able  3. Echocardiogram and return in January 2022  4.  If any issues or new symptoms in he mean time please call us right away

## 2021-08-17 NOTE — PROGRESS NOTES
[address]   [facility]   Clinic Note     Date:8/17/21   Patient Name:Alise Covington  YOB: 1940  Age: 68 y.o. Primary Care Provider: Raudel Pringle MD    Subjective     Very pleasant 37-year-old  female who returns for follow-up accompanied by her daughter. She is recovering well. She reports that her breathing significantly improved after starting Lasix. She reports no more issues related to her left groin access site. She is finishing her antibiotics. She denies fevers and chills. Her dyspnea is improved. She has occasional lightheadedness when she bends over. She denies lower extremity edema and orthopnea PND. She denies syncope. She denies palpitations. She denies chest discomfort. She is compliant with her medications. A focused history review includes:  1. Moderate aortic stenosis by TTE and AVCS 8/2021  2. Severe left main obstructive CAD  a. Status post Impella-supported, IVUS guided, rotational arthrectomy and PCI of the left main into LAD and circumflex using culotte technique via left femoral access (single access technique) on 6/15/2021  b. Access site cellulitis without abscess treated conservatively  3. CKD 3B with baseline creatinine 1.5-1.6  4. Hypertension  5. Carotid stenosis  6. Substernal thyroid (basis for CABG turndown)  7.  HFpEF      Past History    Past Medical History:         Diagnosis Date    Abscess of groin, left 6/22/2021    Aortic valve disorder     CAD (coronary artery disease)     Cancer (HCC)     glaucoma    Carotid stenosis, right 6/22/2021    Chest pain     Diabetes ([address]     Diverticulosis     Femoral-popliteal atherosclerosis ([address] 7/13/2021    GERD (gastroesophageal reflux disease)     Goiter     Hyperlipidemia     Hypertension     Hypothyroidism     Macular degeneration     Mitral valve disorder     Neuropathy     Osteoporosis     VHD (valvular heart disease)     Vitamin MOUTH TWO TIMES A  tablet 1    JANUVIA 50 MG tablet Take 1 tablet by mouth daily 90 tablet 1    nitroGLYCERIN (NITROSTAT) 0.4 MG SL tablet DISSOLVE ONE TABLET UNDER TONGUE AS NEEDED FOR CHEST PAINS. MAY REPEAT IN 5 MINUTES. IF SYMPTOMS PERSISTS CALL 911 25 tablet 1    Cinnamon 500 MG CAPS Take 1 capsule by mouth daily      vitamin D (CHOLECALCIFEROL) 1000 UNIT TABS tablet Take 1,000 Units by mouth daily      vitamin B-12 (CYANOCOBALAMIN) 100 MCG tablet Take 1,000 mcg by mouth daily        No current facility-administered medications for this visit. Physical Examination      /60   Pulse 77   Resp 12   Ht 5' 2\" (1.575 m)   Wt 161 lb 9.6 oz (73.3 kg)   BMI 29.56 kg/m²   Body surface area is 1.79 meters squared. General: No acute distress, appears as stated age, nonicteric  Head: Atraumatic, no gross abnormalities or bruises  Neck: Supple and nontender, no carotid bruits, borderline JVP  Lungs: Clear to auscultation bilaterally, no wheezes, rales, or rhonchi  Heart: Regular rate and rhythm. 2/6 LUIS URSB, early to mid peaking with preserved S2  Abdomen: Soft, nontender, nondistended, normal bowel sounds  Extremities: No obvious deformities, no cyanosis, no edema  Neurological: Alert and oriented x3, EOMI, moving all extremities x4  Psychological: Normal mood and affect, cooperative  Skin: Color, texture, and turgor normal for age         Labs/Imaging/Diagnostics   Personally reviewed:    Lab Results   Component Value Date     07/31/2021    K 3.6 07/31/2021    K 4.3 06/22/2021     07/31/2021    CO2 26 07/31/2021    BUN 17 07/31/2021    CREATININE 1.6 07/31/2021    GLUCOSE 239 07/31/2021    CALCIUM 9.0 07/31/2021        Estimated Creatinine Clearance: 27 mL/min (A) (based on SCr of 1.6 mg/dL (H)).     Lab Results   Component Value Date    WBC 5.1 07/22/2021    HGB 8.6 (L) 07/22/2021    HCT 28.9 (L) 07/22/2021    .8 (H) 07/22/2021     07/22/2021       Lab Results Component Value Date    ALT 10 07/22/2021    AST 21 07/22/2021    ALKPHOS 64 07/22/2021    BILITOT 0.7 07/22/2021       Lab Results   Component Value Date    LABALBU 3.4 (L) 07/22/2021       Lab Results   Component Value Date    CHOL 182 06/09/2021    CHOL 198 04/06/2021    CHOL 222 (H) 01/06/2021     Lab Results   Component Value Date    TRIG 117 06/09/2021    TRIG 142 04/06/2021    TRIG 180 01/06/2021     Lab Results   Component Value Date    HDL 48 06/09/2021    HDL 49 04/06/2021    HDL 56 01/06/2021     Lab Results   Component Value Date    LDLCHOLESTEROL 129 (H) 01/06/2021    LDLCALC 111 (H) 06/09/2021    LDLCALC 121 (H) 04/06/2021    LDLCALC 110 (H) 09/21/2020     Lab Results   Component Value Date    LABVLDL 23 06/09/2021    LABVLDL 28 04/06/2021    LABVLDL 33 09/21/2020     No results found for: [facility]    Lab Results   Component Value Date    TROPONINI < 0.03 06/08/2021       Lab Results   Component Value Date     (H) 07/31/2021         Lab Results   Component Value Date    LABA1C 6.5 (H) 07/31/2021     Lab Results   Component Value Date     07/31/2021         Assessment and Plan:        74-year-old  female with a history above. Her volume status is very acceptable today. Her blood pressure is excellent. She is NYHA class 2. She has no angina. Is interested in cardiac rehab. She has no symptoms of arrhythmia. Her aortic stenosis is moderate. Diagnosis Orders   1. Moderate to severe aortic stenosis  EKG 12 Lead   2. Left main coronary artery disease  BASIC METABOLIC PANEL    BRAIN NATRIURETIC PEPTIDE    CBC WITH AUTO DIFFERENTIAL    Ambulatory Referral to Cardiac Rehab   3. S/P drug eluting coronary stent placement  ECHO LIMITED   4. Stage 3b chronic kidney disease ([facility]     5.  Chronic heart failure with preserved ejection fraction (HFpEF) ([facility])           Continue aspirin and clopidogrel long-term   Continue high intensity rosuvastatin   Continue furosemide 20/40

## 2021-08-19 ENCOUNTER — TELEPHONE (OUTPATIENT)
Dept: FAMILY MEDICINE CLINIC | Age: 81
End: 2021-08-19

## 2021-08-19 NOTE — TELEPHONE ENCOUNTER
Berta Head is calling to ask if you know if there is a cardiac rehab in [place]? The cardiologist wants her to have rehab, but she would like to go to one closer to her.

## 2021-08-20 ENCOUNTER — TELEPHONE (OUTPATIENT)
Dept: CARDIOLOGY CLINIC | Age: 81
End: 2021-08-20

## 2021-08-20 NOTE — TELEPHONE ENCOUNTER
Pt requesting a place closer to her in [place] for cardiac rehab. Please advise and if you know of any.

## 2021-08-27 ENCOUNTER — TELEPHONE (OUTPATIENT)
Dept: CARDIOLOGY CLINIC | Age: 81
End: 2021-08-27

## 2021-08-27 NOTE — TELEPHONE ENCOUNTER
PT request [facility] for rehab phone # . I spoke  to that department  Faxed order to Louie Queen at [phone]. Confirmed receipt and that insurance is excepted.      I spoke to Micki let her know they would be calling to get her scheduled

## 2021-08-30 ENCOUNTER — OFFICE VISIT (OUTPATIENT)
Dept: FAMILY MEDICINE CLINIC | Age: 81
End: 2021-08-30
Payer: MEDICARE

## 2021-08-30 VITALS
WEIGHT: 156.6 LBS | DIASTOLIC BLOOD PRESSURE: 60 MMHG | OXYGEN SATURATION: 99 % | HEART RATE: 78 BPM | SYSTOLIC BLOOD PRESSURE: 114 MMHG | HEIGHT: 62 IN | TEMPERATURE: 97.3 F | BODY MASS INDEX: 28.82 KG/M2

## 2021-08-30 DIAGNOSIS — I35.0 AORTIC VALVE STENOSIS, UNSPECIFIED ETIOLOGY: ICD-10-CM

## 2021-08-30 DIAGNOSIS — I50.30 HEART FAILURE WITH PRESERVED EJECTION FRACTION, UNSPECIFIED HF CHRONICITY (HCC): Primary | ICD-10-CM

## 2021-08-30 DIAGNOSIS — I10 ESSENTIAL HYPERTENSION: ICD-10-CM

## 2021-08-30 DIAGNOSIS — N18.9 CHRONIC KIDNEY DISEASE, UNSPECIFIED CKD STAGE: ICD-10-CM

## 2021-08-30 DIAGNOSIS — E11.9 TYPE 2 DIABETES MELLITUS WITHOUT COMPLICATION, UNSPECIFIED WHETHER LONG TERM INSULIN USE (HCC): ICD-10-CM

## 2021-08-30 DIAGNOSIS — I25.10 CORONARY ARTERY DISEASE INVOLVING NATIVE CORONARY ARTERY OF NATIVE HEART WITHOUT ANGINA PECTORIS: ICD-10-CM

## 2021-08-30 PROCEDURE — 1036F TOBACCO NON-USER: CPT | Performed by: INTERNAL MEDICINE

## 2021-08-30 PROCEDURE — 4040F PNEUMOC VAC/ADMIN/RCVD: CPT | Performed by: INTERNAL MEDICINE

## 2021-08-30 PROCEDURE — G8417 CALC BMI ABV UP PARAM F/U: HCPCS | Performed by: INTERNAL MEDICINE

## 2021-08-30 PROCEDURE — G8400 PT W/DXA NO RESULTS DOC: HCPCS | Performed by: INTERNAL MEDICINE

## 2021-08-30 PROCEDURE — 1090F PRES/ABSN URINE INCON ASSESS: CPT | Performed by: INTERNAL MEDICINE

## 2021-08-30 PROCEDURE — 1123F ACP DISCUSS/DSCN MKR DOCD: CPT | Performed by: INTERNAL MEDICINE

## 2021-08-30 PROCEDURE — G8427 DOCREV CUR MEDS BY ELIG CLIN: HCPCS | Performed by: INTERNAL MEDICINE

## 2021-08-30 PROCEDURE — 99214 OFFICE O/P EST MOD 30 MIN: CPT | Performed by: INTERNAL MEDICINE

## 2021-08-30 NOTE — PROGRESS NOTES
[facility]     [address]  Capri Covington : 1940 Sex: female  Age: 68 y.o. Chief Complaint   Patient presents with    Hypertension     1 month       HPI  Lory Pizarro presents today for follow-up visit on her medical problems. She is looking quite a bit better than she has in the last couple visits. She has diuresed off about 14 pounds in the last 6 weeks. She has seen cardiology/Dr. Lakhani recently and I reviewed his note. Apparently they are holding off on valve surgery at this time and he wants to check another echo in about 6 months and follow-up on this. She is breathing easier. No further chest discomfort. Her swelling is down. She is currently on furosemide 20 mg alternating with 40 mg. This is since the last visit. Unfortunately her creatinine is up to 1.7. I told her I am checking another number today as the most recent labs done through cardiology showed her potassium down to 3.3 as well. I will check a BMP and possibly adjust her furosemide down to 20 mg daily depending on her numbers. She tells me blood pressures have been in a good range. Her blood sugars off of her Metformin because of her renal function have been in the 1 20-1 70 range she states. She is currently still on Januvia and glipizide for her diabetes. She was also taken off of Actos because of her congestive heart failure. And Actonel because of her kidney function.   Her lipids have been stable on statin medication  Her eyes and feet are up-to-date with her diabetes.  No retinopathy no foot sores  She is up-to-date with consultants including renal for chronic kidney disease, ophthalmology, endocrine for her thyroid, cardiology for her aortic stenosis and recent left main stent placement and previously with ear nose and throat who recommended no thyroid biopsy at that point but simply ultrasound follow-up.           Review of Systems     Constitutional: Negative for activity change, appetite change, chills, diaphoresis, fatigue, fever and unexpected weight change. HENT: Negative for congestion, ear pain, hearing loss, postnasal drip, rhinorrhea and sinus pain.    Respiratory: Negative for  wheezing. Cough and shortness of breath improved   cardiovascular: Negative for chest pain, palpitations and leg swelling. Gastrointestinal: Negative for abdominal pain, blood in stool, constipation, diarrhea, nausea and vomiting.          Endocrine:  Type 2 diabetes, goiter   Genitourinary: Negative for difficulty urinating, dysuria, frequency, hematuria and urgency. Musculoskeletal: Positive for arthralgias. Negative for back pain, gait problem and myalgias.        She was complaining of muscle pain prior to stopping the Actonel-did not tolerate oral bisphosphonates-did wish to try Actonel 1 more time for now-was successful-currently holding because of kidney function  Skin: No open wounds   allergic/Immunologic: Negative for environmental allergies and immunocompromised state. Neurological: Negative for dizziness (improved after cardiac stenting), weakness, light-headedness, numbness and headaches. Hematological: Negative. Endocrine: Type 2 diabetes-currently holding Metformin due to renal function, and Actos due to congestive heart failure         REST OF PERTINENT ROS GONE OVER AND WAS NEGATIVE.      PMH:  Problem List: Knee pain, Non-toxic nodular goiter, Essential hypertension, Goiter, Hyperlipidemia, Benign  essential hypertension, Type II diabetes mellitus uncontrolled  Health Maintenance:  Bone Density Test Screening - (12/3/2018)  Bone Density Scan - (12/3/2018)  Influenza Vaccination - (10/29/2018)  Colonoscopy - (7/25/2018)  7/18-due 23  Colonoscopy Screening - (7/25/2018)  Couseled on Home Safety - (6/13/2018)  Pneumonia Vaccination - (2/5/2018)  Mammogram - (12/20/2012)  Mammogram Screening - (12/20/2012)  Declining further  Tetanus Immunization - (1/18/2017)  Rectal Exam - 4/10,4/11  Breast Exam - 4/10,  Hemmocult Cards - -neg x 3,-neg x3, -neg x 3  EKG - ,, 5/15, 3/17,,3/18  2D ECHO -  ,   Carotid Artery Study - -30-49% occlusion bilateral,   Stress Test - -neg,3/18-pos  Zoster/Shingles Vaccine -   Medical Problems:  Non Insulin Dependent Diabetes, Hypertension, Hyperlipidemia  Goiter - multinodular-bx neg-dr hooper  GERD, Hypothyroidism  Osteoporosis - Bisphosphonate intolerant  Glaucoma, Renal Insufficiency  Aortic And Mitral Valve Disorders - 2D echo-  VHD  Pulmonary HTN - mild  Macular Degeneration  WBCs in urine - Workup by urology-  Coronary Artery Disease (CAD) - Positive stress test 3/18-heart cath--left main disease-stents  Diverticulosis, Diabetic Neuropathy, Vitamin B12 deficiency  Hospitalization for infected left groin cath site-  HFpEF      Follows with - Cardiology, nephrology, ophthalmology, endocrine  Surgical Hx:  AKIN-right salpingo-oophorectomy - Fibroids  Appendectomy  Right cataract surgery - left also  Left knee arthroscopy, Removal of Gallbladder  Cardiac catheterization-severe left main disease-atherectomy/PCI/stents     FH: Father:  MI.  Mother:  Cerebrovascular Accident (CVA). Brother 1:  Coronary Artery Disease (CAD) - 52's. 3 other brothers, 1 sister   SH:  Marital: . Personal Habits: Cigarette Use: Negative For current cigarette smoker. Alcohol: does not use  alcohol. 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                Current Outpatient Medications:     Handicap Placard MISC, by Does not apply route Duration: 5 years, Disp: 1 each, Rfl: 0    clopidogrel (PLAVIX) 75 MG tablet, Take 1 tablet by mouth daily, Disp: 90 tablet, Rfl: 3    aspirin EC 81 MG EC tablet, Take 1 tablet by mouth daily, Disp: 90 tablet, Rfl: 3    furosemide (LASIX) 20 MG tablet, 1 pill po alternating with 2 pills daily, Disp: 135 tablet, Rfl: 1    magnesium cl-calcium carbonate (NU-MAG) 71.5-119 MG TBEC tablet, TAKE TWO TABLETS BY MOUTH DAILY (Patient taking differently: Take 1 tablet by mouth daily ), Disp: 180 tablet, Rfl: 1    rosuvastatin (CRESTOR) 20 MG tablet, Take 1 tablet by mouth nightly, Disp: 30 tablet, Rfl: 3    carvedilol (COREG) 6.25 MG tablet, Take 1 tablet by mouth 2 times daily (with meals), Disp: 60 tablet, Rfl: 3    amLODIPine (NORVASC) 5 MG tablet, Take 1 tablet by mouth daily, Disp: 30 tablet, Rfl: 3    glipiZIDE (GLUCOTROL XL) 10 MG extended release tablet, TAKE ONE TABLET BY MOUTH TWO TIMES A DAY, Disp: 180 tablet, Rfl: 1    JANUVIA 50 MG tablet, Take 1 tablet by mouth daily, Disp: 90 tablet, Rfl: 1    nitroGLYCERIN (NITROSTAT) 0.4 MG SL tablet, DISSOLVE ONE TABLET UNDER TONGUE AS NEEDED FOR CHEST PAINS. MAY REPEAT IN 5 MINUTES.  IF SYMPTOMS PERSISTS CALL 911, Disp: 25 tablet, Rfl: 1    Cinnamon 500 MG CAPS, Take 1 capsule by mouth daily, Disp: , Rfl:     vitamin D (CHOLECALCIFEROL) 1000 UNIT TABS tablet, Take 1,000 Units by mouth daily, Disp: , Rfl:     vitamin B-12 (CYANOCOBALAMIN) 100 MCG tablet, Take 1,000 mcg by mouth daily , Disp: , Rfl:   Allergies   Allergen Reactions    Naproxen     Penicillins        Past Medical History:   Diagnosis Date    Abscess of groin, left 6/22/2021    Aortic valve disorder     CAD (coronary artery disease)     Cancer (HCC)     glaucoma    Carotid stenosis, right 6/22/2021    Chest pain     Diabetes ([address]     Diverticulosis     Femoral-popliteal atherosclerosis ([address] 7/13/2021    GERD (gastroesophageal reflux disease)     Goiter     Hyperlipidemia     Hypertension     Hypothyroidism     Macular degeneration     Mitral valve disorder     Neuropathy     Osteoporosis     VHD (valvular heart disease)     Vitamin B12 deficiency      Past Surgical History:   Procedure Laterality Date    APPENDECTOMY      BREAST BIOPSY Right     neg    CARDIAC CATHETERIZATION  06/11/2021    Dr Sheila Friedman  Sexually Abused:        Vitals:    08/30/21 1620   BP: 114/60   Pulse: 78   Temp: 97.3 °F (36.3 °C)   TempSrc: Temporal   SpO2: 99%   Weight: 156 lb 9.6 oz (71 kg)   Height: 5' 2\" (1.575 m)       Physical Exam    Constitutional: She is oriented to person, place, and time. She appears well-developed and well-nourished. No distress.    Neck: Normal range of motion. Neck supple. Thyromegaly present. Positive for goiter   No JVD noted. Cardiovascular: Normal rate, regular rhythm and intact distal pulses. Exam reveals no gallop and no friction rub.   Murmur heard. Patient's edema improved to trace pulmonary/Chest: No respiratory distress. She has no wheezes.  Scattered rhonchi in the lower fields bilaterally.  Appears to have good air exchange today  Abdominal: Soft. Bowel sounds are normal. She exhibits no distension and no mass. There is no tenderness.   Musculoskeletal: Normal range of motion. Neurological: She is alert and oriented to person, place, and time. She displays normal reflexes. No sensory deficit. She exhibits normal muscle tone. Coordination normal.   Skin: Skin is warm and dry. No rash noted. No erythema.      Psychiatric: Mood and affect normal  Nursing note and vitals reviewed           Assessment and Plan: Rudy Cifuentes was seen today for hypertension. Diagnoses and all orders for this visit:    Heart failure with preserved ejection fraction, unspecified HF chronicity ([facility] [address]  -     Cancel: Basic Metabolic Panel; Future  -     Basic Metabolic Panel;  Future  Stable on current medication/ diuretics     Aortic valve stenosis, unspecified etiology  Stable and cardiology following-echo in another 6 months    Coronary artery disease involving native coronary artery of native heart without angina pectoris  Stable with recent PCI to left main    Chronic kidney disease, unspecified CKD stage  Slightly worsened recently on higher dose diuretic    Type 2 diabetes mellitus without complication, unspecified whether long term insulin use ([facility] [address]  Stable currently off of her Metformin and Actos    Essential hypertension  Stable on current antihypertensive medication    Other orders  -     Handicap Placard MISC; by Does not apply route Duration: 5 years    Plan: I will see Alida Prince back in 1 month and as needed. Check a BMP on her today for her kidney function and potassium. Continue to monitor blood pressure and blood sugars closely. We will check the rest of her labs on her next month when I see her. Continue to follow with above consultants. Notify us of problems in the interim. Return in about 1 month (around 9/30/2021). Seen By:  Abebe Mcguire MD      *Document was created using voice recognition software. Note was reviewed however may contain grammatical errors.

## 2021-09-01 ENCOUNTER — TELEPHONE (OUTPATIENT)
Dept: FAMILY MEDICINE CLINIC | Age: 81
End: 2021-09-01

## 2021-09-01 DIAGNOSIS — N28.9 RENAL INSUFFICIENCY: Primary | ICD-10-CM

## 2021-09-01 NOTE — TELEPHONE ENCOUNTER
Called patient and notified her that her Kidney function has worsened. Doc wants her to attempt to go back to 20 mg of Lasix daily repeat BMP in 1 week. Patient verbalized understanding.

## 2021-09-07 ENCOUNTER — TELEPHONE (OUTPATIENT)
Dept: FAMILY MEDICINE CLINIC | Age: 81
End: 2021-09-07

## 2021-09-07 DIAGNOSIS — N28.9 RENAL INSUFFICIENCY: Primary | ICD-10-CM

## 2021-09-08 NOTE — TELEPHONE ENCOUNTER
Although her kidney function has improved slightly still not back to baseline. If she is currently on 20 mg Lasix daily have her try to go 20 mg every other day and see how she tolerates this. BMP in 2 weeks.

## 2021-09-13 NOTE — TELEPHONE ENCOUNTER
Called and notified patient that Although her kidney function has improved slightly still not back to baseline. advised her try to go to 20 mg every other day instead of daily and repeat BMP in 2 weeks. Patient verbalized understanding.

## 2021-09-27 ENCOUNTER — OFFICE VISIT (OUTPATIENT)
Dept: FAMILY MEDICINE CLINIC | Age: 81
End: 2021-09-27
Payer: MEDICARE

## 2021-09-27 VITALS
TEMPERATURE: 98 F | HEIGHT: 62 IN | DIASTOLIC BLOOD PRESSURE: 52 MMHG | HEART RATE: 81 BPM | SYSTOLIC BLOOD PRESSURE: 116 MMHG | WEIGHT: 156.8 LBS | OXYGEN SATURATION: 97 % | BODY MASS INDEX: 28.85 KG/M2

## 2021-09-27 DIAGNOSIS — I10 ESSENTIAL HYPERTENSION: ICD-10-CM

## 2021-09-27 DIAGNOSIS — N28.9 RENAL INSUFFICIENCY: ICD-10-CM

## 2021-09-27 DIAGNOSIS — E11.40 TYPE 2 DIABETES MELLITUS WITH DIABETIC NEUROPATHY, WITHOUT LONG-TERM CURRENT USE OF INSULIN (HCC): ICD-10-CM

## 2021-09-27 DIAGNOSIS — N18.9 CHRONIC KIDNEY DISEASE, UNSPECIFIED CKD STAGE: ICD-10-CM

## 2021-09-27 DIAGNOSIS — I50.30 HEART FAILURE WITH PRESERVED EJECTION FRACTION, UNSPECIFIED HF CHRONICITY (HCC): ICD-10-CM

## 2021-09-27 DIAGNOSIS — I35.0 AORTIC VALVE STENOSIS, UNSPECIFIED ETIOLOGY: ICD-10-CM

## 2021-09-27 DIAGNOSIS — I25.10 CORONARY ARTERY DISEASE INVOLVING NATIVE CORONARY ARTERY OF NATIVE HEART WITHOUT ANGINA PECTORIS: ICD-10-CM

## 2021-09-27 PROCEDURE — G8427 DOCREV CUR MEDS BY ELIG CLIN: HCPCS | Performed by: INTERNAL MEDICINE

## 2021-09-27 PROCEDURE — 1123F ACP DISCUSS/DSCN MKR DOCD: CPT | Performed by: INTERNAL MEDICINE

## 2021-09-27 PROCEDURE — G8417 CALC BMI ABV UP PARAM F/U: HCPCS | Performed by: INTERNAL MEDICINE

## 2021-09-27 PROCEDURE — 1036F TOBACCO NON-USER: CPT | Performed by: INTERNAL MEDICINE

## 2021-09-27 PROCEDURE — G0008 ADMIN INFLUENZA VIRUS VAC: HCPCS | Performed by: INTERNAL MEDICINE

## 2021-09-27 PROCEDURE — 90694 VACC AIIV4 NO PRSRV 0.5ML IM: CPT | Performed by: INTERNAL MEDICINE

## 2021-09-27 PROCEDURE — 99214 OFFICE O/P EST MOD 30 MIN: CPT | Performed by: INTERNAL MEDICINE

## 2021-09-27 PROCEDURE — 4040F PNEUMOC VAC/ADMIN/RCVD: CPT | Performed by: INTERNAL MEDICINE

## 2021-09-27 PROCEDURE — 1090F PRES/ABSN URINE INCON ASSESS: CPT | Performed by: INTERNAL MEDICINE

## 2021-09-27 PROCEDURE — G8400 PT W/DXA NO RESULTS DOC: HCPCS | Performed by: INTERNAL MEDICINE

## 2021-09-27 RX ORDER — GLIPIZIDE 10 MG/1
TABLET, FILM COATED, EXTENDED RELEASE ORAL
Qty: 180 TABLET | Refills: 1 | Status: SHIPPED
Start: 2021-09-27 | End: 2022-04-04 | Stop reason: SDUPTHER

## 2021-09-27 RX ORDER — MAGNESIUM CHLORIDE 71.5 MG
TABLET, DELAYED RELEASE (ENTERIC COATED) ORAL
Qty: 180 TABLET | Refills: 1 | Status: SHIPPED
Start: 2021-09-27 | End: 2022-04-04 | Stop reason: SDUPTHER

## 2021-09-27 NOTE — PROGRESS NOTES
[id number]     21  Pako Covington : 1940 Sex: female  Age: 68 y.o. Chief Complaint   Patient presents with    Hypertension     1 month    Diabetes       HPI  Patient presents today for 1 month follow-up visit on her medical problems. Reviewed last note. We did make some adjustments in her diuretic based on her renal function recently. I do have her down now to 20 mg Lasix every other day which she is tolerating well and swelling staying down for her. No cardiopulmonary symptoms at this time. She has going to be starting cardiac rehab in the next week. She went today for preliminary initial evaluation. Cardiology is planning 2D echo in January timeframe to reassess her aortic valve. If held off on any surgical intervention at this time. Patient has had no chest pain no syncope or presyncope. No shortness of breath. Shows me numbers today for blood pressure and blood sugars which look good. She is only taking the Januvia and glipizide for diabetes currently has been taken off Actos because of CHF and Metformin because of renal insufficiency. Her eyes and feet are up-to-date with her diabetes.  No retinopathy no foot sores  She is up-to-date with consultants including renal for chronic kidney disease, ophthalmology, endocrine for her thyroid, cardiology for her aortic stenosis and recent left main stent placement and previously with ear nose and throat who recommended no thyroid biopsy at that point but simply ultrasound follow-up.            Review of Systems     Constitutional: Negative for activity change, appetite change, chills, diaphoresis, fatigue, fever and unexpected weight change. HENT: Negative for congestion, ear pain, hearing loss, postnasal drip, rhinorrhea and sinus pain.    Respiratory: Negative for  wheezing. Cough and shortness of breath improved   cardiovascular: Negative for chest pain, palpitations and leg swelling.    Gastrointestinal: Negative for abdominal pain, blood in stool, constipation, diarrhea, nausea and vomiting.          Endocrine:  Type 2 diabetes, goiter   Genitourinary: Negative for difficulty urinating, dysuria, frequency, hematuria and urgency. Musculoskeletal: Positive for arthralgias. Negative for back pain, gait problem and myalgias.        She was complaining of muscle pain prior to stopping the Actonel-did not tolerate oral bisphosphonates-did wish to try Actonel 1 more time for now-was successful-currently holding because of kidney function  Skin: No open wounds   allergic/Immunologic: Negative for environmental allergies and immunocompromised state. Neurological: Negative for dizziness (improved after cardiac stenting), weakness, light-headedness, numbness and headaches. Hematological: Negative. Endocrine: Type 2 diabetes-currently holding Metformin due to renal function, and Actos due to congestive heart failure         REST OF PERTINENT ROS GONE OVER AND WAS NEGATIVE.    PMH:  Problem List: Knee pain, Non-toxic nodular goiter, Essential hypertension, Goiter, Hyperlipidemia, Benign  essential hypertension, Type II diabetes mellitus uncontrolled  Health Maintenance:  Bone Density Test Screening - (12/3/2018)  Bone Density Scan - (12/3/2018)  Influenza Vaccination - (10/29/2018)  Colonoscopy - (7/25/2018)  7/18-due 23  Colonoscopy Screening - (7/25/2018)  Couseled on Home Safety - (6/13/2018)  Pneumonia Vaccination - (2/5/2018)  Mammogram - (12/20/2012)  Mammogram Screening - (12/20/2012)  Declining further  Tetanus Immunization - (1/18/2017)  Rectal Exam - 4/10,4/11  Breast Exam - 4/10,4/11  Hemmocult Cards - 9/12-neg x 3,5/14-neg x3, 6/16-neg x 3  EKG - 5/11,, 5/15, 3/17,5/17,3/18  2D ECHO - 1/17 , 1/18  Carotid Artery Study - 1/17-30-49% occlusion bilateral, 2/18  Stress Test - 4/17-neg,3/18-pos  Zoster/Shingles Vaccine - 2016  Medical Problems:  Non Insulin Dependent Diabetes, Hypertension, Hyperlipidemia  Goiter - multinodular-bx neg-dr hooper  GERD, Hypothyroidism  Osteoporosis - Bisphosphonate intolerant  Glaucoma, Renal Insufficiency  Aortic And Mitral Valve Disorders - 2D echo-  VHD  Pulmonary HTN - mild  Macular Degeneration  WBCs in urine - Workup by urology-  Coronary Artery Disease (CAD) - Positive stress test 3/18-heart cath--left main disease-stents  Diverticulosis, Diabetic Neuropathy, Vitamin B12 deficiency  Hospitalization for infected left groin cath site-  HFpEF      Follows with - Cardiology, nephrology, ophthalmology, endocrine  Surgical Hx:  AKIN-right salpingo-oophorectomy - Fibroids  Appendectomy  Right cataract surgery - left also  Left knee arthroscopy, Removal of Gallbladder  Cardiac catheterization-severe left main disease-atherectomy/PCI/stents     FH: Father:  MI.  Mother:  Cerebrovascular Accident (CVA). Brother 1:  Coronary Artery Disease (CAD) - 52's. 3 other brothers, 1 sister   SH:  Marital: . Personal Habits: Cigarette Use: Negative For current cigarette smoker. Alcohol: does not use  alcohol. 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                    Current Outpatient Medications:     magnesium cl-calcium carbonate (NU-MAG) 71.5-119 MG TBEC tablet, TAKE TWO TABLETS BY MOUTH DAILY, Disp: 180 tablet, Rfl: 1    glipiZIDE (GLUCOTROL XL) 10 MG extended release tablet, TAKE ONE TABLET BY MOUTH TWO TIMES A DAY, Disp: 180 tablet, Rfl: 1    Handicap Placard MISC, by Does not apply route Duration: 5 years, Disp: 1 each, Rfl: 0    clopidogrel (PLAVIX) 75 MG tablet, Take 1 tablet by mouth daily, Disp: 90 tablet, Rfl: 3    aspirin EC 81 MG EC tablet, Take 1 tablet by mouth daily, Disp: 90 tablet, Rfl: 3    furosemide (LASIX) 20 MG tablet, 1 pill po alternating with 2 pills daily, Disp: 135 tablet, Rfl: 1    rosuvastatin (CRESTOR) 20 MG tablet, Take 1 tablet by mouth nightly, Disp: 30 tablet, Rfl: 3    carvedilol (COREG) 6.25 MG tablet, Take 1 tablet by mouth 2 times daily (with meals), Disp: 60 tablet, Rfl: 3    amLODIPine (NORVASC) 5 MG tablet, Take 1 tablet by mouth daily, Disp: 30 tablet, Rfl: 3    JANUVIA 50 MG tablet, Take 1 tablet by mouth daily, Disp: 90 tablet, Rfl: 1    nitroGLYCERIN (NITROSTAT) 0.4 MG SL tablet, DISSOLVE ONE TABLET UNDER TONGUE AS NEEDED FOR CHEST PAINS. MAY REPEAT IN 5 MINUTES.  IF SYMPTOMS PERSISTS CALL 911, Disp: 25 tablet, Rfl: 1    Cinnamon 500 MG CAPS, Take 1 capsule by mouth daily, Disp: , Rfl:     vitamin D (CHOLECALCIFEROL) 1000 UNIT TABS tablet, Take 1,000 Units by mouth daily, Disp: , Rfl:     vitamin B-12 (CYANOCOBALAMIN) 100 MCG tablet, Take 1,000 mcg by mouth daily , Disp: , Rfl:   Allergies   Allergen Reactions    Naproxen     Penicillins        Past Medical History:   Diagnosis Date    Abscess of groin, left 6/22/2021    Aortic valve disorder     CAD (coronary artery disease)     Cancer (HCC)     glaucoma    Carotid stenosis, right 6/22/2021    Chest pain     Diabetes ([address]     Diverticulosis     Femoral-popliteal atherosclerosis ([address] 7/13/2021    GERD (gastroesophageal reflux disease)     Goiter     Hyperlipidemia     Hypertension     Hypothyroidism     Macular degeneration     Mitral valve disorder     Neuropathy     Osteoporosis     VHD (valvular heart disease)     Vitamin B12 deficiency      Past Surgical History:   Procedure Laterality Date    APPENDECTOMY      BREAST BIOPSY Right     neg    CARDIAC CATHETERIZATION  06/11/2021    Dr Murali Peña  06/15/2021    Dr Celia Paget - Jose 3.5 x 18 LM to LAD and 4.0 x18 Prox CX    EYE SURGERY Bilateral     cataract    HYSTERECTOMY      KNEE ARTHROPLASTY Left     TOOTH EXTRACTION      TRANSESOPHAGEAL ECHOCARDIOGRAM  06/10/2021    Dr Braulio Calle     Family History   Problem Relation Age of Onset    Stroke Mother     Heart Disease Father 79        MI    Heart Attack Father     Heart Disease Brother     Coronary Art Dis Brother     Stroke Brother     Stroke Brother      Social History     Socioeconomic History    Marital status:      Spouse name: Not on file    Number of children: Not on file    Years of education: Not on file    Highest education level: Not on file   Occupational History    Not on file   Tobacco Use    Smoking status: Never Smoker    Smokeless tobacco: Never Used   Vaping Use    Vaping Use: Never used   Substance and Sexual Activity    Alcohol use: Not Currently    Drug use: Never    Sexual activity: Not on file   Other Topics Concern    Not on file   Social History Narrative    Not on file     Social Determinants of Health     Financial Resource Strain:     Difficulty of Paying Living Expenses:    Food Insecurity:     Worried About Running Out of Food in the Last Year:     [address] in the Last Year:    Transportation Needs:     Lack of Transportation (Medical):  Lack of Transportation (Non-Medical):    Physical Activity:     Days of Exercise per Week:     Minutes of Exercise per Session:    Stress:     Feeling of Stress :    Social Connections:     Frequency of Communication with Friends and Family:     Frequency of Social Gatherings with Friends and Family:     Attends Confucianism Services:     Active Member of Clubs or Organizations:     Attends Club or Organization Meetings:     Marital Status:    Intimate Partner Violence:     Fear of Current or Ex-Partner:     Emotionally Abused:     Physically Abused:     Sexually Abused:        Vitals:    09/27/21 1334   BP: (!) 116/52   Pulse: 81   Temp: 98 °F (36.7 °C)   TempSrc: Temporal   SpO2: 97%   Weight: 156 lb 12.8 oz (71.1 kg)   Height: 5' 2\" (1.575 m)       Physical Exam    Constitutional: She is oriented to person, place, and time.  She appears well-developed and well-nourished. No distress.    Neck: Normal range of motion. Neck supple. Thyromegaly present. Positive for goiter   No JVD noted. Cardiovascular: Normal rate, regular rhythm and intact distal pulses. Exam reveals no gallop and no friction rub.   Murmur heard. Patient's edema improved to trace pulmonary/Chest: No respiratory distress. She has no wheezes Or rhonchi.  Appears to have good air exchange today  Abdominal: Soft. Bowel sounds are normal. She exhibits no distension and no mass. There is no tenderness.   Musculoskeletal: Normal range of motion. Neurological: She is alert and oriented to person, place, and time. She displays normal reflexes. No sensory deficit. She exhibits normal muscle tone. Coordination normal.   Skin: Skin is warm and dry. No rash noted. No erythema.      Psychiatric: Mood and affect normal  Nursing note and vitals reviewed           Assessment and Plan: Devon Barton was seen today for hypertension and diabetes. Diagnoses and all orders for this visit:    Type 2 diabetes mellitus with diabetic neuropathy, without long-term current use of insulin ([facility])  -     magnesium cl-calcium carbonate (NU-MAG) 71.5-119 MG TBEC tablet; TAKE TWO TABLETS BY MOUTH DAILY  -     glipiZIDE (GLUCOTROL XL) 10 MG extended release tablet; TAKE ONE TABLET BY MOUTH TWO TIMES A DAY  -     Lipid Panel; Future  Stable on current Januvia and glipizide    Aortic valve stenosis, unspecified etiology  Currently monitoring with upcoming 2D echo in January stable. Renal insufficiency  Some worsening with recent diuresis-continue to monitor with decreasing dose. Heart failure with preserved ejection fraction, unspecified HF chronicity ([facility])  Stable. Chronic kidney disease, unspecified CKD stage    Essential hypertension  -     Basic Metabolic Panel; Future  -     Magnesium; Future  Stable on current antihypertensive medicine    Coronary artery disease involving native coronary artery of native heart without angina pectoris  -     Lipid Panel;  Future  Stable and follows with cardiology. Hyperlipidemia  Stable on statin medication    Other orders  -     INFLUENZA, QUADV, ADJUVANTED, 65 YRS =, IM, PF, PREFILL SYR, 0.5ML (FLUAD)    Plan: I will see patient back in 3 months or as needed. Blood work at the [facility] fasting to monitor disease progression and medication use. Continue to follow with above specialist.  Prescription management performed. Again she is on Lasix 20 mg every other day depending on kidney numbers may bump her back up to once a day but actually she is feeling quite well on current dose. Cardiac rehab upcoming. Fall precautions. Continue to monitor blood pressure and blood sugar closely. Notify us of problems in the interim. No follow-ups on file. Seen By:  Andrey Short MD      *Document was created using voice recognition software. Note was reviewed however may contain grammatical errors.

## 2021-09-29 ENCOUNTER — TELEPHONE (OUTPATIENT)
Dept: FAMILY MEDICINE CLINIC | Age: 81
End: 2021-09-29

## 2021-09-29 NOTE — TELEPHONE ENCOUNTER
Kidney function continues to show improvement with backing down on the Lasix. We will continue same for now. If increasing shortness of breath or swelling occurs to let us know.

## 2021-09-30 NOTE — TELEPHONE ENCOUNTER
Patient informed of below lab results. .     Kidney function continues to show improvement with backing down on the Lasix. We will continue same for now. If increasing shortness of breath or swelling occurs to let us know.

## 2021-10-08 RX ORDER — CARVEDILOL 6.25 MG/1
TABLET ORAL
Qty: 60 TABLET | Refills: 0 | Status: SHIPPED
Start: 2021-10-08 | End: 2021-11-08

## 2021-10-08 RX ORDER — AMLODIPINE BESYLATE 5 MG/1
TABLET ORAL
Qty: 30 TABLET | Refills: 0 | Status: SHIPPED
Start: 2021-10-08 | End: 2021-11-08

## 2021-10-15 LAB — DIABETIC RETINOPATHY: NEGATIVE

## 2021-10-15 RX ORDER — SITAGLIPTIN 50 MG/1
50 TABLET, FILM COATED ORAL DAILY
Qty: 90 TABLET | Refills: 1 | Status: SHIPPED
Start: 2021-10-15 | End: 2022-04-04 | Stop reason: SDUPTHER

## 2021-10-15 RX ORDER — ROSUVASTATIN CALCIUM 20 MG/1
20 TABLET, COATED ORAL NIGHTLY
Qty: 30 TABLET | Refills: 1 | Status: SHIPPED
Start: 2021-10-15 | End: 2021-12-15 | Stop reason: SDUPTHER

## 2021-10-15 NOTE — TELEPHONE ENCOUNTER
Name of Medication(s) Requested:  Januvia & Rosuvastatin    Pharmacy Requested:   Multiple Pharmacies Pended    Medication(s) pended? [x] Yes  [] No    Last Appointment:  9/27/2021    Future appts:  Future Appointments   Date Time Provider Dioni Menesesi   12/15/2021  2:15 PM MD TITI Talbot [facility]   1/24/2022 11:00 AM [facility]   1/24/2022 12:30 PM Mack Meier MD [facility]   7/13/2023  1:30 PM John Paul Henry MD [facility]          Does patient need call back?   [] Yes  [x] No

## 2021-11-08 RX ORDER — AMLODIPINE BESYLATE 5 MG/1
TABLET ORAL
Qty: 30 TABLET | Refills: 3 | Status: SHIPPED
Start: 2021-11-08 | End: 2022-03-07

## 2021-11-08 RX ORDER — CARVEDILOL 6.25 MG/1
TABLET ORAL
Qty: 60 TABLET | Refills: 3 | Status: SHIPPED
Start: 2021-11-08 | End: 2022-03-14

## 2021-12-15 ENCOUNTER — OFFICE VISIT (OUTPATIENT)
Dept: FAMILY MEDICINE CLINIC | Age: 81
End: 2021-12-15
Payer: MEDICARE

## 2021-12-15 VITALS
BODY MASS INDEX: 28.21 KG/M2 | SYSTOLIC BLOOD PRESSURE: 126 MMHG | DIASTOLIC BLOOD PRESSURE: 60 MMHG | WEIGHT: 153.3 LBS | TEMPERATURE: 97.2 F | OXYGEN SATURATION: 98 % | HEIGHT: 62 IN | HEART RATE: 50 BPM

## 2021-12-15 DIAGNOSIS — E55.9 VITAMIN D DEFICIENCY: ICD-10-CM

## 2021-12-15 DIAGNOSIS — I25.10 CORONARY ARTERY DISEASE INVOLVING NATIVE CORONARY ARTERY OF NATIVE HEART WITHOUT ANGINA PECTORIS: ICD-10-CM

## 2021-12-15 DIAGNOSIS — E11.40 TYPE 2 DIABETES MELLITUS WITH DIABETIC NEUROPATHY, WITHOUT LONG-TERM CURRENT USE OF INSULIN (HCC): ICD-10-CM

## 2021-12-15 DIAGNOSIS — N18.9 CHRONIC KIDNEY DISEASE, UNSPECIFIED CKD STAGE: ICD-10-CM

## 2021-12-15 DIAGNOSIS — R91.1 PULMONARY NODULE: ICD-10-CM

## 2021-12-15 DIAGNOSIS — I50.30 HEART FAILURE WITH PRESERVED EJECTION FRACTION, UNSPECIFIED HF CHRONICITY (HCC): ICD-10-CM

## 2021-12-15 DIAGNOSIS — E11.40 TYPE 2 DIABETES MELLITUS WITH DIABETIC NEUROPATHY, WITHOUT LONG-TERM CURRENT USE OF INSULIN (HCC): Primary | ICD-10-CM

## 2021-12-15 DIAGNOSIS — I10 ESSENTIAL HYPERTENSION: ICD-10-CM

## 2021-12-15 DIAGNOSIS — E04.9 GOITER: ICD-10-CM

## 2021-12-15 DIAGNOSIS — E53.8 VITAMIN B 12 DEFICIENCY: ICD-10-CM

## 2021-12-15 DIAGNOSIS — I35.0 AORTIC VALVE STENOSIS, UNSPECIFIED ETIOLOGY: ICD-10-CM

## 2021-12-15 LAB
ALBUMIN SERPL-MCNC: 4 G/DL (ref 3.5–5.2)
ALP BLD-CCNC: 101 U/L (ref 35–104)
ALT SERPL-CCNC: 19 U/L (ref 0–32)
ANION GAP SERPL CALCULATED.3IONS-SCNC: 16 MMOL/L (ref 7–16)
AST SERPL-CCNC: 26 U/L (ref 0–31)
BASOPHILS ABSOLUTE: 0.02 E9/L (ref 0–0.2)
BASOPHILS RELATIVE PERCENT: 0.3 % (ref 0–2)
BILIRUB SERPL-MCNC: 0.5 MG/DL (ref 0–1.2)
BUN BLDV-MCNC: 23 MG/DL (ref 6–23)
CALCIUM SERPL-MCNC: 9.5 MG/DL (ref 8.6–10.2)
CHLORIDE BLD-SCNC: 102 MMOL/L (ref 98–107)
CO2: 18 MMOL/L (ref 22–29)
CREAT SERPL-MCNC: 1.4 MG/DL (ref 0.5–1)
CREATININE URINE: 237 MG/DL (ref 29–226)
EOSINOPHILS ABSOLUTE: 0.27 E9/L (ref 0.05–0.5)
EOSINOPHILS RELATIVE PERCENT: 3.4 % (ref 0–6)
GFR AFRICAN AMERICAN: 44
GFR NON-AFRICAN AMERICAN: 36 ML/MIN/1.73
GLUCOSE BLD-MCNC: 268 MG/DL (ref 74–99)
HBA1C MFR BLD: 8.3 % (ref 4–5.6)
HCT VFR BLD CALC: 36.6 % (ref 34–48)
HEMOGLOBIN: 11.3 G/DL (ref 11.5–15.5)
IMMATURE GRANULOCYTES #: 0.02 E9/L
IMMATURE GRANULOCYTES %: 0.3 % (ref 0–5)
LYMPHOCYTES ABSOLUTE: 1.38 E9/L (ref 1.5–4)
LYMPHOCYTES RELATIVE PERCENT: 17.3 % (ref 20–42)
MCH RBC QN AUTO: 31.2 PG (ref 26–35)
MCHC RBC AUTO-ENTMCNC: 30.9 % (ref 32–34.5)
MCV RBC AUTO: 101.1 FL (ref 80–99.9)
MICROALBUMIN UR-MCNC: 17.5 MG/L
MICROALBUMIN/CREAT UR-RTO: 7.4 (ref 0–30)
MONOCYTES ABSOLUTE: 0.61 E9/L (ref 0.1–0.95)
MONOCYTES RELATIVE PERCENT: 7.6 % (ref 2–12)
NEUTROPHILS ABSOLUTE: 5.69 E9/L (ref 1.8–7.3)
NEUTROPHILS RELATIVE PERCENT: 71.1 % (ref 43–80)
PDW BLD-RTO: 15.1 FL (ref 11.5–15)
PLATELET # BLD: 220 E9/L (ref 130–450)
PMV BLD AUTO: 11.4 FL (ref 7–12)
POTASSIUM SERPL-SCNC: 4.4 MMOL/L (ref 3.5–5)
RBC # BLD: 3.62 E12/L (ref 3.5–5.5)
SODIUM BLD-SCNC: 136 MMOL/L (ref 132–146)
TOTAL PROTEIN: 7.4 G/DL (ref 6.4–8.3)
VITAMIN B-12: >2000 PG/ML (ref 211–946)
VITAMIN D 25-HYDROXY: 46 NG/ML (ref 30–100)
WBC # BLD: 8 E9/L (ref 4.5–11.5)

## 2021-12-15 PROCEDURE — 1123F ACP DISCUSS/DSCN MKR DOCD: CPT | Performed by: INTERNAL MEDICINE

## 2021-12-15 PROCEDURE — G8484 FLU IMMUNIZE NO ADMIN: HCPCS | Performed by: INTERNAL MEDICINE

## 2021-12-15 PROCEDURE — 1090F PRES/ABSN URINE INCON ASSESS: CPT | Performed by: INTERNAL MEDICINE

## 2021-12-15 PROCEDURE — G8417 CALC BMI ABV UP PARAM F/U: HCPCS | Performed by: INTERNAL MEDICINE

## 2021-12-15 PROCEDURE — 1036F TOBACCO NON-USER: CPT | Performed by: INTERNAL MEDICINE

## 2021-12-15 PROCEDURE — 4040F PNEUMOC VAC/ADMIN/RCVD: CPT | Performed by: INTERNAL MEDICINE

## 2021-12-15 PROCEDURE — G8400 PT W/DXA NO RESULTS DOC: HCPCS | Performed by: INTERNAL MEDICINE

## 2021-12-15 PROCEDURE — G8427 DOCREV CUR MEDS BY ELIG CLIN: HCPCS | Performed by: INTERNAL MEDICINE

## 2021-12-15 PROCEDURE — 99214 OFFICE O/P EST MOD 30 MIN: CPT | Performed by: INTERNAL MEDICINE

## 2021-12-15 RX ORDER — MV-MIN/FA/VIT K/LUTEIN/ZEAXANT 200MCG-5MG
CAPSULE ORAL
COMMUNITY
End: 2022-03-14

## 2021-12-15 RX ORDER — FUROSEMIDE 20 MG/1
TABLET ORAL
Qty: 45 TABLET | Refills: 1 | Status: SHIPPED
Start: 2021-12-15 | End: 2022-08-03 | Stop reason: SDUPTHER

## 2021-12-15 RX ORDER — ROSUVASTATIN CALCIUM 20 MG/1
20 TABLET, COATED ORAL NIGHTLY
Qty: 30 TABLET | Refills: 1 | Status: SHIPPED
Start: 2021-12-15 | End: 2022-01-26 | Stop reason: SDUPTHER

## 2021-12-15 RX ORDER — MECLIZINE HCL 12.5 MG/1
12.5 TABLET ORAL 2 TIMES DAILY PRN
Qty: 30 TABLET | Refills: 0 | Status: SHIPPED | OUTPATIENT
Start: 2021-12-15 | End: 2021-12-30

## 2021-12-15 NOTE — PROGRESS NOTES
[facility]     12/15/21  Julio C Covington : 1940 Sex: female  Age: 80 y.o. Chief Complaint   Patient presents with    Diabetes     3 months    Hypertension       HPI    Patient presents today for 3-month follow-up visit on her medical problems. In general she states she has been doing reasonably well. She shows me blood sugars which have been running in the mid to upper 100 range on current medications which is Januvia and glipizide. She is off Actos because of CHF and Metformin because of her renal insufficiency. .. Higher this month and she was month or 2 before. She has not been watching as closely. Blood pressures been in a good range on current antihypertensive medication. States she has followed with and finished up with cardiac rehab. She remains on her Lasix every other day and is doing okay with that. Swelling is down. States she did see renal in the last few days. They apparently did not do any blood work on her. Hopefully they have reviewed labs that we have obtained. I will send a copy of today's labs to them as well. She is going to see cardiology next month and will be getting 2D echo to assess her aortic stenosis. She denies any chest pain or shortness of breath. Her eyes and feet are up-to-date with her diabetes.  No retinopathy no foot sores  She is up-to-date with consultants including renal for chronic kidney disease, ophthalmology, endocrine for her thyroid, cardiology for her aortic stenosis and recent left main stent placement and previously with ear nose and throat who recommended no thyroid biopsy at that point but simply ultrasound follow-up. --States she is not up-to-date with endocrine currently and I did ask her to get in touch with them regarding follow-up of her thyroid. They have been getting ultrasounds.     Review of Systems     Constitutional: Negative for activity change, appetite change, chills, diaphoresis, fatigue, fever and unexpected weight 3/17,,3/18  2D ECHO -  ,   Carotid Artery Study - -30-49% occlusion bilateral,   Stress Test - -neg,3/18-pos  Zoster/Shingles Vaccine -   Medical Problems:  Non Insulin Dependent Diabetes, Hypertension, Hyperlipidemia  Goiter - multinodular-bx neg-dr hooper  GERD, Hypothyroidism  Osteoporosis - Bisphosphonate intolerant  Glaucoma, Renal Insufficiency  Aortic And Mitral Valve Disorders - 2D echo-  VHD  Pulmonary HTN - mild  Macular Degeneration  WBCs in urine - Workup by urology-  Coronary Artery Disease (CAD) - Positive stress test 3/18-heart cath--left main disease-stents  Diverticulosis, Diabetic Neuropathy, Vitamin B12 deficiency  Hospitalization for infected left groin cath site-  HFpEF      Follows with - Cardiology, nephrology, ophthalmology, endocrine  Surgical Hx:  AKIN-right salpingo-oophorectomy - Fibroids  Appendectomy  Right cataract surgery - left also  Left knee arthroscopy, Removal of Gallbladder  Cardiac catheterization-severe left main disease-atherectomy/PCI/stents     FH: Father:  MI.  Mother:  Cerebrovascular Accident (CVA). Brother 1:  Coronary Artery Disease (CAD) - 52's. 3 other brothers, 1 sister   SH:  Marital: . Personal Habits: Cigarette Use: Negative For current cigarette smoker. Alcohol: does not use  alcohol. 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                      Current Outpatient Medications:     Multiple Vitamins-Minerals (PRESERVISION AREDS 2+MULTI VIT) CAPS, Take by mouth, Disp: , Rfl:     furosemide (LASIX) 20 MG tablet, 1 po QOD, Disp: 45 tablet, Rfl: 1    rosuvastatin (CRESTOR) 20 MG tablet, Take 1 tablet by mouth nightly, Disp: 30 tablet, Rfl: 1    meclizine (ANTIVERT) 12.5 MG tablet, Take 1 tablet by mouth 2 times daily as needed for Dizziness, Disp: 30 tablet, Rfl: 0    carvedilol (COREG) 6.25 MG tablet, TAKE ONE TABLET BY MOUTH TWO TIMES A DAY WITH MEALS, Disp: 60 tablet, Rfl: 3    amLODIPine (NORVASC) 5 MG tablet, TAKE ONE TABLET BY MOUTH EVERY DAY, Disp: 30 tablet, Rfl: 3    JANUVIA 50 MG tablet, Take 1 tablet by mouth daily, Disp: 90 tablet, Rfl: 1    magnesium cl-calcium carbonate (NU-MAG) 71.5-119 MG TBEC tablet, TAKE TWO TABLETS BY MOUTH DAILY, Disp: 180 tablet, Rfl: 1    glipiZIDE (GLUCOTROL XL) 10 MG extended release tablet, TAKE ONE TABLET BY MOUTH TWO TIMES A DAY, Disp: 180 tablet, Rfl: 1    Handicap Placard MISC, by Does not apply route Duration: 5 years, Disp: 1 each, Rfl: 0    clopidogrel (PLAVIX) 75 MG tablet, Take 1 tablet by mouth daily, Disp: 90 tablet, Rfl: 3    aspirin EC 81 MG EC tablet, Take 1 tablet by mouth daily, Disp: 90 tablet, Rfl: 3    nitroGLYCERIN (NITROSTAT) 0.4 MG SL tablet, DISSOLVE ONE TABLET UNDER TONGUE AS NEEDED FOR CHEST PAINS. MAY REPEAT IN 5 MINUTES.  IF SYMPTOMS PERSISTS CALL 911, Disp: 25 tablet, Rfl: 1    Cinnamon 500 MG CAPS, Take 1 capsule by mouth daily, Disp: , Rfl:     vitamin D (CHOLECALCIFEROL) 1000 UNIT TABS tablet, Take 1,000 Units by mouth daily, Disp: , Rfl:     vitamin B-12 (CYANOCOBALAMIN) 1000 MCG tablet, Take 1,000 mcg by mouth daily , Disp: , Rfl:   Allergies   Allergen Reactions    Naproxen     Penicillins        Past Medical History:   Diagnosis Date    Abscess of groin, left 6/22/2021    Aortic valve disorder     CAD (coronary artery disease)     Cancer (HCC)     glaucoma    Carotid stenosis, right 6/22/2021    Chest pain     Diabetes ([address]     Diverticulosis     Femoral-popliteal atherosclerosis ([address] 7/13/2021    GERD (gastroesophageal reflux disease)     Goiter     Hyperlipidemia     Hypertension     Hypothyroidism     Macular degeneration     Mitral valve disorder     Neuropathy     Osteoporosis     VHD (valvular heart disease)     Vitamin B12 deficiency      Past Surgical History:   Procedure Laterality Date    APPENDECTOMY      BREAST BIOPSY Right     neg    CARDIAC CATHETERIZATION  06/11/2021    Dr Walden Human  06/15/2021    Dr Vidales Shallow 3.5 x 18 LM to LAD and 4.0 x18 Prox CX    EYE SURGERY Bilateral     cataract    HYSTERECTOMY      KNEE ARTHROPLASTY Left     TOOTH EXTRACTION      TRANSESOPHAGEAL ECHOCARDIOGRAM  06/10/2021    Dr Cordero Quant     Family History   Problem Relation Age of Onset    Stroke Mother     Heart Disease Father 79        MI    Heart Attack Father     Heart Disease Brother     Coronary Art Dis Brother     Stroke Brother     Stroke Brother      Social History     Socioeconomic History    Marital status:      Spouse name: Not on file    Number of children: Not on file    Years of education: Not on file    Highest education level: Not on file   Occupational History    Not on file   Tobacco Use    Smoking status: Never Smoker    Smokeless tobacco: Never Used   Vaping Use    Vaping Use: Never used   Substance and Sexual Activity    Alcohol use: Not Currently    Drug use: Never    Sexual activity: Not on file   Other Topics Concern    Not on file   Social History Narrative    Not on file     Social Determinants of Health     Financial Resource Strain:     Difficulty of Paying Living Expenses: Not on file   Food Insecurity:     Worried About Running Out of Food in the Last Year: Not on file    Gregorio of Food in the Last Year: Not on file   Transportation Needs:     Lack of Transportation (Medical): Not on file    Lack of Transportation (Non-Medical):  Not on file   Physical Activity:     Days of Exercise per Week: Not on file    Minutes of Exercise per Session: Not on file   Stress:     Feeling of Stress : Not on file   Social Connections:     Frequency of Communication with Friends and Family: Not on file    Frequency of Social Gatherings with Friends and Family: Not on file    Attends Restorationist Services: Not on file   [employer] of Clubs or Organizations: Not on file    Attends Club or Organization Meetings: Not on file    Marital Status: Not on file   Intimate Partner Violence:     Fear of Current or Ex-Partner: Not on file    Emotionally Abused: Not on file    Physically Abused: Not on file    Sexually Abused: Not on file   Housing Stability:     Unable to Pay for Housing in the Last Year: Not on file    Number of [place] in the Last Year: Not on file    Unstable Housing in the Last Year: Not on file       Vitals:    12/15/21 1345   BP: 126/60   Pulse: 50   Temp: 97.2 °F (36.2 °C)   TempSrc: Temporal   SpO2: 98%   Weight: 153 lb 4.8 oz (69.5 kg)   Height: 5' 2\" (1.575 m)       Physical Exam    Constitutional: She is oriented to person, place, and time. She appears well-developed and well-nourished. No distress.    Neck: Normal range of motion. Neck supple. Thyromegaly present. Positive for goiter   No JVD noted. Cardiovascular: Normal rate, regular rhythm and intact distal pulses. Exam reveals no gallop and no friction rub.   Murmur heard. Patient's edema improved to trace pulmonary/Chest: No respiratory distress. She has no wheezes Or rhonchi.  Appears to have good air exchange today  Abdominal: Soft. Bowel sounds are normal. She exhibits no distension and no mass. There is no tenderness.   Musculoskeletal: Normal range of motion. Neurological: She is alert and oriented to person, place, and time. She displays normal reflexes. No sensory deficit. She exhibits normal muscle tone. Coordination normal.   Skin: Skin is warm and dry. No rash noted. No erythema.      Psychiatric: Mood and affect normal  Nursing note and vitals reviewed           Assessment and Plan: Enrique Ochoa was seen today for diabetes and hypertension. Diagnoses and all orders for this visit:    Type 2 diabetes mellitus with diabetic neuropathy, without long-term current use of insulin (HCC)  -     Microalbumin / Creatinine Urine Ratio;  Future  -     Hemoglobin A1C; Future  -     Microalbumin / Creatinine Urine Ratio; Future  Appears to be stable on current medications. Aortic valve stenosis, unspecified etiology  Stable and following with cardiology with upcoming 2D echo next month    Heart failure with preserved ejection fraction, unspecified HF chronicity ([address]  Stable    Chronic kidney disease, unspecified CKD stage  Stable and follows with renal    Essential hypertension  -     Comprehensive Metabolic Panel; Future  -     CBC Auto Differential; Future  Stable on antihypertensive medication    Coronary artery disease involving native coronary artery of native heart without angina pectoris  Stable and follows with cardiology    Goiter  Stable and follows with endocrine    Pulmonary nodule  -     CT CHEST WO CONTRAST; Future  Rechecking CT of chest    Vitamin B 12 deficiency  -     VITAMIN B12; Future    Vitamin D deficiency  -     Vitamin D 25 Hydroxy; Future    Other orders  -     furosemide (LASIX) 20 MG tablet; 1 po QOD  -     rosuvastatin (CRESTOR) 20 MG tablet; Take 1 tablet by mouth nightly  -     meclizine (ANTIVERT) 12.5 MG tablet; Take 1 tablet by mouth 2 times daily as needed for Dizziness    Plan: I will see her back in 3 months and as needed. I am setting her up for CAT scan of the chest to follow-up on nodules noted on 6/21/2021 CT of the chest as recommended by radiology at that time. Blood work to monitor disease progression and medication use. Prescription management performed. Follow-up with above consultants. Continue to monitor blood pressure and blood sugar closely. Notify us of problems in the interim. Return in about 3 months (around 3/15/2022). Seen By:  Gloria Alexis MD      *Document was created using voice recognition software. Note was reviewed however may contain grammatical errors.

## 2021-12-16 ENCOUNTER — TELEPHONE (OUTPATIENT)
Dept: FAMILY MEDICINE CLINIC | Age: 81
End: 2021-12-16

## 2021-12-16 NOTE — TELEPHONE ENCOUNTER
Hemoglobin A1c has jumped up to 8.3. Not wanting to add another medication at this time. Please have her be more careful with diet and try to increase activity level. Also B12 is very high. Have her cut back to either every other day or cut dose in half daily. Kay Welch

## 2021-12-20 RX ORDER — GLUCOSAMINE HCL/CHONDROITIN SU 500-400 MG
1 CAPSULE ORAL DAILY
Qty: 200 STRIP | Refills: 5 | Status: SHIPPED | OUTPATIENT
Start: 2021-12-20 | End: 2022-04-04 | Stop reason: SDUPTHER

## 2021-12-20 RX ORDER — LANCETS 33 GAUGE
EACH MISCELLANEOUS DAILY
COMMUNITY
End: 2021-12-20 | Stop reason: SDUPTHER

## 2021-12-20 RX ORDER — GLUCOSAMINE HCL/CHONDROITIN SU 500-400 MG
1 CAPSULE ORAL DAILY
COMMUNITY
End: 2021-12-20 | Stop reason: SDUPTHER

## 2021-12-20 RX ORDER — LANCETS 33 GAUGE
1 EACH MISCELLANEOUS DAILY
Qty: 200 EACH | Refills: 5 | Status: SHIPPED | OUTPATIENT
Start: 2021-12-20 | End: 2022-04-04 | Stop reason: SDUPTHER

## 2021-12-20 NOTE — TELEPHONE ENCOUNTER
Called is requesting refills on her test strips and lancets.  She would like the scripts to be sent to [facility].

## 2021-12-20 NOTE — TELEPHONE ENCOUNTER
Called and notified the patient about her blood work results. Patient verbalized understanding and agreed that her A1C does need work. She will go to every other day on her b12. She thanked me for calling.

## 2022-01-24 ENCOUNTER — TELEPHONE (OUTPATIENT)
Dept: CARDIOLOGY | Age: 82
End: 2022-01-24

## 2022-01-26 RX ORDER — ROSUVASTATIN CALCIUM 20 MG/1
20 TABLET, COATED ORAL NIGHTLY
Qty: 90 TABLET | Refills: 1 | Status: SHIPPED
Start: 2022-01-26 | End: 2022-03-14

## 2022-01-26 NOTE — TELEPHONE ENCOUNTER
----- Message from Ezekiel Day sent at 1/26/2022  4:44 PM EST -----  Subject: Refill Request    QUESTIONS  Name of Medication? rosuvastatin (CRESTOR) 20 MG tablet  Patient-reported dosage and instructions? take 1 tablet daily  How many days do you have left? 0  Preferred Pharmacy? [facility]  Pharmacy phone number (if available)? [phone]  ---------------------------------------------------------------------------  --------------  CALL BACK INFO  What is the best way for the office to contact you? OK to leave message on   voicemail  Preferred Call Back Phone Number?  [phone]

## 2022-02-21 ENCOUNTER — APPOINTMENT (OUTPATIENT)
Dept: CARDIOLOGY | Age: 82
End: 2022-02-21
Payer: MEDICARE

## 2022-02-21 ENCOUNTER — HOSPITAL ENCOUNTER (OUTPATIENT)
Dept: CT IMAGING | Age: 82
Discharge: HOME | End: 2022-02-23
Payer: MEDICARE

## 2022-02-21 ENCOUNTER — TELEPHONE (OUTPATIENT)
Dept: FAMILY MEDICINE CLINIC | Age: 82
End: 2022-02-21

## 2022-02-21 DIAGNOSIS — R91.1 PULMONARY NODULE: ICD-10-CM

## 2022-02-21 DIAGNOSIS — R91.1 PULMONARY NODULE: Primary | ICD-10-CM

## 2022-02-21 PROCEDURE — G1010 CDSM STANSON: HCPCS

## 2022-02-21 NOTE — TELEPHONE ENCOUNTER
CAT scan findings are stable. Send copy of this to Dr. Kelsey Kimble her endocrinologist.  Would like to repeat this in about 6 months.   Order is in

## 2022-03-07 RX ORDER — AMLODIPINE BESYLATE 5 MG/1
TABLET ORAL
Qty: 30 TABLET | Refills: 3 | Status: SHIPPED
Start: 2022-03-07 | End: 2022-03-14 | Stop reason: SDUPTHER

## 2022-03-14 ENCOUNTER — HOSPITAL ENCOUNTER (OUTPATIENT)
Dept: CARDIOLOGY | Age: 82
Discharge: HOME | End: 2022-03-14
Payer: MEDICARE

## 2022-03-14 ENCOUNTER — OFFICE VISIT (OUTPATIENT)
Dept: CARDIOLOGY CLINIC | Age: 82
End: 2022-03-14
Payer: MEDICARE

## 2022-03-14 VITALS
HEIGHT: 62 IN | RESPIRATION RATE: 18 BRPM | OXYGEN SATURATION: 99 % | SYSTOLIC BLOOD PRESSURE: 120 MMHG | BODY MASS INDEX: 28.19 KG/M2 | HEART RATE: 75 BPM | DIASTOLIC BLOOD PRESSURE: 60 MMHG | WEIGHT: 153.2 LBS

## 2022-03-14 DIAGNOSIS — Z95.5 S/P DRUG ELUTING CORONARY STENT PLACEMENT: ICD-10-CM

## 2022-03-14 DIAGNOSIS — I50.32 CHRONIC HEART FAILURE WITH PRESERVED EJECTION FRACTION (HCC): ICD-10-CM

## 2022-03-14 DIAGNOSIS — I25.10 LEFT MAIN CORONARY ARTERY DISEASE: ICD-10-CM

## 2022-03-14 DIAGNOSIS — I35.0 MODERATE TO SEVERE AORTIC STENOSIS: Primary | ICD-10-CM

## 2022-03-14 DIAGNOSIS — I10 ESSENTIAL HYPERTENSION: ICD-10-CM

## 2022-03-14 PROCEDURE — 93308 TTE F-UP OR LMTD: CPT

## 2022-03-14 PROCEDURE — G8400 PT W/DXA NO RESULTS DOC: HCPCS | Performed by: INTERNAL MEDICINE

## 2022-03-14 PROCEDURE — 1036F TOBACCO NON-USER: CPT | Performed by: INTERNAL MEDICINE

## 2022-03-14 PROCEDURE — G8484 FLU IMMUNIZE NO ADMIN: HCPCS | Performed by: INTERNAL MEDICINE

## 2022-03-14 PROCEDURE — 1090F PRES/ABSN URINE INCON ASSESS: CPT | Performed by: INTERNAL MEDICINE

## 2022-03-14 PROCEDURE — 99213 OFFICE O/P EST LOW 20 MIN: CPT | Performed by: INTERNAL MEDICINE

## 2022-03-14 PROCEDURE — G8427 DOCREV CUR MEDS BY ELIG CLIN: HCPCS | Performed by: INTERNAL MEDICINE

## 2022-03-14 PROCEDURE — 93000 ELECTROCARDIOGRAM COMPLETE: CPT | Performed by: INTERNAL MEDICINE

## 2022-03-14 PROCEDURE — 4040F PNEUMOC VAC/ADMIN/RCVD: CPT | Performed by: INTERNAL MEDICINE

## 2022-03-14 PROCEDURE — 1123F ACP DISCUSS/DSCN MKR DOCD: CPT | Performed by: INTERNAL MEDICINE

## 2022-03-14 PROCEDURE — G8417 CALC BMI ABV UP PARAM F/U: HCPCS | Performed by: INTERNAL MEDICINE

## 2022-03-14 RX ORDER — CARVEDILOL 6.25 MG/1
6.25 TABLET ORAL 2 TIMES DAILY
Qty: 180 TABLET | Refills: 3 | Status: SHIPPED | OUTPATIENT
Start: 2022-03-14

## 2022-03-14 RX ORDER — ROSUVASTATIN CALCIUM 20 MG/1
20 TABLET, COATED ORAL DAILY
Qty: 90 TABLET | Refills: 1 | Status: SHIPPED
Start: 2022-03-14 | End: 2022-08-03 | Stop reason: SDUPTHER

## 2022-03-14 RX ORDER — ASPIRIN 81 MG/1
81 TABLET ORAL DAILY
Qty: 90 TABLET | Refills: 3 | Status: SHIPPED | OUTPATIENT
Start: 2022-03-14

## 2022-03-14 RX ORDER — CLOPIDOGREL BISULFATE 75 MG/1
75 TABLET ORAL DAILY
Qty: 90 TABLET | Refills: 3 | Status: SHIPPED
Start: 2022-03-14 | End: 2022-10-05

## 2022-03-14 RX ORDER — CARVEDILOL 6.25 MG/1
TABLET ORAL
Qty: 60 TABLET | Refills: 3 | Status: SHIPPED
Start: 2022-03-14 | End: 2022-03-14 | Stop reason: SDUPTHER

## 2022-03-14 RX ORDER — AMLODIPINE BESYLATE 5 MG/1
5 TABLET ORAL DAILY
Qty: 90 TABLET | Refills: 3 | Status: SHIPPED | OUTPATIENT
Start: 2022-03-14

## 2022-03-14 RX ORDER — PANTOPRAZOLE SODIUM 40 MG/1
40 TABLET, DELAYED RELEASE ORAL
Qty: 90 TABLET | Refills: 1 | Status: SHIPPED
Start: 2022-03-14 | End: 2022-08-03 | Stop reason: SDUPTHER

## 2022-03-14 NOTE — Clinical Note
She looks very very good. Her AS has certainly progressed and so we need to keep a close eye on it.   Please make sure she gets her TTE in 6 months

## 2022-03-14 NOTE — PATIENT INSTRUCTIONS
1. Continue medications without change  2. Echocardiogram in 6 months  3. Let us know if you develop any symptoms in the mean time  4.  Return in 12 months

## 2022-03-14 NOTE — PROGRESS NOTES
[facility]   [facility] [place]   Clinic Note     Date:3/14/22   Patient Name:Alise Covington  YOB: 1940  Age: 80 y.o. Primary Care Provider: Rosalind Akers MD    Subjective     Very pleasant 30-year-old  female who returns for follow-up accompanied by her daughter. She is doing very well and feels great. She exercises on her stationary bike for half an hour twice a day at 50 mph without any chest discomfort or dyspnea. She has no lower extremity edema orthopnea or PND. She has no palpitations or syncope or presyncope. She is down 16 pounds since July 2021. A focused history review includes:  1. Moderate aortic stenosis by TTE May 2021 and AVCS 8/2021  a. TTE today with progressive but yet still moderate aortic stenosis (V-max 3.6 up from 3.1)  2. Severe left main obstructive CAD  a. Status post Impella-supported, IVUS guided, rotational arthrectomy and PCI of the left main into LAD and circumflex using culotte technique via left femoral access (single access technique) on 6/15/2021 (RK)  i. Access site cellulitis without abscess treated conservatively  3. CKD 3B with baseline creatinine 1.5-1.6  4. Hypertension  5. Asymptomatic carotid stenosis  6. Substernal thyroid (basis for CABG turndown)  7.  HFpEF      Past History    Past Medical History:         Diagnosis Date    Abscess of groin, left 6/22/2021    Aortic valve disorder     CAD (coronary artery disease)     Cancer (HCC)     glaucoma    Carotid stenosis, right 6/22/2021    Chest pain     Diabetes ([address]     Diverticulosis     Femoral-popliteal atherosclerosis ([address] 7/13/2021    GERD (gastroesophageal reflux disease)     Goiter     Hyperlipidemia     Hypertension     Hypothyroidism     Macular degeneration     Mitral valve disorder     Neuropathy     Osteoporosis     VHD (valvular heart disease)     Vitamin B12 deficiency        Past Surgical History:  Past Surgical History:   Procedure Laterality Date    APPENDECTOMY      BREAST BIOPSY Right     neg    CARDIAC CATHETERIZATION  06/11/2021    Dr Lamar Alcantar  06/15/2021    Dr Jacinto Daniels - Eliot Mejia 3.5 x 18 LM to LAD and 4.0 x18 Prox CX    EYE SURGERY Bilateral     cataract    HYSTERECTOMY      KNEE ARTHROPLASTY Left     TOOTH EXTRACTION      TRANSESOPHAGEAL ECHOCARDIOGRAM  06/10/2021    Dr Kiara Franco History:    Social History     Tobacco History     Smoking Status  Never Smoker    Smokeless Tobacco Use  Never Used          Alcohol History     Alcohol Use Status  Not Currently          Drug Use     Drug Use Status  Never          Sexual Activity     Sexually Active  Not Asked                    Family History:-      Problem Relation Age of Onset    Stroke Mother     Heart Disease Father 79        MI    Heart Attack Father     Heart Disease Brother     Coronary Art Dis Brother     Stroke Brother     Stroke Brother          Review of Systems   Negative except as in HPI        Medications     Current Outpatient Medications   Medication Sig Dispense Refill    rosuvastatin (CRESTOR) 20 MG tablet Take 1 tablet by mouth daily 90 tablet 1    clopidogrel (PLAVIX) 75 MG tablet Take 1 tablet by mouth daily 90 tablet 3    aspirin EC 81 MG EC tablet Take 1 tablet by mouth daily 90 tablet 3    pantoprazole (PROTONIX) 40 MG tablet Take 1 tablet by mouth every morning (before breakfast) 90 tablet 1    carvedilol (COREG) 6.25 MG tablet Take 1 tablet by mouth 2 times daily 180 tablet 3    amLODIPine (NORVASC) 5 MG tablet Take 1 tablet by mouth daily 90 tablet 3    Lancets 33G MISC 1 Lancet by Does not apply route daily 200 each 5    blood glucose monitor strips 1 strip by Other route daily Test 1 times a day & as needed for symptoms of irregular blood glucose.  Dispense sufficient amount for indicated testing frequency plus additional to accommodate PRN testing needs. 200 strip 5    furosemide (LASIX) 20 MG tablet 1 po QOD 45 tablet 1    JANUVIA 50 MG tablet Take 1 tablet by mouth daily 90 tablet 1    magnesium cl-calcium carbonate (NU-MAG) 71.5-119 MG TBEC tablet TAKE TWO TABLETS BY MOUTH DAILY 180 tablet 1    glipiZIDE (GLUCOTROL XL) 10 MG extended release tablet TAKE ONE TABLET BY MOUTH TWO TIMES A  tablet 1    Handicap Placard MISC by Does not apply route Duration: 5 years 1 each 0    nitroGLYCERIN (NITROSTAT) 0.4 MG SL tablet DISSOLVE ONE TABLET UNDER TONGUE AS NEEDED FOR CHEST PAINS. MAY REPEAT IN 5 MINUTES. IF SYMPTOMS PERSISTS CALL 911 25 tablet 1    Cinnamon 500 MG CAPS Take 1 capsule by mouth daily      vitamin D (CHOLECALCIFEROL) 1000 UNIT TABS tablet Take 1,000 Units by mouth daily      vitamin B-12 (CYANOCOBALAMIN) 1000 MCG tablet Take 1,000 mcg by mouth every other day        No current facility-administered medications for this visit. Physical Examination      /60 (Site: Left Upper Arm, Position: Sitting, Cuff Size: Medium Adult)   Pulse 75   Resp 18   Ht 5' 2\" (1.575 m)   Wt 153 lb 3.2 oz (69.5 kg)   SpO2 99%   BMI 28.02 kg/m²   Body surface area is 1.74 meters squared. General: No acute distress, appears as stated age, nonicteric  Head: Atraumatic, no gross abnormalities or bruises  Neck: Supple and nontender, no carotid bruits, borderline JVP  Lungs: Clear to auscultation bilaterally, no wheezes, rales, or rhonchi  Heart: Regular rate and rhythm.  2/6 LUIS URSB, mid peaking with preserved S2  Abdomen: Soft, nontender, nondistended, normal bowel sounds  Extremities: No obvious deformities, no cyanosis, no edema  Neurological: Alert and oriented x3, EOMI, moving all extremities x4  Psychological: Normal mood and affect, cooperative  Skin: Color, texture, and turgor normal for age         Labs/Imaging/Diagnostics   Personally reviewed:    Lab Results   Component Value Date     12/15/2021    K 4.4 12/15/2021    K 4.3 06/22/2021     12/15/2021    CO2 18 12/15/2021    BUN 23 12/15/2021    CREATININE 1.4 12/15/2021    GLUCOSE 268 12/15/2021    CALCIUM 9.5 12/15/2021        Estimated Creatinine Clearance: 30 mL/min (A) (based on SCr of 1.4 mg/dL (H)). Lab Results   Component Value Date    WBC 8.0 12/15/2021    HGB 11.3 (L) 12/15/2021    HCT 36.6 12/15/2021    .1 (H) 12/15/2021     12/15/2021       Lab Results   Component Value Date    ALT 19 12/15/2021    AST 26 12/15/2021    ALKPHOS 101 12/15/2021    BILITOT 0.5 12/15/2021       Lab Results   Component Value Date    LABALBU 4.0 12/15/2021       Lab Results   Component Value Date    CHOL 109 09/28/2021    CHOL 182 06/09/2021    CHOL 198 04/06/2021     Lab Results   Component Value Date    TRIG 145 09/28/2021    TRIG 117 06/09/2021    TRIG 142 04/06/2021     Lab Results   Component Value Date    HDL 37 09/28/2021    HDL 48 06/09/2021    HDL 49 04/06/2021     Lab Results   Component Value Date    LDLCHOLESTEROL 129 (H) 01/06/2021    LDLCALC 43 09/28/2021    LDLCALC 111 (H) 06/09/2021    LDLCALC 121 (H) 04/06/2021     Lab Results   Component Value Date    LABVLDL 29 09/28/2021    LABVLDL 23 06/09/2021    LABVLDL 28 04/06/2021     No results found for: [facility]    Lab Results   Component Value Date    TROPONINI < 0.03 06/08/2021       Lab Results   Component Value Date     (H) 08/17/2021         Lab Results   Component Value Date    LABA1C 8.3 (H) 12/15/2021     Lab Results   Component Value Date     07/31/2021      EKG today: NSR, WNL     Assessment and Plan:        56-year-old  female with a history above. She is doing very well. She is euvolemic and has no angina with very good functional capacity. She has no symptoms of arrhythmia. Her aortic stenosis has definitely progressed but remains moderate. Diagnosis Orders   1. Moderate to severe aortic stenosis  EKG 12 lead    ECHO LIMITED   2.  Left main coronary artery disease 3. Chronic heart failure with preserved ejection fraction ([address]     4. S/P drug eluting coronary stent placement     5. Essential hypertension           Continue aspirin and clopidogrel long-term   Continue high intensity rosuvastatin   Continue furosemide 20 mg p.o. every other day   Continue amlodipine and carvedilol as is   BMP and BNP   Limited TTE at the structural heart clinic in 6 months   Return in 12 months     Assessment and plan reviewed with the patient/family and their questions and concerns answered in full. We appreciate the opportunity to participate in Her care!     Brittney Benites MD, [facility]  Interventional Cardiology/Structural Heart Disease  Office: [phone]  Coordinator: Doretha Penaloza

## 2022-03-14 NOTE — LETTER
[facility]  [address] [address] [place]  Phone: [phone]  Fax: [phone]    Al Rockwell MD    March 14, 2022     Cesia Dorado MD  [address] [address] [place] [address]    Patient: Franck Lewis   MR Number: [id number]   YOB: 1940   Date of Visit: 3/14/2022       Dear Cesia Dorado: Thank you for referring Yeny Farah to me for evaluation/treatment. Below are the relevant portions of my assessment and plan of care. If you have questions, please do not hesitate to call me. I look forward to following Silvestre Crawley along with you.     Sincerely,      Al Rockwell MD

## 2022-03-22 NOTE — TELEPHONE ENCOUNTER
Patient advised and will call the [facility] office. Burow's Advancement Flap Text: The defect edges were debeveled with a #15 scalpel blade.  Given the location of the defect and the proximity to free margins a Burow's advancement flap was deemed most appropriate.  Using a sterile surgical marker, the appropriate advancement flap was drawn incorporating the defect and placing the expected incisions within the relaxed skin tension lines where possible.    The area thus outlined was incised deep to adipose tissue with a #15 scalpel blade.  The skin margins were undermined to an appropriate distance in all directions utilizing iris scissors.

## 2022-03-28 RX ORDER — SITAGLIPTIN 50 MG/1
TABLET, FILM COATED ORAL
Qty: 90 TABLET | Refills: 3 | OUTPATIENT
Start: 2022-03-28

## 2022-04-04 ENCOUNTER — OFFICE VISIT (OUTPATIENT)
Dept: FAMILY MEDICINE CLINIC | Age: 82
End: 2022-04-04
Payer: MEDICARE

## 2022-04-04 VITALS
OXYGEN SATURATION: 99 % | SYSTOLIC BLOOD PRESSURE: 128 MMHG | HEIGHT: 62 IN | BODY MASS INDEX: 27.82 KG/M2 | TEMPERATURE: 98 F | HEART RATE: 70 BPM | WEIGHT: 151.2 LBS | DIASTOLIC BLOOD PRESSURE: 62 MMHG

## 2022-04-04 VITALS
WEIGHT: 151.2 LBS | TEMPERATURE: 98 F | BODY MASS INDEX: 27.82 KG/M2 | DIASTOLIC BLOOD PRESSURE: 62 MMHG | OXYGEN SATURATION: 99 % | SYSTOLIC BLOOD PRESSURE: 128 MMHG | HEART RATE: 70 BPM | HEIGHT: 62 IN

## 2022-04-04 DIAGNOSIS — Z00.00 MEDICARE ANNUAL WELLNESS VISIT, SUBSEQUENT: Primary | ICD-10-CM

## 2022-04-04 DIAGNOSIS — I10 ESSENTIAL HYPERTENSION: ICD-10-CM

## 2022-04-04 DIAGNOSIS — E78.5 HYPERLIPIDEMIA, UNSPECIFIED HYPERLIPIDEMIA TYPE: ICD-10-CM

## 2022-04-04 DIAGNOSIS — E11.40 TYPE 2 DIABETES MELLITUS WITH DIABETIC NEUROPATHY, WITHOUT LONG-TERM CURRENT USE OF INSULIN (HCC): ICD-10-CM

## 2022-04-04 DIAGNOSIS — D64.9 ANEMIA, UNSPECIFIED TYPE: ICD-10-CM

## 2022-04-04 DIAGNOSIS — I35.0 MODERATE TO SEVERE AORTIC STENOSIS: ICD-10-CM

## 2022-04-04 DIAGNOSIS — N18.32 STAGE 3B CHRONIC KIDNEY DISEASE (HCC): ICD-10-CM

## 2022-04-04 DIAGNOSIS — I25.10 CORONARY ARTERY DISEASE INVOLVING NATIVE HEART WITHOUT ANGINA PECTORIS, UNSPECIFIED VESSEL OR LESION TYPE: ICD-10-CM

## 2022-04-04 LAB
ANION GAP SERPL CALCULATED.3IONS-SCNC: 10 MMOL/L (ref 7–16)
BUN BLDV-MCNC: 25 MG/DL (ref 6–23)
CALCIUM SERPL-MCNC: 9.7 MG/DL (ref 8.6–10.2)
CHLORIDE BLD-SCNC: 102 MMOL/L (ref 98–107)
CO2: 24 MMOL/L (ref 22–29)
CREAT SERPL-MCNC: 1.4 MG/DL (ref 0.5–1)
GFR AFRICAN AMERICAN: 44
GFR NON-AFRICAN AMERICAN: 36 ML/MIN/1.73
GLUCOSE BLD-MCNC: 281 MG/DL (ref 74–99)
HBA1C MFR BLD: 9.2 % (ref 4–5.6)
POTASSIUM SERPL-SCNC: 4.5 MMOL/L (ref 3.5–5)
SODIUM BLD-SCNC: 136 MMOL/L (ref 132–146)

## 2022-04-04 PROCEDURE — G0439 PPPS, SUBSEQ VISIT: HCPCS | Performed by: INTERNAL MEDICINE

## 2022-04-04 PROCEDURE — 1090F PRES/ABSN URINE INCON ASSESS: CPT | Performed by: INTERNAL MEDICINE

## 2022-04-04 PROCEDURE — 4040F PNEUMOC VAC/ADMIN/RCVD: CPT | Performed by: INTERNAL MEDICINE

## 2022-04-04 PROCEDURE — 1036F TOBACCO NON-USER: CPT | Performed by: INTERNAL MEDICINE

## 2022-04-04 PROCEDURE — 99214 OFFICE O/P EST MOD 30 MIN: CPT | Performed by: INTERNAL MEDICINE

## 2022-04-04 PROCEDURE — G8400 PT W/DXA NO RESULTS DOC: HCPCS | Performed by: INTERNAL MEDICINE

## 2022-04-04 PROCEDURE — 3046F HEMOGLOBIN A1C LEVEL >9.0%: CPT | Performed by: INTERNAL MEDICINE

## 2022-04-04 PROCEDURE — 1123F ACP DISCUSS/DSCN MKR DOCD: CPT | Performed by: INTERNAL MEDICINE

## 2022-04-04 PROCEDURE — G8427 DOCREV CUR MEDS BY ELIG CLIN: HCPCS | Performed by: INTERNAL MEDICINE

## 2022-04-04 PROCEDURE — G8417 CALC BMI ABV UP PARAM F/U: HCPCS | Performed by: INTERNAL MEDICINE

## 2022-04-04 RX ORDER — ANTIOX #8/OM3/DHA/EPA/LUT/ZEAX 250-2.5 MG
1 CAPSULE ORAL 2 TIMES DAILY
COMMUNITY

## 2022-04-04 RX ORDER — MAGNESIUM CHLORIDE 71.5 MG
TABLET, DELAYED RELEASE (ENTERIC COATED) ORAL
Qty: 180 TABLET | Refills: 1 | Status: SHIPPED
Start: 2022-04-04 | End: 2022-10-10

## 2022-04-04 RX ORDER — GLIPIZIDE 10 MG/1
TABLET, FILM COATED, EXTENDED RELEASE ORAL
Qty: 180 TABLET | Refills: 1 | Status: SHIPPED
Start: 2022-04-04 | End: 2022-08-03 | Stop reason: SDUPTHER

## 2022-04-04 RX ORDER — SITAGLIPTIN 50 MG/1
50 TABLET, FILM COATED ORAL DAILY
Qty: 90 TABLET | Refills: 1 | Status: SHIPPED
Start: 2022-04-04 | End: 2022-08-03 | Stop reason: SDUPTHER

## 2022-04-04 RX ORDER — GLUCOSAMINE HCL/CHONDROITIN SU 500-400 MG
1 CAPSULE ORAL 2 TIMES DAILY
Qty: 200 STRIP | Refills: 5 | Status: SHIPPED | OUTPATIENT
Start: 2022-04-04 | End: 2022-08-03

## 2022-04-04 RX ORDER — LANCETS 33 GAUGE
1 EACH MISCELLANEOUS DAILY
Qty: 200 EACH | Refills: 5 | Status: SHIPPED | OUTPATIENT
Start: 2022-04-04 | End: 2022-04-04

## 2022-04-04 RX ORDER — LANCETS 33 GAUGE
1 EACH MISCELLANEOUS 2 TIMES DAILY
Qty: 200 EACH | Refills: 5 | Status: SHIPPED | OUTPATIENT
Start: 2022-04-04 | End: 2022-08-03 | Stop reason: SDUPTHER

## 2022-04-04 RX ORDER — GLUCOSAMINE HCL/CHONDROITIN SU 500-400 MG
1 CAPSULE ORAL DAILY
Qty: 200 STRIP | Refills: 5 | Status: SHIPPED | OUTPATIENT
Start: 2022-04-04 | End: 2022-04-04

## 2022-04-04 SDOH — HEALTH STABILITY: PHYSICAL HEALTH: ON AVERAGE, HOW MANY DAYS PER WEEK DO YOU ENGAGE IN MODERATE TO STRENUOUS EXERCISE (LIKE A BRISK WALK)?: 7 DAYS

## 2022-04-04 SDOH — ECONOMIC STABILITY: FOOD INSECURITY: WITHIN THE PAST 12 MONTHS, YOU WORRIED THAT YOUR FOOD WOULD RUN OUT BEFORE YOU GOT MONEY TO BUY MORE.: NEVER TRUE

## 2022-04-04 SDOH — ECONOMIC STABILITY: FOOD INSECURITY: WITHIN THE PAST 12 MONTHS, THE FOOD YOU BOUGHT JUST DIDN'T LAST AND YOU DIDN'T HAVE MONEY TO GET MORE.: NEVER TRUE

## 2022-04-04 SDOH — HEALTH STABILITY: PHYSICAL HEALTH: ON AVERAGE, HOW MANY MINUTES DO YOU ENGAGE IN EXERCISE AT THIS LEVEL?: 60 MIN

## 2022-04-04 ASSESSMENT — PATIENT HEALTH QUESTIONNAIRE - PHQ9
SUM OF ALL RESPONSES TO PHQ QUESTIONS 1-9: 0
SUM OF ALL RESPONSES TO PHQ QUESTIONS 1-9: 0
2. FEELING DOWN, DEPRESSED OR HOPELESS: 0
SUM OF ALL RESPONSES TO PHQ QUESTIONS 1-9: 0
SUM OF ALL RESPONSES TO PHQ QUESTIONS 1-9: 0
SUM OF ALL RESPONSES TO PHQ9 QUESTIONS 1 & 2: 0
SUM OF ALL RESPONSES TO PHQ9 QUESTIONS 1 & 2: 0
2. FEELING DOWN, DEPRESSED OR HOPELESS: 0
1. LITTLE INTEREST OR PLEASURE IN DOING THINGS: 0
1. LITTLE INTEREST OR PLEASURE IN DOING THINGS: 0
SUM OF ALL RESPONSES TO PHQ QUESTIONS 1-9: 0

## 2022-04-04 ASSESSMENT — LIFESTYLE VARIABLES
HOW MANY STANDARD DRINKS CONTAINING ALCOHOL DO YOU HAVE ON A TYPICAL DAY: 1
HOW OFTEN DO YOU HAVE SIX OR MORE DRINKS ON ONE OCCASION: 1
HOW OFTEN DO YOU HAVE A DRINK CONTAINING ALCOHOL: NEVER
HOW OFTEN DO YOU HAVE A DRINK CONTAINING ALCOHOL: 1
HOW MANY STANDARD DRINKS CONTAINING ALCOHOL DO YOU HAVE ON A TYPICAL DAY: 1 OR 2

## 2022-04-04 ASSESSMENT — SOCIAL DETERMINANTS OF HEALTH (SDOH): HOW HARD IS IT FOR YOU TO PAY FOR THE VERY BASICS LIKE FOOD, HOUSING, MEDICAL CARE, AND HEATING?: NOT HARD AT ALL

## 2022-04-04 NOTE — PATIENT INSTRUCTIONS
Personalized Preventive Plan for Marvin Guillory - 4/4/2022  Medicare offers a range of preventive health benefits. Some of the tests and screenings are paid in full while other may be subject to a deductible, co-insurance, and/or copay. Some of these benefits include a comprehensive review of your medical history including lifestyle, illnesses that may run in your family, and various assessments and screenings as appropriate. After reviewing your medical record and screening and assessments performed today your provider may have ordered immunizations, labs, imaging, and/or referrals for you. A list of these orders (if applicable) as well as your Preventive Care list are included within your After Visit Summary for your review. Other Preventive Recommendations:    · A preventive eye exam performed by an eye specialist is recommended every 1-2 years to screen for glaucoma; cataracts, macular degeneration, and other eye disorders. · A preventive dental visit is recommended every 6 months. · Try to get at least 150 minutes of exercise per week or 10,000 steps per day on a pedometer . · Order or download the FREE \"Exercise & Physical Activity: Your Everyday Guide\" from [employer]. Call 5-286.318.1330 or search [employer] online. · You need 8895-2789 mg of calcium and 8389-6222 IU of vitamin D per day. It is possible to meet your calcium requirement with diet alone, but a vitamin D supplement is usually necessary to meet this goal.  · When exposed to the sun, use a sunscreen that protects against both UVA and UVB radiation with an SPF of 30 or greater. Reapply every 2 to 3 hours or after sweating, drying off with a towel, or swimming. · Always wear a seat belt when traveling in a car. Always wear a helmet when riding a bicycle or motorcycle.

## 2022-04-05 ENCOUNTER — CARE COORDINATION (OUTPATIENT)
Dept: CARE COORDINATION | Age: 82
End: 2022-04-05

## 2022-04-05 ENCOUNTER — TELEPHONE (OUTPATIENT)
Dept: FAMILY MEDICINE CLINIC | Age: 82
End: 2022-04-05

## 2022-04-05 DIAGNOSIS — E11.40 TYPE 2 DIABETES MELLITUS WITH DIABETIC NEUROPATHY, WITHOUT LONG-TERM CURRENT USE OF INSULIN (HCC): ICD-10-CM

## 2022-04-05 DIAGNOSIS — N18.32 STAGE 3B CHRONIC KIDNEY DISEASE (HCC): Primary | ICD-10-CM

## 2022-04-05 RX ORDER — EMPAGLIFLOZIN 10 MG/1
1 TABLET, FILM COATED ORAL DAILY
Qty: 90 TABLET | Refills: 1 | Status: SHIPPED
Start: 2022-04-05 | End: 2022-08-03 | Stop reason: SDUPTHER

## 2022-04-05 NOTE — TELEPHONE ENCOUNTER
Alta Nett of the blood sugar results. She is willing to check on the Jardiance to see if it is covered and will call back with that information.

## 2022-04-05 NOTE — TELEPHONE ENCOUNTER
Not sure what she is doing but hemoglobin A1c has jumped from the 8 to the 9 range. This is about as high as she has been. I would recommend consideration for insulin daily. If not willing ,see if insurance will cover Jardiance 10 mg daily.

## 2022-04-05 NOTE — LETTER
4/5/2022    Ousmane Jay    Dear Miguel A Gonzalez,    I enjoyed speaking with you and wanted to send some additional information. Gautam Bridges MD and I will work together to ensure your needs are met and help you achieve your health goals. We are committed to walk with you on this journey and look forward to working with you. Please feel free to contact me with any questions or concerns. I am available by phone. You can reach me at [phone] .       In good health,     Pablo Sahni RN

## 2022-04-05 NOTE — CARE COORDINATION
Ambulatory Care Coordination Note  4/5/2022  CM Risk Score: 7  Charlson 10 Year Mortality Risk Score: 100%     ACC: Carlos Richardson RN    Summary Note: [facility] contacted [facility] to offer enrollment in care coordination and she is agreeable. [facility] lives alone in a private residence. She is independent in all ADL's and IADL's. She is physically active riding her stationary bike every day for one hour. She has not experienced any falls. She is current on all her vaccines and has had her AWV this year. [facility] is concerned that her blood sugars have been steadily increasing over the past several months. A1c on 4/4/2022 was 9.2 (12/2021 8.3). She has discussed this with Dr. Kiran Reeves. Patient to begin jardiance and monitor diet and is scheduled for a follow up on 8/3/22. She has been reading labels. Patient states her CHF is well controlled. She weighs herself daily and states her weights are always 149-151 pounds. She denies shortness of breath, chest pain, and lower extremity edema. [facility] is unable to give current blood sugar readings and daily weights to [facility]. She is not at home. [facility] requested [facility] have blood sugar and weight log at next contact and she is agreeable. Patient states she has all her medications filled and takes them daily as directed.     Plan  Mail welcome letter, DM zone tool, CHF zone tool, A1C chart, healthy snacks, diabetic meal planning, and low sodium diet literature via mail  Update PCP  Next contact review blood sugar and daily weight log  Follow for care coordination      Ambulatory Care Coordination Assessment    Care Coordination Protocol  Program Enrollment: Complex Care  Referral from Primary Care Provider: No  Week 1 - Initial Assessment     Do you have all of your prescriptions and are they filled?: Yes  Barriers to medication adherence: None  Are you able to afford your medications?: Yes  How often do you have trouble taking your medications the way you have been told to take them?: I always take them as prescribed. Do you have Home O2 Therapy?: No      Ability to seek help/take action for Emergent Urgent situations i.e. fire, crime, inclement weather or health crisis. : Independent  Ability to ambulate to restroom: Independent  Ability handle personal hygeine needs (bathing/dressing/grooming): Independent  Ability to manage Medications: Independent  Ability to prepare Food Preparation: Independent  Ability to maintain home (clean home, laundry): Independent  Ability to drive and/or has transportation: Independent  Ability to do shopping: Independent  Ability to manage finances: Independent  Is patient able to live independently?: Yes     Current Housing: Private Residence        Per the Fall Risk Screening, did the patient have 2 or more falls or 1 fall with injury in the past year?: No     Frequent urination at night?: No  Do you use rails/bars?: Yes  Do you have a non-slip tub mat?: Yes     Are you experiencing loss of meaning?: No  Are you experiencing loss of hope and peace?: No     Thinking about your patient's physical health needs, are there any symptoms or problems (risk indicators) you are unsure about that require further investigation?: No identified areas of uncertainly or problems already being investigated   Are the patients physical health problems impacting on their mental well-being?: No identified areas of concern   Are there any problems with your patients lifestyle behaviors (alcohol, drugs, diet, exercise) that are impacting on physical or mental well-being?: No identified areas of concern   Do you have any other concerns about your patients mental well-being?  How would you rate their severity and impact on the patient?: No identified areas of concern   How would you rate their home environment in terms of safety and stability (including domestic violence, insecure housing, neighbor harassment)?: Consistently safe, supportive, stable, no identified problems   How do daily activities impact on the patient's well-being? (include current or anticipated unemployment, work, caregiving, access to transportation or other): No identified problems or perceived positive benefits   How would you rate their social network (family, work, friends)?: Adequate participation with social networks   How would you rate their financial resources (including ability to afford all required medical care)?: Financially secure, resources adequate, no identified problems   How wells does the patient now understand their health and well-being (symptoms, signs or risk factors) and what they need to do to manage their health?: Reasonable to good understanding and already engages in managing health or is willing to undertake better management   How well do you think your patient can engage in healthcare discussions? (Barriers include language, deafness, aphasia, alcohol or drug problems, learning difficulties, concentration): Clear and open communication, no identified barriers   Do other services need to be involved to help this patient?: Other care/services in place but not sufficient   Are current services involved with this patient well-coordinated? (Include coordination with other services you are now recommendation): All required care/services in place and well-coordinated   Suggested Interventions and Community Resources  Diabetes Education: Not Started    Registered Dietician: Not Started   Social Work: Not Started   Zone Management Tools: Completed         Set up/Review an Education Plan, Set up/Review Goals              Prior to Admission medications    Medication Sig Start Date End Date Taking?  Authorizing Provider   Multiple Vitamins-Minerals (PRESERVISION AREDS 2) CAPS Take 1 capsule by mouth in the morning and at bedtime    Historical Provider, MD   glipiZIDE (GLUCOTROL XL) 10 MG extended release tablet TAKE ONE TABLET BY MOUTH TWO TIMES A DAY 4/4/22   Jeni Lindsey MD   JANUVIA 50 MG tablet Take 1 tablet by mouth daily 4/4/22   Leigh Colunga MD   magnesium cl-calcium carbonate (NU-MAG) 71.5-119 MG TBEC tablet TAKE TWO TABLETS BY MOUTH DAILY 4/4/22   Leigh Colunga MD   blood glucose monitor kit and supplies 1 kit by Other route in the morning and at bedtime Dispense sufficient amount for indicated testing frequency plus additional to accommodate PRN testing needs. Dispense all needed supplies to include: monitor, strips, lancing device, lancets, control solutions, alcohol swabs. 4/4/22   Leigh Colunga MD   Lancets 33G MISC 1 Lancet by Does not apply route in the morning and at bedtime 4/4/22 10/1/22  Leigh Colunga MD   blood glucose monitor strips 1 strip by Other route in the morning and at bedtime Test 1 times a day & as needed for symptoms of irregular blood glucose. Dispense sufficient amount for indicated testing frequency plus additional to accommodate PRN testing needs. 4/4/22   Leigh Colunga MD   rosuvastatin (CRESTOR) 20 MG tablet Take 1 tablet by mouth daily 3/14/22   Kim Byers MD   clopidogrel (PLAVIX) 75 MG tablet Take 1 tablet by mouth daily 3/14/22   Kim Byers MD   aspirin EC 81 MG EC tablet Take 1 tablet by mouth daily 3/14/22   Kim Byers MD   pantoprazole (PROTONIX) 40 MG tablet Take 1 tablet by mouth every morning (before breakfast) 3/14/22   Kim Byers MD   carvedilol (COREG) 6.25 MG tablet Take 1 tablet by mouth 2 times daily 3/14/22   Kim Byers MD   amLODIPine (NORVASC) 5 MG tablet Take 1 tablet by mouth daily 3/14/22   Kim Byers MD   furosemide (LASIX) 20 MG tablet 1 po QOD 12/15/21   Leigh Colunga MD   Handicap Placard MISC by Does not apply route Duration: 5 years 8/30/21   Leigh Colunga MD   nitroGLYCERIN (NITROSTAT) 0.4 MG SL tablet DISSOLVE ONE TABLET UNDER TONGUE AS NEEDED FOR CHEST PAINS. MAY REPEAT IN 5 MINUTES.  IF SYMPTOMS PERSISTS CALL 911 4/20/20   Leigh Colunga MD   Cinnamon 500 MG CAPS Take 1 capsule by mouth daily    Historical Provider, MD   vitamin D (CHOLECALCIFEROL) 1000 UNIT TABS tablet Take 1,000 Units by mouth daily    Historical Provider, MD   vitamin B-12 (CYANOCOBALAMIN) 1000 MCG tablet Take 1,000 mcg by mouth every other day     Historical Provider, MD       Future Appointments   Date Time Provider Dioni Darling   8/3/2022 10:00 AM Torie Thompson  [address] [place] [address]   9/15/2022 12:30 PM Long Rinaldisy ECHO [facility]   9/15/2022  1:30 PM Jovon Putnam MD [facility] CARDIO [facility]   7/13/2023  1:30 PM Audrey Forbes MD [facility]/MED [facility]     ,   Diabetes Assessment    Medic Alert ID: No  Meal Planning: Other, Avoidance of concentrated sweets   How often do you test your blood sugar?: Other (Comment: fbs/hs)   Do you have barriers with adherence to non-pharmacologic self-management interventions?  (Nutrition/Exercise/Self-Monitoring): No   Have you ever had to go to the ED for symptoms of low blood sugar?: No       No patient-reported symptoms       and   Congestive Heart Failure Assessment    Are you currently restricting fluids?: No Restriction  Do you understand a low sodium diet?: Yes  Do you understand how to read food labels?: Yes  Do you salt your food before tasting it?: No     No patient-reported symptoms      Symptoms:  None: Yes      Symptom course: stable  Patient-reported weight (lb): 150 (Comment: 149-151 range)  Weight trend: stable

## 2022-04-05 NOTE — PROGRESS NOTES
[address] [place]     22  Rebecca Covington : 1940 Sex: female  Age: 80 y.o. Chief Complaint   Patient presents with    Diabetes     3 months       HPI  Patient presents today for follow-up visit on her medical problems. She tells me blood sugars have been running high. She does states that she has an old machine and is uncertain of the accuracy. Back over hemoglobin A1c is they do look to be climbing. She is currently on Januvia and glipizide. She could not tolerate Metformin with the chronic kidney disease and has declined going on insulin. We did have to stop her Actos because of CHF history. Blood pressures been well controlled on her current antihypertensive medication. Lipids have been stable and controlled on her statin medication. I did review cardiology note as well as 2D echo results. Also reviewed her endocrinologist note related to her goiter/thyroid status. Reviewed her CAT scan of the chest showing stability of her nodule. I told her I wanted to repeat this in August timeframe. CAD has been stable and following with cardiology as well as her aortic stenosis. Her eyes and feet are up-to-date with her diabetes.  No retinopathy no foot sores  She is up-to-date with consultants including renal for chronic kidney disease, ophthalmology, endocrine (they have been following thyroid ultrasounds) or her thyroid, cardiology for her aortic stenosis and recent left main stent placement and previously with ear nose and throat who recommended no thyroid biopsy at that point but simply ultrasound follow-up.     Review of Systems     Constitutional: Negative for activity change, appetite change, chills, diaphoresis, fatigue, fever and unexpected weight change.    HENT: Negative for congestion, ear pain, hearing loss, postnasal drip, rhinorrhea and sinus pain.    Respiratory: Negative for  wheezing. Cough and shortness of breath improved   cardiovascular: Negative for chest pain, palpitations and leg swelling. Gastrointestinal: Negative for abdominal pain, blood in stool, constipation, diarrhea, nausea and vomiting.          Endocrine:  Type 2 diabetes, goiter   Genitourinary: Negative for difficulty urinating, dysuria, frequency, hematuria and urgency. Musculoskeletal: Positive for arthralgias. Negative for back pain, gait problem and myalgias.        She was complaining of muscle pain prior to stopping the Actonel-did not tolerate oral bisphosphonates-did wish to try Actonel 1 more time for now-was successful-currently holding because of kidney function  Skin: No open wounds   allergic/Immunologic: Negative for environmental allergies and immunocompromised state. Neurological: Negative for dizziness (improved after cardiac stenting), weakness, light-headedness, numbness and headaches. Hematological: Negative. Endocrine: Type 2 diabetes-currently holding Metformin due to renal function, and Actos due to congestive heart failure           REST OF PERTINENT ROS GONE OVER AND WAS NEGATIVE.    PMH:  Problem List: Knee pain, Non-toxic nodular goiter, Essential hypertension, Goiter, Hyperlipidemia, Benign  essential hypertension, Type II diabetes mellitus uncontrolled  Health Maintenance:  Bone Density Test Screening - (12/3/2018)  Bone Density Scan - (12/3/2018)  Influenza Vaccination - (10/29/2018)  Colonoscopy - (7/25/2018)  7/18-due 23  Couseled on Home Safety - (6/13/2018)  Pneumonia Vaccination - (2/5/2018)  Mammogram - (12/20/2012)  Mammogram Screening - (12/20/2012)  Declining further  Tetanus Immunization - (1/18/2017)  Rectal Exam - 4/10,4/11  Breast Exam - 4/10,4/11  Hemmocult Cards - 9/12-neg x 3,5/14-neg x3, 6/16-neg x 3  EKG - 5/11,, 5/15, 3/17,5/17,3/18  2D ECHO - 1/17 , 1/18  Carotid Artery Study - 1/17-30-49% occlusion bilateral, 2/18  Stress Test - 4/17-neg,3/18-pos  Zoster/Shingles Vaccine - 2016  PFTs-6/21  Medical Problems:  Non Insulin Dependent Diabetes, Hypertension, Hyperlipidemia  Goiter - multinodular-bx neg-dr hooper  GERD, Hypothyroidism  Osteoporosis - Bisphosphonate intolerant  Glaucoma, Renal Insufficiency  Aortic And Mitral Valve Disorders - 2D echo-  VHD  Pulmonary HTN - mild  Macular Degeneration  WBCs in urine - Workup by urology-  Coronary Artery Disease (CAD) - Positive stress test 3/18-heart cath--left main disease-stents  Diverticulosis, Diabetic Neuropathy, Vitamin B12 deficiency  Hospitalization for infected left groin cath site-  HFpEF   Lung nodule-following CAT scans     Follows with - Cardiology, nephrology, ophthalmology, endocrine  Surgical Hx:  AKIN-right salpingo-oophorectomy - Fibroids  Appendectomy  Right cataract surgery - left also  Left knee arthroscopy, Removal of Gallbladder  Cardiac catheterization-severe left main disease-atherectomy/PCI/stents     FH: Father:  MI.  Mother:  Cerebrovascular Accident (CVA). Brother 1:  Coronary Artery Disease (CAD) - 52's. 3 other brothers, 1 sister   SH:  Marital: . Personal Habits: Cigarette Use: Negative For current cigarette smoker. Alcohol: does not use  alcohol. 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                       Current Outpatient Medications:     Multiple Vitamins-Minerals (PRESERVISION AREDS 2) CAPS, Take 1 capsule by mouth in the morning and at bedtime, Disp: , Rfl:     glipiZIDE (GLUCOTROL XL) 10 MG extended release tablet, TAKE ONE TABLET BY MOUTH TWO TIMES A DAY, Disp: 180 tablet, Rfl: 1    JANUVIA 50 MG tablet, Take 1 tablet by mouth daily, Disp: 90 tablet, Rfl: 1    magnesium cl-calcium carbonate (NU-MAG) 71.5-119 MG TBEC tablet, TAKE TWO TABLETS BY MOUTH DAILY, Disp: 180 tablet, Rfl: 1    blood glucose monitor kit and supplies, 1 kit by Other route in the morning and at bedtime Dispense sufficient amount for indicated testing frequency plus additional to accommodate PRN testing needs.  Dispense all needed supplies to include: monitor, strips, lancing device, lancets, control solutions, alcohol swabs., Disp: 1 kit, Rfl: 0    Lancets 33G MISC, 1 Lancet by Does not apply route in the morning and at bedtime, Disp: 200 each, Rfl: 5    blood glucose monitor strips, 1 strip by Other route in the morning and at bedtime Test 1 times a day & as needed for symptoms of irregular blood glucose. Dispense sufficient amount for indicated testing frequency plus additional to accommodate PRN testing needs. , Disp: 200 strip, Rfl: 5    rosuvastatin (CRESTOR) 20 MG tablet, Take 1 tablet by mouth daily, Disp: 90 tablet, Rfl: 1    clopidogrel (PLAVIX) 75 MG tablet, Take 1 tablet by mouth daily, Disp: 90 tablet, Rfl: 3    aspirin EC 81 MG EC tablet, Take 1 tablet by mouth daily, Disp: 90 tablet, Rfl: 3    pantoprazole (PROTONIX) 40 MG tablet, Take 1 tablet by mouth every morning (before breakfast), Disp: 90 tablet, Rfl: 1    carvedilol (COREG) 6.25 MG tablet, Take 1 tablet by mouth 2 times daily, Disp: 180 tablet, Rfl: 3    amLODIPine (NORVASC) 5 MG tablet, Take 1 tablet by mouth daily, Disp: 90 tablet, Rfl: 3    furosemide (LASIX) 20 MG tablet, 1 po QOD, Disp: 45 tablet, Rfl: 1    Handicap Placard MISC, by Does not apply route Duration: 5 years, Disp: 1 each, Rfl: 0    nitroGLYCERIN (NITROSTAT) 0.4 MG SL tablet, DISSOLVE ONE TABLET UNDER TONGUE AS NEEDED FOR CHEST PAINS. MAY REPEAT IN 5 MINUTES.  IF SYMPTOMS PERSISTS CALL 911, Disp: 25 tablet, Rfl: 1    Cinnamon 500 MG CAPS, Take 1 capsule by mouth daily, Disp: , Rfl:     vitamin D (CHOLECALCIFEROL) 1000 UNIT TABS tablet, Take 1,000 Units by mouth daily, Disp: , Rfl:     vitamin B-12 (CYANOCOBALAMIN) 1000 MCG tablet, Take 1,000 mcg by mouth every other day , Disp: , Rfl:   Allergies   Allergen Reactions    Naproxen     Penicillins        Past Medical History:   Diagnosis Date    Abscess of groin, left 6/22/2021    Aortic valve disorder     CAD (coronary artery disease)     Cancer ([facility]     glaucoma    Carotid stenosis, right 6/22/2021    Chest pain     Diabetes ([facility]     Diverticulosis     Femoral-popliteal atherosclerosis ([facility] 7/13/2021    GERD (gastroesophageal reflux disease)     Goiter     Hyperlipidemia     Hypertension     Hypothyroidism     Macular degeneration     Mitral valve disorder     Neuropathy     Osteoporosis     VHD (valvular heart disease)     Vitamin B12 deficiency      Past Surgical History:   Procedure Laterality Date    APPENDECTOMY      BREAST BIOPSY Right     neg    CARDIAC CATHETERIZATION  06/11/2021    Dr Milton Coon  06/15/2021    Dr Keerthi Snyder 3.5 x 18 LM to LAD and 4.0 x18 Prox CX    EYE SURGERY Bilateral     cataract    HYSTERECTOMY      KNEE ARTHROPLASTY Left     TOOTH EXTRACTION      TRANSESOPHAGEAL ECHOCARDIOGRAM  06/10/2021    Dr Roney Diaz     Family History   Problem Relation Age of Onset    Stroke Mother     Heart Disease Father 79        MI    Heart Attack Father     Heart Disease Brother     Coronary Art Dis Brother     Stroke Brother     Stroke Brother      Social History     Socioeconomic History    Marital status:       Spouse name: Not on file    Number of children: Not on file    Years of education: Not on file    Highest education level: Not on file   Occupational History    Not on file   Tobacco Use    Smoking status: Never Smoker    Smokeless tobacco: Never Used   Vaping Use    Vaping Use: Never used   Substance and Sexual Activity    Alcohol use: Not Currently    Drug use: Never    Sexual activity: Not on file   Other Topics Concern    Not on file   Social History Narrative    Denies caffeine     Social Determinants of Health     Financial Resource Strain: Low Risk     Difficulty of Paying Living Expenses: Not hard at all   Food Insecurity: No Food Insecurity    Worried About [facility] in the Last Year: Never true    Ran Out of Food in the Last Year: Never true   Transportation Needs:     Lack of Transportation (Medical): Not on file    Lack of Transportation (Non-Medical): Not on file   Physical Activity: Sufficiently Active    Days of Exercise per Week: 7 days    Minutes of Exercise per Session: 60 min   Stress:     Feeling of Stress : Not on file   Social Connections:     Frequency of Communication with Friends and Family: Not on file    Frequency of Social Gatherings with Friends and Family: Not on file    Attends Denominational Services: Not on file    Active Member of Clubs or Organizations: Not on file    Attends Club or Organization Meetings: Not on file    Marital Status: Not on file   Intimate Partner Violence:     Fear of Current or Ex-Partner: Not on file    Emotionally Abused: Not on file    Physically Abused: Not on file    Sexually Abused: Not on file   Housing Stability:     Unable to Pay for Housing in the Last Year: Not on file    Number of [place] in the Last Year: Not on file    Unstable Housing in the Last Year: Not on file       Vitals:    04/04/22 1239   BP: 128/62   Pulse: 70   Temp: 98 °F (36.7 °C)   TempSrc: Temporal   SpO2: 99%   Weight: 151 lb 3.2 oz (68.6 kg)   Height: 5' 2\" (1.575 m)       Physical Exam    Constitutional: She is oriented to person, place, and time. She appears well-developed and well-nourished. No distress.    Neck: Normal range of motion. Neck supple. Thyromegaly present. Positive for goiter   Right carotid bruit  No JVD noted. Cardiovascular: Normal rate, regular rhythm and intact distal pulses. Exam reveals no gallop and no friction rub.   Murmur heard. Patient's edema improved to trace pulmonary/Chest: No respiratory distress. She has no wheezes Or rhonchi.  Appears to [employer] today  Abdominal: Soft. Bowel sounds are normal. She exhibits no distension and no mass.  There is no tenderness.   Musculoskeletal: Normal range of motion. Neurological: She is alert and oriented to person, place, and time. She displays normal reflexes. No sensory deficit. She exhibits normal muscle tone. Coordination normal.   Skin: Skin is warm and dry. No rash noted. No erythema.      Psychiatric: Mood and affect normal  Nursing note and vitals reviewed                 Assessment and Plan: Javier Ruiz was seen today for diabetes. Diagnoses and all orders for this visit:    Type 2 diabetes mellitus with diabetic neuropathy, without long-term current use of insulin (HCC)  -     glipiZIDE (GLUCOTROL XL) 10 MG extended release tablet; TAKE ONE TABLET BY MOUTH TWO TIMES A DAY  -     magnesium cl-calcium carbonate (NU-MAG) 71.5-119 MG TBEC tablet; TAKE TWO TABLETS BY MOUTH DAILY  -     Hemoglobin A1C; Future  -     Discontinue: blood glucose monitor kit and supplies; 1 kit by Other route in the morning and at bedtime Dispense sufficient amount for indicated testing frequency plus additional to accommodate PRN testing needs. Dispense all needed supplies to include: monitor, strips, lancing device, lancets, control solutions, alcohol swabs. -     blood glucose monitor kit and supplies; 1 kit by Other route in the morning and at bedtime Dispense sufficient amount for indicated testing frequency plus additional to accommodate PRN testing needs. Dispense all needed supplies to include: monitor, strips, lancing device, lancets, control solutions, alcohol swabs. -     Discontinue: blood glucose monitor strips; 1 strip by Other route daily Test 1 times a day & as needed for symptoms of irregular blood glucose. Dispense sufficient amount for indicated testing frequency plus additional to accommodate PRN testing needs.   -     Discontinue: Lancets 33G MISC; 1 Lancet by Does not apply route daily  -     Lancets 33G MISC; 1 Lancet by Does not apply route in the morning and at bedtime  -     blood glucose monitor strips; 1 strip by Other route in the morning and at bedtime Test

## 2022-04-05 NOTE — TELEPHONE ENCOUNTER
Prescription sent. Monitor sugars closely. We will need to check kidney function in a couple weeks. Order is in.

## 2022-04-05 NOTE — TELEPHONE ENCOUNTER
Doreen Tadeo called back and said that the insurance will cover it if she can get it through [facility]. It will cost her $45 for a 3 month supply from [facility]. She would like to try this and will work on the diet as well.

## 2022-04-14 ENCOUNTER — CARE COORDINATION (OUTPATIENT)
Dept: CARE COORDINATION | Age: 82
End: 2022-04-14

## 2022-04-14 SDOH — ECONOMIC STABILITY: INCOME INSECURITY: IN THE LAST 12 MONTHS, WAS THERE A TIME WHEN YOU WERE NOT ABLE TO PAY THE MORTGAGE OR RENT ON TIME?: NO

## 2022-04-14 SDOH — ECONOMIC STABILITY: HOUSING INSECURITY
IN THE LAST 12 MONTHS, WAS THERE A TIME WHEN YOU DID NOT HAVE A STEADY PLACE TO SLEEP OR SLEPT IN A SHELTER (INCLUDING NOW)?: NO

## 2022-04-14 SDOH — ECONOMIC STABILITY: HOUSING INSECURITY: IN THE LAST 12 MONTHS, HOW MANY PLACES HAVE YOU LIVED?: 1

## 2022-04-14 NOTE — CARE COORDINATION
Ambulatory Care Coordination Note  4/14/2022  CM Risk Score: 7  Charlson 10 Year Mortality Risk Score: 100%     ACC: José Miguel Dowell, RN    Summary Note: [facility] contacted Brown Villatoro. She states she picked up the test strips for her new glucometer and began using it today. She states she is testing twice daily. Fasting blood sugar today was 189. On 4/13 her blood sugars were 279 (HS) and 152 (FBS). She states she has not started the jardiance because she would like to lower her blood sugars through changes in her diet. Brown Villatoro states she will start the jardiance if after one week her blood sugars have not lowered. [facility] educated Brown Villatoro on the signs and symptoms of hyper/hypoglycemia and steps to take if she experiences s/s. She verbalized understanding. [facility] offered Brown Villatoro a referral to the dietician and diabetes educator. She politely declined. Brown Villatoro states she weighs herself daily. Today's weight was 149 and yesterday's was 150. She denies shortness of breath, lower extremity edema, abdominal distention and chest pain. Per Brown Villatoro she states her CHF is currently well managed. Brown Villatoro states she has all her medications filled and takes them as prescribed (with the exception of jardiance). Brown Villatoro states she has not received literature sent by [facility]. Plan  Check status of DM/CHF  Update PCP  Resend educational material if not received at next contact  Continue to follow for care coordination      Care Coordination Interventions    Program Enrollment: Complex Care  Referral from Primary Care Provider: No  Suggested Interventions and Community Resources  Diabetes Education: Declined  Fall Risk Prevention: Completed  Registered Dietician: Declined  Social Work: Not Started (Comment: ACP?)  Zone Management Tools: Completed (Comment: DM and CHF Zone Tools)         Goals Addressed                 This Visit's Progress     Conditions and Symptoms   On track     I will schedule office visits, as directed by my provider.   I will keep my appointment or reschedule if I have to cancel. I will notify my provider of any barriers to my plan of care. I will follow my Zone Management tool to seek urgent or emergent care. I will notify my provider of any symptoms that indicate a worsening of my condition. Barriers: lack of education  Plan for overcoming my barriers: DM zone tool, CHF zone tool, and care coordination  Confidence: 8/10  Anticipated Goal Completion Date: 7/5/2022         Self Monitoring   On track     Self-Monitored Blood Glucose - I will check my blood sugar Other: fbs/hs, prn  I will notify my provider of any trends of increasing or decreasing blood sugars over a 1 month period. I will notify my provider if I have any blood sugar readings less than 70 more than 2 times a month. Daily Weights - I will weight myself as directed - Daily and write down weights  I will notify my provider of any increase in weight by 3 or more pounds in 2 days OR 5 or more pounds in a week. Patient Reported Weight No flowsheet data found. Patient Reported Blood Glucose No flowsheet data found. Barriers: lack of education  Plan for overcoming my barriers: DM zone tool, CHF zone tool, and care coordination  Confidence: 8/10  Anticipated Goal Completion Date: 7/5/2022              Prior to Admission medications    Medication Sig Start Date End Date Taking?  Authorizing Provider   empagliflozin (JARDIANCE) 10 MG tablet Take 1 tablet by mouth daily 4/5/22   Jaxon Sahni MD   Multiple Vitamins-Minerals (PRESERVISION AREDS 2) CAPS Take 1 capsule by mouth in the morning and at bedtime    Historical Provider, MD   glipiZIDE (GLUCOTROL XL) 10 MG extended release tablet TAKE ONE TABLET BY MOUTH TWO TIMES A DAY 4/4/22   Jaxon Sahni MD   JANUVIA 50 MG tablet Take 1 tablet by mouth daily 4/4/22   Jaxon Sahni MD   magnesium cl-calcium carbonate (NU-MAG) 71.5-119 MG TBEC tablet TAKE TWO TABLETS BY MOUTH DAILY 4/4/22   Jaxon Sahni MD   blood glucose monitor kit and supplies 1 kit by Other route in the morning and at bedtime Dispense sufficient amount for indicated testing frequency plus additional to accommodate PRN testing needs. Dispense all needed supplies to include: monitor, strips, lancing device, lancets, control solutions, alcohol swabs. 4/4/22   Marian Jones MD   Lancets 33G MISC 1 Lancet by Does not apply route in the morning and at bedtime 4/4/22 10/1/22  Marian Jones MD   blood glucose monitor strips 1 strip by Other route in the morning and at bedtime Test 1 times a day & as needed for symptoms of irregular blood glucose. Dispense sufficient amount for indicated testing frequency plus additional to accommodate PRN testing needs. 4/4/22   Marian Jones MD   rosuvastatin (CRESTOR) 20 MG tablet Take 1 tablet by mouth daily 3/14/22   Omari Burgos MD   clopidogrel (PLAVIX) 75 MG tablet Take 1 tablet by mouth daily 3/14/22   Omari Burgos MD   aspirin EC 81 MG EC tablet Take 1 tablet by mouth daily 3/14/22   Omari Burgos MD   pantoprazole (PROTONIX) 40 MG tablet Take 1 tablet by mouth every morning (before breakfast) 3/14/22   Omari Burgos MD   carvedilol (COREG) 6.25 MG tablet Take 1 tablet by mouth 2 times daily 3/14/22   Omari Burgos MD   amLODIPine (NORVASC) 5 MG tablet Take 1 tablet by mouth daily 3/14/22   Omari Burgos MD   furosemide (LASIX) 20 MG tablet 1 po QOD 12/15/21   Marian Jones MD   Handicap Placard MISC by Does not apply route Duration: 5 years 8/30/21   Marian Jones MD   nitroGLYCERIN (NITROSTAT) 0.4 MG SL tablet DISSOLVE ONE TABLET UNDER TONGUE AS NEEDED FOR CHEST PAINS. MAY REPEAT IN 5 MINUTES.  IF SYMPTOMS PERSISTS CALL 911 4/20/20   Marian Jones MD   Cinnamon 500 MG CAPS Take 1 capsule by mouth daily    Historical Provider, MD   vitamin D (CHOLECALCIFEROL) 1000 UNIT TABS tablet Take 1,000 Units by mouth daily    Historical Provider, MD   vitamin B-12 (CYANOCOBALAMIN) 1000 MCG tablet Take 1,000 mcg by mouth every other day     Historical Provider, MD

## 2022-05-04 ENCOUNTER — CARE COORDINATION (OUTPATIENT)
Dept: CARE COORDINATION | Age: 82
End: 2022-05-04

## 2022-05-04 NOTE — CARE COORDINATION
Ambulatory Care Coordination Note  5/4/2022  CM Risk Score: 3  Charlson 10 Year Mortality Risk Score: 100%     ACC: Matthew Noe, RN    Summary Note: ACM contacted Ayah Marcano. She states she is testing her blood sugars twice daily. Fasting blood sugar today was 145. On 5/3 her blood sugars were 153 (HS) and 136 (FBS) and 5/2 they were 189 (HS) and 161 (FBS). She states she has not started the jardiance because she would like to lower her blood sugars through changes in her diet. Ayah Marcano states she has made changes to her diet which include:  less carb intake and increased salads with grilled chicken. She is reading labels and is eating three well balanced meals daily. Ayah Marcano has received the diabetes educational material mailed to her. ACM reviewed material (healthy eating, DM zone tool, A1C chart) with patient and she verbalized understanding. She states the information has been very helpful to her. ACM encouraged patient to continue with diet changes and acknowledged her accomplishments thus far. She states she is very motivated to change her habits as she wants to avoid the complications of uncontrolled diabetes and potential need for insulin. Ayah Marcano states she weighs herself daily. Today's weight was 150 and yesterday's was 151. She denies shortness of breath, lower extremity edema, abdominal distention and chest pain. Per Ayah Marcano she states her CHF is currently well managed.   Ayah Marcano states she has all her medications filled and takes them as prescribed (with the exception of jardiance).       Plan  Review blood sugar logs and weight logs  Update PCP  Continue to follow for care coordination      Care Coordination Interventions    Program Enrollment: Complex Care  Referral from Primary Care Provider: No  Suggested Interventions and Community Resources  Diabetes Education: Declined  Fall Risk Prevention: Completed  Registered Dietician: Km Britt Work: Not Started (Comment: ACP?)  Zone Management Tools: Completed (Comment: DM and CHF Zone Tools)         Goals Addressed                 This Visit's Progress     Conditions and Symptoms   On track     I will schedule office visits, as directed by my provider. I will keep my appointment or reschedule if I have to cancel. I will notify my provider of any barriers to my plan of care. I will follow my Zone Management tool to seek urgent or emergent care. I will notify my provider of any symptoms that indicate a worsening of my condition. Barriers: lack of education  Plan for overcoming my barriers: DM zone tool, CHF zone tool, and care coordination  Confidence: 8/10  Anticipated Goal Completion Date: 7/5/2022         Self Monitoring   On track     Self-Monitored Blood Glucose - I will check my blood sugar Other: fbs/hs, prn  I will notify my provider of any trends of increasing or decreasing blood sugars over a 1 month period. I will notify my provider if I have any blood sugar readings less than 70 more than 2 times a month. Daily Weights - I will weight myself as directed - Daily and write down weights  I will notify my provider of any increase in weight by 3 or more pounds in 2 days OR 5 or more pounds in a week. Patient Reported Weight No flowsheet data found. Patient Reported Blood Glucose No flowsheet data found. Barriers: lack of education  Plan for overcoming my barriers: DM zone tool, CHF zone tool, and care coordination  Confidence: 8/10  Anticipated Goal Completion Date: 7/5/2022              Prior to Admission medications    Medication Sig Start Date End Date Taking?  Authorizing Provider   empagliflozin (JARDIANCE) 10 MG tablet Take 1 tablet by mouth daily 4/5/22   Ansley Fontaine MD   Multiple Vitamins-Minerals (PRESERVISION AREDS 2) CAPS Take 1 capsule by mouth in the morning and at bedtime    Historical Provider, MD   glipiZIDE (GLUCOTROL XL) 10 MG extended release tablet TAKE ONE TABLET BY MOUTH TWO TIMES A DAY 4/4/22   Ansley Fontaine MD JANUVIA 50 MG tablet Take 1 tablet by mouth daily 4/4/22   Pietro Najera MD   magnesium cl-calcium carbonate (NU-MAG) 71.5-119 MG TBEC tablet TAKE TWO TABLETS BY MOUTH DAILY 4/4/22   Pietro Najera MD   blood glucose monitor kit and supplies 1 kit by Other route in the morning and at bedtime Dispense sufficient amount for indicated testing frequency plus additional to accommodate PRN testing needs. Dispense all needed supplies to include: monitor, strips, lancing device, lancets, control solutions, alcohol swabs. 4/4/22   Pietro Najera MD   Lancets 33G MISC 1 Lancet by Does not apply route in the morning and at bedtime 4/4/22 10/1/22  Pietro Najera MD   blood glucose monitor strips 1 strip by Other route in the morning and at bedtime Test 1 times a day & as needed for symptoms of irregular blood glucose. Dispense sufficient amount for indicated testing frequency plus additional to accommodate PRN testing needs. 4/4/22   Pietro Najera MD   rosuvastatin (CRESTOR) 20 MG tablet Take 1 tablet by mouth daily 3/14/22   Qasim Lazo MD   clopidogrel (PLAVIX) 75 MG tablet Take 1 tablet by mouth daily 3/14/22   Qaism Lazo MD   aspirin EC 81 MG EC tablet Take 1 tablet by mouth daily 3/14/22   Qasim Lazo MD   pantoprazole (PROTONIX) 40 MG tablet Take 1 tablet by mouth every morning (before breakfast) 3/14/22   Qasim Lazo MD   carvedilol (COREG) 6.25 MG tablet Take 1 tablet by mouth 2 times daily 3/14/22   Qasim Lazo MD   amLODIPine (NORVASC) 5 MG tablet Take 1 tablet by mouth daily 3/14/22   Qasim Lazo MD   furosemide (LASIX) 20 MG tablet 1 po QOD 12/15/21   Pietro Najera MD   Handicap Placard MISC by Does not apply route Duration: 5 years 8/30/21   Pietro Najera MD   nitroGLYCERIN (NITROSTAT) 0.4 MG SL tablet DISSOLVE ONE TABLET UNDER TONGUE AS NEEDED FOR CHEST PAINS. MAY REPEAT IN 5 MINUTES.  IF SYMPTOMS PERSISTS CALL 911 4/20/20   Pietro Najera MD   Cinnamon 500 MG CAPS Take 1 capsule by mouth daily    Historical Provider, MD vitamin D (CHOLECALCIFEROL) 1000 UNIT TABS tablet Take 1,000 Units by mouth daily    Historical Provider, MD   vitamin B-12 (CYANOCOBALAMIN) 1000 MCG tablet Take 1,000 mcg by mouth every other day     Historical Provider, MD       Future Appointments   Date Time Provider Dioni Darling   8/3/2022 10:00 AM Keshawn Youngblood  [address] [place] [address]   9/15/2022 12:30 PM Nida Wood [facility]   9/15/2022  1:30 PM Chel Frausto MD [facility]   7/13/2023  1:30 PM Meng Mcclure MD [facility]     ,   Diabetes Assessment    Medic Alert ID: No  Meal Planning: Other, Avoidance of concentrated sweets   How often do you test your blood sugar?: Other (Comment: fbs/hs)   Do you have barriers with adherence to non-pharmacologic self-management interventions?  (Nutrition/Exercise/Self-Monitoring): No   Have you ever had to go to the ED for symptoms of low blood sugar?: No       No patient-reported symptoms   Do you have hyperglycemia symptoms?: No   Do you have hypoglycemia symptoms?: No   Last Blood Sugar Value: 145   Blood Sugar Trends: Steady Decrease       and   Congestive Heart Failure Assessment    Are you currently restricting fluids?: No Restriction  Do you understand a low sodium diet?: Yes  Do you understand how to read food labels?: Yes  Do you salt your food before tasting it?: No     No patient-reported symptoms      Symptoms:  None: Yes      Symptom course: stable  Patient-reported weight (lb): 150  Weight trend: stable  Salt intake watch compared to last visit: stable

## 2022-05-16 ENCOUNTER — CARE COORDINATION (OUTPATIENT)
Dept: CARE COORDINATION | Age: 82
End: 2022-05-16

## 2022-05-16 NOTE — CARE COORDINATION
Ambulatory Care Coordination Note  5/16/2022  CM Risk Score: 7  Charlson 10 Year Mortality Risk Score: 100%     ACC: Darius Richmond RN    Summary Note: Summary Note: ACM contacted Donna Smith. She is out of town visiting her nephew. She has not been weighing herself for the past few days. She is returning home today and will start daily weights. The last weight she had at home was 143.5. She denies lower extremity edema, shortness of breath, and chest pain. Reviewed CHF Precautions with Donna Smith:  -She verbalizes understanding to call MD office with weight increase of three pounds overnight or five pounds in a week. Patient to weigh daily; and record. Should weigh first thing in the morning, wearing the same type of clothes, after you urinate, before you eat breakfast.  Reviewed heart failure zones; and when should update cardiologist.   Denies any questions/concerns. She will review packet sent to her home upon her return. Donna Smith states she has continued to watch her diet closely. She states she checks her blood sugars twice daily. Fasting blood sugars are in the 129-150 range and two hours post prandial are 162-182. She denies signs or symptoms of hyper/hypoglycemia. She has not started jardiance. Donna Smith states she has all her medications and takes them as prescribed. Plan  Review blood sugar log  Review weight log  Continue to follow for care coordination       Care Coordination Interventions    Program Enrollment: Complex Care  Referral from Primary Care Provider: No  Suggested Interventions and Community Resources  Diabetes Education: Declined  Fall Risk Prevention: Completed  Registered Dietician: Declined  Social Work: Declined (Comment: has ACP documents)  Zone Management Tools: Completed (Comment: DM and CHF Zone Tools)         Goals Addressed                 This Visit's Progress     Conditions and Symptoms   On track     I will schedule office visits, as directed by my provider.   I will keep my appointment or reschedule if I have to cancel. I will notify my provider of any barriers to my plan of care. I will follow my Zone Management tool to seek urgent or emergent care. I will notify my provider of any symptoms that indicate a worsening of my condition. Barriers: lack of education  Plan for overcoming my barriers: DM zone tool, CHF zone tool, and care coordination  Confidence: 8/10  Anticipated Goal Completion Date: 7/5/2022         Self Monitoring   On track     Self-Monitored Blood Glucose - I will check my blood sugar Other: fbs/hs, prn  I will notify my provider of any trends of increasing or decreasing blood sugars over a 1 month period. I will notify my provider if I have any blood sugar readings less than 70 more than 2 times a month. Daily Weights - I will weight myself as directed - Daily and write down weights  I will notify my provider of any increase in weight by 3 or more pounds in 2 days OR 5 or more pounds in a week. Patient Reported Weight No flowsheet data found. Patient Reported Blood Glucose No flowsheet data found. Barriers: lack of education  Plan for overcoming my barriers: DM zone tool, CHF zone tool, and care coordination  Confidence: 8/10  Anticipated Goal Completion Date: 7/5/2022              Prior to Admission medications    Medication Sig Start Date End Date Taking?  Authorizing Provider   empagliflozin (JARDIANCE) 10 MG tablet Take 1 tablet by mouth daily 4/5/22   Torie Thompson MD   Multiple Vitamins-Minerals (PRESERVISION AREDS 2) CAPS Take 1 capsule by mouth in the morning and at bedtime    Historical Provider, MD   glipiZIDE (GLUCOTROL XL) 10 MG extended release tablet TAKE ONE TABLET BY MOUTH TWO TIMES A DAY 4/4/22   Torie Thompson MD   JANUVIA 50 MG tablet Take 1 tablet by mouth daily 4/4/22   Torie Thompson MD   magnesium cl-calcium carbonate (NU-MAG) 71.5-119 MG TBEC tablet TAKE TWO TABLETS BY MOUTH DAILY 4/4/22   Torie Thompson MD   blood glucose monitor kit and supplies 1 kit by Other route in the morning and at bedtime Dispense sufficient amount for indicated testing frequency plus additional to accommodate PRN testing needs. Dispense all needed supplies to include: monitor, strips, lancing device, lancets, control solutions, alcohol swabs. 4/4/22   Suzan Hernandes MD   Lancets 33G MISC 1 Lancet by Does not apply route in the morning and at bedtime 4/4/22 10/1/22  Suzan Hernandes MD   blood glucose monitor strips 1 strip by Other route in the morning and at bedtime Test 1 times a day & as needed for symptoms of irregular blood glucose. Dispense sufficient amount for indicated testing frequency plus additional to accommodate PRN testing needs. 4/4/22   Suzan Hernandes MD   rosuvastatin (CRESTOR) 20 MG tablet Take 1 tablet by mouth daily 3/14/22   Anitha Dodd MD   clopidogrel (PLAVIX) 75 MG tablet Take 1 tablet by mouth daily 3/14/22   Anitha Dodd MD   aspirin EC 81 MG EC tablet Take 1 tablet by mouth daily 3/14/22   Anitha Dodd MD   pantoprazole (PROTONIX) 40 MG tablet Take 1 tablet by mouth every morning (before breakfast) 3/14/22   Anitha Dodd MD   carvedilol (COREG) 6.25 MG tablet Take 1 tablet by mouth 2 times daily 3/14/22   Anitha Dodd MD   amLODIPine (NORVASC) 5 MG tablet Take 1 tablet by mouth daily 3/14/22   Anitha Dodd MD   furosemide (LASIX) 20 MG tablet 1 po QOD 12/15/21   Suzan Hernandes MD   Handicap Placard MISC by Does not apply route Duration: 5 years 8/30/21   Suzan Hernandes MD   nitroGLYCERIN (NITROSTAT) 0.4 MG SL tablet DISSOLVE ONE TABLET UNDER TONGUE AS NEEDED FOR CHEST PAINS. MAY REPEAT IN 5 MINUTES.  IF SYMPTOMS PERSISTS CALL 911 4/20/20   Suzan Hernandes MD   Cinnamon 500 MG CAPS Take 1 capsule by mouth daily    Historical Provider, MD   vitamin D (CHOLECALCIFEROL) 1000 UNIT TABS tablet Take 1,000 Units by mouth daily    Historical Provider, MD   vitamin B-12 (CYANOCOBALAMIN) 1000 MCG tablet Take 1,000 mcg by mouth every other day     Historical Provider, MD Future Appointments   Date Time Provider Dioni Darling   8/3/2022 10:00 AM Zoya Martinez  [address] [place] [address]   9/15/2022 12:30 PM [facility] [place]   9/15/2022  1:30 PM Mathew Miner MD [facility]   7/13/2023  1:30 PM Placido Leonard MD [facility]     ,   Diabetes Assessment    Medic Alert ID: No  Meal Planning: Other, Avoidance of concentrated sweets   How often do you test your blood sugar?: Other (Comment: fbs/hs)   Do you have barriers with adherence to non-pharmacologic self-management interventions?  (Nutrition/Exercise/Self-Monitoring): No   Have you ever had to go to the ED for symptoms of low blood sugar?: No       No patient-reported symptoms   Do you have hyperglycemia symptoms?: No   Do you have hypoglycemia symptoms?: No   Last Blood Sugar Value: 142   Blood Sugar Monitoring Regimen: 2 Hours Post Meal, Morning Fasting   Blood Sugar Trends: No Change       and   Congestive Heart Failure Assessment    Are you currently restricting fluids?: No Restriction  Do you understand a low sodium diet?: Yes  Do you understand how to read food labels?: Yes  Do you salt your food before tasting it?: No     No patient-reported symptoms      Symptoms:  None: Yes      Symptom course: stable  Patient-reported weight (lb): 143.5  Weight trend: decreasing steadily

## 2022-05-25 ENCOUNTER — CARE COORDINATION (OUTPATIENT)
Dept: CARE COORDINATION | Age: 82
End: 2022-05-25

## 2022-06-09 ENCOUNTER — CARE COORDINATION (OUTPATIENT)
Dept: CARE COORDINATION | Age: 82
End: 2022-06-09

## 2022-06-09 SDOH — ECONOMIC STABILITY: TRANSPORTATION INSECURITY
IN THE PAST 12 MONTHS, HAS THE LACK OF TRANSPORTATION KEPT YOU FROM MEDICAL APPOINTMENTS OR FROM GETTING MEDICATIONS?: NO

## 2022-06-09 SDOH — ECONOMIC STABILITY: TRANSPORTATION INSECURITY
IN THE PAST 12 MONTHS, HAS LACK OF TRANSPORTATION KEPT YOU FROM MEETINGS, WORK, OR FROM GETTING THINGS NEEDED FOR DAILY LIVING?: NO

## 2022-06-09 ASSESSMENT — SOCIAL DETERMINANTS OF HEALTH (SDOH)
HOW OFTEN DO YOU GET TOGETHER WITH FRIENDS OR RELATIVES?: ONCE A WEEK
HOW OFTEN DO YOU ATTEND CHURCH OR RELIGIOUS SERVICES?: 1 TO 4 TIMES PER YEAR
IN A TYPICAL WEEK, HOW MANY TIMES DO YOU TALK ON THE PHONE WITH FAMILY, FRIENDS, OR NEIGHBORS?: THREE TIMES A WEEK
DO YOU BELONG TO ANY CLUBS OR ORGANIZATIONS SUCH AS CHURCH GROUPS UNIONS, FRATERNAL OR ATHLETIC GROUPS, OR SCHOOL GROUPS?: NO
HOW OFTEN DO YOU ATTENT MEETINGS OF THE CLUB OR ORGANIZATION YOU BELONG TO?: NEVER

## 2022-06-09 NOTE — CARE COORDINATION
Ambulatory Care Coordination Note  6/9/2022  CM Risk Score: 3  Charlson 10 Year Mortality Risk Score: 100%     ACC: Ashok Quiroz RN    Summary Note: AC contacted Jerry Colbert. She states her blood sugars have been stable. Most recent readings: 156 (FBS 6/9), 170 (2 hours post dinner 6/8) and 155 (FBS 6/8). She denies any signs or symptoms of hyper/hypoglycemia and is able to tell ACM signs and symptoms and what to do if she experiences any episodes. She is continuing to watch her diet closely and to exercise on most days. She rides her stationary bike for 30 minutes in the morning and 30 minutes in the evening. She has not started jardiance. Jerry Colbert states she is weighing herself daily. She reports a morning weight of 143.5 today and 144.1 yesterday. She denies any shortness of breath, chest pain or lower extremity edema. Tanya Vallecillo understanding to call provider's office with weight increase of three pounds overnight or five pounds in a week. Patient to weigh daily; and record. Should weigh first thing in the morning, wearing the same type of clothes, after you urinate, before you eat breakfast.  Reviewed heart failure zones; and when should update provider. Denies any questions/concerns. Jerry Colbert states she has all her medications and takes them as prescribed.     Plan  Review blood sugar and weight logs  Check on status of ACP documents-potential SW referral  Continue to follow for care coordination           Lab Results     None          Care Coordination Interventions    Program Enrollment: Complex Care  Referral from Primary Care Provider: No  Suggested Interventions and Community Resources  Diabetes Education: Declined  Fall Risk Prevention: Completed  Registered Dietician: Declined  Social Work: Declined (Comment: has ACP documents)  Zone Management Tools: Completed (Comment: DM and CHF Zone Tools)         Goals Addressed                 This Visit's Progress     Conditions and Symptoms   On track I will schedule office visits, as directed by my provider. I will keep my appointment or reschedule if I have to cancel. I will notify my provider of any barriers to my plan of care. I will follow my Zone Management tool to seek urgent or emergent care. I will notify my provider of any symptoms that indicate a worsening of my condition. Barriers: lack of education  Plan for overcoming my barriers: DM zone tool, CHF zone tool, and care coordination  Confidence: 8/10  Anticipated Goal Completion Date: 7/5/2022         Self Monitoring   On track     Self-Monitored Blood Glucose - I will check my blood sugar Other: fbs/hs, prn  I will notify my provider of any trends of increasing or decreasing blood sugars over a 1 month period. I will notify my provider if I have any blood sugar readings less than 70 more than 2 times a month. Daily Weights - I will weight myself as directed - Daily and write down weights  I will notify my provider of any increase in weight by 3 or more pounds in 2 days OR 5 or more pounds in a week. Patient Reported Weight No flowsheet data found. Patient Reported Blood Glucose No flowsheet data found. Barriers: lack of education  Plan for overcoming my barriers: DM zone tool, CHF zone tool, and care coordination  Confidence: 8/10  Anticipated Goal Completion Date: 7/5/2022              Prior to Admission medications    Medication Sig Start Date End Date Taking?  Authorizing Provider   empagliflozin (JARDIANCE) 10 MG tablet Take 1 tablet by mouth daily 4/5/22   Arpit Christiansen MD   Multiple Vitamins-Minerals (PRESERVISION AREDS 2) CAPS Take 1 capsule by mouth in the morning and at bedtime    Historical Provider, MD   glipiZIDE (GLUCOTROL XL) 10 MG extended release tablet TAKE ONE TABLET BY MOUTH TWO TIMES A DAY 4/4/22   Arpit Christiansen MD   JANUVIA 50 MG tablet Take 1 tablet by mouth daily 4/4/22   Arpit Christiansen MD   magnesium cl-calcium carbonate (NU-MAG) 71.5-119 MG TBEC tablet TAKE TWO TABLETS BY MOUTH DAILY 4/4/22   Marian Jones MD   blood glucose monitor kit and supplies 1 kit by Other route in the morning and at bedtime Dispense sufficient amount for indicated testing frequency plus additional to accommodate PRN testing needs. Dispense all needed supplies to include: monitor, strips, lancing device, lancets, control solutions, alcohol swabs. 4/4/22   Marian Jones MD   Lancets 33G MISC 1 Lancet by Does not apply route in the morning and at bedtime 4/4/22 10/1/22  Marian Jones MD   blood glucose monitor strips 1 strip by Other route in the morning and at bedtime Test 1 times a day & as needed for symptoms of irregular blood glucose. Dispense sufficient amount for indicated testing frequency plus additional to accommodate PRN testing needs. 4/4/22   Marian Jones MD   rosuvastatin (CRESTOR) 20 MG tablet Take 1 tablet by mouth daily 3/14/22   Omrai Burgso MD   clopidogrel (PLAVIX) 75 MG tablet Take 1 tablet by mouth daily 3/14/22   Omari Burgos MD   aspirin EC 81 MG EC tablet Take 1 tablet by mouth daily 3/14/22   Omari Burgos MD   pantoprazole (PROTONIX) 40 MG tablet Take 1 tablet by mouth every morning (before breakfast) 3/14/22   Omari Burgos MD   carvedilol (COREG) 6.25 MG tablet Take 1 tablet by mouth 2 times daily 3/14/22   Omari Burgos MD   amLODIPine (NORVASC) 5 MG tablet Take 1 tablet by mouth daily 3/14/22   Omari Burgos MD   furosemide (LASIX) 20 MG tablet 1 po QOD 12/15/21   Marian Jones MD   Handicap Placard MISC by Does not apply route Duration: 5 years 8/30/21   Marian Jones MD   nitroGLYCERIN (NITROSTAT) 0.4 MG SL tablet DISSOLVE ONE TABLET UNDER TONGUE AS NEEDED FOR CHEST PAINS. MAY REPEAT IN 5 MINUTES.  IF SYMPTOMS PERSISTS CALL 911 4/20/20   Marian Jones MD   Cinnamon 500 MG CAPS Take 1 capsule by mouth daily    Historical Provider, MD   vitamin D (CHOLECALCIFEROL) 1000 UNIT TABS tablet Take 1,000 Units by mouth daily    Historical Provider, MD   vitamin B-12 (CYANOCOBALAMIN) 1000 MCG tablet Take 1,000 mcg by mouth every other day     Historical Provider, MD       Future Appointments   Date Time Provider Dioni Menesesi   8/3/2022 10:00 AM Alfonso Gaming  W [address] [place] [address]   9/15/2022 12:30 PM Fernando [place]   9/15/2022  1:30 PM [facility]   7/13/2023  1:30 PM Jose Maria Hendricks MD [facility]     ,   Diabetes Assessment    Medic Alert ID: No  Meal Planning: Other, Avoidance of concentrated sweets   How often do you test your blood sugar?: Other (Comment: fbs/hs)   Do you have barriers with adherence to non-pharmacologic self-management interventions?  (Nutrition/Exercise/Self-Monitoring): No   Have you ever had to go to the ED for symptoms of low blood sugar?: No       No patient-reported symptoms   Do you have hyperglycemia symptoms?: No   Do you have hypoglycemia symptoms?: No   Last Blood Sugar Value: 156   Blood Sugar Monitoring Regimen: 2 Hours Post Meal, Morning Fasting       and   Congestive Heart Failure Assessment    Are you currently restricting fluids?: No Restriction  Do you understand a low sodium diet?: Yes  Do you understand how to read food labels?: Yes  Do you salt your food before tasting it?: No     No patient-reported symptoms      Symptoms:  None: Yes      Symptom course: stable  Patient-reported weight (lb): 143.5  Weight trend: stable  Salt intake watch compared to last visit: stable

## 2022-06-13 LAB — DIABETIC RETINOPATHY: NEGATIVE

## 2022-07-01 ENCOUNTER — CARE COORDINATION (OUTPATIENT)
Dept: CARE COORDINATION | Age: 82
End: 2022-07-01

## 2022-07-01 NOTE — CARE COORDINATION
Heart Failure Education outreach Date/Time: 2022 1:50 PM    Ambulatory Care Manager (ACM) contacted the patient by telephone to perform Ambulatory Care Coordination. Verified name and  with patient as identifiers. Provided introduction to self, and explanation of the Ambulatory Care Manager's role. ACM reviewed that a Health Healthy tips for the Summer packet has been mailed to the them. ACM reviewed CHF zones, daily weights, the importance of low sodium diet and healthy tips packet with the patient. Instructed patient to call their provider if they have a weight gain of 3 lbs in 2 days or 5 lbs in a week. Patient reminded that there is a physician on call 24 hours a day / 7 days a week should the patient have questions or concerns. The patient verbalized understanding. Nannette Link reports her CHF is stable. She is weighing herself everyday. She is currently in the car with family and does not recall her weight this morning. Nannette Link states she had a few blood sugars over 200 and decided to start Jardiance that had been previously prescribed. .  She began [place] on . Her last readings by recall were fasting blood sugar this morning of 111. Blood sugars from  were 94 (FBS) and 134 (2 hours after dinner). Nannette Link is to have labs drawn 2 weeks after beginning jardiance. She has not had time to notify Dr. Jan Ahumada of starting medication.     Plan  Update Dr. Tutu Davis order has not -support staff assistance requested via inbasket  Continue to follow for Care Coordination

## 2022-07-05 ENCOUNTER — TELEPHONE (OUTPATIENT)
Dept: CARE COORDINATION | Age: 82
End: 2022-07-05

## 2022-07-05 NOTE — TELEPHONE ENCOUNTER
The lab order in [facility] ordered on 4/5/22 for BMP states on the order that the lab expires 4/5/23.

## 2022-07-05 NOTE — TELEPHONE ENCOUNTER
----- Message from Mehnaz Mo RN sent at 7/1/2022  4:07 PM EDT -----  Could you please check next week with the [facility] lab to verify the order for BMP placed on 4/15/22 is still valid?

## 2022-07-11 ENCOUNTER — CARE COORDINATION (OUTPATIENT)
Dept: CARE COORDINATION | Age: 82
End: 2022-07-11

## 2022-07-11 NOTE — CARE COORDINATION
Ambulatory Care Coordination Note  7/11/2022  CM Risk Score: 3  Charlson 10 Year Mortality Risk Score: 100%     ACC: Gaston Licona, RN    Summary Note: Shawn Vergara states she is doing well. She checks and monitors her BS a few times a day especially am and pm. She is knowledgeable of her medications. She knows when her F/U doctors appointments are scheduled. She has 2 glucometer's that give different readings. Encouraged to stick with the one she prefers and be consistent. Place other aside for now and have as a backup in case other is not functioning. She chose to continue with the easy max glucometer at this time. Her FBS was 117 today. Her weight 142. She questioned next lab work, order is in system. She prefers [facility]. Attempted to call and office is now closed. Pt will call in am to verify and set a date and time. She denies any SOB, pain during conversation and states \"I feel pretty good\". Plan;  F/U on her lab work and check resutls  Assess BS and weight and any needs. DM:  Denies s/s of Hypo/Hyperglycemia. Discussed DM Zone Tool and verbalizes understanding.   FBS:  117     Follow DM Zone Tool:   Self Manages and Aman Riding Understanding   Monitor Blood Sugars:  Self Manages, Verbalizes Understanding and Needs Reinforcement   Follow DM Diet:  Self Manages and [employer]   Exercise as tolerated: Verbalizes Understanding and Needs Reinforcement   Take medications as prescribed:  Self Manages, Aman Riding Understanding and Needs Reinforcement   Follow up on any health maintenance issues discussed:  Verbalizes Understanding   Schedule any needed appointments:  [employer] Keep scheduled appointments:  Verbalizes Understanding   Appropriately utilize PCP office, Urgent Care, and ED as discussed:  Verbalizes Understanding and Needs Reinforcement   Report any changes in condition to ACM or PCP:  Verbalizes Understanding and Needs Reinforcement   Call ACM with any questions/concerns/updates:  Verbalizes Understanding and Needs Reinforcement       CHF:  Denies s/s CHF Exacerbation. Discussed Zone Tool and verbalizes understanding.   Today's weight: 142#     Follow HF Zone Tool:  Self Manages, Verbalizes Understanding and Needs Reinforcement   Daily weights before breakfast and log them:  Self Manages and [employer] Understanding   Follow low sodium diet:  Self Manages, Verbalizes Understanding and Needs Reinforcement   Report weight gain or loss of greater than or equal to 3 pounds in 1-7 days:  Self Manages and [employer] Balance activity and rest periods:  [employer] and Needs Reinforcement   Check for swelling in hands, feet, ankles and stomach:  Verbalizes Understanding   Take medications as prescribed:  Self Manages   Follow up on any health maintenance issues discussed:  Verbalizes Understanding and Needs Reinforcement   Schedule needed appointments:  Self Manages and [employer] Keep scheduled appointments:  Self Manages and Verbalizes Understanding   Appropriately utilize PCP office, Urgent Care, and ED as discussed:  Verbalizes Understanding and Needs Reinforcement   Call PCP or ACM with any changes in conditions:  Verbalizes Understanding and Needs Reinforcement   Call ACM with any questions/concerns/updates:  Verbalizes Understanding and Needs Reinforcement               Lab Results     None          Care Coordination Interventions    Program Enrollment: Complex Care  Referral from Primary Care Provider: No  Suggested Interventions and Community Resources  Diabetes Education: Declined  Fall Risk Prevention: Completed  Registered Dietician: Declined  Social Work: Declined (Comment: has ACP documents)  Zone Management Tools: Completed (Comment: DM and CHF Zone Tools)         Goals Addressed                 This Visit's Progress     Conditions and Symptoms   On track     I will schedule office visits, as directed by my provider. I will keep my appointment or reschedule if I have to cancel. I will notify my provider of any barriers to my plan of care. I will follow my Zone Management tool to seek urgent or emergent care. I will notify my provider of any symptoms that indicate a worsening of my condition. Barriers: lack of education  Plan for overcoming my barriers: DM zone tool, CHF zone tool, and care coordination  Confidence: 8/10  Anticipated Goal Completion Date: 7/5/2022         Self Monitoring   On track     Self-Monitored Blood Glucose - I will check my blood sugar Other: fbs/hs, prn  I will notify my provider of any trends of increasing or decreasing blood sugars over a 1 month period. I will notify my provider if I have any blood sugar readings less than 70 more than 2 times a month. Daily Weights - I will weight myself as directed - Daily and write down weights  I will notify my provider of any increase in weight by 3 or more pounds in 2 days OR 5 or more pounds in a week. Patient Reported Weight No flowsheet data found. Patient Reported Blood Glucose No flowsheet data found. Barriers: lack of education  Plan for overcoming my barriers: DM zone tool, CHF zone tool, and care coordination  Confidence: 8/10  Anticipated Goal Completion Date: 7/5/2022              Prior to Admission medications    Medication Sig Start Date End Date Taking?  Authorizing Provider   empagliflozin (JARDIANCE) 10 MG tablet Take 1 tablet by mouth daily 4/5/22   Susana Patel MD   Multiple Vitamins-Minerals (PRESERVISION AREDS 2) CAPS Take 1 capsule by mouth in the morning and at bedtime    Historical Provider, MD   glipiZIDE (GLUCOTROL XL) 10 MG extended release tablet TAKE ONE TABLET BY MOUTH TWO TIMES A DAY 4/4/22   Susana Patel MD   JANUVIA 50 MG tablet Take 1 tablet by mouth daily 4/4/22   Susana Patel MD   magnesium cl-calcium carbonate (NU-MAG) 71.5-119 MG TBEC tablet TAKE TWO TABLETS BY MOUTH DAILY 4/4/22 Fransisca Garcia MD   blood glucose monitor kit and supplies 1 kit by Other route in the morning and at bedtime Dispense sufficient amount for indicated testing frequency plus additional to accommodate PRN testing needs. Dispense all needed supplies to include: monitor, strips, lancing device, lancets, control solutions, alcohol swabs. 4/4/22   Fransisca Garcia MD   Lancets 33G MISC 1 Lancet by Does not apply route in the morning and at bedtime 4/4/22 10/1/22  Fransisca Garcia MD   blood glucose monitor strips 1 strip by Other route in the morning and at bedtime Test 1 times a day & as needed for symptoms of irregular blood glucose. Dispense sufficient amount for indicated testing frequency plus additional to accommodate PRN testing needs. 4/4/22   Fransisca Garcia MD   rosuvastatin (CRESTOR) 20 MG tablet Take 1 tablet by mouth daily 3/14/22   Luis Solorio MD   clopidogrel (PLAVIX) 75 MG tablet Take 1 tablet by mouth daily 3/14/22   Luis Solorio MD   aspirin EC 81 MG EC tablet Take 1 tablet by mouth daily 3/14/22   Luis Solorio MD   pantoprazole (PROTONIX) 40 MG tablet Take 1 tablet by mouth every morning (before breakfast) 3/14/22   Luis Solorio MD   carvedilol (COREG) 6.25 MG tablet Take 1 tablet by mouth 2 times daily 3/14/22   Luis Solorio MD   amLODIPine (NORVASC) 5 MG tablet Take 1 tablet by mouth daily 3/14/22   Luis Solorio MD   furosemide (LASIX) 20 MG tablet 1 po QOD 12/15/21   Fransisca Garcia MD   Handicap Placard MISC by Does not apply route Duration: 5 years 8/30/21   Fransisca Garcia MD   nitroGLYCERIN (NITROSTAT) 0.4 MG SL tablet DISSOLVE ONE TABLET UNDER TONGUE AS NEEDED FOR CHEST PAINS. MAY REPEAT IN 5 MINUTES.  IF SYMPTOMS PERSISTS CALL 911 4/20/20   Fransisca Garcia MD   Cinnamon 500 MG CAPS Take 1 capsule by mouth daily    Historical Provider, MD   vitamin D (CHOLECALCIFEROL) 1000 UNIT TABS tablet Take 1,000 Units by mouth daily    Historical Provider, MD   vitamin B-12 (CYANOCOBALAMIN) 1000 MCG tablet Take 1,000 mcg by mouth every other day     Historical Provider, MD       Future Appointments   Date Time Provider Dioni Menesesi   8/3/2022 10:00 AM Dusty Prince  [facility]   9/15/2022 12:30 PM Rosi Dan ECHO  [place]   9/15/2022  1:30 PM Katharina Dey MD [facility] CARDIO [facility]   7/13/2023  1:30 PM Aldo Partida MD [facility]/MED [facility]     ,   Diabetes Assessment    Medic Alert ID: No  Meal Planning: Other, Avoidance of concentrated sweets   How often do you test your blood sugar?: Other (Comment: fbs/hs)   Do you have barriers with adherence to non-pharmacologic self-management interventions?  (Nutrition/Exercise/Self-Monitoring): No   Have you ever had to go to the ED for symptoms of low blood sugar?: No       No patient-reported symptoms       and   Congestive Heart Failure Assessment    Are you currently restricting fluids?: No Restriction  Do you understand a low sodium diet?: Yes  Do you understand how to read food labels?: Yes  Do you salt your food before tasting it?: No     No patient-reported symptoms      Symptoms:  None: Yes      Symptom course: stable  Weight trend: stable  Salt intake watch compared to last visit: stable

## 2022-07-12 ENCOUNTER — CARE COORDINATION (OUTPATIENT)
Dept: CARE COORDINATION | Age: 82
End: 2022-07-12

## 2022-07-12 ENCOUNTER — TELEPHONE (OUTPATIENT)
Dept: FAMILY MEDICINE CLINIC | Age: 82
End: 2022-07-12

## 2022-07-12 NOTE — TELEPHONE ENCOUNTER
Spoke with patient, this was in regards to lab work ordered in April. Patient never started Jardiance until about 2 weeks ago. Patient was informed for the [facility] labs, no appointment or scheduling was required. She will go to the lab in [place] to have this drawn.

## 2022-07-12 NOTE — CARE COORDINATION
not apply route in the morning and at bedtime 4/4/22 10/1/22  Francisco Javier Kinney MD   blood glucose monitor strips 1 strip by Other route in the morning and at bedtime Test 1 times a day & as needed for symptoms of irregular blood glucose. Dispense sufficient amount for indicated testing frequency plus additional to accommodate PRN testing needs. 4/4/22   Francisco Javier Kinney MD   rosuvastatin (CRESTOR) 20 MG tablet Take 1 tablet by mouth daily 3/14/22   Behzad Gaytan MD   clopidogrel (PLAVIX) 75 MG tablet Take 1 tablet by mouth daily 3/14/22   Behzad Gaytan MD   aspirin EC 81 MG EC tablet Take 1 tablet by mouth daily 3/14/22   Behzad Gaytan MD   pantoprazole (PROTONIX) 40 MG tablet Take 1 tablet by mouth every morning (before breakfast) 3/14/22   Behzad Gaytan MD   carvedilol (COREG) 6.25 MG tablet Take 1 tablet by mouth 2 times daily 3/14/22   Behzad Gaytan MD   amLODIPine (NORVASC) 5 MG tablet Take 1 tablet by mouth daily 3/14/22   Behzad Gaytan MD   furosemide (LASIX) 20 MG tablet 1 po QOD 12/15/21   Francisco Javier Kinney MD   Handicap Placard MISC by Does not apply route Duration: 5 years 8/30/21   Francisco Javier Kinney MD   nitroGLYCERIN (NITROSTAT) 0.4 MG SL tablet DISSOLVE ONE TABLET UNDER TONGUE AS NEEDED FOR CHEST PAINS. MAY REPEAT IN 5 MINUTES.  IF SYMPTOMS PERSISTS CALL 911 4/20/20   Fracnisco Javier Kinney MD   Cinnamon 500 MG CAPS Take 1 capsule by mouth daily    Historical Provider, MD   vitamin D (CHOLECALCIFEROL) 1000 UNIT TABS tablet Take 1,000 Units by mouth daily    Historical Provider, MD   vitamin B-12 (CYANOCOBALAMIN) 1000 MCG tablet Take 1,000 mcg by mouth every other day     Historical Provider, MD       Future Appointments   Date Time Provider Dioni Darling   8/3/2022 10:00 AM Francisco Javier Kinney  [address] [place] [address]   9/15/2022 12:30 PM Mickeal Fears ECHO [facility]   9/15/2022  1:30 PM Behzad Gaytan MD [facility]   7/13/2023  1:30 PM Haywood Gowers, MD [facility]

## 2022-07-12 NOTE — TELEPHONE ENCOUNTER
----- Message from "[employer] sent at 7/12/2022  9:13 AM EDT -----  Subject: Message to Provider    QUESTIONS  Information for Provider? PT is calling about urine lab work, she says she   is supposed to have one ordered and was told to call and schedule it   today.   ---------------------------------------------------------------------------  --------------  Alejandra CARNEY  [phone]; OK to leave message on voicemail  ---------------------------------------------------------------------------  --------------  SCRIPT ANSWERS  Relationship to Patient?  Self

## 2022-07-14 DIAGNOSIS — N18.32 STAGE 3B CHRONIC KIDNEY DISEASE (HCC): ICD-10-CM

## 2022-07-14 DIAGNOSIS — E11.40 TYPE 2 DIABETES MELLITUS WITH DIABETIC NEUROPATHY, WITHOUT LONG-TERM CURRENT USE OF INSULIN (HCC): ICD-10-CM

## 2022-07-14 DIAGNOSIS — I50.30 HEART FAILURE WITH PRESERVED EJECTION FRACTION, UNSPECIFIED HF CHRONICITY (HCC): ICD-10-CM

## 2022-07-14 DIAGNOSIS — R60.9 EDEMA, UNSPECIFIED TYPE: ICD-10-CM

## 2022-07-14 LAB
ANION GAP SERPL CALCULATED.3IONS-SCNC: 15 MMOL/L (ref 7–16)
BUN BLDV-MCNC: 23 MG/DL (ref 6–23)
CALCIUM SERPL-MCNC: 9.3 MG/DL (ref 8.6–10.2)
CHLORIDE BLD-SCNC: 105 MMOL/L (ref 98–107)
CO2: 19 MMOL/L (ref 22–29)
CREAT SERPL-MCNC: 1.6 MG/DL (ref 0.5–1)
GFR AFRICAN AMERICAN: 38
GFR NON-AFRICAN AMERICAN: 31 ML/MIN/1.73
GLUCOSE BLD-MCNC: 235 MG/DL (ref 74–99)
HBA1C MFR BLD: 8.1 % (ref 4–5.6)
MAGNESIUM: 2.3 MG/DL (ref 1.6–2.6)
POTASSIUM SERPL-SCNC: 4.5 MMOL/L (ref 3.5–5)
SODIUM BLD-SCNC: 139 MMOL/L (ref 132–146)

## 2022-07-15 ENCOUNTER — TELEPHONE (OUTPATIENT)
Dept: FAMILY MEDICINE CLINIC | Age: 82
End: 2022-07-15

## 2022-07-15 NOTE — TELEPHONE ENCOUNTER
Hemoglobin A1c has come down from 9 range to 8 range. Still too high but improved on the Jardiance. Keep upcoming appointment to review labs.

## 2022-07-29 ENCOUNTER — CARE COORDINATION (OUTPATIENT)
Dept: CARE COORDINATION | Age: 82
End: 2022-07-29

## 2022-08-01 NOTE — CARE COORDINATION
Ambulatory Care Coordination Note  8/1/2022    ACC: Jean Claude Galdamez, RN    Summary Note: ACM contacted [facility]. Reviewed CHF Precautions with [facility]:  -She verbalizes understanding to call MD office with weight increase of three pounds overnight or five pounds in a week. Patient to weigh daily; and record. Should weigh first thing in the morning, wearing the same type of clothes, after you urinate, before you eat breakfast.  Reviewed heart failure zones; and when should update cardiologist.   Denies any questions/concerns. She denies any s/s of worsening CHF. She remains active riding her stationary bike in the morning and afternoon for 30 minutes each. She has completed her BMP as ordered by PCP. She states her blood sugars have been 170-230. She states she is watching her diet. She declines to speak with dietician at this time. [facility] is eligible for [facility] from [address] [place] [address] (CKD stage 3). AC explained [facility] program to [facility] and she is agreeable to transfer . Plan  [facility]  AC to close care coordination episode. Lab Results       None            Care Coordination Interventions    Program Enrollment: Complex Care  Referral from Primary Care Provider: No  Suggested Interventions and Community Resources  Diabetes Education: Declined  Fall Risk Prevention: Completed  Registered Dietician: Declined  Social Work: Declined (Comment: has ACP documents)  Zone Management Tools: Completed (Comment: DM and CHF Zone Tools)          Goals Addressed    None         Prior to Admission medications    Medication Sig Start Date End Date Taking?  Authorizing Provider   empagliflozin (JARDIANCE) 10 MG tablet Take 1 tablet by mouth daily 4/5/22   Gloria Alexis MD   Multiple Vitamins-Minerals (PRESERVISION AREDS 2) CAPS Take 1 capsule by mouth in the morning and at bedtime    Historical Provider, MD   glipiZIDE (GLUCOTROL XL) 10 MG extended release tablet TAKE ONE TABLET BY MOUTH TWO TIMES A DAY 4/4/22   Edilberto Nolan MD   JANUVIA 50 MG tablet Take 1 tablet by mouth daily 4/4/22   Edilberto Nolan MD   magnesium cl-calcium carbonate (NU-MAG) 71.5-119 MG TBEC tablet TAKE TWO TABLETS BY MOUTH DAILY 4/4/22   Edilberto Nolan MD   blood glucose monitor kit and supplies 1 kit by Other route in the morning and at bedtime Dispense sufficient amount for indicated testing frequency plus additional to accommodate PRN testing needs. Dispense all needed supplies to include: monitor, strips, lancing device, lancets, control solutions, alcohol swabs. 4/4/22   Edilberto Nolan MD   Lancets 33G MISC 1 Lancet by Does not apply route in the morning and at bedtime 4/4/22 10/1/22  Edilberto Nolan MD   blood glucose monitor strips 1 strip by Other route in the morning and at bedtime Test 1 times a day & as needed for symptoms of irregular blood glucose. Dispense sufficient amount for indicated testing frequency plus additional to accommodate PRN testing needs. 4/4/22   Edilberto Nolan MD   rosuvastatin (CRESTOR) 20 MG tablet Take 1 tablet by mouth daily 3/14/22   Hayley Andrea MD   clopidogrel (PLAVIX) 75 MG tablet Take 1 tablet by mouth daily 3/14/22   Hayley Andrea MD   aspirin EC 81 MG EC tablet Take 1 tablet by mouth daily 3/14/22   Hayley Andrea MD   pantoprazole (PROTONIX) 40 MG tablet Take 1 tablet by mouth every morning (before breakfast) 3/14/22   Hayley Andrea MD   carvedilol (COREG) 6.25 MG tablet Take 1 tablet by mouth 2 times daily 3/14/22   Hayley Andrea MD   amLODIPine (NORVASC) 5 MG tablet Take 1 tablet by mouth daily 3/14/22   Hayley Andrea MD   furosemide (LASIX) 20 MG tablet 1 po QOD 12/15/21   Edilberto Nolan MD   Handicap Placard MISC by Does not apply route Duration: 5 years 8/30/21   Edilberto Nolan MD   nitroGLYCERIN (NITROSTAT) 0.4 MG SL tablet DISSOLVE ONE TABLET UNDER TONGUE AS NEEDED FOR CHEST PAINS. MAY REPEAT IN 5 MINUTES.  IF SYMPTOMS PERSISTS CALL 911 4/20/20   Edilberto Nolan MD   Cinnamon 500 MG CAPS Take 1 capsule by mouth daily Historical Provider, MD   vitamin D (CHOLECALCIFEROL) 1000 UNIT TABS tablet Take 1,000 Units by mouth daily    Historical Provider, MD   vitamin B-12 (CYANOCOBALAMIN) 1000 MCG tablet Take 1,000 mcg by mouth every other day     Historical Provider, MD       Future Appointments   Date Time Provider Dioni Darling   8/3/2022 10:00 AM Jimmie Alexander  [address] [place] [address]   8/3/2022 11:30 AM [facility]   9/15/2022 12:30 PM Pushpa Allred ECHO [facility]   9/15/2022  1:30 PM Mariia Gerard MD [facility]   7/13/2023  1:30 PM Jase Berry MD [facility]   ,   Diabetes Assessment    Medic Alert ID: No  Meal Planning: Other, Avoidance of concentrated sweets   How often do you test your blood sugar?: Other (Comment: fbs/hs)   Do you have barriers with adherence to non-pharmacologic self-management interventions?  (Nutrition/Exercise/Self-Monitoring): No   Have you ever had to go to the ED for symptoms of low blood sugar?: No            , and   Congestive Heart Failure Assessment    Are you currently restricting fluids?: No Restriction  Do you understand a low sodium diet?: Yes  Do you understand how to read food labels?: Yes  Do you salt your food before tasting it?: No         Symptoms:

## 2022-08-03 ENCOUNTER — OFFICE VISIT (OUTPATIENT)
Dept: FAMILY MEDICINE CLINIC | Age: 82
End: 2022-08-03
Payer: MEDICARE

## 2022-08-03 VITALS
TEMPERATURE: 98.2 F | SYSTOLIC BLOOD PRESSURE: 118 MMHG | HEIGHT: 62 IN | BODY MASS INDEX: 26.2 KG/M2 | DIASTOLIC BLOOD PRESSURE: 52 MMHG | HEART RATE: 71 BPM | WEIGHT: 142.4 LBS | OXYGEN SATURATION: 98 %

## 2022-08-03 DIAGNOSIS — E04.9 GOITER: ICD-10-CM

## 2022-08-03 DIAGNOSIS — E78.5 HYPERLIPIDEMIA, UNSPECIFIED HYPERLIPIDEMIA TYPE: ICD-10-CM

## 2022-08-03 DIAGNOSIS — R91.1 PULMONARY NODULE: ICD-10-CM

## 2022-08-03 DIAGNOSIS — N18.32 STAGE 3B CHRONIC KIDNEY DISEASE (HCC): ICD-10-CM

## 2022-08-03 DIAGNOSIS — Z12.31 SCREENING MAMMOGRAM FOR BREAST CANCER: ICD-10-CM

## 2022-08-03 DIAGNOSIS — E55.9 VITAMIN D DEFICIENCY: ICD-10-CM

## 2022-08-03 DIAGNOSIS — I25.10 CORONARY ARTERY DISEASE INVOLVING NATIVE HEART WITHOUT ANGINA PECTORIS, UNSPECIFIED VESSEL OR LESION TYPE: ICD-10-CM

## 2022-08-03 DIAGNOSIS — I10 ESSENTIAL HYPERTENSION: ICD-10-CM

## 2022-08-03 DIAGNOSIS — E11.40 TYPE 2 DIABETES MELLITUS WITH DIABETIC NEUROPATHY, WITHOUT LONG-TERM CURRENT USE OF INSULIN (HCC): ICD-10-CM

## 2022-08-03 DIAGNOSIS — E53.8 VITAMIN B 12 DEFICIENCY: ICD-10-CM

## 2022-08-03 DIAGNOSIS — K21.9 GASTROESOPHAGEAL REFLUX DISEASE WITHOUT ESOPHAGITIS: Primary | ICD-10-CM

## 2022-08-03 DIAGNOSIS — I50.22 CHRONIC SYSTOLIC (CONGESTIVE) HEART FAILURE (HCC): ICD-10-CM

## 2022-08-03 PROCEDURE — G8427 DOCREV CUR MEDS BY ELIG CLIN: HCPCS | Performed by: INTERNAL MEDICINE

## 2022-08-03 PROCEDURE — 99214 OFFICE O/P EST MOD 30 MIN: CPT | Performed by: INTERNAL MEDICINE

## 2022-08-03 PROCEDURE — G8400 PT W/DXA NO RESULTS DOC: HCPCS | Performed by: INTERNAL MEDICINE

## 2022-08-03 PROCEDURE — 3052F HG A1C>EQUAL 8.0%<EQUAL 9.0%: CPT | Performed by: INTERNAL MEDICINE

## 2022-08-03 PROCEDURE — 1090F PRES/ABSN URINE INCON ASSESS: CPT | Performed by: INTERNAL MEDICINE

## 2022-08-03 PROCEDURE — 1123F ACP DISCUSS/DSCN MKR DOCD: CPT | Performed by: INTERNAL MEDICINE

## 2022-08-03 PROCEDURE — 1036F TOBACCO NON-USER: CPT | Performed by: INTERNAL MEDICINE

## 2022-08-03 PROCEDURE — G8417 CALC BMI ABV UP PARAM F/U: HCPCS | Performed by: INTERNAL MEDICINE

## 2022-08-03 RX ORDER — ROSUVASTATIN CALCIUM 20 MG/1
20 TABLET, COATED ORAL DAILY
Qty: 90 TABLET | Refills: 1 | Status: SHIPPED | OUTPATIENT
Start: 2022-08-03

## 2022-08-03 RX ORDER — GLIPIZIDE 10 MG/1
TABLET, FILM COATED, EXTENDED RELEASE ORAL
Qty: 180 TABLET | Refills: 1 | Status: SHIPPED | OUTPATIENT
Start: 2022-08-03

## 2022-08-03 RX ORDER — FUROSEMIDE 20 MG/1
TABLET ORAL
Qty: 45 TABLET | Refills: 1 | Status: SHIPPED | OUTPATIENT
Start: 2022-08-03

## 2022-08-03 RX ORDER — PANTOPRAZOLE SODIUM 40 MG/1
40 TABLET, DELAYED RELEASE ORAL
Qty: 90 TABLET | Refills: 1 | Status: SHIPPED | OUTPATIENT
Start: 2022-08-03

## 2022-08-03 RX ORDER — SITAGLIPTIN 50 MG/1
50 TABLET, FILM COATED ORAL DAILY
Qty: 90 TABLET | Refills: 1 | Status: SHIPPED
Start: 2022-08-03 | End: 2022-11-02 | Stop reason: SDUPTHER

## 2022-08-03 RX ORDER — ISOPROPYL ALCOHOL 0.7 ML/1
1 SWAB TOPICAL 2 TIMES DAILY
Qty: 100 EACH | Refills: 3 | Status: SHIPPED | OUTPATIENT
Start: 2022-08-03

## 2022-08-03 RX ORDER — ISOPROPYL ALCOHOL 0.7 ML/1
SWAB TOPICAL
COMMUNITY
End: 2022-08-03 | Stop reason: SDUPTHER

## 2022-08-03 RX ORDER — LANCETS 33 GAUGE
1 EACH MISCELLANEOUS 2 TIMES DAILY
Qty: 200 EACH | Refills: 5 | Status: SHIPPED | OUTPATIENT
Start: 2022-08-03 | End: 2023-01-30

## 2022-08-03 RX ORDER — MECLIZINE HCL 12.5 MG/1
12.5 TABLET ORAL DAILY PRN
COMMUNITY
End: 2022-09-13 | Stop reason: SDUPTHER

## 2022-08-03 NOTE — PROGRESS NOTES
[address] [place]     8/3/22  Genesis Covington : 1940 Sex: female  Age: 80 y.o. Chief Complaint   Patient presents with    Hypertension     4 months    Diabetes       HPI  Patient presents today for follow-up visit on her medical problems. Since have seen her last we have started Jardiance to add to her diabetic regimen. A1c went from 9.2 down to 8.2. Obviously still high but has shown some improvement and patient is tolerating the medication. She has lost 9 pounds since have seen her last she states she really has not thought about it but she is eating much healthier and taking in less calories she states. Her activity level really has not changed much. Shows me blood pressure numbers which look to be stable and controlled on her current antihypertensive medication. Her blood sugars are somewhat better controlled with the addition of the Jardiance. Lipids are stable and controlled on her statin medication. GERD is stable and controlled on her PPI. I did talk to her about follow-up of the pulmonary nodule and CAT scan to again this month. I did review her optometry note demonstrating no retinopathy. CAD/CHF status stable on follow-up with cardiology. Her eyes and feet are up-to-date with her diabetes. No retinopathy no foot sores  She is up-to-date with consultants including renal for chronic kidney disease, ophthalmology, endocrine (they have been following thyroid ultrasounds) or her thyroid, cardiology for her aortic stenosis and recent left main stent placement and previously with ear nose and throat who recommended no thyroid biopsy at that point but simply ultrasound follow-up. Review of Systems    Constitutional: Negative for activity change, appetite change, chills, diaphoresis, fatigue, fever and unexpected weight change. HENT: Negative for congestion, ear pain, hearing loss, postnasal drip, rhinorrhea and sinus pain. Respiratory: Negative for  wheezing.  Cough and shortness of breath improved  cardiovascular: Negative for chest pain, palpitations and leg swelling. Gastrointestinal: Negative for abdominal pain, blood in stool, constipation, diarrhea, nausea and vomiting. Endocrine:  Type 2 diabetes, goiter   Genitourinary: Negative for difficulty urinating, dysuria, frequency, hematuria and urgency. Musculoskeletal: Positive for arthralgias. Negative for back pain, gait problem and myalgias. She was complaining of muscle pain prior to stopping the Actonel-did not tolerate oral bisphosphonates-did wish to try Actonel 1 more time for now-was successful-currently holding because of kidney function  Skin: No open wounds  allergic/Immunologic: Negative for environmental allergies and immunocompromised state. Neurological: Negative for dizziness (improved after cardiac stenting), weakness, light-headedness, numbness and headaches. Hematological: Negative. Endocrine: Type 2 diabetes-currently holding Metformin due to renal function, and Actos due to congestive heart failure           REST OF PERTINENT ROS GONE OVER AND WAS NEGATIVE.      PMH:  Problem List: Knee pain, Non-toxic nodular goiter, Essential hypertension, Goiter, Hyperlipidemia, Benign  essential hypertension, Type II diabetes mellitus uncontrolled  Health Maintenance:  Bone Density Test Screening - (12/3/2018)  Bone Density Scan - (12/3/2018)  Influenza Vaccination - (10/29/2018)  Colonoscopy - (7/25/2018)  7/18-due 23  Couseled on Home Safety - (6/13/2018)  Pneumonia Vaccination - (2/5/2018)  Mammogram - (12/20/2012)  Mammogram Screening - (12/20/2012)  Declining further  Tetanus Immunization - (1/18/2017)  Rectal Exam - 4/10,4/11  Breast Exam - 4/10,4/11  Hemmocult Cards - 9/12-neg x 3,5/14-neg x3, 6/16-neg x 3  EKG - 5/11,, 5/15, 3/17,5/17,3/18  2D ECHO - 1/17 , 1/18  Carotid Artery Study - 1/17-30-49% occlusion bilateral, 2/18  Stress Test - 4/17-neg,3/18-pos  Zoster/Shingles Vaccine -   PFTs-  Medical Problems:  Non Insulin Dependent Diabetes, Hypertension, Hyperlipidemia  Goiter - multinodular-bx neg-dr hooper  GERD, Hypothyroidism  Osteoporosis - Bisphosphonate intolerant  Glaucoma, Renal Insufficiency  Aortic And Mitral Valve Disorders - 2D echo-  VHD  Pulmonary HTN - mild  Macular Degeneration  WBCs in urine - Workup by urology-  Coronary Artery Disease (CAD) - Positive stress test 3/18-heart cath--left main disease-stents  Diverticulosis, Diabetic Neuropathy, Vitamin B12 deficiency  Hospitalization for infected left groin cath site-  HFpEF   Lung nodule-following CAT scans     Follows with - Cardiology, nephrology, ophthalmology, endocrine  Surgical Hx:  AKIN-right salpingo-oophorectomy - Fibroids  Appendectomy  Right cataract surgery - left also  Left knee arthroscopy, Removal of Gallbladder  Cardiac catheterization-severe left main disease-atherectomy/PCI/stents     FH:  Father:  MI.  Mother:  Cerebrovascular Accident (CVA). Brother 1:  Coronary Artery Disease (CAD) - 52's. 3 other brothers, 1 sister   SH:  Marital: . Personal Habits: Cigarette Use: Negative For current cigarette smoker. Alcohol: does not use  alcohol. 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                       Current Outpatient Medications:     meclizine (ANTIVERT) 12.5 MG tablet, Take 12.5 mg by mouth daily as needed for Dizziness, Disp: , Rfl:     glipiZIDE (GLUCOTROL XL) 10 MG extended release tablet, TAKE ONE TABLET BY MOUTH TWO TIMES A DAY, Disp: 180 tablet, Rfl: 1    pantoprazole (PROTONIX) 40 MG tablet, Take 1 tablet by mouth every morning (before breakfast), Disp: 90 tablet, Rfl: 1    rosuvastatin (CRESTOR) 20 MG tablet, Take 1 tablet by mouth in the morning., Disp: 90 tablet, Rfl: 1    furosemide (LASIX) 20 MG tablet, 1 po QOD, Disp: 45 tablet, Rfl: 1    JANUVIA 50 MG tablet, Take 1 tablet by mouth in the morning., Disp: 90 tablet, Rfl: 1    empagliflozin (JARDIANCE) 10 MG tablet, Take 1 tablet by mouth in the morning., Disp: 90 tablet, Rfl: 1    Lancets 33G MISC, 1 Lancet by Does not apply route in the morning and at bedtime, Disp: 200 each, Rfl: 5    blood glucose test strips (ASCENSIA AUTODISC VI;ONE TOUCH ULTRA TEST VI) strip, 1 each by In Vitro route 2 times daily Indications: For an Easy Max 5 machine, Disp: 200 each, Rfl: 5    Alcohol Swabs (ALCOHOL WIPES) 70 % PADS, 1 packet by Does not apply route in the morning and at bedtime, Disp: 100 each, Rfl: 3    Multiple Vitamins-Minerals (PRESERVISION AREDS 2) CAPS, Take 1 capsule by mouth in the morning and at bedtime, Disp: , Rfl:     magnesium cl-calcium carbonate (NU-MAG) 71.5-119 MG TBEC tablet, TAKE TWO TABLETS BY MOUTH DAILY, Disp: 180 tablet, Rfl: 1    blood glucose monitor kit and supplies, 1 kit by Other route in the morning and at bedtime Dispense sufficient amount for indicated testing frequency plus additional to accommodate PRN testing needs. Dispense all needed supplies to include: monitor, strips, lancing device, lancets, control solutions, alcohol swabs., Disp: 1 kit, Rfl: 0    clopidogrel (PLAVIX) 75 MG tablet, Take 1 tablet by mouth daily, Disp: 90 tablet, Rfl: 3    aspirin EC 81 MG EC tablet, Take 1 tablet by mouth daily, Disp: 90 tablet, Rfl: 3    carvedilol (COREG) 6.25 MG tablet, Take 1 tablet by mouth 2 times daily, Disp: 180 tablet, Rfl: 3    amLODIPine (NORVASC) 5 MG tablet, Take 1 tablet by mouth daily, Disp: 90 tablet, Rfl: 3    Handicap Placard MISC, by Does not apply route Duration: 5 years, Disp: 1 each, Rfl: 0    nitroGLYCERIN (NITROSTAT) 0.4 MG SL tablet, DISSOLVE ONE TABLET UNDER TONGUE AS NEEDED FOR CHEST PAINS. MAY REPEAT IN 5 MINUTES.  IF SYMPTOMS PERSISTS CALL 911, Disp: 25 tablet, Rfl: 1    Cinnamon 500 MG CAPS, Take 1 capsule by mouth daily, Disp: , Rfl:     vitamin D (CHOLECALCIFEROL) 1000 UNIT TABS tablet, Take 1,000 Units by mouth daily, Disp: , Rfl: Social History Narrative    Denies caffeine     Social Determinants of Health     Financial Resource Strain: Low Risk     Difficulty of Paying Living Expenses: Not hard at all   Food Insecurity: No Food Insecurity    Worried About [address] in the Last Year: Never true    Ran Out of Food in the Last Year: Never true   Transportation Needs: No Transportation Needs    Lack of Transportation (Medical): No    Lack of Transportation (Non-Medical): No   Physical Activity: Sufficiently Active    Days of Exercise per Week: 7 days    Minutes of Exercise per Session: 60 min   Stress: No Stress Concern Present    Feeling of Stress : Only a little   Social Connections: Moderately Isolated    Frequency of Communication with Friends and Family: Three times a week    Frequency of Social Gatherings with Friends and Family: Once a week    Attends Faith Services: 1 to 4 times per year    Active Member of [address] [place] [address] or Organizations: No    Attends Club or Organization Meetings: Never    Marital Status:    Intimate Partner Violence: Not on file   Housing Stability: Low Risk     Unable to Pay for Housing in the Last Year: No    Number of Places Lived in the Last Year: 1    Unstable Housing in the Last Year: No       Vitals:    08/03/22 0951   BP: (!) 118/52   Pulse: 71   Temp: 98.2 °F (36.8 °C)   TempSrc: Temporal   SpO2: 98%   Weight: 142 lb 6.4 oz (64.6 kg)   Height: 5' 2\" (1.575 m)       Physical Exam  Constitutional: She is oriented to person, place, and time. She appears well-developed and well-nourished. No distress. Neck: Normal range of motion. Neck supple. Thyromegaly present. Positive for goiter   Right carotid bruit  No JVD noted. Cardiovascular: Normal rate, regular rhythm and intact distal pulses. Exam reveals no gallop and no friction rub. Murmur heard. Patient's edema improved to trace pulmonary/Chest: No respiratory distress. She has no wheezes Or rhonchi.   Appears to have good air exchange deficiency  -     Vitamin D 25 Hydroxy; Future    Other orders  -     pantoprazole (PROTONIX) 40 MG tablet; Take 1 tablet by mouth every morning (before breakfast)  -     rosuvastatin (CRESTOR) 20 MG tablet; Take 1 tablet by mouth in the morning.  -     furosemide (LASIX) 20 MG tablet; 1 po QOD  -     JANUVIA 50 MG tablet; Take 1 tablet by mouth in the morning.  -     empagliflozin (JARDIANCE) 10 MG tablet; Take 1 tablet by mouth in the morning.  -     blood glucose test strips (ASCENSIA AUTODISC VI;ONE TOUCH ULTRA TEST VI) strip; 1 each by In Vitro route 2 times daily Indications: For an Easy Max 5 machine  -     Alcohol Swabs (ALCOHOL WIPES) 70 % PADS; 1 packet by Does not apply route in the morning and at bedtime      Plan: I will see her back in 3 months and as needed. I sent her for CAT scan of the chest for this coming month. Monitor her weight loss. Blood work in the next 2 weeks fasting to monitor disease progression and medication use. Prescription management performed after review of efficacy of medication on a current medical problems. Order for mammogram given. Was going to do carotid ultrasound given her previous ultrasound numbers however she is completely asymptomatic from a neurological standpoint and we have elected mutually to wait at this time. Follow with above consultants. Notify us of problems in the interim. We did discuss colonoscopy with her weight loss of about 38 pounds in the last year and a half but states that she has been trying and watching her diet. She does not wish to do colonoscopy at this time. She is due next year. Also we ordered strips for her Vasomax machine lancets and alcohol pads. Patient is testing twice daily and as needed with the addition of her new medication in the marginal control of her sugars. .  Return in about 3 months (around 11/3/2022). Seen By:  Lorrie Moseley MD      *Document was created using voice recognition software.   Note was reviewed however may contain grammatical errors.

## 2022-08-10 ENCOUNTER — TELEPHONE (OUTPATIENT)
Dept: FAMILY MEDICINE CLINIC | Age: 82
End: 2022-08-10

## 2022-08-15 ENCOUNTER — TELEPHONE (OUTPATIENT)
Dept: FAMILY MEDICINE CLINIC | Age: 82
End: 2022-08-15

## 2022-08-15 DIAGNOSIS — E11.40 TYPE 2 DIABETES MELLITUS WITH DIABETIC NEUROPATHY, WITHOUT LONG-TERM CURRENT USE OF INSULIN (HCC): ICD-10-CM

## 2022-08-15 DIAGNOSIS — I10 ESSENTIAL HYPERTENSION: ICD-10-CM

## 2022-08-15 DIAGNOSIS — E53.8 VITAMIN B 12 DEFICIENCY: ICD-10-CM

## 2022-08-15 DIAGNOSIS — E55.9 VITAMIN D DEFICIENCY: ICD-10-CM

## 2022-08-15 DIAGNOSIS — E78.5 HYPERLIPIDEMIA, UNSPECIFIED HYPERLIPIDEMIA TYPE: ICD-10-CM

## 2022-08-15 LAB
CREATININE URINE: 109 MG/DL (ref 29–226)
MICROALBUMIN UR-MCNC: <12 MG/L
MICROALBUMIN/CREAT UR-RTO: ABNORMAL (ref 0–30)

## 2022-08-16 DIAGNOSIS — I25.10 LEFT MAIN CORONARY ARTERY DISEASE: ICD-10-CM

## 2022-08-16 LAB
ALBUMIN SERPL-MCNC: 4.1 G/DL (ref 3.5–5.2)
ALP BLD-CCNC: 95 U/L (ref 35–104)
ALT SERPL-CCNC: 14 U/L (ref 0–32)
ANION GAP SERPL CALCULATED.3IONS-SCNC: 15 MMOL/L (ref 7–16)
AST SERPL-CCNC: 25 U/L (ref 0–31)
BASOPHILS ABSOLUTE: 0.03 E9/L (ref 0–0.2)
BASOPHILS RELATIVE PERCENT: 0.5 % (ref 0–2)
BILIRUB SERPL-MCNC: 0.6 MG/DL (ref 0–1.2)
BUN BLDV-MCNC: 28 MG/DL (ref 6–23)
CALCIUM SERPL-MCNC: 9.3 MG/DL (ref 8.6–10.2)
CHLORIDE BLD-SCNC: 105 MMOL/L (ref 98–107)
CHOLESTEROL, TOTAL: 126 MG/DL (ref 0–199)
CO2: 18 MMOL/L (ref 22–29)
CREAT SERPL-MCNC: 1.4 MG/DL (ref 0.5–1)
EOSINOPHILS ABSOLUTE: 0.17 E9/L (ref 0.05–0.5)
EOSINOPHILS RELATIVE PERCENT: 3.1 % (ref 0–6)
GFR AFRICAN AMERICAN: 44
GFR NON-AFRICAN AMERICAN: 36 ML/MIN/1.73
GLUCOSE BLD-MCNC: 150 MG/DL (ref 74–99)
HCT VFR BLD CALC: 38.9 % (ref 34–48)
HDLC SERPL-MCNC: 46 MG/DL
HEMOGLOBIN: 12.4 G/DL (ref 11.5–15.5)
IMMATURE GRANULOCYTES #: 0.02 E9/L
IMMATURE GRANULOCYTES %: 0.4 % (ref 0–5)
LDL CHOLESTEROL CALCULATED: 48 MG/DL (ref 0–99)
LYMPHOCYTES ABSOLUTE: 1.06 E9/L (ref 1.5–4)
LYMPHOCYTES RELATIVE PERCENT: 19.1 % (ref 20–42)
MCH RBC QN AUTO: 32.3 PG (ref 26–35)
MCHC RBC AUTO-ENTMCNC: 31.9 % (ref 32–34.5)
MCV RBC AUTO: 101.3 FL (ref 80–99.9)
MONOCYTES ABSOLUTE: 0.42 E9/L (ref 0.1–0.95)
MONOCYTES RELATIVE PERCENT: 7.6 % (ref 2–12)
NEUTROPHILS ABSOLUTE: 3.84 E9/L (ref 1.8–7.3)
NEUTROPHILS RELATIVE PERCENT: 69.3 % (ref 43–80)
PDW BLD-RTO: 13.9 FL (ref 11.5–15)
PLATELET # BLD: 190 E9/L (ref 130–450)
PMV BLD AUTO: 12 FL (ref 7–12)
POTASSIUM SERPL-SCNC: 4.3 MMOL/L (ref 3.5–5)
PRO-BNP: 524 PG/ML (ref 0–450)
RBC # BLD: 3.84 E12/L (ref 3.5–5.5)
SODIUM BLD-SCNC: 138 MMOL/L (ref 132–146)
TOTAL PROTEIN: 8 G/DL (ref 6.4–8.3)
TRIGL SERPL-MCNC: 160 MG/DL (ref 0–149)
VITAMIN B-12: 1122 PG/ML (ref 211–946)
VITAMIN D 25-HYDROXY: 49 NG/ML (ref 30–100)
VLDLC SERPL CALC-MCNC: 32 MG/DL
WBC # BLD: 5.5 E9/L (ref 4.5–11.5)

## 2022-08-16 NOTE — TELEPHONE ENCOUNTER
I called the lab.   Nothing was cancelled, the chemistries were down but it's back up today and it looks like they are processing

## 2022-08-29 ENCOUNTER — TELEPHONE (OUTPATIENT)
Dept: FAMILY MEDICINE CLINIC | Age: 82
End: 2022-08-29

## 2022-08-29 DIAGNOSIS — R91.8 PULMONARY NODULES: Primary | ICD-10-CM

## 2022-08-29 NOTE — TELEPHONE ENCOUNTER
Reviewed CAT scan of chest with Dr. Raymon Turpin who is recommending 6-month follow-up scan because of pulmonary nodules. Order is in. This needs to be done at [facility].  Also please send copy of this report to her endocrinologist Dr. Keisha Erickson regarding the thyroid gland findings. He is following.

## 2022-08-31 ENCOUNTER — TELEPHONE (OUTPATIENT)
Dept: FAMILY MEDICINE CLINIC | Age: 82
End: 2022-08-31

## 2022-08-31 NOTE — TELEPHONE ENCOUNTER
This is the first time I am seeing these labs. Says that Dr. Barbara Mccall was the authorizing physician. There was some processing issue at the time if I remember. Labs look to be stable. Please send copy of these to her renal physician and make sure she follows with them.

## 2022-09-13 RX ORDER — MECLIZINE HCL 12.5 MG/1
12.5 TABLET ORAL DAILY PRN
Qty: 30 TABLET | Refills: 0 | Status: SHIPPED | OUTPATIENT
Start: 2022-09-13

## 2022-09-13 NOTE — TELEPHONE ENCOUNTER
I placed a refill but unsure as to why she continues to need this.   If she is having ongoing dizziness may need to further investigate and possibly have neurology look at her

## 2022-09-13 NOTE — TELEPHONE ENCOUNTER
Pt informed&states she last took a pill for dizziness on aug 16 and has not had issues all year. She just likes to have them on hand. She states she will discuss a neuro referral at her appt in nov.

## 2022-09-14 NOTE — PROGRESS NOTES
[facility]      Patient name: Romain Becker    Reason for consult: AS    Primary Care Physician: Sanjana Brito MD    Date of service: 9/15/2022    Chief Complaint: AS    HPI: This is a 80y.o.-year-old female presenting to the [address] [place] [address] for evaluation of AS. She was seen in  6 months ago for surveillance of moderate AS. At that time she felt great, was exercising daily and denied any major complaints. [place]  at that time showed moderate AS. She does have a hx in 2021 of Severe left main obstructive CAD Status post Impella-supported, IVUS guided, rotational arthrectomy and PCI of the left main into LAD and circumflex. She presents today to [facility] for any progression of aortic stenosis. Focused past medical history:          Diagnosis Date    Abscess of groin, left 6/22/2021    Aortic valve disorder     CAD (coronary artery disease)     Cancer (HCC)     glaucoma    Carotid stenosis, right 6/22/2021    Chest pain     Diabetes ([facility] [address]     Diverticulosis     Femoral-popliteal atherosclerosis ([facility] [address] 7/13/2021    GERD (gastroesophageal reflux disease)     Goiter     Hyperlipidemia     Hypertension     Hypothyroidism     Macular degeneration     Mitral valve disorder     Neuropathy     Osteoporosis     VHD (valvular heart disease)     Vitamin B12 deficiency           Allergies: Allergies   Allergen Reactions    Naproxen     Penicillins          Home medications:    Current Outpatient Medications   Medication Sig Dispense Refill    meclizine (ANTIVERT) 12.5 MG tablet Take 1 tablet by mouth daily as needed for Dizziness 30 tablet 0    glipiZIDE (GLUCOTROL XL) 10 MG extended release tablet TAKE ONE TABLET BY MOUTH TWO TIMES A  tablet 1    pantoprazole (PROTONIX) 40 MG tablet Take 1 tablet by mouth every morning (before breakfast) 90 tablet 1    rosuvastatin (CRESTOR) 20 MG tablet Take 1 tablet by mouth in the morning. 90 tablet 1    furosemide (LASIX) 20 MG tablet 1 po QOD 45 tablet 1    JANUVIA 50 MG tablet Take 1 tablet by mouth in the morning. 90 tablet 1    empagliflozin (JARDIANCE) 10 MG tablet Take 1 tablet by mouth in the morning. 90 tablet 1    Lancets 33G MISC 1 Lancet by Does not apply route in the morning and at bedtime 200 each 5    blood glucose test strips (ASCENSIA AUTODISC VI;ONE TOUCH ULTRA TEST VI) strip 1 each by In Vitro route 2 times daily Indications: For an Easy Max 5 machine 200 each 5    Alcohol Swabs (ALCOHOL WIPES) 70 % PADS 1 packet by Does not apply route in the morning and at bedtime 100 each 3    Multiple Vitamins-Minerals (PRESERVISION AREDS 2) CAPS Take 1 capsule by mouth in the morning and at bedtime      magnesium cl-calcium carbonate (NU-MAG) 71.5-119 MG TBEC tablet TAKE TWO TABLETS BY MOUTH DAILY 180 tablet 1    blood glucose monitor kit and supplies 1 kit by Other route in the morning and at bedtime Dispense sufficient amount for indicated testing frequency plus additional to accommodate PRN testing needs. Dispense all needed supplies to include: monitor, strips, lancing device, lancets, control solutions, alcohol swabs. 1 kit 0    clopidogrel (PLAVIX) 75 MG tablet Take 1 tablet by mouth daily 90 tablet 3    aspirin EC 81 MG EC tablet Take 1 tablet by mouth daily 90 tablet 3    carvedilol (COREG) 6.25 MG tablet Take 1 tablet by mouth 2 times daily 180 tablet 3    amLODIPine (NORVASC) 5 MG tablet Take 1 tablet by mouth daily 90 tablet 3    Handicap Placard MISC by Does not apply route Duration: 5 years 1 each 0    nitroGLYCERIN (NITROSTAT) 0.4 MG SL tablet DISSOLVE ONE TABLET UNDER TONGUE AS NEEDED FOR CHEST PAINS. MAY REPEAT IN 5 MINUTES.  IF SYMPTOMS PERSISTS CALL 911 25 tablet 1    Cinnamon 500 MG CAPS Take 1 capsule by mouth daily      vitamin D (CHOLECALCIFEROL) 1000 UNIT TABS tablet Take 1,000 Units by mouth daily      vitamin B-12 (CYANOCOBALAMIN) 1000 MCG tablet Take 1,000 mcg by mouth every other day        No current facility-administered medications for this visit. Past Medical History:  Past Medical History:   Diagnosis Date    Abscess of groin, left 6/22/2021    Aortic valve disorder     CAD (coronary artery disease)     Cancer (HCC)     glaucoma    Carotid stenosis, right 6/22/2021    Chest pain     Diabetes ([facility] [address]     Diverticulosis     Femoral-popliteal atherosclerosis ([facility] [address] 7/13/2021    GERD (gastroesophageal reflux disease)     Goiter     Hyperlipidemia     Hypertension     Hypothyroidism     Macular degeneration     Mitral valve disorder     Neuropathy     Osteoporosis     VHD (valvular heart disease)     Vitamin B12 deficiency          Past Surgical History:  Past Surgical History:   Procedure Laterality Date    APPENDECTOMY      BREAST BIOPSY Right     neg    CARDIAC CATHETERIZATION  06/11/2021    Dr Berenice Cabral  06/15/2021    Dr Nirali Benítez Bills 3.5 x 18 LM to LAD and 4.0 x18 Prox CX    EYE SURGERY Bilateral     cataract    HYSTERECTOMY (CERVIX STATUS UNKNOWN)      KNEE ARTHROPLASTY Left     TOOTH EXTRACTION      TRANSESOPHAGEAL ECHOCARDIOGRAM  06/10/2021    Dr Choi Manus History:  Social History     Socioeconomic History    Marital status:       Spouse name: Not on file    Number of children: Not on file    Years of education: Not on file    Highest education level: Not on file   Occupational History    Not on file   Tobacco Use    Smoking status: Never    Smokeless tobacco: Never   Vaping Use    Vaping Use: Never used   Substance and Sexual Activity    Alcohol use: Not Currently    Drug use: Never    Sexual activity: Not on file   Other Topics Concern    Not on file   Social History Narrative    Denies caffeine     Social Determinants of Health     Financial Resource Strain: Low Risk     Difficulty of Paying Living Expenses: Not hard at all   Food Insecurity: No Food Insecurity    Worried About Running Out of Food in the Last Year: Never true    Ran Out of Food in the Last Year: Never true   Transportation Needs: No Transportation Needs    Lack of Transportation (Medical): No    Lack of Transportation (Non-Medical): No   Physical Activity: Sufficiently Active    Days of Exercise per Week: 7 days    Minutes of Exercise per Session: 60 min   Stress: No Stress Concern Present    Feeling of Stress : Only a little   Social Connections: Moderately Isolated    Frequency of Communication with Friends and Family: Three times a week    Frequency of Social Gatherings with Friends and Family: Once a week    Attends Confucianism Services: 1 to 4 times per year    Active Member of [address] [place] [address] [employer] or Organizations: No    Attends Club or Organization Meetings: Never    Marital Status:    Intimate Partner Violence: Not on file   Housing Stability: Low Risk     Unable to Pay for Housing in the Last Year: No    Number of Places Lived in the Last Year: 1    Unstable Housing in the Last Year: No         Family History:  Family History   Problem Relation Age of Onset    Stroke Mother     Heart Disease Father 79        MI    Heart Attack Father     Heart Disease Brother     Coronary Art Dis Brother     Stroke Brother     Stroke Brother          Review of Systems:  Constitutional: Denies fevers, chills, or weight loss. HEENT: Denies visual changes or hearing loss. Heart: As per HPI. Lungs: Denies shortness of breath, cough, or wheezing. Gastrointestinal: Denies nausea, vomiting, constipation, or diarrhea. Genitourinary: dysuria or hematuria. Psychiatric: Patient denies anxiety or depression. Neurologic: Patient denies weakness of the extremities, dizziness, or headaches. All other ROS checked and found to be negative. Objective:  General Appearance: Pleasant 80y.o. year old female who appears stated age. Communicates well, no acute distress. HEENT: Head is normocephalic, atraumatic. EOMs intact, PERRL.   Trachea midline. Lungs: Normal respiratory rate and normal effort. She is not in respiratory distress. Breath sounds clear to auscultation. No wheezes. Heart: Normal rate. Regular rhythm. S1 normal and S2 normal. Positive for murmur. Chest: Symmetric chest wall expansion. Extremities: Normal range of motion. Neurological: Patient is alert and oriented to person, place and time. Patient has normal reflexes. Skin: Warm and dry. Abdomen: Abdomen is soft and non-distended. Bowel sounds are normal. There is no abdominal tenderness tenderness. There is no guarding. There is no mass. Pulses: Distal pulses are intact. Skin: Warm and dry without lesions. Assessment:   Patient Active Problem List   Diagnosis    Type 2 diabetes mellitus with diabetic neuropathy, without long-term current use of insulin ([facility] [address]    Essential hypertension    Hyperlipidemia    Goiter    Osteoporosis    Stage 3b chronic kidney disease ([facility])    Moderate to severe aortic stenosis    Type 2 diabetes mellitus, without long-term current use of insulin ([facility])    Obesity (BMI 30-39. 9)    Nonrheumatic mitral valve regurgitation    Nonrheumatic tricuspid valve regurgitation    Left main coronary artery disease    Status post insertion of drug eluting coronary artery stent    Cellulitis of left groin    CKD (chronic kidney disease)    Anemia    CAD (coronary artery disease)    S/P drug eluting coronary stent placement    Carotid stenosis, right    PVD (peripheral vascular disease) ([facility])    Abscess of groin, left    Femoral-popliteal atherosclerosis ([facility])    Chronic heart failure with preserved ejection fraction (HFpEF) ([facility])    Chronic systolic (congestive) heart failure     Moderate AS  ECHO today pending     Plan:   Has had moderate AS in the past   ECHO pending today  She feels well with no SOB or symptoms   F/u on her ECHO and likely can be seen in a year in the valve clinic if not changes       \"Severe AS Shared Decision Making discussion took place using the [facility] Cardiology AS. TAVR/SAVR tool. The treatment plan formulated by the Heart Team and the patient and the patient's preference for AVR is TAVR. \"

## 2022-09-15 ENCOUNTER — OFFICE VISIT (OUTPATIENT)
Dept: CARDIOTHORACIC SURGERY | Age: 82
End: 2022-09-15

## 2022-09-15 VITALS
HEIGHT: 62 IN | TEMPERATURE: 98.3 F | HEART RATE: 76 BPM | SYSTOLIC BLOOD PRESSURE: 157 MMHG | DIASTOLIC BLOOD PRESSURE: 56 MMHG | BODY MASS INDEX: 26.31 KG/M2 | WEIGHT: 143 LBS

## 2022-09-15 DIAGNOSIS — I35.0 NONRHEUMATIC AORTIC VALVE STENOSIS: Primary | ICD-10-CM

## 2022-09-29 ENCOUNTER — HOSPITAL ENCOUNTER (OUTPATIENT)
Dept: CARDIOLOGY | Age: 82
Discharge: HOME | End: 2022-09-29
Payer: MEDICARE

## 2022-09-29 DIAGNOSIS — I35.0 MODERATE TO SEVERE AORTIC STENOSIS: ICD-10-CM

## 2022-09-29 PROCEDURE — 93308 TTE F-UP OR LMTD: CPT

## 2022-10-05 RX ORDER — CLOPIDOGREL BISULFATE 75 MG/1
TABLET ORAL
Qty: 90 TABLET | Refills: 0 | Status: SHIPPED | OUTPATIENT
Start: 2022-10-05

## 2022-10-09 DIAGNOSIS — E11.40 TYPE 2 DIABETES MELLITUS WITH DIABETIC NEUROPATHY, WITHOUT LONG-TERM CURRENT USE OF INSULIN (HCC): ICD-10-CM

## 2022-10-10 RX ORDER — MAGNESIUM CHLORIDE 71.5 G/G
TABLET ORAL
Qty: 60 TABLET | Refills: 0 | Status: SHIPPED | OUTPATIENT
Start: 2022-10-10

## 2022-10-17 ENCOUNTER — TELEPHONE (OUTPATIENT)
Dept: CARDIOLOGY | Age: 82
End: 2022-10-17

## 2022-10-17 NOTE — TELEPHONE ENCOUNTER
Spoke to patient re: echo results. No major change from previous except more AI. She remains asymptomatic. Will plan follow up in structural heart clinic in 6 months; she was advised to call sooner if symptoms arise in the meantime. She states understanding.

## 2022-10-28 LAB — DIABETIC RETINOPATHY: NEGATIVE

## 2022-11-02 ENCOUNTER — OFFICE VISIT (OUTPATIENT)
Dept: FAMILY MEDICINE CLINIC | Age: 82
End: 2022-11-02
Payer: MEDICARE

## 2022-11-02 VITALS
OXYGEN SATURATION: 100 % | HEIGHT: 62 IN | BODY MASS INDEX: 26.17 KG/M2 | SYSTOLIC BLOOD PRESSURE: 124 MMHG | WEIGHT: 142.2 LBS | TEMPERATURE: 97.5 F | HEART RATE: 64 BPM | DIASTOLIC BLOOD PRESSURE: 66 MMHG

## 2022-11-02 DIAGNOSIS — I10 ESSENTIAL HYPERTENSION: ICD-10-CM

## 2022-11-02 DIAGNOSIS — K21.9 GASTROESOPHAGEAL REFLUX DISEASE WITHOUT ESOPHAGITIS: ICD-10-CM

## 2022-11-02 DIAGNOSIS — E11.9 TYPE 2 DIABETES MELLITUS WITHOUT COMPLICATION, UNSPECIFIED WHETHER LONG TERM INSULIN USE (HCC): ICD-10-CM

## 2022-11-02 DIAGNOSIS — I35.0 MODERATE TO SEVERE AORTIC STENOSIS: ICD-10-CM

## 2022-11-02 DIAGNOSIS — E78.5 HYPERLIPIDEMIA, UNSPECIFIED HYPERLIPIDEMIA TYPE: ICD-10-CM

## 2022-11-02 DIAGNOSIS — E04.9 GOITER: ICD-10-CM

## 2022-11-02 DIAGNOSIS — I25.10 CORONARY ARTERY DISEASE INVOLVING NATIVE HEART WITHOUT ANGINA PECTORIS, UNSPECIFIED VESSEL OR LESION TYPE: ICD-10-CM

## 2022-11-02 DIAGNOSIS — N18.32 STAGE 3B CHRONIC KIDNEY DISEASE (HCC): ICD-10-CM

## 2022-11-02 DIAGNOSIS — R91.8 PULMONARY NODULES: ICD-10-CM

## 2022-11-02 LAB
ALBUMIN SERPL-MCNC: 4.2 G/DL (ref 3.5–5.2)
ALP BLD-CCNC: 96 U/L (ref 35–104)
ALT SERPL-CCNC: 21 U/L (ref 0–32)
ANION GAP SERPL CALCULATED.3IONS-SCNC: 15 MMOL/L (ref 7–16)
AST SERPL-CCNC: 24 U/L (ref 0–31)
BILIRUB SERPL-MCNC: 0.7 MG/DL (ref 0–1.2)
BUN BLDV-MCNC: 26 MG/DL (ref 6–23)
CALCIUM SERPL-MCNC: 10.1 MG/DL (ref 8.6–10.2)
CHLORIDE BLD-SCNC: 103 MMOL/L (ref 98–107)
CO2: 21 MMOL/L (ref 22–29)
CREAT SERPL-MCNC: 1.4 MG/DL (ref 0.5–1)
GFR SERPL CREATININE-BSD FRML MDRD: 38 ML/MIN/1.73
GLUCOSE BLD-MCNC: 154 MG/DL (ref 74–99)
HBA1C MFR BLD: 8.3 % (ref 4–5.6)
POTASSIUM SERPL-SCNC: 4.9 MMOL/L (ref 3.5–5)
SODIUM BLD-SCNC: 139 MMOL/L (ref 132–146)
TOTAL PROTEIN: 7.9 G/DL (ref 6.4–8.3)

## 2022-11-02 PROCEDURE — G8427 DOCREV CUR MEDS BY ELIG CLIN: HCPCS | Performed by: INTERNAL MEDICINE

## 2022-11-02 PROCEDURE — 99214 OFFICE O/P EST MOD 30 MIN: CPT | Performed by: INTERNAL MEDICINE

## 2022-11-02 PROCEDURE — 3078F DIAST BP <80 MM HG: CPT | Performed by: INTERNAL MEDICINE

## 2022-11-02 PROCEDURE — G0008 ADMIN INFLUENZA VIRUS VAC: HCPCS | Performed by: INTERNAL MEDICINE

## 2022-11-02 PROCEDURE — G8400 PT W/DXA NO RESULTS DOC: HCPCS | Performed by: INTERNAL MEDICINE

## 2022-11-02 PROCEDURE — 3052F HG A1C>EQUAL 8.0%<EQUAL 9.0%: CPT | Performed by: INTERNAL MEDICINE

## 2022-11-02 PROCEDURE — G8417 CALC BMI ABV UP PARAM F/U: HCPCS | Performed by: INTERNAL MEDICINE

## 2022-11-02 PROCEDURE — 90694 VACC AIIV4 NO PRSRV 0.5ML IM: CPT | Performed by: INTERNAL MEDICINE

## 2022-11-02 PROCEDURE — 1090F PRES/ABSN URINE INCON ASSESS: CPT | Performed by: INTERNAL MEDICINE

## 2022-11-02 PROCEDURE — 3074F SYST BP LT 130 MM HG: CPT | Performed by: INTERNAL MEDICINE

## 2022-11-02 PROCEDURE — G8484 FLU IMMUNIZE NO ADMIN: HCPCS | Performed by: INTERNAL MEDICINE

## 2022-11-02 PROCEDURE — 1036F TOBACCO NON-USER: CPT | Performed by: INTERNAL MEDICINE

## 2022-11-02 PROCEDURE — 1123F ACP DISCUSS/DSCN MKR DOCD: CPT | Performed by: INTERNAL MEDICINE

## 2022-11-02 RX ORDER — SITAGLIPTIN 50 MG/1
50 TABLET, FILM COATED ORAL DAILY
Qty: 90 TABLET | Refills: 1 | Status: SHIPPED | OUTPATIENT
Start: 2022-11-02

## 2022-11-03 ENCOUNTER — TELEPHONE (OUTPATIENT)
Dept: FAMILY MEDICINE CLINIC | Age: 82
End: 2022-11-03

## 2022-11-03 NOTE — TELEPHONE ENCOUNTER
Hemoglobin A1c is 8.3. Up from 8.1. Really do not want to increase medications at this point with her kidney function. She needs to work at this dietarily for now. If next A1c remains over 8 may have to bump her Jardiance up to 25.

## 2022-11-03 NOTE — PROGRESS NOTES
[address] [place]     22  Ketty Herrera Long : 1940 Sex: female  Age: [de-identified] y.o. Chief Complaint   Patient presents with    Diabetes     3 months       HPI  Patient presents today for 3-month follow-up visit on her medical problems. Reviewed my last note. Reviewed note from Dr. Albert Sloan of structural heart disease clinic related to her aortic stenosis. Although moderate to severe looks to be stable at this time. Is having no symptoms of severe aortic stenosis. Denies any chest pain syncope or presyncope, shortness of breath. blood sugars have been doing well since we started her on Jardiance not too long ago. Hemoglobin A1c did drop from the 9 range to the 8 range and we will be checking it again. Blood pressure she shows me has been well controlled on current antihypertensive regimen. He is tolerating all of her medications well. Lipids have been stable and controlled on her statin medication. GERD has been stable and controlled on her PPI. Discussed pulmonary nodules and previous CAT scan and the need for follow-up probably sometime in the February timeframe with her new physician. Eyes are up-to-date and feet are being checked with her diabetes. No retinopathy no foot sores  Weight has been stable  She is up-to-date with consultants including  ophthalmology, endocrine (they have been following thyroid ultrasounds) or her thyroid, cardiology for her aortic stenosis and left main stent placement and previously with ear nose and throat who recommended no thyroid biopsy at that point but simply ultrasound follow-up. Also no longer following with renal        lReview of Systems  Constitutional: Negative for activity change, appetite change, chills, diaphoresis, fatigue, fever and unexpected weight change. HENT: Negative for congestion, ear pain, hearing loss, postnasal drip, rhinorrhea and sinus pain. Respiratory: Negative for  wheezing.  Cough and shortness of breath improved  cardiovascular: Negative for chest pain, palpitations and leg swelling. Gastrointestinal: Negative for abdominal pain, blood in stool, constipation, diarrhea, nausea and vomiting. Endocrine:  Type 2 diabetes, goiter   Genitourinary: Negative for difficulty urinating, dysuria, frequency, hematuria and urgency. Musculoskeletal: Positive for arthralgias. Negative for back pain, gait problem and myalgias. Skin: No open wounds  allergic/Immunologic: Negative for environmental allergies and immunocompromised state. Neurological: Negative for dizziness (improved after cardiac stenting), weakness, light-headedness, numbness and headaches. Hematological: Negative. Endocrine: Type 2 diabetes-currently holding Metformin due to renal function, and Actos due to congestive heart failure--is on Jardiance              REST OF PERTINENT ROS GONE OVER AND WAS NEGATIVE.      PMH:  Problem List: Knee pain, Non-toxic nodular goiter, Essential hypertension, Goiter, Hyperlipidemia, Benign  essential hypertension, Type II diabetes mellitus uncontrolled  Health Maintenance:  Bone Density Test Screening - (12/3/2018)  Bone Density Scan - (12/3/2018)  Influenza Vaccination - (10/29/2018)  Colonoscopy - (7/25/2018)  7/18-due 23  Couseled on Home Safety - (6/13/2018)  Pneumonia Vaccination - (2/5/2018)  Mammogram - (12/20/2012)  Mammogram Screening - (12/20/2012)  Declining further  Tetanus Immunization - (1/18/2017)  Rectal Exam - 4/10,4/11  Breast Exam - 4/10,4/11  Hemmocult Cards - 9/12-neg x 3,5/14-neg x3, 6/16-neg x 3  EKG - 5/11,, 5/15, 3/17,5/17,3/18  2D ECHO - 1/17 , 1/18  Carotid Artery Study - 1/17-30-49% occlusion bilateral, 2/18  Stress Test - 4/17-neg,3/18-pos  Zoster/Shingles Vaccine - 2016  PFTs-6/21  Medical Problems:  Non Insulin Dependent Diabetes, Hypertension, Hyperlipidemia  Goiter - multinodular-bx neg-dr hooper  GERD, Hypothyroidism  Osteoporosis - Bisphosphonate intolerant  Glaucoma, Renal Insufficiency  Aortic And Mitral Valve Disorders - 2D echo-  VHD  Pulmonary HTN - mild  Macular Degeneration  WBCs in urine - Workup by urology-  Coronary Artery Disease (CAD) - Positive stress test 3/18-heart cath--left main disease-stents  Diverticulosis, Diabetic Neuropathy, Vitamin B12 deficiency  Hospitalization for infected left groin cath site-  HFpEF   Lung nodule-following CAT scans     Follows with - Cardiology, nephrology, ophthalmology, endocrine  Surgical Hx:  AKIN-right salpingo-oophorectomy - Fibroids  Appendectomy  Right cataract surgery - left also  Left knee arthroscopy, Removal of Gallbladder  Cardiac catheterization-severe left main disease-atherectomy/PCI/stents     FH:  Father:  MI.  Mother:  Cerebrovascular Accident (CVA). Brother 1:  Coronary Artery Disease (CAD) - 52's. 3 other brothers, 1 sister   SH:  Marital: . Personal Habits: Cigarette Use: Negative For current cigarette smoker. Alcohol: does not use  alcohol. 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                Current Outpatient Medications:     JANUVIA 50 MG tablet, Take 1 tablet by mouth daily, Disp: 90 tablet, Rfl: 1    empagliflozin (JARDIANCE) 10 MG tablet, Take 1 tablet by mouth daily, Disp: 90 tablet, Rfl: 1    magnesium cl-calcium carbonate (SLOW-MAG) 71.5-119 MG TBEC tablet, TAKE TWO TABLETS BY MOUTH DAILY, Disp: 60 tablet, Rfl: 0    clopidogrel (PLAVIX) 75 MG tablet, TAKE ONE TABLET BY MOUTH DAILY, Disp: 90 tablet, Rfl: 0    meclizine (ANTIVERT) 12.5 MG tablet, Take 1 tablet by mouth daily as needed for Dizziness, Disp: 30 tablet, Rfl: 0    glipiZIDE (GLUCOTROL XL) 10 MG extended release tablet, TAKE ONE TABLET BY MOUTH TWO TIMES A DAY, Disp: 180 tablet, Rfl: 1    pantoprazole (PROTONIX) 40 MG tablet, Take 1 tablet by mouth every morning (before breakfast), Disp: 90 tablet, Rfl: 1    rosuvastatin (CRESTOR) 20 MG tablet, Take 1 tablet by mouth in the morning., Disp: 90 tablet, Rfl: 1    furosemide (LASIX) 20 MG tablet, 1 po QOD, Disp: 45 tablet, Rfl: 1    Lancets 33G MISC, 1 Lancet by Does not apply route in the morning and at bedtime, Disp: 200 each, Rfl: 5    blood glucose test strips (ASCENSIA AUTODISC VI;ONE TOUCH ULTRA TEST VI) strip, 1 each by In Vitro route 2 times daily Indications: For an Easy Max 5 machine, Disp: 200 each, Rfl: 5    Alcohol Swabs (ALCOHOL WIPES) 70 % PADS, 1 packet by Does not apply route in the morning and at bedtime, Disp: 100 each, Rfl: 3    Multiple Vitamins-Minerals (PRESERVISION AREDS 2) CAPS, Take 1 capsule by mouth in the morning and at bedtime, Disp: , Rfl:     blood glucose monitor kit and supplies, 1 kit by Other route in the morning and at bedtime Dispense sufficient amount for indicated testing frequency plus additional to accommodate PRN testing needs. Dispense all needed supplies to include: monitor, strips, lancing device, lancets, control solutions, alcohol swabs., Disp: 1 kit, Rfl: 0    aspirin EC 81 MG EC tablet, Take 1 tablet by mouth daily, Disp: 90 tablet, Rfl: 3    carvedilol (COREG) 6.25 MG tablet, Take 1 tablet by mouth 2 times daily, Disp: 180 tablet, Rfl: 3    amLODIPine (NORVASC) 5 MG tablet, Take 1 tablet by mouth daily, Disp: 90 tablet, Rfl: 3    Handicap Placard MISC, by Does not apply route Duration: 5 years, Disp: 1 each, Rfl: 0    nitroGLYCERIN (NITROSTAT) 0.4 MG SL tablet, DISSOLVE ONE TABLET UNDER TONGUE AS NEEDED FOR CHEST PAINS. MAY REPEAT IN 5 MINUTES.  IF SYMPTOMS PERSISTS CALL 911, Disp: 25 tablet, Rfl: 1    Cinnamon 500 MG CAPS, Take 1 capsule by mouth daily, Disp: , Rfl:     vitamin D (CHOLECALCIFEROL) 1000 UNIT TABS tablet, Take 1,000 Units by mouth daily, Disp: , Rfl:     vitamin B-12 (CYANOCOBALAMIN) 1000 MCG tablet, Take 1,000 mcg by mouth every other day , Disp: , Rfl:   Allergies   Allergen Reactions    Naproxen     Penicillins        Past Medical History:   Diagnosis Date    Abscess of groin, left 6/22/2021    Aortic valve disorder     CAD (coronary artery disease)     Cancer (HCC)     glaucoma    Carotid stenosis, right 6/22/2021    Chest pain     Diabetes ([facility] [address]     Diverticulosis     Femoral-popliteal atherosclerosis ([facility] [address] 7/13/2021    GERD (gastroesophageal reflux disease)     Goiter     Hyperlipidemia     Hypertension     Hypothyroidism     Macular degeneration     Mitral valve disorder     Neuropathy     Osteoporosis     VHD (valvular heart disease)     Vitamin B12 deficiency      Past Surgical History:   Procedure Laterality Date    APPENDECTOMY      BREAST BIOPSY Right     neg    CARDIAC CATHETERIZATION  06/11/2021    Dr Lewis Arm  06/15/2021    Dr Georgie Blackmon 3.5 x 18 LM to LAD and 4.0 x18 Prox CX    EYE SURGERY Bilateral     cataract    HYSTERECTOMY (CERVIX STATUS UNKNOWN)      KNEE ARTHROPLASTY Left     TOOTH EXTRACTION      TRANSESOPHAGEAL ECHOCARDIOGRAM  06/10/2021    Dr Delgado Oswald     Family History   Problem Relation Age of Onset    Stroke Mother     Heart Disease Father 79        MI    Heart Attack Father     Heart Disease Brother     Coronary Art Dis Brother     Stroke Brother     Stroke Brother      Social History     Socioeconomic History    Marital status:       Spouse name: Not on file    Number of children: Not on file    Years of education: Not on file    Highest education level: Not on file   Occupational History    Not on file   Tobacco Use    Smoking status: Never    Smokeless tobacco: Never   Vaping Use    Vaping Use: Never used   Substance and Sexual Activity    Alcohol use: Not Currently    Drug use: Never    Sexual activity: Not on file   Other Topics Concern    Not on file   Social History Narrative    Denies caffeine     Social Determinants of Health     Financial Resource Strain: Low Risk     Difficulty of Paying Living Expenses: Not hard at all   Food Insecurity: No Food Insecurity    Worried About Running Out of Food in the Last Year: Never true    Ran Out of Food in the Last Year: Never true   Transportation Needs: No Transportation Needs    Lack of Transportation (Medical): No    Lack of Transportation (Non-Medical): No   Physical Activity: Sufficiently Active    Days of Exercise per Week: 7 days    Minutes of Exercise per Session: 60 min   Stress: No Stress Concern Present    Feeling of Stress : Only a little   Social Connections: Moderately Isolated    Frequency of Communication with Friends and Family: Three times a week    Frequency of Social Gatherings with Friends and Family: Once a week    Attends [employer] Services: 1 to 4 times per year    Active Member of [employer] or Organizations: No    Attends Club or Organization Meetings: Never    Marital Status:    Intimate Partner Violence: Not on file   Housing Stability: Low Risk     Unable to Pay for Housing in the Last Year: No    Number of Places Lived in the Last Year: 1    Unstable Housing in the Last Year: No       Vitals:    11/02/22 1020   BP: 124/66   Pulse: 64   Temp: 97.5 °F (36.4 °C)   TempSrc: Temporal   SpO2: 100%   Weight: 142 lb 3.2 oz (64.5 kg)   Height: 5' 2\" (1.575 m)       Physical Exam    Constitutional: She is oriented to person, place, and time. She appears well-developed and well-nourished. No distress. Neck: Normal range of motion. Neck supple. Thyromegaly present. Positive for goiter   Right carotid bruit  No JVD noted. Cardiovascular: Normal rate, regular rhythm and intact distal pulses. Exam reveals no gallop and no friction rub. Murmur heard. Patient's edema improved to trace pulmonary/Chest: No respiratory distress. She has no wheezes Or rhonchi. Appears to have good air exchange today  Abdominal: Soft. Bowel sounds are normal. She exhibits no distension and no mass. There is no tenderness. Musculoskeletal: Normal range of motion. Neurological: She is alert and oriented to person, place, and time.  She displays normal reflexes. No sensory deficit. She exhibits normal muscle tone. Coordination normal.  Skin: Skin is warm and dry. No rash noted. No erythema. Psychiatric: Mood and affect normal  Nursing note and vitals reviewed           Assessment and Plan: Marce Oconnell was seen today for diabetes. Diagnoses and all orders for this visit:    Essential hypertension  -     Comprehensive Metabolic Panel; Future  Able and controlled on antihypertensive medication    Type 2 diabetes mellitus without complication, unspecified whether long term insulin use (HCC)  -     Hemoglobin A1C; Future  Stable and controlled on her diabetic medication    Pulmonary nodules  For upcoming CT scan follow-up 2/23    Hyperlipidemia, unspecified hyperlipidemia type  Stable and controlled on statin medication    Stage 3b chronic kidney disease ([facility] [address]  Stable and no longer following with renal    Coronary artery disease involving native heart without angina pectoris, unspecified vessel or lesion type  Stable and following with cardiology    Goiter  Stable and following with endocrine    Gastroesophageal reflux disease without esophagitis  Stable and controlled on PPI    Moderate to severe aortic stenosis  Stable and following with cardiology    Other orders  -     Cancel: Influenza, FLUAD, (age 72 y+), IM, Preservative Free, 0.5 mL  -     JANUVIA 50 MG tablet; Take 1 tablet by mouth daily  -     empagliflozin (JARDIANCE) 10 MG tablet; Take 1 tablet by mouth daily  -     Influenza, FLUAD, (age 72 y+), IM, Preservative Free, 0.5 mL  Plan: Continue to follow with above consultants. Blood work to monitor disease progression and medication use. Prescription management performed after review of efficacy of medication on her current medical problems. She will be due for colonoscopy next year. I did review her 2D echo report. Continue to monitor blood pressure and blood sugars closely.   She is checking blood sugars twice a day secondary to marginal control on 3 different oral antidiabetic medications and refusal to go on insulin. She will see new physician in 3 months to establish and follow-up. Asked her to bring her documenting blood pressure and blood sugar numbers and at that time. She will be due for another CT of the chest to monitor pulmonary nodules. See 8/29/2022 encounter speaking with radiologist.  Fall precautions. Notify us of problems in the interim. No follow-ups on file. Seen By:  Alfredito Segura MD      *Document was created using voice recognition software. Note was reviewed however may contain grammatical errors.

## 2022-11-21 RX ORDER — CLOPIDOGREL BISULFATE 75 MG/1
75 TABLET ORAL DAILY
Qty: 90 TABLET | Refills: 3 | Status: SHIPPED | OUTPATIENT
Start: 2022-11-21

## 2022-12-14 ENCOUNTER — TELEPHONE (OUTPATIENT)
Dept: FAMILY MEDICINE CLINIC | Age: 82
End: 2022-12-14

## 2022-12-14 NOTE — TELEPHONE ENCOUNTER
Patient's prescription for diabetic testing supplies will  prior to her establishing w/ her new PCP on 22. [facility] is faxing an order form for you to fill out and sign. Patient is currently testing her blood sugars twice a day. Medicare covers once a day testing when patients are not on insulin. They will make an exception if it is documented in a progress note that she is testing twice a day and why it is medically necessary. Can you add an addendum and have isabell fax it with the order they are sending?

## 2023-01-30 RX ORDER — FUROSEMIDE 20 MG/1
TABLET ORAL
Qty: 45 TABLET | Refills: 0 | OUTPATIENT
Start: 2023-01-30

## 2023-01-30 RX ORDER — ROSUVASTATIN CALCIUM 20 MG/1
TABLET, COATED ORAL
Qty: 90 TABLET | Refills: 0 | OUTPATIENT
Start: 2023-01-30

## 2023-02-06 ENCOUNTER — OFFICE VISIT (OUTPATIENT)
Dept: PRIMARY CARE CLINIC | Age: 83
End: 2023-02-06

## 2023-02-06 ENCOUNTER — TELEPHONE (OUTPATIENT)
Dept: FAMILY MEDICINE CLINIC | Age: 83
End: 2023-02-06

## 2023-02-06 VITALS
WEIGHT: 143 LBS | HEIGHT: 62 IN | TEMPERATURE: 97.6 F | OXYGEN SATURATION: 98 % | HEART RATE: 69 BPM | DIASTOLIC BLOOD PRESSURE: 68 MMHG | BODY MASS INDEX: 26.31 KG/M2 | RESPIRATION RATE: 18 BRPM | SYSTOLIC BLOOD PRESSURE: 170 MMHG

## 2023-02-06 DIAGNOSIS — E11.40 TYPE 2 DIABETES MELLITUS WITH DIABETIC NEUROPATHY, WITHOUT LONG-TERM CURRENT USE OF INSULIN (HCC): ICD-10-CM

## 2023-02-06 DIAGNOSIS — I25.10 CORONARY ARTERY DISEASE INVOLVING NATIVE HEART WITHOUT ANGINA PECTORIS, UNSPECIFIED VESSEL OR LESION TYPE: ICD-10-CM

## 2023-02-06 DIAGNOSIS — I73.9 PVD (PERIPHERAL VASCULAR DISEASE) (HCC): ICD-10-CM

## 2023-02-06 DIAGNOSIS — I50.32 CHRONIC HEART FAILURE WITH PRESERVED EJECTION FRACTION (HFPEF) (HCC): Primary | ICD-10-CM

## 2023-02-06 DIAGNOSIS — I35.0 MODERATE TO SEVERE AORTIC STENOSIS: ICD-10-CM

## 2023-02-06 DIAGNOSIS — E04.9 GOITER: ICD-10-CM

## 2023-02-06 DIAGNOSIS — I65.21 CAROTID STENOSIS, RIGHT: ICD-10-CM

## 2023-02-06 DIAGNOSIS — E78.2 MIXED HYPERLIPIDEMIA: ICD-10-CM

## 2023-02-06 DIAGNOSIS — N18.32 STAGE 3B CHRONIC KIDNEY DISEASE (HCC): ICD-10-CM

## 2023-02-06 DIAGNOSIS — I20.9 ANGINA PECTORIS, UNSPECIFIED (HCC): ICD-10-CM

## 2023-02-06 DIAGNOSIS — I50.22 CHRONIC SYSTOLIC (CONGESTIVE) HEART FAILURE (HCC): ICD-10-CM

## 2023-02-06 DIAGNOSIS — I10 ESSENTIAL HYPERTENSION: ICD-10-CM

## 2023-02-06 PROBLEM — I25.119 ATHEROSCLEROTIC HEART DISEASE OF NATIVE CORONARY ARTERY WITH UNSPECIFIED ANGINA PECTORIS (HCC): Status: ACTIVE | Noted: 2023-02-06

## 2023-02-06 LAB
ALBUMIN SERPL-MCNC: 4 G/DL (ref 3.5–5.2)
ALP BLD-CCNC: 90 U/L (ref 35–104)
ALT SERPL-CCNC: 15 U/L (ref 0–32)
ANION GAP SERPL CALCULATED.3IONS-SCNC: 21 MMOL/L (ref 7–16)
AST SERPL-CCNC: 23 U/L (ref 0–31)
BASOPHILS ABSOLUTE: 0.04 E9/L (ref 0–0.2)
BASOPHILS RELATIVE PERCENT: 0.6 % (ref 0–2)
BILIRUB SERPL-MCNC: 0.9 MG/DL (ref 0–1.2)
BILIRUBIN DIRECT: <0.2 MG/DL (ref 0–0.3)
BILIRUBIN, INDIRECT: NORMAL MG/DL (ref 0–1)
BUN BLDV-MCNC: 25 MG/DL (ref 6–23)
CALCIUM SERPL-MCNC: 9.6 MG/DL (ref 8.6–10.2)
CHLORIDE BLD-SCNC: 103 MMOL/L (ref 98–107)
CHOLESTEROL, TOTAL: 145 MG/DL (ref 0–199)
CO2: 19 MMOL/L (ref 22–29)
CREAT SERPL-MCNC: 1.4 MG/DL (ref 0.5–1)
EOSINOPHILS ABSOLUTE: 0.13 E9/L (ref 0.05–0.5)
EOSINOPHILS RELATIVE PERCENT: 2.1 % (ref 0–6)
GFR SERPL CREATININE-BSD FRML MDRD: 38 ML/MIN/1.73
GLUCOSE BLD-MCNC: 143 MG/DL (ref 74–99)
HBA1C MFR BLD: 8.2 % (ref 4–5.6)
HCT VFR BLD CALC: 42.7 % (ref 34–48)
HDLC SERPL-MCNC: 61 MG/DL
HEMOGLOBIN: 13.4 G/DL (ref 11.5–15.5)
IMMATURE GRANULOCYTES #: 0.02 E9/L
IMMATURE GRANULOCYTES %: 0.3 % (ref 0–5)
LDL CHOLESTEROL CALCULATED: 58 MG/DL (ref 0–99)
LYMPHOCYTES ABSOLUTE: 1.18 E9/L (ref 1.5–4)
LYMPHOCYTES RELATIVE PERCENT: 19.1 % (ref 20–42)
MCH RBC QN AUTO: 31.3 PG (ref 26–35)
MCHC RBC AUTO-ENTMCNC: 31.4 % (ref 32–34.5)
MCV RBC AUTO: 99.8 FL (ref 80–99.9)
MICROALBUMIN UR-MCNC: <12 MG/L
MONOCYTES ABSOLUTE: 0.46 E9/L (ref 0.1–0.95)
MONOCYTES RELATIVE PERCENT: 7.5 % (ref 2–12)
NEUTROPHILS ABSOLUTE: 4.34 E9/L (ref 1.8–7.3)
NEUTROPHILS RELATIVE PERCENT: 70.4 % (ref 43–80)
PDW BLD-RTO: 14.3 FL (ref 11.5–15)
PLATELET # BLD: 190 E9/L (ref 130–450)
PMV BLD AUTO: 12.1 FL (ref 7–12)
POTASSIUM SERPL-SCNC: 4.8 MMOL/L (ref 3.5–5)
RBC # BLD: 4.28 E12/L (ref 3.5–5.5)
SODIUM BLD-SCNC: 143 MMOL/L (ref 132–146)
TOTAL PROTEIN: 7.7 G/DL (ref 6.4–8.3)
TRIGL SERPL-MCNC: 129 MG/DL (ref 0–149)
VLDLC SERPL CALC-MCNC: 26 MG/DL
WBC # BLD: 6.2 E9/L (ref 4.5–11.5)

## 2023-02-06 RX ORDER — MECLIZINE HCL 12.5 MG/1
12.5 TABLET ORAL DAILY PRN
Qty: 30 TABLET | Refills: 0 | Status: SHIPPED | OUTPATIENT
Start: 2023-02-06

## 2023-02-06 RX ORDER — GLIPIZIDE 10 MG/1
TABLET, FILM COATED, EXTENDED RELEASE ORAL
Qty: 180 TABLET | Refills: 1 | Status: SHIPPED | OUTPATIENT
Start: 2023-02-06

## 2023-02-06 RX ORDER — ROSUVASTATIN CALCIUM 20 MG/1
20 TABLET, COATED ORAL DAILY
Qty: 90 TABLET | Refills: 1 | Status: SHIPPED | OUTPATIENT
Start: 2023-02-06

## 2023-02-06 RX ORDER — PANTOPRAZOLE SODIUM 40 MG/1
40 TABLET, DELAYED RELEASE ORAL
Qty: 90 TABLET | Refills: 1 | Status: CANCELLED | OUTPATIENT
Start: 2023-02-06

## 2023-02-06 RX ORDER — FUROSEMIDE 20 MG/1
TABLET ORAL
Qty: 45 TABLET | Refills: 1 | Status: SHIPPED | OUTPATIENT
Start: 2023-02-06

## 2023-02-06 RX ORDER — NITROGLYCERIN 0.4 MG/1
TABLET SUBLINGUAL
Qty: 25 TABLET | Refills: 1 | Status: SHIPPED | OUTPATIENT
Start: 2023-02-06

## 2023-02-06 ASSESSMENT — ANXIETY QUESTIONNAIRES
4. TROUBLE RELAXING: 0
7. FEELING AFRAID AS IF SOMETHING AWFUL MIGHT HAPPEN: NOT AT ALL
5. BEING SO RESTLESS THAT IT IS HARD TO SIT STILL: NOT AT ALL
IF YOU CHECKED OFF ANY PROBLEMS ON THIS QUESTIONNAIRE, HOW DIFFICULT HAVE THESE PROBLEMS MADE IT FOR YOU TO DO YOUR WORK, TAKE CARE OF THINGS AT HOME, OR GET ALONG WITH OTHER PEOPLE: NOT DIFFICULT AT ALL
3. WORRYING TOO MUCH ABOUT DIFFERENT THINGS: 0
GAD7 TOTAL SCORE: 0
IF YOU CHECKED OFF ANY PROBLEMS ON THIS QUESTIONNAIRE, HOW DIFFICULT HAVE THESE PROBLEMS MADE IT FOR YOU TO DO YOUR WORK, TAKE CARE OF THINGS AT HOME, OR GET ALONG WITH OTHER PEOPLE: NOT DIFFICULT AT ALL
3. WORRYING TOO MUCH ABOUT DIFFERENT THINGS: NOT AT ALL
6. BECOMING EASILY ANNOYED OR IRRITABLE: 0
1. FEELING NERVOUS, ANXIOUS, OR ON EDGE: 0
7. FEELING AFRAID AS IF SOMETHING AWFUL MIGHT HAPPEN: 0
2. NOT BEING ABLE TO STOP OR CONTROL WORRYING: NOT AT ALL
2. NOT BEING ABLE TO STOP OR CONTROL WORRYING: 0
5. BEING SO RESTLESS THAT IT IS HARD TO SIT STILL: 0
4. TROUBLE RELAXING: NOT AT ALL
1. FEELING NERVOUS, ANXIOUS, OR ON EDGE: NOT AT ALL
6. BECOMING EASILY ANNOYED OR IRRITABLE: NOT AT ALL

## 2023-02-06 ASSESSMENT — PATIENT HEALTH QUESTIONNAIRE - PHQ9
2. FEELING DOWN, DEPRESSED OR HOPELESS: 0
SUM OF ALL RESPONSES TO PHQ QUESTIONS 1-9: 0
1. LITTLE INTEREST OR PLEASURE IN DOING THINGS: 0
SUM OF ALL RESPONSES TO PHQ9 QUESTIONS 1 & 2: 0

## 2023-02-06 ASSESSMENT — LIFESTYLE VARIABLES
HOW OFTEN DO YOU HAVE A DRINK CONTAINING ALCOHOL: NEVER
HOW OFTEN DO YOU HAVE A DRINK CONTAINING ALCOHOL: 1
HOW MANY STANDARD DRINKS CONTAINING ALCOHOL DO YOU HAVE ON A TYPICAL DAY: PATIENT DOES NOT DRINK
HOW MANY STANDARD DRINKS CONTAINING ALCOHOL DO YOU HAVE ON A TYPICAL DAY: 0
HOW OFTEN DO YOU HAVE SIX OR MORE DRINKS ON ONE OCCASION: 1

## 2023-02-06 NOTE — TELEPHONE ENCOUNTER
Let patient know that I spoke with Dr. Emy Pompa of radiology at [facility] to go over the CAT scan report. There does not appear to be a  change from last August film but he is going to pull [facility] films from previous for comparison also. We will get back in touch with her after he puts on his addendum. Also please send copy of this report to her endocrinologist regarding thyroid status.

## 2023-02-06 NOTE — TELEPHONE ENCOUNTER
I advised [facility] of the results and the process going on to re-read the films. I let her know that she would likely hear back from the doctor soon.

## 2023-02-06 NOTE — PROGRESS NOTES
23  Gema Mcqueen : 1940 Sex: female  Age: [de-identified] y.o. Assessment and Plan: Delmar Packer was seen today for establish care and medication refill. Diagnoses and all orders for this visit:    Chronic heart failure with preserved ejection fraction (HFpEF) (Formerly [facility])  -     furosemide (LASIX) 20 MG tablet; 1 po QOD    Type 2 diabetes mellitus with diabetic neuropathy, without long-term current use of insulin (Formerly [facility])  -     empagliflozin (JARDIANCE) 10 MG tablet; Take 1 tablet by mouth daily  -     glipiZIDE (GLUCOTROL XL) 10 MG extended release tablet; TAKE ONE TABLET BY MOUTH TWO TIMES A DAY  -     rosuvastatin (CRESTOR) 20 MG tablet; Take 1 tablet by mouth daily  -     Hemoglobin A1C; Future  -     Lipid Panel; Future  -     CBC with Auto Differential; Future  -     Hepatic Function Panel; Future  -     Basic Metabolic Panel; Future  -     Microalbumin, Ur; Future    Chronic systolic (congestive) heart failure (Formerly [facility])    PVD (peripheral vascular disease) (Formerly [facility])    Stage 3b chronic kidney disease (Formerly [facility])    Carotid stenosis, right    Essential hypertension    Mixed hyperlipidemia    Coronary artery disease involving native heart without angina pectoris, unspecified vessel or lesion type  -     rosuvastatin (CRESTOR) 20 MG tablet; Take 1 tablet by mouth daily  -     nitroGLYCERIN (NITROSTAT) 0.4 MG SL tablet; DISSOLVE ONE TABLET UNDER TONGUE AS NEEDED FOR CHEST PAINS. MAY REPEAT IN 5 MINUTES. IF SYMPTOMS PERSISTS CALL 911    Moderate to severe aortic stenosis    Goiter  Comments:  Dr. Laura White     Angina pectoris, unspecified    Other orders  -     meclizine (ANTIVERT) 12.5 MG tablet;  Take 1 tablet by mouth daily as needed for Dizziness    Generally well controlled  Cont current meds  Update routine labs  Further recommendations pending results    Of note, recent chest CT to f/u lung nodule - appears stable  Plan to reassess after August to document two years of stability       Return in about 3 months (around 5/6/2023). Chief Complaint   Patient presents with    Establish Care    Medication Refill     Test strips and lancets, send to [facility]. Pt tests BID        HPI  Pt here to establish care   She is a former patient of Dr. Murali Patrick   She was lifting some heavy stuff a few days ago   Now noting a pain right upper quadrant, under her ribs  Hurts when she gets out of bed or turns too quickly  Seems to be getting better  Did use some Biofreeze but otherwise nothing     DMII - fair control  Last A1c 8.3 in [facility]   A1c  Glipizide  Velia Adamson   Denies any episodes of hypoglycemia   STATIN    HTN - elevated today but she reports better at home  Amlodipine  Carvedilol    CAD s/p stenting 2021   Had been seeing cardiology but then still wasn't feeling right few months later and went to ER   Was transported to [facility] and had stenting done   Also with valvular issues and last seen in fall at the structural heart disease clinic for moderate AS   Most recent echo shows preserved EF though documented h/o CHF as well  STATIN, ASA, BB, PLAVIX    Carotid artery disease - will be seeing new vascular this summer    Problem list reviewed and updated in full with patient today as necessary. A comprehensive ROS was negative, except as documented above. Current Outpatient Medications:     empagliflozin (JARDIANCE) 10 MG tablet, Take 1 tablet by mouth daily, Disp: 90 tablet, Rfl: 1    furosemide (LASIX) 20 MG tablet, 1 po QOD, Disp: 45 tablet, Rfl: 1    glipiZIDE (GLUCOTROL XL) 10 MG extended release tablet, TAKE ONE TABLET BY MOUTH TWO TIMES A DAY, Disp: 180 tablet, Rfl: 1    rosuvastatin (CRESTOR) 20 MG tablet, Take 1 tablet by mouth daily, Disp: 90 tablet, Rfl: 1    meclizine (ANTIVERT) 12.5 MG tablet, Take 1 tablet by mouth daily as needed for Dizziness, Disp: 30 tablet, Rfl: 0    nitroGLYCERIN (NITROSTAT) 0.4 MG SL tablet, DISSOLVE ONE TABLET UNDER TONGUE AS NEEDED FOR CHEST PAINS.  MAY REPEAT IN 5 MINUTES. IF SYMPTOMS PERSISTS CALL 911, Disp: 25 tablet, Rfl: 1    clopidogrel (PLAVIX) 75 MG tablet, Take 1 tablet by mouth daily, Disp: 90 tablet, Rfl: 3    JANUVIA 50 MG tablet, Take 1 tablet by mouth daily, Disp: 90 tablet, Rfl: 1    magnesium cl-calcium carbonate (SLOW-MAG) 71.5-119 MG TBEC tablet, TAKE TWO TABLETS BY MOUTH DAILY, Disp: 60 tablet, Rfl: 0    pantoprazole (PROTONIX) 40 MG tablet, Take 1 tablet by mouth every morning (before breakfast), Disp: 90 tablet, Rfl: 1    Multiple Vitamins-Minerals (PRESERVISION AREDS 2) CAPS, Take 1 capsule by mouth in the morning and at bedtime, Disp: , Rfl:     aspirin EC 81 MG EC tablet, Take 1 tablet by mouth daily, Disp: 90 tablet, Rfl: 3    carvedilol (COREG) 6.25 MG tablet, Take 1 tablet by mouth 2 times daily, Disp: 180 tablet, Rfl: 3    amLODIPine (NORVASC) 5 MG tablet, Take 1 tablet by mouth daily, Disp: 90 tablet, Rfl: 3    Cinnamon 500 MG CAPS, Take 1 capsule by mouth daily, Disp: , Rfl:     vitamin D (CHOLECALCIFEROL) 1000 UNIT TABS tablet, Take 1,000 Units by mouth daily, Disp: , Rfl:     vitamin B-12 (CYANOCOBALAMIN) 1000 MCG tablet, Take 1,000 mcg by mouth every other day , Disp: , Rfl:     Lancets 33G MISC, 1 Lancet by Does not apply route in the morning and at bedtime, Disp: 200 each, Rfl: 5    blood glucose test strips (ASCENSIA AUTODISC VI;ONE TOUCH ULTRA TEST VI) strip, 1 each by In Vitro route 2 times daily Indications: For an Easy Max 5 machine, Disp: 200 each, Rfl: 5    Alcohol Swabs (ALCOHOL WIPES) 70 % PADS, 1 packet by Does not apply route in the morning and at bedtime, Disp: 100 each, Rfl: 3    blood glucose monitor kit and supplies, 1 kit by Other route in the morning and at bedtime Dispense sufficient amount for indicated testing frequency plus additional to accommodate PRN testing needs.  Dispense all needed supplies to include: monitor, strips, lancing device, lancets, control solutions, alcohol swabs., Disp: 1 kit, Rfl: 0    Handicap [facility], by Does not apply route Duration: 5 years, Disp: 1 each, Rfl: 0  Allergies   Allergen Reactions    Naproxen     Penicillins        Pt's past medical and surgical history were reviewed and updated as necessary today   Pt's family and social history were reviewed and updated as necessary today      Vitals:    02/06/23 1022 02/06/23 1029   BP: (!) 172/68 (!) 170/68   Pulse: 69    Resp: 18    Temp: 97.6 °F (36.4 °C)    TempSrc: Temporal    SpO2: 98%    Weight: 143 lb (64.9 kg)    Height: 5' 2\" (1.575 m)        Physical Exam  Constitutional:       Appearance: Normal appearance. HENT:      Head: Normocephalic and atraumatic. Eyes:      Conjunctiva/sclera: Conjunctivae normal.   Cardiovascular:      Rate and Rhythm: Normal rate and regular rhythm. Heart sounds: Normal heart sounds. Pulmonary:      Effort: Pulmonary effort is normal.      Breath sounds: Normal breath sounds. Abdominal:      Palpations: Abdomen is soft. Tenderness: There is no abdominal tenderness. Musculoskeletal:         General: Normal range of motion. Skin:     General: Skin is warm and dry. Neurological:      General: No focal deficit present. Mental Status: She is alert and oriented to person, place, and time. Psychiatric:         Mood and Affect: Mood normal.         Behavior: Behavior normal.     Counseled patient as appropriate and relevant regarding above diagnosis, including possible risks and complications, especially if left uncontrolled. Counseled patient as appropriate and relevant regarding any  possible side effects, risks, and alternatives to treatment; patient and/or guardian verbalizes understanding, and is in agreement with the plan as detailed above. Reviewed age and gender appropriate health screening exams and vaccinations.   Advised patient regarding importance of keeping up with recommended health maintenance and to schedule as soon as possible if overdue, as this is important in assessing for undiagnosed pathology, especially cancer, as well as protecting against potentially harmful/life threatening disease. If discussed, any educational materials and/or home exercises printed for patient's review and were included in patient instructions on his/her After Visit Summary and given to patient at the end of visit. Advised patient to call with any new medication issues, and and other concerns/complaints prior to scheduled follow up. All questions answered to the patient's satisfaction.         Seen By:  Lupe Dorsey MD

## 2023-02-14 ENCOUNTER — TELEPHONE (OUTPATIENT)
Dept: PRIMARY CARE CLINIC | Age: 83
End: 2023-02-14

## 2023-02-14 ENCOUNTER — TELEPHONE (OUTPATIENT)
Dept: FAMILY MEDICINE CLINIC | Age: 83
End: 2023-02-14

## 2023-02-14 NOTE — TELEPHONE ENCOUNTER
Current CAT scan was compared to August 2021 scan without significant change per radiologist Dr. Tess Cedeño. Would repeat 1 more scan in August timeframe to complete 2-year follow-up of the nodules. She should discuss this with her PCP.   Send copy to her PCP and also make sure that her endocrinologist has a copy regarding her thyroid status

## 2023-02-14 NOTE — TELEPHONE ENCOUNTER
----- Message from Taniya Dickerson sent at 2/14/2023 12:17 PM EST -----  Subject: Message to Provider    QUESTIONS  Information for Provider? please make sure her CT scan is going to [facility] patient has to have it re-done in August   ---------------------------------------------------------------------------  --------------  [id number] [employer]  [phone]; OK to leave message on voicemail  ---------------------------------------------------------------------------  --------------  SCRIPT ANSWERS  Relationship to Patient?  Self

## 2023-02-14 NOTE — LETTER
[address] [address]  Phone: [phone]  Fax: [phone]    Dusty Prince MD    February 14, 2023     Ousmane Jay      Dear Jonny Mail:      Current CAT scan was compared to August 2021 scan without significant change per radiologist Dr. Pedro Lomax. Would repeat 1 more scan in August timeframe to complete 2-year follow-up of the nodules. You should discuss this with Dr Iven Seip. A copy of the report and most recent labs were faxed to Dr Karyna Brian. If you have any questions or concerns, please don't hesitate to call.     Sincerely,    Dusty Prince MD

## 2023-02-14 NOTE — TELEPHONE ENCOUNTER
Pt notified, she asked if we had her lab results from the 6th. It does not look like they were resulted, notified we would call her back after you review them.

## 2023-02-14 NOTE — TELEPHONE ENCOUNTER
Reviewed results w/ Rosy Delarosa. She will be seeing Dr Darius Davis, faxing copy of CT and most recent labs. Now following w/ Trell Marquez MD in the [facility] office. Will forward note to her so she is aware that repeat testing will be needed in Aug 2023. Copy of report mailed to Rosy Delarosa.

## 2023-02-27 NOTE — TELEPHONE ENCOUNTER
Last Appointment:  11/2/2022  Future Appointments   Date Time Provider Dioni Menesesi   4/20/2023 12:30 PM [facility] ECHO  [place]   4/20/2023  1:30 PM Celsa Blum MD CARDIO SURG [facility]   5/8/2023 10:45 AM Shaina Bermudez MD [facility]   7/13/2023  1:30 PM Kip Henderson MD [facility]/MED [facility]

## 2023-02-28 ENCOUNTER — TELEPHONE (OUTPATIENT)
Dept: CARDIOLOGY CLINIC | Age: 83
End: 2023-02-28

## 2023-02-28 RX ORDER — PANTOPRAZOLE SODIUM 40 MG/1
TABLET, DELAYED RELEASE ORAL
Qty: 90 TABLET | Refills: 0 | Status: SHIPPED | OUTPATIENT
Start: 2023-02-28

## 2023-02-28 NOTE — TELEPHONE ENCOUNTER
PT is having a  ultra sound guided left thyroid biopsy 3-7-23    At [address]     [phone]  [phone]([place])    Can she be cleared  to proceed  from a cardiac standpoint?  also requesting anticoagulant instructions      Please advise

## 2023-03-06 ENCOUNTER — OFFICE VISIT (OUTPATIENT)
Dept: CARDIOLOGY CLINIC | Age: 83
End: 2023-03-06
Payer: MEDICARE

## 2023-03-06 ENCOUNTER — HOSPITAL ENCOUNTER (OUTPATIENT)
Dept: CARDIOLOGY | Age: 83
Discharge: HOME | End: 2023-03-06
Payer: MEDICARE

## 2023-03-06 VITALS
SYSTOLIC BLOOD PRESSURE: 104 MMHG | DIASTOLIC BLOOD PRESSURE: 58 MMHG | HEIGHT: 66 IN | RESPIRATION RATE: 18 BRPM | WEIGHT: 144 LBS | HEART RATE: 67 BPM | BODY MASS INDEX: 23.14 KG/M2

## 2023-03-06 DIAGNOSIS — I34.0 NONRHEUMATIC MITRAL VALVE REGURGITATION: ICD-10-CM

## 2023-03-06 DIAGNOSIS — Z01.810 PREOP CARDIOVASCULAR EXAM: Primary | ICD-10-CM

## 2023-03-06 DIAGNOSIS — I25.10 CORONARY ARTERY DISEASE INVOLVING NATIVE HEART WITHOUT ANGINA PECTORIS, UNSPECIFIED VESSEL OR LESION TYPE: ICD-10-CM

## 2023-03-06 DIAGNOSIS — I50.32 CHRONIC HEART FAILURE WITH PRESERVED EJECTION FRACTION (HFPEF) (HCC): ICD-10-CM

## 2023-03-06 DIAGNOSIS — I35.0 MODERATE TO SEVERE AORTIC STENOSIS: ICD-10-CM

## 2023-03-06 DIAGNOSIS — I70.209 FEMORAL-POPLITEAL ATHEROSCLEROSIS (HCC): ICD-10-CM

## 2023-03-06 DIAGNOSIS — I36.1 NONRHEUMATIC TRICUSPID VALVE REGURGITATION: ICD-10-CM

## 2023-03-06 LAB
LV EF: 60 %
LVEF MODALITY: NORMAL

## 2023-03-06 PROCEDURE — 93308 TTE F-UP OR LMTD: CPT

## 2023-03-06 PROCEDURE — G8420 CALC BMI NORM PARAMETERS: HCPCS | Performed by: INTERNAL MEDICINE

## 2023-03-06 PROCEDURE — G8484 FLU IMMUNIZE NO ADMIN: HCPCS | Performed by: INTERNAL MEDICINE

## 2023-03-06 PROCEDURE — 1036F TOBACCO NON-USER: CPT | Performed by: INTERNAL MEDICINE

## 2023-03-06 PROCEDURE — 93000 ELECTROCARDIOGRAM COMPLETE: CPT | Performed by: INTERNAL MEDICINE

## 2023-03-06 PROCEDURE — 99214 OFFICE O/P EST MOD 30 MIN: CPT | Performed by: INTERNAL MEDICINE

## 2023-03-06 PROCEDURE — G8427 DOCREV CUR MEDS BY ELIG CLIN: HCPCS | Performed by: INTERNAL MEDICINE

## 2023-03-06 PROCEDURE — 1123F ACP DISCUSS/DSCN MKR DOCD: CPT | Performed by: INTERNAL MEDICINE

## 2023-03-06 PROCEDURE — 3078F DIAST BP <80 MM HG: CPT | Performed by: INTERNAL MEDICINE

## 2023-03-06 PROCEDURE — 3074F SYST BP LT 130 MM HG: CPT | Performed by: INTERNAL MEDICINE

## 2023-03-06 PROCEDURE — 1090F PRES/ABSN URINE INCON ASSESS: CPT | Performed by: INTERNAL MEDICINE

## 2023-03-06 PROCEDURE — G8400 PT W/DXA NO RESULTS DOC: HCPCS | Performed by: INTERNAL MEDICINE

## 2023-03-06 NOTE — PROGRESS NOTES
[facility]   [facility] [place]   Clinic Note     Date:3/7/23   Patient Name:Alise Covington  YOB: 1940  Age: [de-identified] y.o. Primary Care Provider: Mckayla Abbasi MD    Subjective     Very pleasant 79-year-old  female who returns for follow-up accompanied by her daughter. She is doing very well and feels great. She exercises on her stationary bike for half an hour twice a day at 50 mph without any chest discomfort or dyspnea. She has no lower extremity edema orthopnea or PND. She has no palpitations or syncope or presyncope. She is scheduled to undergo thyroid biopsy and is here for preoperative evaluation for the same. He has not had any visits to the hospital or emergency room since her last visit with me. A focused history review includes: Moderate aortic stenosis by TTE May 2021 and AVCS 8/2021  TTE today with progressive but yet still moderate aortic stenosis (V-max 3.6 up from 3.1)  Severe left main obstructive CAD  Status post Impella-supported, IVUS guided, rotational arthrectomy and PCI of the left main into LAD and circumflex using culotte technique via left femoral access (single access technique) on 6/15/2021 ([facility])  Access site cellulitis without abscess treated conservatively  CKD 3B with baseline creatinine 1.5-1.6  Hypertension  Asymptomatic carotid stenosis  Substernal thyroid (basis for CABG turndown)  HFpEF      Limited echocardiogram on 3/6/2023: EF 60%. Normal RV systolic function. Moderate aortic stenosis. Mean gradient 25 mmHg. Stroke-volume index is 51 mm/m². Mild aortic regurgitation. Aortic valve appears trileaflet and is calcified. No significant changes compared to prior echocardiogram from September 22. Echocardiogram from 9/29/2022:   Limited echocardiogram ordered (reassess severity of aortic stenosis). Normal left ventricular systolic function. Ejection fraction is visually estimated at 60%.    Normal right ventricular size and function. Mild mitral regurgitation. Normal-flow low-gradient moderate-severe aortic stenosis (AV peak velocity   3.05 m/s, AV mean gradient 26 mmHg, AV area 0.8 cm2, peak velocity   dimensionless index 0.29, VTI dimensionless index 0.26, stroke volume   index 44 mL/m2). Moderate aortic regurgitation. Mild tricuspid regurgitation. RV-RA gradient is estimated at 33 mmHg.       Past History    Past Medical History:         Diagnosis Date    Abscess of groin, left 6/22/2021    Aortic valve disorder     CAD (coronary artery disease)     Cancer (HCC)     glaucoma    Carotid stenosis, right 6/22/2021    Chest pain     Diabetes ([facility] [address]     Diverticulosis     Femoral-popliteal atherosclerosis ([facility] [address] 7/13/2021    GERD (gastroesophageal reflux disease)     Goiter     Hyperlipidemia     Hypertension     Hypothyroidism     Macular degeneration     Mitral valve disorder     Neuropathy     Osteoporosis     VHD (valvular heart disease)     Vitamin B12 deficiency        Past Surgical History:  Past Surgical History:   Procedure Laterality Date    APPENDECTOMY      BREAST BIOPSY Right     neg    CARDIAC CATHETERIZATION  06/11/2021    Dr Bev Reyes  06/15/2021    Dr Laine Gamboa - [facility] 3.5 x 18 LM to LAD and 4.0 x18 Prox CX    EYE SURGERY Bilateral     cataract    HYSTERECTOMY (CERVIX STATUS UNKNOWN)      KNEE ARTHROPLASTY Left     TOOTH EXTRACTION      TRANSESOPHAGEAL ECHOCARDIOGRAM  06/10/2021    Dr Flash Lewis History:    Social History       Tobacco History       Smoking Status  Never Smoker      Smokeless Tobacco Use  Never Used              Alcohol History       Alcohol Use Status  Not Currently              Drug Use       Drug Use Status  Never              Sexual Activity       Sexually Active  Not Asked                        Family History:-      Problem Relation Age of Onset    Stroke Mother     Heart Disease Father 79 MI    Heart Attack Father     Heart Disease Brother     Coronary Art Dis Brother     Stroke Brother     Stroke Brother          Review of Systems   Negative except as in HPI        Medications     Current Outpatient Medications   Medication Sig Dispense Refill    empagliflozin (JARDIANCE) 10 MG tablet Take 1 tablet by mouth daily 90 tablet 1    furosemide (LASIX) 20 MG tablet 1 po QOD 45 tablet 1    glipiZIDE (GLUCOTROL XL) 10 MG extended release tablet TAKE ONE TABLET BY MOUTH TWO TIMES A  tablet 1    rosuvastatin (CRESTOR) 20 MG tablet Take 1 tablet by mouth daily 90 tablet 1    meclizine (ANTIVERT) 12.5 MG tablet Take 1 tablet by mouth daily as needed for Dizziness 30 tablet 0    clopidogrel (PLAVIX) 75 MG tablet Take 1 tablet by mouth daily 90 tablet 3    JANUVIA 50 MG tablet Take 1 tablet by mouth daily 90 tablet 1    magnesium cl-calcium carbonate (SLOW-MAG) 71.5-119 MG TBEC tablet TAKE TWO TABLETS BY MOUTH DAILY 60 tablet 0    Multiple Vitamins-Minerals (PRESERVISION AREDS 2) CAPS Take 1 capsule by mouth in the morning and at bedtime      aspirin EC 81 MG EC tablet Take 1 tablet by mouth daily 90 tablet 3    carvedilol (COREG) 6.25 MG tablet Take 1 tablet by mouth 2 times daily 180 tablet 3    amLODIPine (NORVASC) 5 MG tablet Take 1 tablet by mouth daily 90 tablet 3    Cinnamon 500 MG CAPS Take 1 capsule by mouth daily      vitamin D (CHOLECALCIFEROL) 1000 UNIT TABS tablet Take 1,000 Units by mouth daily      vitamin B-12 (CYANOCOBALAMIN) 1000 MCG tablet Take 1,000 mcg by mouth every other day       pantoprazole (PROTONIX) 40 MG tablet TAKE ONE TABLET BY MOUTH EVERY MORNING before breakfast (Patient not taking: Reported on 3/6/2023) 90 tablet 0    nitroGLYCERIN (NITROSTAT) 0.4 MG SL tablet DISSOLVE ONE TABLET UNDER TONGUE AS NEEDED FOR CHEST PAINS. MAY REPEAT IN 5 MINUTES.  IF SYMPTOMS PERSISTS CALL 911 25 tablet 1    Lancets 33G MISC 1 Lancet by Does not apply route in the morning and at bedtime 200 each 5    blood glucose test strips (ASCENSIA AUTODISC VI;ONE TOUCH ULTRA TEST VI) strip 1 each by In Vitro route 2 times daily Indications: For an Easy Max 5 machine 200 each 5    Alcohol Swabs (ALCOHOL WIPES) 70 % PADS 1 packet by Does not apply route in the morning and at bedtime 100 each 3    blood glucose monitor kit and supplies 1 kit by Other route in the morning and at bedtime Dispense sufficient amount for indicated testing frequency plus additional to accommodate PRN testing needs. Dispense all needed supplies to include: monitor, strips, lancing device, lancets, control solutions, alcohol swabs. 1 kit 0    Handicap Placard MISC by Does not apply route Duration: 5 years 1 each 0     No current facility-administered medications for this visit. Physical Examination      BP (!) 104/58   Pulse 67   Resp 18   Ht 5' 6\" (1.676 m)   Wt 144 lb (65.3 kg)   BMI 23.24 kg/m²   Body surface area is 1.74 meters squared. General: No acute distress, appears as stated age, nonicteric  Head: Atraumatic, no gross abnormalities or bruises  Neck: Supple and nontender, no carotid bruits, borderline JVP  Lungs: Clear to auscultation bilaterally, no wheezes, rales, or rhonchi  Heart: Regular rate and rhythm.  2/6 LUIS URSB, mid peaking with preserved S2  Abdomen: Soft, nontender, nondistended, normal bowel sounds  Extremities: No obvious deformities, no cyanosis, no edema  Neurological: Alert and oriented x3, EOMI, moving all extremities x4  Psychological: Normal mood and affect, cooperative  Skin: Color, texture, and turgor normal for age         Labs/Imaging/Diagnostics   Personally reviewed:    Lab Results   Component Value Date/Time     02/06/2023 11:26 AM    K 4.8 02/06/2023 11:26 AM    K 4.3 06/22/2021 07:51 AM     02/06/2023 11:26 AM    CO2 19 02/06/2023 11:26 AM    BUN 25 02/06/2023 11:26 AM    CREATININE 1.4 02/06/2023 11:26 AM    GLUCOSE 143 02/06/2023 11:26 AM    CALCIUM 9.6 02/06/2023 11:26 AM        Estimated Creatinine Clearance: 30 mL/min (A) (based on SCr of 1.4 mg/dL (H)). Lab Results   Component Value Date    WBC 6.2 02/06/2023    HGB 13.4 02/06/2023    HCT 42.7 02/06/2023    MCV 99.8 02/06/2023     02/06/2023       Lab Results   Component Value Date    ALT 15 02/06/2023    AST 23 02/06/2023    ALKPHOS 90 02/06/2023    BILITOT 0.9 02/06/2023       Lab Results   Component Value Date    LABALBU 4.0 02/06/2023       Lab Results   Component Value Date    CHOL 145 02/06/2023    CHOL 126 08/16/2022    CHOL 109 09/28/2021     Lab Results   Component Value Date    TRIG 129 02/06/2023    TRIG 160 (H) 08/16/2022    TRIG 145 09/28/2021     Lab Results   Component Value Date    HDL 61 02/06/2023    HDL 46 08/16/2022    HDL 37 09/28/2021     Lab Results   Component Value Date    LDLCHOLESTEROL 129 (H) 01/06/2021    LDLCALC 58 02/06/2023    LDLCALC 48 08/16/2022    LDLCALC 43 09/28/2021     Lab Results   Component Value Date    LABVLDL 26 02/06/2023    LABVLDL 32 08/16/2022    LABVLDL 29 09/28/2021     No results found for: [facility]    Lab Results   Component Value Date    TROPONINI < 0.03 06/08/2021       Lab Results   Component Value Date     (H) 08/17/2021         Lab Results   Component Value Date    LABA1C 8.2 (H) 02/06/2023     Lab Results   Component Value Date     07/31/2021      EKG today: NSR, WNL     Assessment and Plan:        82-year-old  female with a history above. She is doing very well. She is euvolemic and has no angina with very good functional capacity. She has no symptoms of arrhythmia. Her aortic stenosis has definitely progressed but remains moderate. Diagnosis Orders   1. Preop cardiovascular exam        2. Chronic heart failure with preserved ejection fraction (HFpEF) ([facility])  EKG 12 Lead      3. Moderate to severe aortic stenosis        4. Nonrheumatic mitral valve regurgitation        5. Nonrheumatic tricuspid valve regurgitation        6. Coronary artery disease involving native heart without angina pectoris, unspecified vessel or lesion type        7. Femoral-popliteal atherosclerosis (HCC)             Continue aspirin and clopidogrel long-term  Echocardiogram findings as above. Aortic stenosis remains moderate. Preserved biventricular systolic function. No further testing indicated as will not . She would be a low to intermediate risk candidate for low risk procedure. Okay to hold aspirin and Plavix for 5 to 7 days preprocedure. Resume as soon as able postoperatively. Continue high intensity rosuvastatin perioperatively. LDL from 2/6/2023 is at goal at 68. Continue furosemide 20 mg p.o. every other day  Continue amlodipine and carvedilol as is. She is also on Jardiance 10 mg daily. Assessment and plan reviewed with the patient/family and their questions and concerns answered in full. She will follow-up with me in a year. We appreciate the opportunity to participate in Her care! Macario Leija MD, [facility]     NOTE: This report was transcribed using voice recognition software. Every effort was made to ensure accuracy; however, inadvertent computerized transcription errors may be present.

## 2023-03-06 NOTE — PATIENT INSTRUCTIONS
Continue all your medications at current doses. I will give you a letter for surgery. Restrict sodium intake to less than 2-2.5 g/day. Restrict fluid intake to less than 2.2 L/day. Goal BP is less than 130/80. Please try to exercise for 150 minutes a week. I will see you back in the office in  12  months. Please call the office at ([phone], option 2) if you have any questions.

## 2023-03-07 PROBLEM — Z01.810 PREOP CARDIOVASCULAR EXAM: Status: ACTIVE | Noted: 2023-03-07

## 2023-03-10 RX ORDER — CARVEDILOL 6.25 MG/1
6.25 TABLET ORAL 2 TIMES DAILY
Qty: 180 TABLET | Refills: 3 | Status: SHIPPED | OUTPATIENT
Start: 2023-03-10

## 2023-03-10 NOTE — TELEPHONE ENCOUNTER
Last Appointment:  2/6/2023  Future Appointments   Date Time Provider Dioni Darling   5/8/2023 10:45 AM MD Gianluca Young [facility]   7/13/2023  1:30 PM Shelly Gross MD [facility]      Patient needs pended med refilled.     Electronically signed by Bran Guerra LPN on 8/23/8555 at 20:34 AM

## 2023-03-23 ENCOUNTER — TELEPHONE (OUTPATIENT)
Dept: PRIMARY CARE CLINIC | Age: 83
End: 2023-03-23

## 2023-03-23 NOTE — TELEPHONE ENCOUNTER
----- Message from Joseph sent at 3/23/2023 10:31 AM EDT -----  Subject: Appointment Request    Reason for Call: Established Patient Appointment needed: Routine Existing   Condition Follow Up    QUESTIONS    Reason for appointment request? No appointments available during search     Additional Information for Provider? PT states her side isn't improved and   no appts available.  Please call f/u.  ---------------------------------------------------------------------------  --------------  Juan Baker INFO  [phone]; OK to leave message on voicemail  ---------------------------------------------------------------------------  --------------  SCRIPT ANSWERS  COVID Screen: Jonathan Funes

## 2023-03-23 NOTE — TELEPHONE ENCOUNTER
----- Message from Joseph sent at 3/23/2023 10:31 AM EDT -----  Subject: Appointment Request    Reason for Call: Established Patient Appointment needed: Routine Existing   Condition Follow Up    QUESTIONS    Reason for appointment request? No appointments available during search     Additional Information for Provider? PT states her side isn't improved and   no appts available.  Please call f/u.  ---------------------------------------------------------------------------  --------------  Jase Celis INFO  [phone]; OK to leave message on voicemail  ---------------------------------------------------------------------------  --------------  SCRIPT ANSWERS  COVID Screen: Kena Osorio

## 2023-03-23 NOTE — TELEPHONE ENCOUNTER
I don't know what to suggest  Can do walk in  Or if anyone sees a cancellation please stick her in right away

## 2023-03-24 NOTE — TELEPHONE ENCOUNTER
Had a cancellation at 10 am Monday. She is scheduled then. Pt stated she would rather come in on Monday then come through walk in.

## 2023-03-27 ENCOUNTER — OFFICE VISIT (OUTPATIENT)
Dept: PRIMARY CARE CLINIC | Age: 83
End: 2023-03-27
Payer: MEDICARE

## 2023-03-27 VITALS
HEIGHT: 66 IN | TEMPERATURE: 97.6 F | DIASTOLIC BLOOD PRESSURE: 62 MMHG | SYSTOLIC BLOOD PRESSURE: 138 MMHG | WEIGHT: 144 LBS | RESPIRATION RATE: 18 BRPM | OXYGEN SATURATION: 98 % | HEART RATE: 72 BPM | BODY MASS INDEX: 23.14 KG/M2

## 2023-03-27 DIAGNOSIS — E04.2 MULTINODULAR GOITER: ICD-10-CM

## 2023-03-27 DIAGNOSIS — I35.0 MODERATE TO SEVERE AORTIC STENOSIS: ICD-10-CM

## 2023-03-27 DIAGNOSIS — R19.03 ABDOMINAL SWELLING, RIGHT LOWER QUADRANT: Primary | ICD-10-CM

## 2023-03-27 DIAGNOSIS — R19.03 ABDOMINAL MASS, RLQ (RIGHT LOWER QUADRANT): ICD-10-CM

## 2023-03-27 PROCEDURE — G8420 CALC BMI NORM PARAMETERS: HCPCS | Performed by: FAMILY MEDICINE

## 2023-03-27 PROCEDURE — 1036F TOBACCO NON-USER: CPT | Performed by: FAMILY MEDICINE

## 2023-03-27 PROCEDURE — 3075F SYST BP GE 130 - 139MM HG: CPT | Performed by: FAMILY MEDICINE

## 2023-03-27 PROCEDURE — 99214 OFFICE O/P EST MOD 30 MIN: CPT | Performed by: FAMILY MEDICINE

## 2023-03-27 PROCEDURE — G8427 DOCREV CUR MEDS BY ELIG CLIN: HCPCS | Performed by: FAMILY MEDICINE

## 2023-03-27 PROCEDURE — G8484 FLU IMMUNIZE NO ADMIN: HCPCS | Performed by: FAMILY MEDICINE

## 2023-03-27 PROCEDURE — 1090F PRES/ABSN URINE INCON ASSESS: CPT | Performed by: FAMILY MEDICINE

## 2023-03-27 PROCEDURE — G8400 PT W/DXA NO RESULTS DOC: HCPCS | Performed by: FAMILY MEDICINE

## 2023-03-27 PROCEDURE — 3078F DIAST BP <80 MM HG: CPT | Performed by: FAMILY MEDICINE

## 2023-03-27 PROCEDURE — 1123F ACP DISCUSS/DSCN MKR DOCD: CPT | Performed by: FAMILY MEDICINE

## 2023-03-27 RX ORDER — ASPIRIN 81 MG/1
81 TABLET ORAL DAILY
Qty: 90 TABLET | Refills: 3 | Status: SHIPPED | OUTPATIENT
Start: 2023-03-27

## 2023-03-27 NOTE — PROGRESS NOTES
the plan as detailed above. Reviewed age and gender appropriate health screening exams and vaccinations. Advised patient regarding importance of keeping up with recommended health maintenance and to schedule as soon as possible if overdue, as this is important in assessing for undiagnosed pathology, especially cancer, as well as protecting against potentially harmful/life threatening disease. If discussed, any educational materials and/or home exercises printed for patient's review and were included in patient instructions on his/her After Visit Summary and given to patient at the end of visit. Advised patient to call with any new medication issues, and and other concerns/complaints prior to scheduled follow up. All questions answered to the patient's satisfaction.         Seen By:  Cherelle Ayala MD

## 2023-03-29 DIAGNOSIS — E11.40 TYPE 2 DIABETES MELLITUS WITH DIABETIC NEUROPATHY, WITHOUT LONG-TERM CURRENT USE OF INSULIN (HCC): ICD-10-CM

## 2023-03-29 RX ORDER — LANCETS 33 GAUGE
1 EACH MISCELLANEOUS 2 TIMES DAILY
Qty: 200 EACH | Refills: 5 | Status: SHIPPED | OUTPATIENT
Start: 2023-03-29 | End: 2023-09-25

## 2023-04-03 RX ORDER — SITAGLIPTIN 50 MG/1
TABLET, FILM COATED ORAL
Qty: 90 TABLET | Refills: 3 | Status: SHIPPED | OUTPATIENT
Start: 2023-04-03

## 2023-04-05 RX ORDER — AMLODIPINE BESYLATE 5 MG/1
5 TABLET ORAL DAILY
Qty: 90 TABLET | Refills: 3 | Status: SHIPPED | OUTPATIENT
Start: 2023-04-05

## 2023-04-06 PROBLEM — Z01.810 PREOP CARDIOVASCULAR EXAM: Status: RESOLVED | Noted: 2023-03-07 | Resolved: 2023-04-06

## 2023-05-08 ENCOUNTER — OFFICE VISIT (OUTPATIENT)
Dept: PRIMARY CARE CLINIC | Age: 83
End: 2023-05-08
Payer: MEDICARE

## 2023-05-08 VITALS
SYSTOLIC BLOOD PRESSURE: 148 MMHG | WEIGHT: 146 LBS | BODY MASS INDEX: 23.46 KG/M2 | HEART RATE: 64 BPM | TEMPERATURE: 97.6 F | OXYGEN SATURATION: 98 % | DIASTOLIC BLOOD PRESSURE: 70 MMHG | HEIGHT: 66 IN | RESPIRATION RATE: 18 BRPM

## 2023-05-08 DIAGNOSIS — E11.40 TYPE 2 DIABETES MELLITUS WITH DIABETIC NEUROPATHY, WITHOUT LONG-TERM CURRENT USE OF INSULIN (HCC): ICD-10-CM

## 2023-05-08 DIAGNOSIS — R19.03 ABDOMINAL SWELLING, RIGHT LOWER QUADRANT: ICD-10-CM

## 2023-05-08 DIAGNOSIS — R19.03 ABDOMINAL MASS, RLQ (RIGHT LOWER QUADRANT): ICD-10-CM

## 2023-05-08 DIAGNOSIS — I10 ESSENTIAL HYPERTENSION: ICD-10-CM

## 2023-05-08 DIAGNOSIS — R19.03 ABDOMINAL SWELLING, RIGHT LOWER QUADRANT: Primary | ICD-10-CM

## 2023-05-08 LAB
ANION GAP SERPL CALCULATED.3IONS-SCNC: 11 MMOL/L (ref 7–16)
BUN SERPL-MCNC: 27 MG/DL (ref 6–23)
CALCIUM SERPL-MCNC: 9.6 MG/DL (ref 8.6–10.2)
CHLORIDE SERPL-SCNC: 103 MMOL/L (ref 98–107)
CO2 SERPL-SCNC: 22 MMOL/L (ref 22–29)
CREAT SERPL-MCNC: 1.2 MG/DL (ref 0.5–1)
GLUCOSE SERPL-MCNC: 148 MG/DL (ref 74–99)
HBA1C MFR BLD: 8.4 % (ref 4–5.6)
POTASSIUM SERPL-SCNC: 4.5 MMOL/L (ref 3.5–5)
SODIUM SERPL-SCNC: 136 MMOL/L (ref 132–146)

## 2023-05-08 PROCEDURE — 3052F HG A1C>EQUAL 8.0%<EQUAL 9.0%: CPT | Performed by: FAMILY MEDICINE

## 2023-05-08 PROCEDURE — 1123F ACP DISCUSS/DSCN MKR DOCD: CPT | Performed by: FAMILY MEDICINE

## 2023-05-08 PROCEDURE — 3078F DIAST BP <80 MM HG: CPT | Performed by: FAMILY MEDICINE

## 2023-05-08 PROCEDURE — G8420 CALC BMI NORM PARAMETERS: HCPCS | Performed by: FAMILY MEDICINE

## 2023-05-08 PROCEDURE — 1090F PRES/ABSN URINE INCON ASSESS: CPT | Performed by: FAMILY MEDICINE

## 2023-05-08 PROCEDURE — 99214 OFFICE O/P EST MOD 30 MIN: CPT | Performed by: FAMILY MEDICINE

## 2023-05-08 PROCEDURE — G8427 DOCREV CUR MEDS BY ELIG CLIN: HCPCS | Performed by: FAMILY MEDICINE

## 2023-05-08 PROCEDURE — 3074F SYST BP LT 130 MM HG: CPT | Performed by: FAMILY MEDICINE

## 2023-05-08 PROCEDURE — 1036F TOBACCO NON-USER: CPT | Performed by: FAMILY MEDICINE

## 2023-05-08 PROCEDURE — G8400 PT W/DXA NO RESULTS DOC: HCPCS | Performed by: FAMILY MEDICINE

## 2023-05-08 RX ORDER — CARVEDILOL 6.25 MG/1
6.25 TABLET ORAL 2 TIMES DAILY
Qty: 180 TABLET | Refills: 3
Start: 2023-05-08

## 2023-05-08 RX ORDER — SITAGLIPTIN 50 MG/1
50 TABLET, FILM COATED ORAL DAILY
Qty: 90 TABLET | Refills: 3
Start: 2023-05-08

## 2023-05-08 RX ORDER — AMLODIPINE BESYLATE 5 MG/1
5 TABLET ORAL DAILY
Qty: 90 TABLET | Refills: 3 | Status: SHIPPED | OUTPATIENT
Start: 2023-05-08

## 2023-05-08 SDOH — ECONOMIC STABILITY: FOOD INSECURITY: WITHIN THE PAST 12 MONTHS, THE FOOD YOU BOUGHT JUST DIDN'T LAST AND YOU DIDN'T HAVE MONEY TO GET MORE.: NEVER TRUE

## 2023-05-08 SDOH — ECONOMIC STABILITY: FOOD INSECURITY: WITHIN THE PAST 12 MONTHS, YOU WORRIED THAT YOUR FOOD WOULD RUN OUT BEFORE YOU GOT MONEY TO BUY MORE.: NEVER TRUE

## 2023-05-08 SDOH — ECONOMIC STABILITY: INCOME INSECURITY: HOW HARD IS IT FOR YOU TO PAY FOR THE VERY BASICS LIKE FOOD, HOUSING, MEDICAL CARE, AND HEATING?: NOT VERY HARD

## 2023-05-08 NOTE — PROGRESS NOTES
23  Lindo Lat Long : 1940 Sex: female  Age: [de-identified] y.o. Assessment and Plan: Miguel Lacy was seen today for swelling. Diagnoses and all orders for this visit:    Abdominal swelling, right lower quadrant  -     CT ABDOMEN PELVIS WO CONTRAST Additional Contrast? Oral and Rectal; Future  -     Basic Metabolic Panel; Future  F/u with CT scan as recommended     Abdominal mass, RLQ (right lower quadrant)  -     CT ABDOMEN PELVIS WO CONTRAST Additional Contrast? Oral and Rectal; Future  -     Basic Metabolic Panel; Future  As above     Type 2 diabetes mellitus with diabetic neuropathy, without long-term current use of insulin (HCC)  -     Hemoglobin A1C; Future  -     JANUVIA 50 MG tablet; Take 1 tablet by mouth daily  Check a1c   Further recommendations pending results    Essential hypertension  -     amLODIPine (NORVASC) 5 MG tablet; Take 1 tablet by mouth daily  -     carvedilol (COREG) 6.25 MG tablet; Take 1 tablet by mouth 2 times daily  Stable. Cont current treatment as documented below. No changes today  If cont to trend high can adjust meds       Return in about 6 weeks (around 2023). Chief Complaint   Patient presents with    Swelling     Abdomen        HPI  Pt here for acute concerns       US abdomen reviewed with pt in full  Still noting some tenderness and discomfort at the area  She would like to proceed with CT scan       DMII  Fastings are low 100s  Evening readings tend to be higher 100s  No readings she got over 200  Glipizide  Jardiance  Januvia  STATIN       HTN -   Home reading well controlled  BP in our office today a bit high but acceptable for her age   Carvedilol  Amlodipine       Problem list reviewed and updated in full with patient today as necessary. A comprehensive ROS was negative, except as documented above.        Current Outpatient Medications:     JANUVIA 50 MG tablet, Take 1 tablet by mouth daily, Disp: 90 tablet, Rfl: 3    amLODIPine (NORVASC) 5 MG tablet, Take

## 2023-06-05 DIAGNOSIS — R19.03 ABDOMINAL MASS, RLQ (RIGHT LOWER QUADRANT): ICD-10-CM

## 2023-06-05 DIAGNOSIS — R19.03 ABDOMINAL SWELLING, RIGHT LOWER QUADRANT: ICD-10-CM

## 2023-06-09 DIAGNOSIS — E11.40 TYPE 2 DIABETES MELLITUS WITH DIABETIC NEUROPATHY, WITHOUT LONG-TERM CURRENT USE OF INSULIN (HCC): ICD-10-CM

## 2023-06-09 NOTE — TELEPHONE ENCOUNTER
Pt needs refill for the Jardiance 25mg  Please send to [facility]   [phone]

## 2023-06-20 ENCOUNTER — OFFICE VISIT (OUTPATIENT)
Dept: PRIMARY CARE CLINIC | Age: 83
End: 2023-06-20
Payer: MEDICARE

## 2023-06-20 VITALS
DIASTOLIC BLOOD PRESSURE: 60 MMHG | OXYGEN SATURATION: 99 % | RESPIRATION RATE: 16 BRPM | TEMPERATURE: 97.4 F | HEART RATE: 70 BPM | SYSTOLIC BLOOD PRESSURE: 132 MMHG | WEIGHT: 146 LBS | HEIGHT: 66 IN | BODY MASS INDEX: 23.46 KG/M2

## 2023-06-20 VITALS
HEART RATE: 70 BPM | DIASTOLIC BLOOD PRESSURE: 58 MMHG | BODY MASS INDEX: 23.46 KG/M2 | TEMPERATURE: 97.4 F | RESPIRATION RATE: 16 BRPM | HEIGHT: 66 IN | WEIGHT: 146 LBS | SYSTOLIC BLOOD PRESSURE: 144 MMHG | OXYGEN SATURATION: 99 %

## 2023-06-20 DIAGNOSIS — E11.40 TYPE 2 DIABETES MELLITUS WITH DIABETIC NEUROPATHY, WITHOUT LONG-TERM CURRENT USE OF INSULIN (HCC): ICD-10-CM

## 2023-06-20 DIAGNOSIS — Z00.00 MEDICARE ANNUAL WELLNESS VISIT, SUBSEQUENT: Primary | ICD-10-CM

## 2023-06-20 DIAGNOSIS — R19.03 ABDOMINAL MASS, RLQ (RIGHT LOWER QUADRANT): ICD-10-CM

## 2023-06-20 DIAGNOSIS — I25.10 CORONARY ARTERY DISEASE INVOLVING NATIVE HEART WITHOUT ANGINA PECTORIS, UNSPECIFIED VESSEL OR LESION TYPE: ICD-10-CM

## 2023-06-20 DIAGNOSIS — R19.03 ABDOMINAL SWELLING, RIGHT LOWER QUADRANT: Primary | ICD-10-CM

## 2023-06-20 DIAGNOSIS — I10 ESSENTIAL HYPERTENSION: ICD-10-CM

## 2023-06-20 PROCEDURE — 3078F DIAST BP <80 MM HG: CPT | Performed by: FAMILY MEDICINE

## 2023-06-20 PROCEDURE — 3075F SYST BP GE 130 - 139MM HG: CPT | Performed by: FAMILY MEDICINE

## 2023-06-20 PROCEDURE — G8427 DOCREV CUR MEDS BY ELIG CLIN: HCPCS | Performed by: FAMILY MEDICINE

## 2023-06-20 PROCEDURE — 1090F PRES/ABSN URINE INCON ASSESS: CPT | Performed by: FAMILY MEDICINE

## 2023-06-20 PROCEDURE — 1036F TOBACCO NON-USER: CPT | Performed by: FAMILY MEDICINE

## 2023-06-20 PROCEDURE — 1123F ACP DISCUSS/DSCN MKR DOCD: CPT | Performed by: FAMILY MEDICINE

## 2023-06-20 PROCEDURE — G8400 PT W/DXA NO RESULTS DOC: HCPCS | Performed by: FAMILY MEDICINE

## 2023-06-20 PROCEDURE — G0439 PPPS, SUBSEQ VISIT: HCPCS | Performed by: FAMILY MEDICINE

## 2023-06-20 PROCEDURE — 3052F HG A1C>EQUAL 8.0%<EQUAL 9.0%: CPT | Performed by: FAMILY MEDICINE

## 2023-06-20 PROCEDURE — G8420 CALC BMI NORM PARAMETERS: HCPCS | Performed by: FAMILY MEDICINE

## 2023-06-20 PROCEDURE — 99214 OFFICE O/P EST MOD 30 MIN: CPT | Performed by: FAMILY MEDICINE

## 2023-06-20 RX ORDER — ROSUVASTATIN CALCIUM 20 MG/1
20 TABLET, COATED ORAL DAILY
Qty: 90 TABLET | Refills: 1 | Status: SHIPPED | OUTPATIENT
Start: 2023-06-20

## 2023-06-20 ASSESSMENT — PATIENT HEALTH QUESTIONNAIRE - PHQ9
SUM OF ALL RESPONSES TO PHQ QUESTIONS 1-9: 0
SUM OF ALL RESPONSES TO PHQ QUESTIONS 1-9: 0
2. FEELING DOWN, DEPRESSED OR HOPELESS: 0
SUM OF ALL RESPONSES TO PHQ9 QUESTIONS 1 & 2: 0
SUM OF ALL RESPONSES TO PHQ QUESTIONS 1-9: 0
SUM OF ALL RESPONSES TO PHQ QUESTIONS 1-9: 0
1. LITTLE INTEREST OR PLEASURE IN DOING THINGS: 0

## 2023-06-20 ASSESSMENT — LIFESTYLE VARIABLES
HOW OFTEN DO YOU HAVE A DRINK CONTAINING ALCOHOL: NEVER
HOW MANY STANDARD DRINKS CONTAINING ALCOHOL DO YOU HAVE ON A TYPICAL DAY: PATIENT DOES NOT DRINK

## 2023-06-20 NOTE — PROGRESS NOTES
Medicare Annual Wellness Visit    Romain Becker is here for Medicare AWV    Assessment & Plan   Medicare annual wellness visit, subsequent    Recommendations for Preventive Services Due: see orders and patient instructions/AVS.  Recommended screening schedule for the next 5-10 years is provided to the patient in written form: see Patient Instructions/AVS.    Shingles vaccine recommended      Return for Medicare Annual Wellness Visit in 1 year. Subjective       Patient's complete Health Risk Assessment and screening values have been reviewed and are found in Flowsheets. The following problems were reviewed today and where indicated follow up appointments were made and/or referrals ordered. Positive Risk Factor Screenings with Interventions:               General HRA Questions:  Select all that apply: (!) New or Increased Pain    Pain Interventions:  Pt has been having right sided abdominal pain for the past few weeks. Workup is in progress. Objective   Vitals:    06/20/23 1113 06/20/23 1120   BP: (!) 144/60 (!) 144/58   Pulse: 70    Resp: 16    Temp: 97.4 °F (36.3 °C)    TempSrc: Temporal    SpO2: 99%    Weight: 146 lb (66.2 kg)    Height: 5' 6\" (1.676 m)       Body mass index is 23.57 kg/m². Allergies   Allergen Reactions    Naproxen     Penicillins      Prior to Visit Medications    Medication Sig Taking? Authorizing Provider   JANUVIA 50 MG tablet Take 1 tablet by mouth daily Yes Altagracia Echols MD   amLODIPine (NORVASC) 5 MG tablet Take 1 tablet by mouth daily Yes Altagracia Echols MD   carvedilol (COREG) 6.25 MG tablet Take 1 tablet by mouth 2 times daily Yes Altagracia Echols MD   Lancets 33G MISC 1 Lancet by Does not apply route in the morning and at bedtime Yes Altagracia Echols MD   blood glucose test strips (ASCENSIA AUTODISC VI;ONE TOUCH ULTRA TEST VI) strip 1 each by In Vitro route 2 times daily Indications:  For an Easy Max 5 machine Yes Altagracia Echols MD

## 2023-06-20 NOTE — PROGRESS NOTES
but generally acceptable for her age  She also has a large pulse pressure so would shy away from adding meds that might just drop her diastolic further  Home readings reviewed and are generally at goal  Carvedilol  Amlodipine       Problem list reviewed and updated in full with patient today as necessary. A comprehensive ROS was negative, except as documented above. Current Outpatient Medications:     empagliflozin (JARDIANCE) 25 MG tablet, Take 1 tablet by mouth daily, Disp: 90 tablet, Rfl: 1    rosuvastatin (CRESTOR) 20 MG tablet, Take 1 tablet by mouth daily, Disp: 90 tablet, Rfl: 1    JANUVIA 50 MG tablet, Take 1 tablet by mouth daily, Disp: 90 tablet, Rfl: 3    amLODIPine (NORVASC) 5 MG tablet, Take 1 tablet by mouth daily, Disp: 90 tablet, Rfl: 3    carvedilol (COREG) 6.25 MG tablet, Take 1 tablet by mouth 2 times daily, Disp: 180 tablet, Rfl: 3    Lancets 33G MISC, 1 Lancet by Does not apply route in the morning and at bedtime, Disp: 200 each, Rfl: 5    blood glucose test strips (ASCENSIA AUTODISC VI;ONE TOUCH ULTRA TEST VI) strip, 1 each by In Vitro route 2 times daily Indications: For an Easy Max 5 machine, Disp: 200 each, Rfl: 5    aspirin EC 81 MG EC tablet, Take 1 tablet by mouth daily, Disp: 90 tablet, Rfl: 3    pantoprazole (PROTONIX) 40 MG tablet, TAKE ONE TABLET BY MOUTH EVERY MORNING before breakfast, Disp: 90 tablet, Rfl: 0    furosemide (LASIX) 20 MG tablet, 1 po QOD, Disp: 45 tablet, Rfl: 1    glipiZIDE (GLUCOTROL XL) 10 MG extended release tablet, TAKE ONE TABLET BY MOUTH TWO TIMES A DAY, Disp: 180 tablet, Rfl: 1    meclizine (ANTIVERT) 12.5 MG tablet, Take 1 tablet by mouth daily as needed for Dizziness, Disp: 30 tablet, Rfl: 0    nitroGLYCERIN (NITROSTAT) 0.4 MG SL tablet, DISSOLVE ONE TABLET UNDER TONGUE AS NEEDED FOR CHEST PAINS. MAY REPEAT IN 5 MINUTES.  IF SYMPTOMS PERSISTS CALL 911, Disp: 25 tablet, Rfl: 1    clopidogrel (PLAVIX) 75 MG tablet, Take 1 tablet by mouth daily, Disp: 90

## 2023-07-13 ENCOUNTER — OFFICE VISIT (OUTPATIENT)
Dept: VASCULAR SURGERY | Age: 83
End: 2023-07-13
Payer: MEDICARE

## 2023-07-13 VITALS — HEIGHT: 62 IN | BODY MASS INDEX: 26.68 KG/M2 | WEIGHT: 145 LBS

## 2023-07-13 DIAGNOSIS — I73.9 PVD (PERIPHERAL VASCULAR DISEASE) WITH CLAUDICATION (HCC): ICD-10-CM

## 2023-07-13 DIAGNOSIS — I70.209 FEMORAL-POPLITEAL ATHEROSCLEROSIS (HCC): Primary | ICD-10-CM

## 2023-07-13 DIAGNOSIS — I65.21 CAROTID STENOSIS, RIGHT: ICD-10-CM

## 2023-07-13 PROCEDURE — G8400 PT W/DXA NO RESULTS DOC: HCPCS | Performed by: SURGERY

## 2023-07-13 PROCEDURE — 1090F PRES/ABSN URINE INCON ASSESS: CPT | Performed by: SURGERY

## 2023-07-13 PROCEDURE — 1036F TOBACCO NON-USER: CPT | Performed by: SURGERY

## 2023-07-13 PROCEDURE — 1123F ACP DISCUSS/DSCN MKR DOCD: CPT | Performed by: SURGERY

## 2023-07-13 PROCEDURE — 99204 OFFICE O/P NEW MOD 45 MIN: CPT | Performed by: SURGERY

## 2023-07-13 PROCEDURE — G8427 DOCREV CUR MEDS BY ELIG CLIN: HCPCS | Performed by: SURGERY

## 2023-07-13 PROCEDURE — G8417 CALC BMI ABV UP PARAM F/U: HCPCS | Performed by: SURGERY

## 2023-07-13 NOTE — PROGRESS NOTES
Chief Complaint:   Chief Complaint   Patient presents with    Circulatory Problem     F/u 2 year carotid artery          HPI: Patient came to the office after 2 years, for the evaluation of right carotid stenosis and mild peripheral vascular disease with claudication but overall doing well    No changes in her health system other than high blood pressure and diabetes mellitus      Patient denies any focal lateralizing neurological symptoms like loss of speech, vision or loss of function of extremity    Patient can walk a few blocks without problems but as long she walks slowly, and denies any symptoms of rest pain    Allergies   Allergen Reactions    Naproxen     Penicillins        Current Outpatient Medications   Medication Sig Dispense Refill    empagliflozin (JARDIANCE) 25 MG tablet Take 1 tablet by mouth daily 90 tablet 1    rosuvastatin (CRESTOR) 20 MG tablet Take 1 tablet by mouth daily 90 tablet 1    JANUVIA 50 MG tablet Take 1 tablet by mouth daily 90 tablet 3    amLODIPine (NORVASC) 5 MG tablet Take 1 tablet by mouth daily 90 tablet 3    carvedilol (COREG) 6.25 MG tablet Take 1 tablet by mouth 2 times daily 180 tablet 3    Lancets 33G MISC 1 Lancet by Does not apply route in the morning and at bedtime 200 each 5    blood glucose test strips (ASCENSIA AUTODISC VI;ONE TOUCH ULTRA TEST VI) strip 1 each by In Vitro route 2 times daily Indications:  For an Easy Max 5 machine 200 each 5    aspirin EC 81 MG EC tablet Take 1 tablet by mouth daily 90 tablet 3    pantoprazole (PROTONIX) 40 MG tablet TAKE ONE TABLET BY MOUTH EVERY MORNING before breakfast 90 tablet 0    furosemide (LASIX) 20 MG tablet 1 po QOD 45 tablet 1    glipiZIDE (GLUCOTROL XL) 10 MG extended release tablet TAKE ONE TABLET BY MOUTH TWO TIMES A  tablet 1    meclizine (ANTIVERT) 12.5 MG tablet Take 1 tablet by mouth daily as needed for Dizziness 30 tablet 0    nitroGLYCERIN (NITROSTAT) 0.4 MG SL tablet DISSOLVE ONE TABLET UNDER TONGUE AS

## 2023-08-08 ENCOUNTER — TELEPHONE (OUTPATIENT)
Dept: PRIMARY CARE CLINIC | Age: 83
End: 2023-08-08

## 2023-08-08 DIAGNOSIS — I73.9 PVD (PERIPHERAL VASCULAR DISEASE) (HCC): Primary | ICD-10-CM

## 2023-08-08 DIAGNOSIS — I70.209 FEMORAL-POPLITEAL ATHEROSCLEROSIS (HCC): ICD-10-CM

## 2023-08-08 DIAGNOSIS — I65.21 CAROTID STENOSIS, RIGHT: ICD-10-CM

## 2023-08-08 NOTE — TELEPHONE ENCOUNTER
Pedrito Fofana from [facility] office called and said patient showed up there for her mammogram and there is no order in. She explained that a lot of times patients will come in when they get their letter reminding them it is time for the mammo, and don't realize there needs to be an order there. I verified that it has been a year since her last one and Pedrito Fofana said she can order it on her end. She said she usually does do that for the orders over there.

## 2023-08-08 NOTE — TELEPHONE ENCOUNTER
Pt came in and wanted orders printed because she said she is not going back to Dr. Ulisses Alexander referred her to because he told her that you need to send him nicer pts because she was a pain in his butt.  So she is going to hospital to have orders done

## 2023-08-09 NOTE — TELEPHONE ENCOUNTER
I am so sorry that this happened  I can order the testing myself for [facility] hospital if that is what she needs   Thank you

## 2023-08-09 NOTE — TELEPHONE ENCOUNTER
So it looks like you referred pt to Dr. Padma Owens  for VL lower extremity Arterial and  US Carotid Artery Bilateral  But pt came in our office and wanted to know if I could print those orders for her because she went to the Dr. And she said he was rude and said to her that she was a pain in his [place] and that Dr. Claudia Capellan should send him nicer patients.  So she did not want to go back there and will get orders done at hospital. [facility] this helps

## 2023-08-10 NOTE — TELEPHONE ENCOUNTER
New orders placed  I could d/c his orders as they're already scheduled  Pt can keep that appt to do the imaging if that works for her - she does not have to f/u with the ordering provider of course  We can review results and decide next course ourselves  She can also take new orders and schedule separately and cancel initial imaging appt

## 2023-08-12 DIAGNOSIS — I50.32 CHRONIC HEART FAILURE WITH PRESERVED EJECTION FRACTION (HFPEF) (HCC): ICD-10-CM

## 2023-08-14 ENCOUNTER — TELEPHONE (OUTPATIENT)
Dept: PRIMARY CARE CLINIC | Age: 83
End: 2023-08-14

## 2023-08-14 RX ORDER — FUROSEMIDE 20 MG/1
TABLET ORAL
Qty: 45 TABLET | Refills: 0 | Status: SHIPPED | OUTPATIENT
Start: 2023-08-14

## 2023-08-14 NOTE — TELEPHONE ENCOUNTER
----- Message from Cayla Schaefer sent at 8/14/2023 12:02 PM EDT -----  Subject: Message to Provider    QUESTIONS  Information for Provider? Pt would like for her imaging orders to be sent   to [address] [place] [address]. pt called the hosp and they have nothing there for pt. pt   states she informed the office to send these to [facility] for her and would   like for it to be faxed to them so she can have imaging completed. please   contact pt once this has been faxed over. ---------------------------------------------------------------------------  --------------  Ana POLLACK  [phone]; Do not leave any message, patient will call back for answer  ---------------------------------------------------------------------------  --------------  SCRIPT ANSWERS  Relationship to Patient?  Self

## 2023-08-14 NOTE — TELEPHONE ENCOUNTER
Pt had called earlier to ask for orders to be faxed to [facility]. They were faxed, and the pt was notified.

## 2023-08-29 DIAGNOSIS — I70.209 FEMORAL-POPLITEAL ATHEROSCLEROSIS (HCC): ICD-10-CM

## 2023-08-29 DIAGNOSIS — I73.9 PVD (PERIPHERAL VASCULAR DISEASE) (HCC): ICD-10-CM

## 2023-08-29 DIAGNOSIS — I65.21 CAROTID STENOSIS, RIGHT: ICD-10-CM

## 2023-09-05 ENCOUNTER — TELEPHONE (OUTPATIENT)
Dept: VASCULAR SURGERY | Age: 83
End: 2023-09-05

## 2023-09-05 NOTE — TELEPHONE ENCOUNTER
----- Message from eBn Dorsey MD sent at 9/5/2023 11:48 AM EDT -----  Please inform the patient, that the arterial Doppler study of the legs, satisfactory no change    The carotid ultrasound, done in [place] reportedly worsening to 50 to 60% stenosis, please change her appointment from 2 years to 1 year if possible do a carotid ultrasound when she comes to see me next year, thank you

## 2023-09-05 NOTE — TELEPHONE ENCOUNTER
Notified patient of results and rescheduled appointment for office visit and US to 8-15-24 at 2:00 pm.

## 2023-09-19 ENCOUNTER — OFFICE VISIT (OUTPATIENT)
Dept: PRIMARY CARE CLINIC | Age: 83
End: 2023-09-19
Payer: MEDICARE

## 2023-09-19 VITALS
DIASTOLIC BLOOD PRESSURE: 70 MMHG | WEIGHT: 148 LBS | TEMPERATURE: 97.6 F | BODY MASS INDEX: 27.23 KG/M2 | HEIGHT: 62 IN | SYSTOLIC BLOOD PRESSURE: 132 MMHG | HEART RATE: 65 BPM | OXYGEN SATURATION: 100 %

## 2023-09-19 DIAGNOSIS — E11.40 TYPE 2 DIABETES MELLITUS WITH DIABETIC NEUROPATHY, WITHOUT LONG-TERM CURRENT USE OF INSULIN (HCC): Primary | ICD-10-CM

## 2023-09-19 DIAGNOSIS — I50.32 CHRONIC HEART FAILURE WITH PRESERVED EJECTION FRACTION (HFPEF) (HCC): ICD-10-CM

## 2023-09-19 DIAGNOSIS — Z23 NEED FOR INFLUENZA VACCINATION: ICD-10-CM

## 2023-09-19 DIAGNOSIS — R91.8 PULMONARY NODULES: ICD-10-CM

## 2023-09-19 DIAGNOSIS — I10 ESSENTIAL HYPERTENSION: ICD-10-CM

## 2023-09-19 LAB — HBA1C MFR BLD: 8.9 %

## 2023-09-19 PROCEDURE — 90694 VACC AIIV4 NO PRSRV 0.5ML IM: CPT | Performed by: FAMILY MEDICINE

## 2023-09-19 PROCEDURE — G0008 ADMIN INFLUENZA VIRUS VAC: HCPCS | Performed by: FAMILY MEDICINE

## 2023-09-19 PROCEDURE — 83036 HEMOGLOBIN GLYCOSYLATED A1C: CPT | Performed by: FAMILY MEDICINE

## 2023-09-19 PROCEDURE — 1090F PRES/ABSN URINE INCON ASSESS: CPT | Performed by: FAMILY MEDICINE

## 2023-09-19 PROCEDURE — G8427 DOCREV CUR MEDS BY ELIG CLIN: HCPCS | Performed by: FAMILY MEDICINE

## 2023-09-19 PROCEDURE — 1036F TOBACCO NON-USER: CPT | Performed by: FAMILY MEDICINE

## 2023-09-19 PROCEDURE — 1123F ACP DISCUSS/DSCN MKR DOCD: CPT | Performed by: FAMILY MEDICINE

## 2023-09-19 PROCEDURE — 3078F DIAST BP <80 MM HG: CPT | Performed by: FAMILY MEDICINE

## 2023-09-19 PROCEDURE — G8417 CALC BMI ABV UP PARAM F/U: HCPCS | Performed by: FAMILY MEDICINE

## 2023-09-19 PROCEDURE — G8400 PT W/DXA NO RESULTS DOC: HCPCS | Performed by: FAMILY MEDICINE

## 2023-09-19 PROCEDURE — 3075F SYST BP GE 130 - 139MM HG: CPT | Performed by: FAMILY MEDICINE

## 2023-09-19 PROCEDURE — 99214 OFFICE O/P EST MOD 30 MIN: CPT | Performed by: FAMILY MEDICINE

## 2023-09-19 PROCEDURE — 3052F HG A1C>EQUAL 8.0%<EQUAL 9.0%: CPT | Performed by: FAMILY MEDICINE

## 2023-09-19 RX ORDER — AMLODIPINE BESYLATE 5 MG/1
5 TABLET ORAL DAILY
Qty: 90 TABLET | Refills: 3 | Status: SHIPPED | OUTPATIENT
Start: 2023-09-19

## 2023-09-19 RX ORDER — GLIPIZIDE 10 MG/1
TABLET, FILM COATED, EXTENDED RELEASE ORAL
Qty: 180 TABLET | Refills: 1 | Status: SHIPPED | OUTPATIENT
Start: 2023-09-19

## 2023-09-19 RX ORDER — SITAGLIPTIN 50 MG/1
50 TABLET, FILM COATED ORAL DAILY
Qty: 90 TABLET | Refills: 3 | Status: SHIPPED | OUTPATIENT
Start: 2023-09-19

## 2023-09-19 RX ORDER — FUROSEMIDE 20 MG/1
TABLET ORAL
Qty: 45 TABLET | Refills: 1 | Status: SHIPPED | OUTPATIENT
Start: 2023-09-19

## 2023-09-19 NOTE — PROGRESS NOTES
23  Edita Covington : 1940 Sex: female  Age: 82 y.o. Assessment and Plan: Hao Mistry was seen today for follow-up. Diagnoses and all orders for this visit:    Type 2 diabetes mellitus with diabetic neuropathy, without long-term current use of insulin ([facility])  -     POCT glycosylated hemoglobin (Hb A1C)  -     JANUVIA 50 MG tablet; Take 1 tablet by mouth daily  -     glipiZIDE (GLUCOTROL XL) 10 MG extended release tablet; TAKE ONE TABLET BY MOUTH TWO TIMES A DAY  Uncontrolled  Pt declines med changes today -we discussed adding insulin, which she definitely wants to avoid, and we also discussed changing her DPP 4 to a GLP-1. Patient will try to work on her diet, and we will reassess in another 3 months. Essential hypertension  -     amLODIPine (NORVASC) 5 MG tablet; Take 1 tablet by mouth daily  Well controlled. Cont current treatment plan as documented below. Pulmonary nodules  -     CT CHEST WO CONTRAST; Future  Recheck nodules to ensure no growth     Chronic heart failure with preserved ejection fraction (HFpEF) ([facility])  -     furosemide (LASIX) 20 MG tablet; TAKE ONE TABLET BY MOUTH EVERY OTHER DAY  Refills as requested. Follow-up with cardiology as scheduled. Need for influenza vaccination  -     Influenza, FLUAD, (age 72 y+), IM, Preservative Free, 0.5 mL        Return in about 3 months (around 2023).         Chief Complaint   Patient presents with    Follow-up       HPI  Pt here for     She got a stinging pain left chest, in her superior breast  Felt superficial, like a bee sting   Now it's just tender  No lump or bump noted   Never happened before     DMII -   Uncontrolled  Last a1c 8.4; today 8.9   Goal for her age 8   Readings after dinner are high - mostly high 100s, 200s  Morning readings are low 100s   Denies any episodes of hypoglycemia   Glipizide 10mg BID   Januvia 50mg daily   Jardiance 25mg daily   STATIN      HTN -   BP today is well controlled   Carvedilol  Amlodipine

## 2023-09-26 ENCOUNTER — TELEPHONE (OUTPATIENT)
Dept: PRIMARY CARE CLINIC | Age: 83
End: 2023-09-26

## 2023-09-26 DIAGNOSIS — B02.9 HERPES ZOSTER WITHOUT COMPLICATION: Primary | ICD-10-CM

## 2023-09-26 RX ORDER — ACETAMINOPHEN AND CODEINE PHOSPHATE 300; 30 MG/1; MG/1
1 TABLET ORAL EVERY 8 HOURS PRN
Qty: 21 TABLET | Refills: 0 | Status: SHIPPED | OUTPATIENT
Start: 2023-09-26 | End: 2023-10-03

## 2023-09-26 NOTE — TELEPHONE ENCOUNTER
Tylenol with codeine sent  Call if not helping or any problems  Review precautions- can cause sedation

## 2023-09-26 NOTE — TELEPHONE ENCOUNTER
Patient called again she is in town and will wait around for script.       Electronically signed by Rhina Bailey LPN on 3/63/6061 at 7:94 PM

## 2023-09-26 NOTE — TELEPHONE ENCOUNTER
She has never taken anything besides tylenol for pain. She would like the pain medication called into [facility] in [place].

## 2023-09-26 NOTE — TELEPHONE ENCOUNTER
Pt went to the ER today for back pain and they diagnosed her with shingles. She was not prescribed anything to treat it. She is having a lot of pain and was wondering if she had to be seen or if you could prescribe her something for both the pain and treatment.

## 2023-10-09 DIAGNOSIS — R91.8 PULMONARY NODULES: ICD-10-CM

## 2023-10-26 NOTE — PROGRESS NOTES
Medicare Annual Wellness Visit    Nery Corbin is here for Medicare AW    Assessment & Plan    Recommendations for Preventive Services Due: see orders and patient instructions/AVS.  Recommended screening schedule for the next 5-10 years is provided to the patient in written form: see Patient Instructions/AVS.     No follow-ups on file. Subjective   The following acute and/or chronic problems were also addressed today:  Pt hd separate encounter for her reg visit to address her on going problems    Patient's complete Health Risk Assessment and screening values have been reviewed and are found in Flowsheets. The following problems were reviewed today and where indicated follow up appointments were made and/or referrals ordered. Positive Risk Factor Screenings with Interventions:             General Health and ACP:  General  In general, how would you say your health is?: Excellent  In the past 7 days, have you experienced any of the following: New or Increased Pain, New or Increased Fatigue, Loneliness, Social Isolation, Stress or Anger?: No  Do you get the social and emotional support that you need?: Yes  Do you have a Living Will?: Yes    Advance Directives     Power of  Living Will ACP-Advance Directive ACP-Power of     Not on File Not on File Not on File Not on File      General Health Risk Interventions:  · no current issues found              Objective   Vitals:    04/04/22 1250   BP: 128/62   Pulse: 70   Temp: 98 °F (36.7 °C)   TempSrc: Temporal   SpO2: 99%   Weight: 151 lb 3.2 oz (68.6 kg)   Height: 5' 2\" (1.575 m)      Body mass index is 27.65 kg/m². Allergies   Allergen Reactions    Naproxen     Penicillins      Prior to Visit Medications    Medication Sig Taking?  Authorizing Provider   Multiple Vitamins-Minerals (PRESERVISION AREDS 2) CAPS Take 1 capsule by mouth in the morning and at bedtime  Historical Provider, MD   glipiZIDE (GLUCOTROL XL) 10 MG extended release tablet TAKE ONE TABLET BY MOUTH TWO TIMES A DAY  Taz Dan MD   JANUVIA 50 MG tablet Take 1 tablet by mouth daily  Taz Dan MD   magnesium cl-calcium carbonate (NU-MAG) 71.5-119 MG TBEC tablet TAKE TWO TABLETS BY MOUTH DAILY  Taz Dan MD   rosuvastatin (CRESTOR) 20 MG tablet Take 1 tablet by mouth daily  Yash Ott MD   clopidogrel (PLAVIX) 75 MG tablet Take 1 tablet by mouth daily  Yash Ott MD   aspirin EC 81 MG EC tablet Take 1 tablet by mouth daily  Yash Ott MD   pantoprazole (PROTONIX) 40 MG tablet Take 1 tablet by mouth every morning (before breakfast)  Yash Ott MD   carvedilol (COREG) 6.25 MG tablet Take 1 tablet by mouth 2 times daily  Yash Ott MD   amLODIPine (NORVASC) 5 MG tablet Take 1 tablet by mouth daily  Yash Ott MD   Lancets 33G MISC 1 Lancet by Does not apply route daily  Taz Dan MD   blood glucose monitor strips 1 strip by Other route daily Test 1 times a day & as needed for symptoms of irregular blood glucose. Dispense sufficient amount for indicated testing frequency plus additional to accommodate PRN testing needs. Taz Dan MD   furosemide (LASIX) 20 MG tablet 1 po QOD  Taz Dan MD   Handicap Placard MISC by Does not apply route Duration: 5 years  Taz Dan MD   nitroGLYCERIN (NITROSTAT) 0.4 MG SL tablet DISSOLVE ONE TABLET UNDER TONGUE AS NEEDED FOR CHEST PAINS. MAY REPEAT IN 5 MINUTES.  IF SYMPTOMS PERSISTS CALL Lindsey Watt MD   Cinnamon 500 MG CAPS Take 1 capsule by mouth daily  Historical Provider, MD   vitamin D (CHOLECALCIFEROL) 1000 UNIT TABS tablet Take 1,000 Units by mouth daily  Historical Provider, MD   vitamin B-12 (CYANOCOBALAMIN) 1000 MCG tablet Take 1,000 mcg by mouth every other day   Historical Provider, MD [place] (Including outside providers/suppliers regularly involved in providing care):   Patient Care Team:  Taz Dan MD as PCP - General (Internal Medicine)  Taz Dan MD as PCP - [facility] Provider  Alma Her MD as Consulting Physician (Cardiology)  Nabil Palmer MD (General Surgery)  Win Apodaca RN as Ambulatory Care Manager    Reviewed and updated this visit: 62y/o Micronesian speaking male, current smoker (started at 14 years old; 2 cigs/ day), FHx CAD/HTN, PMHx HTN, HLD, Prediabetes, Venous Insufficiency, Carotid stenosis, Renal Artery Stenosis, Hx "below neck mass" (s/p surgical removal 2020),  who presents to Dr. Olvera cardiologist reporting occasional Chest Pain at rest.    1. 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  Continue aspirin, plavix, metoprolol, atorvastatin. Referred to cardiac rehab, follow up with Dr. Olvera.

## 2023-11-07 DIAGNOSIS — E11.40 TYPE 2 DIABETES MELLITUS WITH DIABETIC NEUROPATHY, WITHOUT LONG-TERM CURRENT USE OF INSULIN (HCC): ICD-10-CM

## 2023-11-08 RX ORDER — EMPAGLIFLOZIN 25 MG/1
25 TABLET, FILM COATED ORAL DAILY
Qty: 90 TABLET | Refills: 3 | Status: SHIPPED | OUTPATIENT
Start: 2023-11-08

## 2023-11-08 NOTE — TELEPHONE ENCOUNTER
Last Appointment:  9/19/2023  Future Appointments   Date Time Provider [address]   12/19/2023 12:00 PM Edy Michaud MD [facility] PC [facility]   8/15/2024  2:00 PM YOGI CHAGN  1 [facility] CARDIO [facility] Rad/Car   8/15/2024  2:45 PM Sharmin Millard MD [facility]

## 2023-11-29 DIAGNOSIS — E11.40 TYPE 2 DIABETES MELLITUS WITH DIABETIC NEUROPATHY, WITHOUT LONG-TERM CURRENT USE OF INSULIN (HCC): ICD-10-CM

## 2023-11-29 RX ORDER — LANCETS 33 GAUGE
1 EACH MISCELLANEOUS 2 TIMES DAILY
Qty: 200 EACH | Refills: 5 | Status: SHIPPED | OUTPATIENT
Start: 2023-11-29 | End: 2024-05-27

## 2023-11-29 NOTE — TELEPHONE ENCOUNTER
Pt needs test strips and lancets scripts sent to [facility] for refill. Set to print so they can be faxed. Pt also stated medicare told her see needs a new prescription sent every 6 months since she is not on insulin and just checks her sugar. She was also wondering if medicare will pay for the solution to test her meter, advised to ask [facility] when she goes to  supplies.

## 2023-12-22 ENCOUNTER — TELEPHONE (OUTPATIENT)
Dept: PRIMARY CARE CLINIC | Age: 83
End: 2023-12-22

## 2023-12-22 NOTE — TELEPHONE ENCOUNTER
Ok so Called [facility] they did receive office note they  stated soon as they get the order signed back they will get working on getting the glucometer.  She stated she faxed it over yesterday.

## 2023-12-22 NOTE — TELEPHONE ENCOUNTER
Please see note from yesterday  Was told I needed to addend last note so she can get a new machine   I don't care what brand as long as covered and compatible   I apologize, but the orders were all generic and didn't specify a brand on our end

## 2023-12-22 NOTE — TELEPHONE ENCOUNTER
[facility] called stated that the glucose monitor and supplies are all different and they stated they are not sure which one you wanted pt to have.  They need clarification on brand/style.  Does she need new machine test strips and lancets?   For the one script it says an easy max 5 which they do not carry.

## 2023-12-22 NOTE — TELEPHONE ENCOUNTER
Called [facility].   They stated they do not have any talking glucometer's she would have to go else where for that.  I read other telephone encounter seen it was [facility] called with message.  Theresa did fax office note over there.  Called [facility] to make sure they received office note

## 2023-12-22 NOTE — TELEPHONE ENCOUNTER
Apologies for the confusion  Did they send an order for me to sign or I need to print one for them?

## 2024-03-25 DIAGNOSIS — E11.40 TYPE 2 DIABETES MELLITUS WITH DIABETIC NEUROPATHY, WITHOUT LONG-TERM CURRENT USE OF INSULIN (HCC): ICD-10-CM

## 2024-03-25 RX ORDER — GLIPIZIDE 10 MG/1
TABLET, FILM COATED, EXTENDED RELEASE ORAL
Qty: 180 TABLET | Refills: 1 | Status: SHIPPED | OUTPATIENT
Start: 2024-03-25

## 2024-03-25 RX ORDER — ASPIRIN 81 MG/1
81 TABLET ORAL DAILY
Qty: 90 TABLET | Refills: 3 | Status: SHIPPED | OUTPATIENT
Start: 2024-03-25

## 2024-03-26 ENCOUNTER — OFFICE VISIT (OUTPATIENT)
Dept: CARDIOLOGY CLINIC | Age: 84
End: 2024-03-26
Payer: MEDICARE

## 2024-03-26 VITALS
HEIGHT: 62 IN | HEART RATE: 65 BPM | WEIGHT: 146 LBS | SYSTOLIC BLOOD PRESSURE: 112 MMHG | DIASTOLIC BLOOD PRESSURE: 62 MMHG | BODY MASS INDEX: 26.87 KG/M2 | RESPIRATION RATE: 16 BRPM

## 2024-03-26 DIAGNOSIS — I34.0 NONRHEUMATIC MITRAL VALVE REGURGITATION: ICD-10-CM

## 2024-03-26 DIAGNOSIS — I35.0 MODERATE TO SEVERE AORTIC STENOSIS: ICD-10-CM

## 2024-03-26 DIAGNOSIS — I50.32 CHRONIC HEART FAILURE WITH PRESERVED EJECTION FRACTION (HFPEF) (HCC): Primary | ICD-10-CM

## 2024-03-26 DIAGNOSIS — I25.10 CORONARY ARTERY DISEASE INVOLVING NATIVE HEART WITHOUT ANGINA PECTORIS, UNSPECIFIED VESSEL OR LESION TYPE: ICD-10-CM

## 2024-03-26 DIAGNOSIS — I36.1 NONRHEUMATIC TRICUSPID VALVE REGURGITATION: ICD-10-CM

## 2024-03-26 DIAGNOSIS — I20.9 ANGINA PECTORIS, UNSPECIFIED (HCC): ICD-10-CM

## 2024-03-26 DIAGNOSIS — I10 ESSENTIAL HYPERTENSION: ICD-10-CM

## 2024-03-26 DIAGNOSIS — I73.9 PVD (PERIPHERAL VASCULAR DISEASE) WITH CLAUDICATION (HCC): ICD-10-CM

## 2024-03-26 PROCEDURE — 1036F TOBACCO NON-USER: CPT | Performed by: INTERNAL MEDICINE

## 2024-03-26 PROCEDURE — G8417 CALC BMI ABV UP PARAM F/U: HCPCS | Performed by: INTERNAL MEDICINE

## 2024-03-26 PROCEDURE — 93000 ELECTROCARDIOGRAM COMPLETE: CPT | Performed by: INTERNAL MEDICINE

## 2024-03-26 PROCEDURE — G8427 DOCREV CUR MEDS BY ELIG CLIN: HCPCS | Performed by: INTERNAL MEDICINE

## 2024-03-26 PROCEDURE — 3074F SYST BP LT 130 MM HG: CPT | Performed by: INTERNAL MEDICINE

## 2024-03-26 PROCEDURE — 99214 OFFICE O/P EST MOD 30 MIN: CPT | Performed by: INTERNAL MEDICINE

## 2024-03-26 PROCEDURE — 1090F PRES/ABSN URINE INCON ASSESS: CPT | Performed by: INTERNAL MEDICINE

## 2024-03-26 PROCEDURE — 3078F DIAST BP <80 MM HG: CPT | Performed by: INTERNAL MEDICINE

## 2024-03-26 PROCEDURE — G8400 PT W/DXA NO RESULTS DOC: HCPCS | Performed by: INTERNAL MEDICINE

## 2024-03-26 PROCEDURE — 1123F ACP DISCUSS/DSCN MKR DOCD: CPT | Performed by: INTERNAL MEDICINE

## 2024-03-26 PROCEDURE — G8484 FLU IMMUNIZE NO ADMIN: HCPCS | Performed by: INTERNAL MEDICINE

## 2024-03-26 RX ORDER — MAGNESIUM CHLORIDE 71.5 G/G
1 TABLET ORAL 2 TIMES DAILY
COMMUNITY

## 2024-03-26 NOTE — PATIENT INSTRUCTIONS
Continue all your medications at current doses.   Restrict sodium intake to less than 2-2.5 g/day. Restrict fluid intake to less than 2.2 L/day. Goal BP is less than 130/80.   Please try to exercise for 150 minutes a week.   I will see you back in the office in  12  months. Please call the office at ([phone], option [phone]) if you have any questions.

## 2024-03-26 NOTE — PROGRESS NOTES
02/06/2023    MCV 99.8 02/06/2023     02/06/2023       Lab Results   Component Value Date    ALT 15 02/06/2023    AST 23 02/06/2023    ALKPHOS 90 02/06/2023    BILITOT 0.9 02/06/2023       Lab Results   Component Value Date    LABALBU 4.0 02/06/2023       Lab Results   Component Value Date    CHOL 145 02/06/2023    CHOL 126 08/16/2022    CHOL 109 09/28/2021     Lab Results   Component Value Date    TRIG 129 02/06/2023    TRIG 160 (H) 08/16/2022    TRIG 145 09/28/2021     Lab Results   Component Value Date    HDL 61 02/06/2023    HDL 46 08/16/2022    HDL 37 09/28/2021     Lab Results   Component Value Date    LDLCHOLESTEROL 129 (H) 01/06/2021    LDLCALC 58 02/06/2023    LDLCALC 48 08/16/2022    LDLCALC 43 09/28/2021     No results found for: \"VLDL\"    No results found for: \"CHOLHDLRATIO\"    Lab Results   Component Value Date    TROPONINI < 0.03 06/08/2021       Lab Results   Component Value Date     (H) 08/17/2021         Lab Results   Component Value Date    LABA1C 9.0 12/19/2023     Lab Results   Component Value Date     07/31/2021      EKG today: NSR, WNL     Assessment and Plan:        83-year-old  female with a history above.  She is doing very well.  She is euvolemic and has no angina with very good functional capacity.  She has no symptoms of arrhythmia.  Her aortic stenosis has definitely progressed but remains moderate.     Diagnosis Orders   1. Chronic heart failure with preserved ejection fraction (HFpEF) ([facility])  EKG 12 lead      2. Nonrheumatic mitral valve regurgitation        3. PVD (peripheral vascular disease) with claudication ([facility])        4. Angina pectoris, unspecified ([facility])        5. Nonrheumatic tricuspid valve regurgitation        6. Coronary artery disease involving native heart without angina pectoris, unspecified vessel or lesion type        7. Essential hypertension        8. Moderate to severe aortic stenosis             Continue aspirin and clopidogrel

## 2024-04-16 ENCOUNTER — OFFICE VISIT (OUTPATIENT)
Dept: URBAN - METROPOLITAN AREA CLINIC 44 | Facility: CLINIC | Age: 84
End: 2024-04-16
Payer: MEDICARE

## 2024-04-16 DIAGNOSIS — H40.033 ANATOMICAL NARROW ANGLE, BILATERAL: Primary | ICD-10-CM

## 2024-04-16 PROCEDURE — 99204 OFFICE O/P NEW MOD 45 MIN: CPT | Performed by: OPTOMETRIST

## 2024-04-16 ASSESSMENT — KERATOMETRY
OS: 42.50
OD: 42.50

## 2024-04-16 ASSESSMENT — INTRAOCULAR PRESSURE
OD: 15
OS: 15

## 2024-04-16 ASSESSMENT — VISUAL ACUITY
OD: 20/25
OS: 20/60

## 2024-05-08 ENCOUNTER — OFFICE VISIT (OUTPATIENT)
Dept: PRIMARY CARE CLINIC | Age: 84
End: 2024-05-08

## 2024-05-08 VITALS
TEMPERATURE: 98.5 F | WEIGHT: 148 LBS | DIASTOLIC BLOOD PRESSURE: 70 MMHG | BODY MASS INDEX: 27.23 KG/M2 | OXYGEN SATURATION: 97 % | HEIGHT: 62 IN | SYSTOLIC BLOOD PRESSURE: 110 MMHG | HEART RATE: 75 BPM

## 2024-05-08 DIAGNOSIS — I25.10 CORONARY ARTERY DISEASE INVOLVING NATIVE HEART WITHOUT ANGINA PECTORIS, UNSPECIFIED VESSEL OR LESION TYPE: ICD-10-CM

## 2024-05-08 DIAGNOSIS — I10 ESSENTIAL HYPERTENSION: ICD-10-CM

## 2024-05-08 DIAGNOSIS — I50.32 CHRONIC HEART FAILURE WITH PRESERVED EJECTION FRACTION (HFPEF) (HCC): ICD-10-CM

## 2024-05-08 DIAGNOSIS — N18.32 STAGE 3B CHRONIC KIDNEY DISEASE (HCC): ICD-10-CM

## 2024-05-08 DIAGNOSIS — E11.40 TYPE 2 DIABETES MELLITUS WITH DIABETIC NEUROPATHY, WITHOUT LONG-TERM CURRENT USE OF INSULIN (HCC): ICD-10-CM

## 2024-05-08 DIAGNOSIS — E11.40 TYPE 2 DIABETES MELLITUS WITH DIABETIC NEUROPATHY, WITHOUT LONG-TERM CURRENT USE OF INSULIN (HCC): Primary | ICD-10-CM

## 2024-05-08 LAB
ALBUMIN: 4.1 G/DL (ref 3.5–5.2)
ALP BLD-CCNC: 78 U/L (ref 35–104)
ALT SERPL-CCNC: 13 U/L (ref 0–32)
ANION GAP SERPL CALCULATED.3IONS-SCNC: 16 MMOL/L (ref 7–16)
AST SERPL-CCNC: 18 U/L (ref 0–31)
BASOPHILS ABSOLUTE: 0.03 K/UL (ref 0–0.2)
BASOPHILS RELATIVE PERCENT: 1 % (ref 0–2)
BILIRUB SERPL-MCNC: 0.9 MG/DL (ref 0–1.2)
BILIRUBIN DIRECT: <0.2 MG/DL (ref 0–0.3)
BILIRUBIN, INDIRECT: NORMAL MG/DL (ref 0–1)
BUN BLDV-MCNC: 27 MG/DL (ref 6–23)
CALCIUM SERPL-MCNC: 9.1 MG/DL (ref 8.6–10.2)
CHLORIDE BLD-SCNC: 105 MMOL/L (ref 98–107)
CHOLESTEROL, TOTAL: 132 MG/DL
CO2: 19 MMOL/L (ref 22–29)
CREAT SERPL-MCNC: 1.3 MG/DL (ref 0.5–1)
CREATININE URINE: 77.2 MG/DL (ref 29–226)
EOSINOPHILS ABSOLUTE: 0.16 K/UL (ref 0.05–0.5)
EOSINOPHILS RELATIVE PERCENT: 3 % (ref 0–6)
GFR, ESTIMATED: 41 ML/MIN/1.73M2
GLUCOSE BLD-MCNC: 137 MG/DL (ref 74–99)
HBA1C MFR BLD: 8.4 % (ref 4–5.6)
HCT VFR BLD CALC: 44.8 % (ref 34–48)
HDLC SERPL-MCNC: 52 MG/DL
HEMOGLOBIN: 14 G/DL (ref 11.5–15.5)
IMMATURE GRANULOCYTES %: 1 % (ref 0–5)
IMMATURE GRANULOCYTES ABSOLUTE: 0.04 K/UL (ref 0–0.58)
LDL CHOLESTEROL: 57 MG/DL
LYMPHOCYTES ABSOLUTE: 1.38 K/UL (ref 1.5–4)
LYMPHOCYTES RELATIVE PERCENT: 22 % (ref 20–42)
MCH RBC QN AUTO: 32.3 PG (ref 26–35)
MCHC RBC AUTO-ENTMCNC: 31.3 G/DL (ref 32–34.5)
MCV RBC AUTO: 103.5 FL (ref 80–99.9)
MICROALBUMIN/CREAT 24H UR: <12 MG/L (ref 0–19)
MICROALBUMIN/CREAT UR-RTO: NORMAL MCG/MG CREAT (ref 0–30)
MONOCYTES ABSOLUTE: 0.58 K/UL (ref 0.1–0.95)
MONOCYTES RELATIVE PERCENT: 9 % (ref 2–12)
NEUTROPHILS ABSOLUTE: 4.17 K/UL (ref 1.8–7.3)
NEUTROPHILS RELATIVE PERCENT: 66 % (ref 43–80)
PDW BLD-RTO: 13.9 % (ref 11.5–15)
PLATELET # BLD: 187 K/UL (ref 130–450)
PMV BLD AUTO: 11.7 FL (ref 7–12)
POTASSIUM SERPL-SCNC: 4.2 MMOL/L (ref 3.5–5)
RBC # BLD: 4.33 M/UL (ref 3.5–5.5)
SODIUM BLD-SCNC: 140 MMOL/L (ref 132–146)
T4 FREE SERPL-MCNC: 1.3 NG/DL (ref 0.9–1.7)
TOTAL PROTEIN: 7.2 G/DL (ref 6.4–8.3)
TRIGL SERPL-MCNC: 114 MG/DL
TSH SERPL DL<=0.05 MIU/L-ACNC: 0.52 UIU/ML (ref 0.27–4.2)
VLDLC SERPL CALC-MCNC: 23 MG/DL
WBC # BLD: 6.4 K/UL (ref 4.5–11.5)

## 2024-05-08 RX ORDER — FUROSEMIDE 20 MG/1
TABLET ORAL
Qty: 45 TABLET | Refills: 1 | Status: SHIPPED | OUTPATIENT
Start: 2024-05-08

## 2024-05-08 RX ORDER — MECLIZINE HCL 12.5 MG/1
12.5 TABLET ORAL DAILY PRN
Qty: 30 TABLET | Refills: 0 | Status: SHIPPED | OUTPATIENT
Start: 2024-05-08

## 2024-05-08 RX ORDER — AMLODIPINE BESYLATE 5 MG/1
5 TABLET ORAL DAILY
Qty: 90 TABLET | Refills: 3 | Status: SHIPPED | OUTPATIENT
Start: 2024-05-08

## 2024-05-08 RX ORDER — GLIPIZIDE 10 MG/1
TABLET, FILM COATED, EXTENDED RELEASE ORAL
Qty: 180 TABLET | Refills: 1 | Status: SHIPPED | OUTPATIENT
Start: 2024-05-08

## 2024-05-08 RX ORDER — ROSUVASTATIN CALCIUM 20 MG/1
20 TABLET, COATED ORAL DAILY
Qty: 90 TABLET | Refills: 1 | Status: SHIPPED | OUTPATIENT
Start: 2024-05-08

## 2024-05-08 SDOH — ECONOMIC STABILITY: FOOD INSECURITY: WITHIN THE PAST 12 MONTHS, THE FOOD YOU BOUGHT JUST DIDN'T LAST AND YOU DIDN'T HAVE MONEY TO GET MORE.: NEVER TRUE

## 2024-05-08 SDOH — ECONOMIC STABILITY: FOOD INSECURITY: WITHIN THE PAST 12 MONTHS, YOU WORRIED THAT YOUR FOOD WOULD RUN OUT BEFORE YOU GOT MONEY TO BUY MORE.: NEVER TRUE

## 2024-05-08 SDOH — ECONOMIC STABILITY: INCOME INSECURITY: HOW HARD IS IT FOR YOU TO PAY FOR THE VERY BASICS LIKE FOOD, HOUSING, MEDICAL CARE, AND HEATING?: NOT HARD AT ALL

## 2024-05-08 ASSESSMENT — PATIENT HEALTH QUESTIONNAIRE - PHQ9
SUM OF ALL RESPONSES TO PHQ9 QUESTIONS 1 & 2: 0
2. FEELING DOWN, DEPRESSED OR HOPELESS: NOT AT ALL
SUM OF ALL RESPONSES TO PHQ QUESTIONS 1-9: 0
2. FEELING DOWN, DEPRESSED OR HOPELESS: NOT AT ALL
SUM OF ALL RESPONSES TO PHQ9 QUESTIONS 1 & 2: 0
SUM OF ALL RESPONSES TO PHQ QUESTIONS 1-9: 0
1. LITTLE INTEREST OR PLEASURE IN DOING THINGS: NOT AT ALL
SUM OF ALL RESPONSES TO PHQ QUESTIONS 1-9: 0
SUM OF ALL RESPONSES TO PHQ QUESTIONS 1-9: 0
1. LITTLE INTEREST OR PLEASURE IN DOING THINGS: NOT AT ALL

## 2024-05-08 ASSESSMENT — ANXIETY QUESTIONNAIRES
6. BECOMING EASILY ANNOYED OR IRRITABLE: NOT AT ALL
7. FEELING AFRAID AS IF SOMETHING AWFUL MIGHT HAPPEN: NOT AT ALL
IF YOU CHECKED OFF ANY PROBLEMS ON THIS QUESTIONNAIRE, HOW DIFFICULT HAVE THESE PROBLEMS MADE IT FOR YOU TO DO YOUR WORK, TAKE CARE OF THINGS AT HOME, OR GET ALONG WITH OTHER PEOPLE: NOT DIFFICULT AT ALL
6. BECOMING EASILY ANNOYED OR IRRITABLE: NOT AT ALL
5. BEING SO RESTLESS THAT IT IS HARD TO SIT STILL: NOT AT ALL
IF YOU CHECKED OFF ANY PROBLEMS ON THIS QUESTIONNAIRE, HOW DIFFICULT HAVE THESE PROBLEMS MADE IT FOR YOU TO DO YOUR WORK, TAKE CARE OF THINGS AT HOME, OR GET ALONG WITH OTHER PEOPLE: NOT DIFFICULT AT ALL
7. FEELING AFRAID AS IF SOMETHING AWFUL MIGHT HAPPEN: NOT AT ALL
4. TROUBLE RELAXING: NOT AT ALL
3. WORRYING TOO MUCH ABOUT DIFFERENT THINGS: NOT AT ALL
3. WORRYING TOO MUCH ABOUT DIFFERENT THINGS: NOT AT ALL
5. BEING SO RESTLESS THAT IT IS HARD TO SIT STILL: NOT AT ALL
1. FEELING NERVOUS, ANXIOUS, OR ON EDGE: NOT AT ALL
4. TROUBLE RELAXING: NOT AT ALL
1. FEELING NERVOUS, ANXIOUS, OR ON EDGE: NOT AT ALL
GAD7 TOTAL SCORE: 0
2. NOT BEING ABLE TO STOP OR CONTROL WORRYING: NOT AT ALL
2. NOT BEING ABLE TO STOP OR CONTROL WORRYING: NOT AT ALL

## 2024-05-08 ASSESSMENT — LIFESTYLE VARIABLES
HOW OFTEN DO YOU HAVE A DRINK CONTAINING ALCOHOL: NEVER
HOW OFTEN DO YOU HAVE SIX OR MORE DRINKS ON ONE OCCASION: 1
HOW OFTEN DO YOU HAVE A DRINK CONTAINING ALCOHOL: 1
HOW MANY STANDARD DRINKS CONTAINING ALCOHOL DO YOU HAVE ON A TYPICAL DAY: PATIENT DOES NOT DRINK
HOW MANY STANDARD DRINKS CONTAINING ALCOHOL DO YOU HAVE ON A TYPICAL DAY: 0

## 2024-05-08 NOTE — PROGRESS NOTES
24  Alise Covington : 1940 Sex: female  Age: 83 y.o.      Assessment and Plan:  Alise was seen today for follow-up.    Diagnoses and all orders for this visit:    Type 2 diabetes mellitus with diabetic neuropathy, without long-term current use of insulin ([facility])  -     glipiZIDE (GLUCOTROL XL) 10 MG extended release tablet; TAKE ONE TABLET BY MOUTH TWO TIMES A DAY  -     rosuvastatin (CRESTOR) 20 MG tablet; Take 1 tablet by mouth daily  -     empagliflozin (JARDIANCE) 25 MG tablet; Take 1 tablet by mouth daily  -     blood glucose test strips (ASCENSIA AUTODISC VI;ONE TOUCH ULTRA TEST VI) strip; 1 each by In Vitro route 2 times daily Indications: For an Easy Max 5 machine  -     Hemoglobin A1C; Future  -     Lipid Panel; Future  -     CBC with Auto Differential; Future  -     Hepatic Function Panel; Future  -     Basic Metabolic Panel; Future  -     Microalbumin, Ur; Future  Uncontrolled, but patient has been hesitant to change medications as of yet.  Further recommendation will be made pending these results.    Coronary artery disease involving native heart without angina pectoris, unspecified vessel or lesion type  -     rosuvastatin (CRESTOR) 20 MG tablet; Take 1 tablet by mouth daily  Stable. Cont current treatment as documented below.  Continue follow-up with cardiology.    Essential hypertension  -     amLODIPine (NORVASC) 5 MG tablet; Take 1 tablet by mouth daily  Well controlled. Cont current treatment plan as documented below.    Chronic heart failure with preserved ejection fraction (HFpEF) ([facility])  -     furosemide (LASIX) 20 MG tablet; TAKE ONE TABLET BY MOUTH EVERY OTHER DAY  Stable. Cont current treatment as documented below.  Continue follow-up with cardiology.    Stage 3b chronic kidney disease ([facility])  Reassess with BMP today    Other orders  -     meclizine (ANTIVERT) 12.5 MG tablet; Take 1 tablet by mouth daily as needed for Dizziness    I have a longitudinal relationship with this patient

## 2024-05-10 DIAGNOSIS — E11.40 TYPE 2 DIABETES MELLITUS WITH DIABETIC NEUROPATHY, WITHOUT LONG-TERM CURRENT USE OF INSULIN (HCC): ICD-10-CM

## 2024-05-31 ENCOUNTER — OFFICE VISIT (OUTPATIENT)
Dept: URBAN - METROPOLITAN AREA CLINIC 44 | Facility: CLINIC | Age: 84
End: 2024-05-31
Payer: MEDICARE

## 2024-05-31 DIAGNOSIS — H25.13 AGE-RELATED NUCLEAR CATARACT, BILATERAL: ICD-10-CM

## 2024-05-31 DIAGNOSIS — H43.393 OTHER VITREOUS OPACITIES, BILATERAL: ICD-10-CM

## 2024-05-31 DIAGNOSIS — H04.123 DRY EYE SYNDROME OF BILATERAL LACRIMAL GLANDS: ICD-10-CM

## 2024-05-31 DIAGNOSIS — H40.033 ANATOMICAL NARROW ANGLE, BILATERAL: Primary | ICD-10-CM

## 2024-05-31 PROCEDURE — 92020 GONIOSCOPY: CPT | Performed by: OPHTHALMOLOGY

## 2024-05-31 PROCEDURE — 92083 EXTENDED VISUAL FIELD XM: CPT | Performed by: OPHTHALMOLOGY

## 2024-05-31 PROCEDURE — 76514 ECHO EXAM OF EYE THICKNESS: CPT | Performed by: OPHTHALMOLOGY

## 2024-05-31 PROCEDURE — 99204 OFFICE O/P NEW MOD 45 MIN: CPT | Performed by: OPHTHALMOLOGY

## 2024-05-31 RX ORDER — PREDNISOLONE ACETATE 10 MG/ML
1 % SUSPENSION/ DROPS OPHTHALMIC
Qty: 5 | Refills: 1 | Status: ACTIVE
Start: 2024-05-31

## 2024-05-31 ASSESSMENT — INTRAOCULAR PRESSURE
OS: 15
OD: 14

## 2024-06-14 ENCOUNTER — TELEPHONE (OUTPATIENT)
Dept: PRIMARY CARE CLINIC | Age: 84
End: 2024-06-14

## 2024-06-14 DIAGNOSIS — I65.21 CAROTID STENOSIS, RIGHT: Primary | ICD-10-CM

## 2024-06-14 DIAGNOSIS — I73.9 PVD (PERIPHERAL VASCULAR DISEASE) WITH CLAUDICATION (HCC): ICD-10-CM

## 2024-06-14 DIAGNOSIS — I70.209 FEMORAL-POPLITEAL ATHEROSCLEROSIS (HCC): ICD-10-CM

## 2024-06-14 NOTE — TELEPHONE ENCOUNTER
Patient states that she is scheduled for a vascular test with Dr Banks and she wants to know if she can have it done somewhere in [place]. She said she would rather not go back to that doctor bc he asked her who her doctor was and when she told him you, she states that he told her to 'tell Dr Marti to send me better patients than you'. She wanted to know if Dr Camara would be able to do the test and I advised that she would need to contact his office and let them know what is going on, and I would let you know.

## 2024-06-14 NOTE — TELEPHONE ENCOUNTER
Looks that she is scheduled for carotid ultrasound, and then follow-up in his office.  We can order this to be done through [place] with follow-up with Dr. Peguero as an option, or find someone in [place]. I don't think Dr. Camara can treat this.

## 2024-06-15 NOTE — TELEPHONE ENCOUNTER
I also re-ordered the carotid dopplers - pt may want to change that from [facility] to [facility] if following with Sudhir

## 2024-06-17 NOTE — TELEPHONE ENCOUNTER
Patient states that she talked to Dr Santos office and they said they can do the neck thing but not the 'cardiac study', so they did schedule her. She said she called the [facility] and they told her they no longer do those there. She said she just wants to have all her tests done in [place]. I advised that we can see what we can do.

## 2024-06-18 ENCOUNTER — TELEPHONE (OUTPATIENT)
Dept: VASCULAR SURGERY | Age: 84
End: 2024-06-18

## 2024-06-18 NOTE — TELEPHONE ENCOUNTER
No problem, I wanted to make sure I wasn't missing something  Hopefully she hears to schedule the carotid ultrasound soon too  Thank you

## 2024-06-18 NOTE — TELEPHONE ENCOUNTER
My apologies. Patient is confused about the testing, and I was confused by what she was talking about. She said the paper we gave her said 'cardiac study' on 08/15. I advised that I can see that she has an appt with Dr Banks and a carotid study on 08/15, so she is needing to call and cancel these appts. She said she will. She said she has an appt with Dr Peguero on 06/26.

## 2024-06-20 ENCOUNTER — SURGERY (OUTPATIENT)
Dept: URBAN - METROPOLITAN AREA SURGERY 19 | Facility: SURGERY | Age: 84
End: 2024-06-20
Payer: MEDICARE

## 2024-06-20 PROCEDURE — 66761 REVISION OF IRIS: CPT | Performed by: OPHTHALMOLOGY

## 2024-06-27 ENCOUNTER — SURGERY (OUTPATIENT)
Dept: URBAN - METROPOLITAN AREA SURGERY 19 | Facility: SURGERY | Age: 84
End: 2024-06-27
Payer: MEDICARE

## 2024-06-27 PROCEDURE — 66761 REVISION OF IRIS: CPT | Performed by: OPHTHALMOLOGY

## 2024-07-01 DIAGNOSIS — I65.21 CAROTID STENOSIS, RIGHT: ICD-10-CM

## 2024-07-01 DIAGNOSIS — I70.209 FEMORAL-POPLITEAL ATHEROSCLEROSIS (HCC): ICD-10-CM

## 2024-07-01 DIAGNOSIS — I73.9 PVD (PERIPHERAL VASCULAR DISEASE) WITH CLAUDICATION (HCC): ICD-10-CM

## 2024-07-26 ENCOUNTER — OFFICE VISIT (OUTPATIENT)
Dept: URBAN - METROPOLITAN AREA CLINIC 44 | Facility: CLINIC | Age: 84
End: 2024-07-26
Payer: MEDICARE

## 2024-07-26 DIAGNOSIS — H43.393 OTHER VITREOUS OPACITIES, BILATERAL: ICD-10-CM

## 2024-07-26 DIAGNOSIS — H40.033 ANATOMICAL NARROW ANGLE, BILATERAL: Primary | ICD-10-CM

## 2024-07-26 DIAGNOSIS — H25.13 AGE-RELATED NUCLEAR CATARACT, BILATERAL: ICD-10-CM

## 2024-07-26 DIAGNOSIS — H04.123 DRY EYE SYNDROME OF BILATERAL LACRIMAL GLANDS: ICD-10-CM

## 2024-07-26 PROCEDURE — 99213 OFFICE O/P EST LOW 20 MIN: CPT | Performed by: OPHTHALMOLOGY

## 2024-07-26 ASSESSMENT — INTRAOCULAR PRESSURE
OS: 15
OD: 15

## 2024-08-05 ENCOUNTER — TELEPHONE (OUTPATIENT)
Dept: PRIMARY CARE CLINIC | Age: 84
End: 2024-08-05

## 2024-08-05 DIAGNOSIS — Z12.31 ENCOUNTER FOR SCREENING MAMMOGRAM FOR MALIGNANT NEOPLASM OF BREAST: Primary | ICD-10-CM

## 2024-08-05 NOTE — TELEPHONE ENCOUNTER
Last Appointment:  5/8/2024  Future Appointments   Date Time Provider Department Center   8/13/2024 11:30 AM [facility] IMAGING [facility]   9/10/2024 12:00 PM Marina Marti MD [place] [facility] ECC DEP   9/10/2024 12:15 PM Marina Marti MD [place] [facility] DEP      Patient needs order for Mammogram.  She will be having it done in [place].    Electronically signed by More Desir LPN on 8/5/2024 at 10:04 AM

## 2024-08-12 ENCOUNTER — OFFICE VISIT (OUTPATIENT)
Dept: URBAN - METROPOLITAN AREA CLINIC 44 | Facility: CLINIC | Age: 84
End: 2024-08-12
Payer: MEDICARE

## 2024-08-12 DIAGNOSIS — H04.123 DRY EYE SYNDROME OF BILATERAL LACRIMAL GLANDS: Primary | ICD-10-CM

## 2024-08-12 DIAGNOSIS — H25.813 COMBINED FORMS OF AGE-RELATED CATARACT, BILATERAL: ICD-10-CM

## 2024-08-12 DIAGNOSIS — H35.3132 NONEXUDATIVE MACULAR DEGENERATION, INTERMEDIATE DRY STAGE, BILATERAL: ICD-10-CM

## 2024-08-12 DIAGNOSIS — H40.033 ANATOMICAL NARROW ANGLE, BILATERAL: ICD-10-CM

## 2024-08-12 DIAGNOSIS — H43.393 OTHER VITREOUS OPACITIES, BILATERAL: ICD-10-CM

## 2024-08-12 PROCEDURE — 92134 CPTRZ OPH DX IMG PST SGM RTA: CPT | Performed by: OPTOMETRIST

## 2024-08-12 PROCEDURE — 99214 OFFICE O/P EST MOD 30 MIN: CPT | Performed by: OPTOMETRIST

## 2024-08-12 ASSESSMENT — VISUAL ACUITY
OS: 20/125
OD: 20/40

## 2024-08-12 ASSESSMENT — INTRAOCULAR PRESSURE
OS: 14
OD: 13

## 2024-08-12 ASSESSMENT — KERATOMETRY
OD: 42.38
OS: 42.38

## 2024-08-13 DIAGNOSIS — R92.8 ABNORMAL MAMMOGRAM OF RIGHT BREAST: Primary | ICD-10-CM

## 2024-08-19 ENCOUNTER — ADULT PHYSICAL (OUTPATIENT)
Dept: URBAN - METROPOLITAN AREA CLINIC 44 | Facility: CLINIC | Age: 84
End: 2024-08-19
Payer: MEDICARE

## 2024-08-19 DIAGNOSIS — Z01.818 ENCOUNTER FOR OTHER PREPROCEDURAL EXAMINATION: Primary | ICD-10-CM

## 2024-08-19 DIAGNOSIS — H25.13 AGE-RELATED NUCLEAR CATARACT, BILATERAL: ICD-10-CM

## 2024-08-19 PROCEDURE — 99203 OFFICE O/P NEW LOW 30 MIN: CPT | Performed by: PHYSICIAN ASSISTANT

## 2024-08-19 ASSESSMENT — PACHYMETRY
OS: 24.36
OS: 2.93
OD: 2.82
OD: 24.11

## 2024-09-03 DIAGNOSIS — N63.12 MASS OF UPPER INNER QUADRANT OF RIGHT BREAST: Primary | ICD-10-CM

## 2024-09-04 ENCOUNTER — TELEPHONE (OUTPATIENT)
Dept: CARDIOLOGY CLINIC | Age: 84
End: 2024-09-04

## 2024-09-04 ENCOUNTER — TELEPHONE (OUTPATIENT)
Dept: SURGERY | Age: 84
End: 2024-09-04

## 2024-09-04 DIAGNOSIS — R92.8 ABNORMAL MAMMOGRAM OF RIGHT BREAST: ICD-10-CM

## 2024-09-04 NOTE — TELEPHONE ENCOUNTER
Nara from [facility] called [phone]  Needs order faxed to [phone]  for hold of Plavix for right breast biopsy tentatively scheduled for 9/13/24

## 2024-09-04 NOTE — TELEPHONE ENCOUNTER
Patient called needs her US order faxed to [facility] for scheduling. Faxed to [phone]

## 2024-09-10 ENCOUNTER — OFFICE VISIT (OUTPATIENT)
Dept: PRIMARY CARE CLINIC | Age: 84
End: 2024-09-10

## 2024-09-10 VITALS
HEIGHT: 62 IN | OXYGEN SATURATION: 99 % | SYSTOLIC BLOOD PRESSURE: 122 MMHG | RESPIRATION RATE: 16 BRPM | BODY MASS INDEX: 26.87 KG/M2 | WEIGHT: 146 LBS | HEART RATE: 65 BPM | DIASTOLIC BLOOD PRESSURE: 64 MMHG

## 2024-09-10 VITALS
DIASTOLIC BLOOD PRESSURE: 64 MMHG | HEART RATE: 65 BPM | TEMPERATURE: 97.8 F | SYSTOLIC BLOOD PRESSURE: 122 MMHG | RESPIRATION RATE: 16 BRPM | BODY MASS INDEX: 26.87 KG/M2 | OXYGEN SATURATION: 99 % | WEIGHT: 146 LBS | HEIGHT: 62 IN

## 2024-09-10 DIAGNOSIS — Z00.00 MEDICARE ANNUAL WELLNESS VISIT, SUBSEQUENT: Primary | ICD-10-CM

## 2024-09-10 DIAGNOSIS — N18.32 STAGE 3B CHRONIC KIDNEY DISEASE (HCC): ICD-10-CM

## 2024-09-10 DIAGNOSIS — I25.10 CORONARY ARTERY DISEASE INVOLVING NATIVE HEART WITHOUT ANGINA PECTORIS, UNSPECIFIED VESSEL OR LESION TYPE: ICD-10-CM

## 2024-09-10 DIAGNOSIS — I10 ESSENTIAL HYPERTENSION: ICD-10-CM

## 2024-09-10 DIAGNOSIS — I50.32 CHRONIC HEART FAILURE WITH PRESERVED EJECTION FRACTION (HFPEF) (HCC): ICD-10-CM

## 2024-09-10 DIAGNOSIS — I65.21 CAROTID STENOSIS, RIGHT: ICD-10-CM

## 2024-09-10 DIAGNOSIS — Z23 NEED FOR INFLUENZA VACCINATION: ICD-10-CM

## 2024-09-10 DIAGNOSIS — E04.1 THYROID NODULE: ICD-10-CM

## 2024-09-10 DIAGNOSIS — E11.40 TYPE 2 DIABETES MELLITUS WITH DIABETIC NEUROPATHY, WITHOUT LONG-TERM CURRENT USE OF INSULIN (HCC): Primary | ICD-10-CM

## 2024-09-10 LAB — HBA1C MFR BLD: 8.9 %

## 2024-09-10 RX ORDER — CARVEDILOL 6.25 MG/1
6.25 TABLET ORAL 2 TIMES DAILY
Qty: 180 TABLET | Refills: 3 | Status: SHIPPED | OUTPATIENT
Start: 2024-09-10

## 2024-09-10 RX ORDER — SITAGLIPTIN 50 MG/1
50 TABLET, FILM COATED ORAL DAILY
Qty: 90 TABLET | Refills: 3 | Status: SHIPPED | OUTPATIENT
Start: 2024-09-10

## 2024-09-10 RX ORDER — ROSUVASTATIN CALCIUM 20 MG/1
20 TABLET, COATED ORAL DAILY
Qty: 90 TABLET | Refills: 1 | Status: SHIPPED | OUTPATIENT
Start: 2024-09-10

## 2024-09-10 RX ORDER — FUROSEMIDE 20 MG
TABLET ORAL
Qty: 45 TABLET | Refills: 1 | Status: SHIPPED | OUTPATIENT
Start: 2024-09-10

## 2024-09-10 RX ORDER — LANCETS 33 GAUGE
1 EACH MISCELLANEOUS 2 TIMES DAILY
Qty: 200 EACH | Refills: 5 | Status: SHIPPED | OUTPATIENT
Start: 2024-09-10 | End: 2025-03-09

## 2024-09-10 RX ORDER — AMLODIPINE BESYLATE 5 MG/1
5 TABLET ORAL DAILY
Qty: 90 TABLET | Refills: 3 | Status: SHIPPED | OUTPATIENT
Start: 2024-09-10

## 2024-09-10 RX ORDER — CLOPIDOGREL BISULFATE 75 MG/1
75 TABLET ORAL DAILY
Qty: 90 TABLET | Refills: 3 | Status: SHIPPED | OUTPATIENT
Start: 2024-09-10

## 2024-09-10 RX ORDER — GLIPIZIDE 10 MG/1
TABLET, FILM COATED, EXTENDED RELEASE ORAL
Qty: 180 TABLET | Refills: 1 | Status: SHIPPED | OUTPATIENT
Start: 2024-09-10

## 2024-09-10 ASSESSMENT — PATIENT HEALTH QUESTIONNAIRE - PHQ9
1. LITTLE INTEREST OR PLEASURE IN DOING THINGS: NOT AT ALL
SUM OF ALL RESPONSES TO PHQ QUESTIONS 1-9: 0
2. FEELING DOWN, DEPRESSED OR HOPELESS: NOT AT ALL
SUM OF ALL RESPONSES TO PHQ9 QUESTIONS 1 & 2: 0
SUM OF ALL RESPONSES TO PHQ QUESTIONS 1-9: 0

## 2024-09-10 ASSESSMENT — ENCOUNTER SYMPTOMS
SHORTNESS OF BREATH: 0
GASTROINTESTINAL NEGATIVE: 1

## 2024-09-16 DIAGNOSIS — N63.12 MASS OF UPPER INNER QUADRANT OF RIGHT BREAST: ICD-10-CM

## 2024-09-19 ENCOUNTER — OFFICE VISIT (OUTPATIENT)
Dept: URBAN - METROPOLITAN AREA CLINIC 44 | Facility: CLINIC | Age: 84
End: 2024-09-19
Payer: MEDICARE

## 2024-09-19 DIAGNOSIS — D32.9 MENINGIOMA: ICD-10-CM

## 2024-09-19 DIAGNOSIS — H25.813 COMBINED FORMS OF AGE-RELATED CATARACT, BILATERAL: Primary | ICD-10-CM

## 2024-09-19 DIAGNOSIS — H43.393 OTHER VITREOUS OPACITIES, BILATERAL: ICD-10-CM

## 2024-09-19 DIAGNOSIS — H35.3132 NONEXUDATIVE MACULAR DEGENERATION, INTERMEDIATE DRY STAGE, BILATERAL: ICD-10-CM

## 2024-09-19 DIAGNOSIS — H52.203 BILATERAL ASTIGMATISM: ICD-10-CM

## 2024-09-19 DIAGNOSIS — H40.033 ANATOMICAL NARROW ANGLE, BILATERAL: ICD-10-CM

## 2024-09-19 DIAGNOSIS — H04.123 DRY EYE SYNDROME OF BILATERAL LACRIMAL GLANDS: ICD-10-CM

## 2024-09-19 DIAGNOSIS — H25.811 COMBINED FORMS OF AGE-RELATED CATARACT, RIGHT EYE: ICD-10-CM

## 2024-09-19 DIAGNOSIS — G40.909 SEIZURE DISORDER: ICD-10-CM

## 2024-09-19 PROCEDURE — 99214 OFFICE O/P EST MOD 30 MIN: CPT | Performed by: OPHTHALMOLOGY

## 2024-09-19 RX ORDER — PREDNISOLONE ACETATE 10 MG/ML
1 % SUSPENSION/ DROPS OPHTHALMIC
Qty: 5 | Refills: 2 | Status: ACTIVE
Start: 2024-09-19

## 2024-09-19 RX ORDER — DICLOFENAC SODIUM 1 MG/ML
0.1 % SOLUTION/ DROPS OPHTHALMIC
Qty: 5 | Refills: 2 | Status: ACTIVE
Start: 2024-09-19

## 2024-09-19 ASSESSMENT — INTRAOCULAR PRESSURE
OD: 14
OS: 15

## 2024-09-20 DIAGNOSIS — C50.919 INFILTRATING DUCTAL CARCINOMA OF BREAST, UNSPECIFIED LATERALITY (HCC): Primary | ICD-10-CM

## 2024-09-24 DIAGNOSIS — C50.911 INFILTRATING DUCTAL CARCINOMA OF RIGHT BREAST (HCC): Primary | ICD-10-CM

## 2024-09-24 DIAGNOSIS — R92.8 ABNORMAL MAMMOGRAM OF RIGHT BREAST: ICD-10-CM

## 2024-09-26 ENCOUNTER — SURGERY (OUTPATIENT)
Dept: URBAN - METROPOLITAN AREA SURGERY 19 | Facility: SURGERY | Age: 84
End: 2024-09-26
Payer: MEDICARE

## 2024-09-26 PROCEDURE — 66984 XCAPSL CTRC RMVL W/O ECP: CPT | Performed by: OPHTHALMOLOGY

## 2024-09-27 ENCOUNTER — TELEPHONE (OUTPATIENT)
Dept: PRIMARY CARE CLINIC | Age: 84
End: 2024-09-27

## 2024-09-27 ENCOUNTER — POST-OPERATIVE VISIT (OUTPATIENT)
Dept: URBAN - METROPOLITAN AREA CLINIC 44 | Facility: CLINIC | Age: 84
End: 2024-09-27
Payer: MEDICARE

## 2024-09-27 DIAGNOSIS — Z48.810 ENCOUNTER FOR SURGICAL AFTERCARE FOLLOWING SURGERY ON A SENSE ORGAN: Primary | ICD-10-CM

## 2024-09-27 PROCEDURE — 99024 POSTOP FOLLOW-UP VISIT: CPT | Performed by: OPTOMETRIST

## 2024-09-27 ASSESSMENT — INTRAOCULAR PRESSURE: OS: 17

## 2024-09-27 NOTE — TELEPHONE ENCOUNTER
The lab called and wanted to know if we received paperwork on the 9/25/24 for testing for patient. I said yes, but that it wasn't  filled out yet per [place]. I told lab that Dr. Marti would be in on Monday. They showed understanding

## 2024-10-01 ENCOUNTER — TELEPHONE (OUTPATIENT)
Dept: PRIMARY CARE CLINIC | Age: 84
End: 2024-10-01

## 2024-10-01 DIAGNOSIS — C50.911 INFILTRATING DUCTAL CARCINOMA OF RIGHT BREAST (HCC): Primary | ICD-10-CM

## 2024-10-01 NOTE — TELEPHONE ENCOUNTER
Pt called in stating she had received a box in the mail for genetic testing. On the box it states it was ordered by dr thomas. I got the information off the box- [place], [place]. I looked this company up and there is a fraud alert from the government in the website. I advised patient not to complete the swab and do not give them any personal information

## 2024-10-03 ENCOUNTER — POST-OPERATIVE VISIT (OUTPATIENT)
Dept: URBAN - METROPOLITAN AREA CLINIC 44 | Facility: CLINIC | Age: 84
End: 2024-10-03
Payer: MEDICARE

## 2024-10-03 DIAGNOSIS — H52.4 PRESBYOPIA: ICD-10-CM

## 2024-10-03 DIAGNOSIS — Z48.810 ENCOUNTER FOR SURGICAL AFTERCARE FOLLOWING SURGERY ON A SENSE ORGAN: Primary | ICD-10-CM

## 2024-10-03 PROCEDURE — 99024 POSTOP FOLLOW-UP VISIT: CPT | Performed by: OPTOMETRIST

## 2024-10-03 PROCEDURE — 92015 DETERMINE REFRACTIVE STATE: CPT | Performed by: OPTOMETRIST

## 2024-10-03 ASSESSMENT — VISUAL ACUITY
OS: 20/20
OD: 20/40

## 2024-10-03 ASSESSMENT — KERATOMETRY
OS: 42.38
OD: 43.75

## 2024-10-03 ASSESSMENT — INTRAOCULAR PRESSURE
OS: 11
OD: 11

## 2024-10-10 ENCOUNTER — SURGERY (OUTPATIENT)
Dept: URBAN - METROPOLITAN AREA SURGERY 19 | Facility: SURGERY | Age: 84
End: 2024-10-10
Payer: MEDICARE

## 2024-10-10 PROCEDURE — 66984 XCAPSL CTRC RMVL W/O ECP: CPT | Performed by: OPHTHALMOLOGY

## 2024-10-11 ENCOUNTER — POST-OPERATIVE VISIT (OUTPATIENT)
Dept: URBAN - METROPOLITAN AREA CLINIC 44 | Facility: CLINIC | Age: 84
End: 2024-10-11
Payer: MEDICARE

## 2024-10-11 DIAGNOSIS — Z48.810 ENCOUNTER FOR SURGICAL AFTERCARE FOLLOWING SURGERY ON A SENSE ORGAN: Primary | ICD-10-CM

## 2024-10-11 PROCEDURE — 99024 POSTOP FOLLOW-UP VISIT: CPT | Performed by: OPTOMETRIST

## 2024-10-11 ASSESSMENT — INTRAOCULAR PRESSURE: OD: 10

## 2024-10-24 ENCOUNTER — TELEPHONE (OUTPATIENT)
Dept: PRIMARY CARE CLINIC | Age: 84
End: 2024-10-24

## 2024-10-24 NOTE — TELEPHONE ENCOUNTER
[facility] office called asking about medical clearance for patient.  She is to have the procedure done tomorrow at 11 am.   They are wondering if you are going to sign off on this.  It is the left breast lumpectomy.  I Stated that I didn't know If you would be able to fill it out since they just sent it today.  She stated she was sorry they overlooked it until this morning and really do not want to reschedule and put this off.   Order is attached to msg.

## 2024-10-24 NOTE — TELEPHONE ENCOUNTER
I am fine to sign off that she could hold jardiance but obviously I don't know if she did since we're just getting this today   I'm not doing any other type of clearance   I'll sign and send the paper from [facility]

## 2024-11-07 ENCOUNTER — POST-OPERATIVE VISIT (OUTPATIENT)
Dept: URBAN - METROPOLITAN AREA CLINIC 44 | Facility: CLINIC | Age: 84
End: 2024-11-07
Payer: MEDICARE

## 2024-11-07 DIAGNOSIS — H52.4 PRESBYOPIA: Primary | ICD-10-CM

## 2024-11-07 DIAGNOSIS — Z96.1 PRESENCE OF INTRAOCULAR LENS: ICD-10-CM

## 2024-11-07 PROCEDURE — 99024 POSTOP FOLLOW-UP VISIT: CPT | Performed by: OPTOMETRIST

## 2024-11-07 ASSESSMENT — VISUAL ACUITY
OD: 20/25
OS: 20/20

## 2024-11-07 ASSESSMENT — KERATOMETRY
OD: 42.50
OS: 42.38

## 2024-11-07 ASSESSMENT — INTRAOCULAR PRESSURE
OD: 10
OS: 13

## 2024-11-19 DIAGNOSIS — E11.40 TYPE 2 DIABETES MELLITUS WITH DIABETIC NEUROPATHY, WITHOUT LONG-TERM CURRENT USE OF INSULIN (HCC): ICD-10-CM

## 2024-11-19 NOTE — TELEPHONE ENCOUNTER
Pt states she always gets her testing supplies at [facility]. I have these set up to print and we will have to fax them

## 2024-11-20 RX ORDER — LANCETS 33 GAUGE
1 EACH MISCELLANEOUS 2 TIMES DAILY
Qty: 200 EACH | Refills: 5 | Status: SHIPPED | OUTPATIENT
Start: 2024-11-20 | End: 2025-05-19

## 2025-01-06 ENCOUNTER — TELEPHONE (OUTPATIENT)
Dept: CARDIOLOGY CLINIC | Age: 85
End: 2025-01-06

## 2025-01-06 NOTE — TELEPHONE ENCOUNTER
Dr. Isaac,    Patient is calling for Cardiac Clearance and wants to stop the Plavix for 7 days?    Please Advise.

## 2025-01-20 ENCOUNTER — TELEPHONE (OUTPATIENT)
Dept: CARDIOLOGY CLINIC | Age: 85
End: 2025-01-20

## 2025-01-20 NOTE — TELEPHONE ENCOUNTER
[facility] CALLED SHE WAS HOLDING HER PLAVIX FOR SURGERY.  THE SURGERY WAS SCHEDULED FOR TODAY BUT THEY CANCELLED IT.  THEY RESCHEDULED IT FOR 1/24/25.  SHE WANTED TO KNOW IF SHE SHOULD RESTART IT TODAY OR JUST CONTINUE TO HOLD IT UNTIL THE SURGERY.    IN DR BURDICK'S ABSENCE I ASKED DR GONZALEZ WHAT TO TELL THE PATIENT.  PER DR GONZALEZ CONTINUE TO HOLD IT UNTIL AFTER THE SURGERY ON FRIDAY.  THEN THE SURGEON WILL RESTART IT.      I CALLED SHAKA WITH THE ABOVE INFORMATION.  RLL

## 2025-03-18 ENCOUNTER — OFFICE VISIT (OUTPATIENT)
Dept: PRIMARY CARE CLINIC | Age: 85
End: 2025-03-18

## 2025-03-18 VITALS
RESPIRATION RATE: 16 BRPM | HEART RATE: 68 BPM | SYSTOLIC BLOOD PRESSURE: 130 MMHG | BODY MASS INDEX: 25.4 KG/M2 | DIASTOLIC BLOOD PRESSURE: 80 MMHG | HEIGHT: 62 IN | OXYGEN SATURATION: 98 % | WEIGHT: 138 LBS

## 2025-03-18 DIAGNOSIS — C50.911 MALIGNANT NEOPLASM OF RIGHT FEMALE BREAST, UNSPECIFIED ESTROGEN RECEPTOR STATUS, UNSPECIFIED SITE OF BREAST: Primary | ICD-10-CM

## 2025-03-18 DIAGNOSIS — I25.10 CORONARY ARTERY DISEASE INVOLVING NATIVE HEART WITHOUT ANGINA PECTORIS, UNSPECIFIED VESSEL OR LESION TYPE: ICD-10-CM

## 2025-03-18 DIAGNOSIS — I10 ESSENTIAL HYPERTENSION: ICD-10-CM

## 2025-03-18 DIAGNOSIS — I50.32 CHRONIC HEART FAILURE WITH PRESERVED EJECTION FRACTION (HFPEF) (HCC): ICD-10-CM

## 2025-03-18 DIAGNOSIS — N18.32 STAGE 3B CHRONIC KIDNEY DISEASE (HCC): ICD-10-CM

## 2025-03-18 DIAGNOSIS — E11.40 TYPE 2 DIABETES MELLITUS WITH DIABETIC NEUROPATHY, WITHOUT LONG-TERM CURRENT USE OF INSULIN (HCC): ICD-10-CM

## 2025-03-18 LAB — HBA1C MFR BLD: 9.1 %

## 2025-03-18 RX ORDER — MECLIZINE HCL 12.5 MG 12.5 MG/1
12.5 TABLET ORAL DAILY PRN
Qty: 30 TABLET | Refills: 0 | Status: SHIPPED | OUTPATIENT
Start: 2025-03-18

## 2025-03-18 RX ORDER — SITAGLIPTIN 50 MG/1
50 TABLET, FILM COATED ORAL DAILY
Qty: 90 TABLET | Refills: 3 | Status: SHIPPED | OUTPATIENT
Start: 2025-03-18

## 2025-03-18 RX ORDER — CARVEDILOL 6.25 MG/1
6.25 TABLET ORAL 2 TIMES DAILY
Qty: 180 TABLET | Refills: 3 | Status: SHIPPED | OUTPATIENT
Start: 2025-03-18

## 2025-03-18 RX ORDER — ROSUVASTATIN CALCIUM 20 MG/1
20 TABLET, COATED ORAL DAILY
Qty: 90 TABLET | Refills: 1 | Status: SHIPPED | OUTPATIENT
Start: 2025-03-18

## 2025-03-18 RX ORDER — GLIPIZIDE 10 MG/1
TABLET, FILM COATED, EXTENDED RELEASE ORAL
Qty: 180 TABLET | Refills: 1 | Status: SHIPPED | OUTPATIENT
Start: 2025-03-18

## 2025-03-18 RX ORDER — AMLODIPINE BESYLATE 5 MG/1
5 TABLET ORAL DAILY
Qty: 90 TABLET | Refills: 3 | Status: SHIPPED | OUTPATIENT
Start: 2025-03-18

## 2025-03-18 RX ORDER — TAMOXIFEN CITRATE 20 MG/1
20 TABLET ORAL DAILY
COMMUNITY
Start: 2025-02-26

## 2025-03-18 RX ORDER — FUROSEMIDE 20 MG/1
TABLET ORAL
Qty: 45 TABLET | Refills: 1 | Status: SHIPPED | OUTPATIENT
Start: 2025-03-18

## 2025-03-18 RX ORDER — ASPIRIN 81 MG/1
81 TABLET ORAL DAILY
Qty: 90 TABLET | Refills: 3 | Status: SHIPPED | OUTPATIENT
Start: 2025-03-18

## 2025-03-18 RX ORDER — CLOPIDOGREL BISULFATE 75 MG/1
75 TABLET ORAL DAILY
Qty: 90 TABLET | Refills: 3 | Status: SHIPPED | OUTPATIENT
Start: 2025-03-18

## 2025-03-18 RX ORDER — LANCETS 33 GAUGE
1 EACH MISCELLANEOUS 2 TIMES DAILY
Qty: 200 EACH | Refills: 5 | Status: SHIPPED | OUTPATIENT
Start: 2025-03-18 | End: 2025-09-14

## 2025-03-18 SDOH — ECONOMIC STABILITY: FOOD INSECURITY: WITHIN THE PAST 12 MONTHS, THE FOOD YOU BOUGHT JUST DIDN'T LAST AND YOU DIDN'T HAVE MONEY TO GET MORE.: NEVER TRUE

## 2025-03-18 SDOH — ECONOMIC STABILITY: FOOD INSECURITY: WITHIN THE PAST 12 MONTHS, YOU WORRIED THAT YOUR FOOD WOULD RUN OUT BEFORE YOU GOT MONEY TO BUY MORE.: NEVER TRUE

## 2025-03-18 ASSESSMENT — PATIENT HEALTH QUESTIONNAIRE - PHQ9
1. LITTLE INTEREST OR PLEASURE IN DOING THINGS: NOT AT ALL
SUM OF ALL RESPONSES TO PHQ QUESTIONS 1-9: 0
1. LITTLE INTEREST OR PLEASURE IN DOING THINGS: NOT AT ALL
SUM OF ALL RESPONSES TO PHQ QUESTIONS 1-9: 0
2. FEELING DOWN, DEPRESSED OR HOPELESS: NOT AT ALL
SUM OF ALL RESPONSES TO PHQ QUESTIONS 1-9: 0
2. FEELING DOWN, DEPRESSED OR HOPELESS: NOT AT ALL
SUM OF ALL RESPONSES TO PHQ QUESTIONS 1-9: 0

## 2025-03-18 NOTE — PROGRESS NOTES
3/18/25  Alise Covington : 1940 Sex: female  Age: 84 y.o.      Assessment and Plan:  Alise was seen today for diabetes.    Diagnoses and all orders for this visit:    Malignant neoplasm of right female breast, unspecified estrogen receptor status, unspecified site of breast (HCC)  Patient will follow-up with oncology as scheduled    Type 2 diabetes mellitus with diabetic neuropathy, without long-term current use of insulin ([facility])  -     rosuvastatin (CRESTOR) 20 MG tablet; Take 1 tablet by mouth daily  -     Lancets 33G MISC; 1 Lancet  by Does not apply route in the morning and at bedtime  -     JANUVIA 50 MG tablet; Take 1 tablet by mouth daily  -     glipiZIDE (GLUCOTROL XL) 10 MG extended release tablet; TAKE ONE TABLET BY MOUTH TWO TIMES A DAY  -     empagliflozin (JARDIANCE) 25 MG tablet; Take 1 tablet by mouth daily  -     blood glucose test strips (ASCENSIA AUTODISC VI;ONE TOUCH ULTRA TEST VI) strip; 1 each by In Vitro route 2 times daily Indications: For an Easy Max 5 machine  -     POCT glycosylated hemoglobin (Hb A1C)  Further recommendations pending results    Coronary artery disease involving native heart without angina pectoris, unspecified vessel or lesion type  -     rosuvastatin (CRESTOR) 20 MG tablet; Take 1 tablet by mouth daily  -     clopidogrel (PLAVIX) 75 MG tablet; Take 1 tablet by mouth daily  Stable. Cont current treatment as documented below.     Chronic heart failure with preserved ejection fraction (HFpEF) ([facility])  -     furosemide (LASIX) 20 MG tablet; TAKE ONE TABLET BY MOUTH EVERY OTHER DAY  Stable. Cont current treatment as documented below.     Essential hypertension  -     carvedilol (COREG) 6.25 MG tablet; Take 1 tablet by mouth 2 times daily  -     amLODIPine (NORVASC) 5 MG tablet; Take 1 tablet by mouth daily  Well controlled. Cont current treatment plan as documented below.    Stage 3b chronic kidney disease ([facility])  Followed through oncology    Other orders  -     meclizine

## 2025-03-20 ENCOUNTER — RESULTS FOLLOW-UP (OUTPATIENT)
Dept: PRIMARY CARE CLINIC | Age: 85
End: 2025-03-20

## 2025-03-26 RX ORDER — NITROGLYCERIN 0.4 MG/1
0.4 TABLET SUBLINGUAL EVERY 5 MIN PRN
Qty: 25 TABLET | Refills: 1 | Status: SHIPPED | OUTPATIENT
Start: 2025-03-26

## 2025-03-26 NOTE — TELEPHONE ENCOUNTER
Pt called in requesting a refill for nitroglycerin, she states she has not had this filled since 2023.   Pended for [facility].     Name of Medication(s) Requested:  Requested Prescriptions     Pending Prescriptions Disp Refills    nitroGLYCERIN (NITROSTAT) 0.4 MG SL tablet 25 tablet 1     Sig: Place 1 tablet under the tongue every 5 minutes as needed for Chest pain up to max of 3 total doses. If no relief after 1 dose, call 911.       Medication is on current medication list Yes    Dosage and directions were verified? Yes    Quantity verified: 30 day supply     Pharmacy Verified?  Yes    Last Appointment:  3/18/2025    Future appts:  Future Appointments   Date Time Provider Department Center   3/27/2025 10:45 AM Valentino Camara MD [facility]   9/16/2025  2:30 PM Marina Marti MD [place] [facility] DEP        (If no appt send self scheduling link. .REFILLAPPT)  Scheduling request sent?     [] Yes  [] No    Does patient need updated?  [] Yes  [x] No

## 2025-03-27 ENCOUNTER — OFFICE VISIT (OUTPATIENT)
Dept: CARDIOLOGY CLINIC | Age: 85
End: 2025-03-27
Payer: MEDICARE

## 2025-03-27 VITALS
BODY MASS INDEX: 25.3 KG/M2 | HEIGHT: 62 IN | RESPIRATION RATE: 18 BRPM | OXYGEN SATURATION: 100 % | DIASTOLIC BLOOD PRESSURE: 60 MMHG | SYSTOLIC BLOOD PRESSURE: 116 MMHG | TEMPERATURE: 96.5 F | HEART RATE: 69 BPM | WEIGHT: 137.5 LBS

## 2025-03-27 DIAGNOSIS — I25.10 CORONARY ARTERY DISEASE INVOLVING NATIVE CORONARY ARTERY OF NATIVE HEART WITHOUT ANGINA PECTORIS: ICD-10-CM

## 2025-03-27 DIAGNOSIS — I34.0 NONRHEUMATIC MITRAL VALVE REGURGITATION: ICD-10-CM

## 2025-03-27 DIAGNOSIS — R01.1 HEART MURMUR: ICD-10-CM

## 2025-03-27 DIAGNOSIS — I10 ESSENTIAL HYPERTENSION: ICD-10-CM

## 2025-03-27 DIAGNOSIS — I50.32 CHRONIC HEART FAILURE WITH PRESERVED EJECTION FRACTION (HFPEF) (HCC): ICD-10-CM

## 2025-03-27 DIAGNOSIS — I50.22 CHRONIC SYSTOLIC (CONGESTIVE) HEART FAILURE: ICD-10-CM

## 2025-03-27 DIAGNOSIS — I35.0 MODERATE TO SEVERE AORTIC STENOSIS: Primary | ICD-10-CM

## 2025-03-27 PROCEDURE — G8417 CALC BMI ABV UP PARAM F/U: HCPCS | Performed by: INTERNAL MEDICINE

## 2025-03-27 PROCEDURE — 1036F TOBACCO NON-USER: CPT | Performed by: INTERNAL MEDICINE

## 2025-03-27 PROCEDURE — 1159F MED LIST DOCD IN RCRD: CPT | Performed by: INTERNAL MEDICINE

## 2025-03-27 PROCEDURE — 1123F ACP DISCUSS/DSCN MKR DOCD: CPT | Performed by: INTERNAL MEDICINE

## 2025-03-27 PROCEDURE — 3078F DIAST BP <80 MM HG: CPT | Performed by: INTERNAL MEDICINE

## 2025-03-27 PROCEDURE — G8399 PT W/DXA RESULTS DOCUMENT: HCPCS | Performed by: INTERNAL MEDICINE

## 2025-03-27 PROCEDURE — G2211 COMPLEX E/M VISIT ADD ON: HCPCS | Performed by: INTERNAL MEDICINE

## 2025-03-27 PROCEDURE — 93000 ELECTROCARDIOGRAM COMPLETE: CPT | Performed by: INTERNAL MEDICINE

## 2025-03-27 PROCEDURE — 3074F SYST BP LT 130 MM HG: CPT | Performed by: INTERNAL MEDICINE

## 2025-03-27 PROCEDURE — G8427 DOCREV CUR MEDS BY ELIG CLIN: HCPCS | Performed by: INTERNAL MEDICINE

## 2025-03-27 PROCEDURE — 1090F PRES/ABSN URINE INCON ASSESS: CPT | Performed by: INTERNAL MEDICINE

## 2025-03-27 PROCEDURE — 99214 OFFICE O/P EST MOD 30 MIN: CPT | Performed by: INTERNAL MEDICINE

## 2025-03-27 NOTE — PATIENT INSTRUCTIONS
Continue all your medications at current doses.   We will schedule limited echo  I will give you a handout for healthy diet  Restrict sodium intake to less than 2-2.5 g/day. Restrict fluid intake to less than 2.2 L/day. Goal BP is less than 130/80.   Please try to exercise for 150 minutes a week.   I will see you back in the office in  12  months. Please call the office at ([phone]) if you have any questions.

## 2025-03-27 NOTE — PROGRESS NOTES
affect, cooperative  Skin: Color, texture, and turgor normal for age         Labs/Imaging/Diagnostics   Personally reviewed:    Lab Results   Component Value Date/Time     02/04/2025 02:11 PM    K 3.6 02/04/2025 02:11 PM    K 4.3 06/22/2021 07:51 AM     02/04/2025 02:11 PM    CO2 24 02/04/2025 02:11 PM    BUN 22 02/04/2025 02:11 PM    CREATININE 1.60 02/04/2025 02:11 PM    GLUCOSE 270 02/04/2025 02:11 PM    GLUCOSE 170 01/24/2025 10:24 AM    CALCIUM 9.0 02/04/2025 02:11 PM        Estimated Creatinine Clearance: 24 mL/min (A) (based on SCr of 1.6 mg/dL (H)).    Lab Results   Component Value Date    WBC 7.3 02/04/2025    HGB 13.7 02/04/2025    HCT 40.4 02/04/2025    MCV 98.6 02/04/2025     02/04/2025       Lab Results   Component Value Date    ALT 14 02/04/2025    AST 15 02/04/2025    ALKPHOS 71 02/04/2025    BILITOT 0.7 02/04/2025       No results found for: \"LABPROT\", \"LABALBU\"      Lab Results   Component Value Date    CHOL 132 05/08/2024    CHOL 145 02/06/2023    CHOL 126 08/16/2022     Lab Results   Component Value Date    TRIG 114 05/08/2024    TRIG 129 02/06/2023    TRIG 160 (H) 08/16/2022     Lab Results   Component Value Date    HDL 52 05/08/2024    HDL 61 02/06/2023    HDL 46 08/16/2022     No components found for: \"LDLCHOLESTEROL\", \"LDLCALC\"    Lab Results   Component Value Date    VLDL 23 05/08/2024    VLDL 26 02/06/2023    VLDL 32 08/16/2022       No results found for: \"CHOLHDLRATIO\"    Lab Results   Component Value Date    TROPONINI < 0.03 06/08/2021       Lab Results   Component Value Date     (H) 08/17/2021         Lab Results   Component Value Date    LABA1C 9.1 03/18/2025     Lab Results   Component Value Date     07/31/2021      EKG today: NSR, WNL     Assessment and Plan:        83-year-old  female with a history above.  She is doing very well.  She is euvolemic and has no angina with very good functional capacity.  She has no symptoms of arrhythmia.  Her

## 2025-03-31 ENCOUNTER — TELEPHONE (OUTPATIENT)
Dept: CARDIOLOGY | Age: 85
End: 2025-03-31

## 2025-03-31 NOTE — TELEPHONE ENCOUNTER
Called and spoke with patient about scheduling echo. Patient needs echo is about 6 months, around September. Patient stated she wanted to wait to schedule until closer to the day and will call at that time to do so.    Electronically signed by Dalia Estrada on 3/31/2025 at 1:52 PM

## 2025-04-07 ENCOUNTER — TELEPHONE (OUTPATIENT)
Dept: PRIMARY CARE CLINIC | Age: 85
End: 2025-04-07

## 2025-04-07 DIAGNOSIS — M54.50 LUMBAR BACK PAIN: Primary | ICD-10-CM

## 2025-04-07 NOTE — TELEPHONE ENCOUNTER
Order faxed to [facility].  Patient informed.    Electronically signed by More Desir LPN on 4/7/2025 at 9:42 AM

## 2025-04-07 NOTE — TELEPHONE ENCOUNTER
Last Appointment:  3/18/2025  Future Appointments   Date Time Provider Department Center   9/16/2025  2:30 PM Marina Marti MD [place] [facility] DEP      Patient called she needs an x-ray of lower back.  Bent down to  something about a week ago and it is not getting better.    Please call patient.    Electronically signed by More Desir LPN on 4/7/2025 at 9:26 AM

## 2025-04-09 ENCOUNTER — TELEPHONE (OUTPATIENT)
Dept: PRIMARY CARE CLINIC | Age: 85
End: 2025-04-09

## 2025-04-09 NOTE — TELEPHONE ENCOUNTER
Pt called stating she believes she has a pinched nerve in her back, so she asked if we can cancel the back xray and instead order a CT or an MRI.   I stated she would need to schedule an appt to discuss, and the first available is next Wednesday morning.   She states she has been dealing with this for over a week now, and asked if she can see you sooner.     Please advise.

## 2025-04-09 NOTE — TELEPHONE ENCOUNTER
Could she come Monday at 9 AM?  I would do the back x-ray regardless as insurance typically needs to see that prior to authorizing further imaging.

## 2025-04-14 ENCOUNTER — OFFICE VISIT (OUTPATIENT)
Dept: PRIMARY CARE CLINIC | Age: 85
End: 2025-04-14

## 2025-04-14 VITALS
OXYGEN SATURATION: 99 % | HEIGHT: 62 IN | HEART RATE: 61 BPM | SYSTOLIC BLOOD PRESSURE: 132 MMHG | BODY MASS INDEX: 25.03 KG/M2 | WEIGHT: 136 LBS | DIASTOLIC BLOOD PRESSURE: 74 MMHG | RESPIRATION RATE: 16 BRPM

## 2025-04-14 DIAGNOSIS — M54.50 ACUTE BILATERAL LOW BACK PAIN WITHOUT SCIATICA: Primary | ICD-10-CM

## 2025-04-14 RX ORDER — METHYLPREDNISOLONE 4 MG/1
TABLET ORAL
Qty: 21 TABLET | Refills: 0 | Status: SHIPPED | OUTPATIENT
Start: 2025-04-14

## 2025-04-14 NOTE — PROGRESS NOTES
25  Alise Covington : 1940 Sex: female  Age: 84 y.o.      Assessment and Plan:  Alise was seen today for back pain.    Diagnoses and all orders for this visit:    Acute bilateral low back pain without sciatica  -     methylPREDNISolone (MEDROL DOSEPACK) 4 MG tablet; Take by mouth as per dosepak instructions.  -     XR LUMBAR SPINE (2-3 VIEWS)    Precautions and red flags explicitly reviewed.  Follow-up in 1 week to reassess.  I have a longitudinal relationship with this patient and continued responsibility as the focal point for all needed services for his or her care.    Return in about 1 week (around 2025).    The patient (or guardian, if applicable) and other individuals in attendance with the patient were advised that Artificial Intelligence will be utilized during this visit to record, process the conversation to generate a clinical note, and support improvement of the AI technology. The patient (or guardian, if applicable) and other individuals in attendance at the appointment consented to the use of AI, including the recording.                Chief Complaint   Patient presents with    Back Pain       HPI  Pt here for acute concerns    Patient strained her lower back lifting case of water bottles  X 2 weeks   Discomfort has mostly been the same during this time  Tylenol helps some, Mineral Ice helps as well - but doesn't like to use them frequently     Pain lower back, just below her waist  Both sides and into her hips  In her pelvic area  Then also with pain just below her buttock in upper thighs, and down to the feet  Feels like a squeezing sensation lower legs  No new N/T   No weakness   No associated loss control bowels/bladder   No genital N/T     Problem list reviewed and updated in full with patient today as necessary.  A comprehensive ROS was negative, except as documented above.       Current Outpatient Medications:     methylPREDNISolone (MEDROL DOSEPACK) 4 MG tablet, Take by mouth

## 2025-04-15 ENCOUNTER — RESULTS FOLLOW-UP (OUTPATIENT)
Dept: PRIMARY CARE CLINIC | Age: 85
End: 2025-04-15

## 2025-04-29 ENCOUNTER — OFFICE VISIT (OUTPATIENT)
Dept: PRIMARY CARE CLINIC | Age: 85
End: 2025-04-29

## 2025-04-29 VITALS
SYSTOLIC BLOOD PRESSURE: 136 MMHG | DIASTOLIC BLOOD PRESSURE: 80 MMHG | WEIGHT: 136 LBS | RESPIRATION RATE: 16 BRPM | OXYGEN SATURATION: 97 % | HEART RATE: 74 BPM | BODY MASS INDEX: 25.03 KG/M2 | HEIGHT: 62 IN

## 2025-04-29 DIAGNOSIS — S22.080S COMPRESSION FRACTURE OF T12 VERTEBRA, SEQUELA: ICD-10-CM

## 2025-04-29 DIAGNOSIS — M54.16 LUMBAR BACK PAIN WITH RADICULOPATHY AFFECTING LOWER EXTREMITY: Primary | ICD-10-CM

## 2025-04-29 DIAGNOSIS — S32.040S COMPRESSION FRACTURE OF L4 LUMBAR VERTEBRA, SEQUELA: ICD-10-CM

## 2025-04-29 DIAGNOSIS — R39.89 ABNORMAL URINE COLOR: ICD-10-CM

## 2025-04-29 NOTE — PROGRESS NOTES
25  Alise Covington : 1940 Sex: female  Age: 84 y.o.      Assessment and Plan:  Alise was seen today for back pain.    Diagnoses and all orders for this visit:    Lumbar back pain with radiculopathy affecting lower extremity  -     MRI LUMBAR SPINE WO CONTRAST; Future    Abnormal urine color  -     Urinalysis; Future  -     Culture, Urine; Future    Compression fracture of T12 vertebra, sequela  -     NM BONE SCAN 3 PHASE; Future    Compression fracture of L4 lumbar vertebra, sequela  -     NM BONE SCAN 3 PHASE; Future      Further recommendations pending results    Return if symptoms worsen or fail to improve.          Chief Complaint   Patient presents with    Back Pain       HPI  Pt here for follow-up back pain  We did start patient on Medrol Dosepak at her last office visit as well as undertake lumbar x-ray  Results showed:  1.  Mild old compression fracture deformity of T12 (10%) and moderate old compression fracture    deformity of the L4 vertebral body (50%).    Radiology recommended bone scan  Pt also still interested in MRI Given her sx of sciatica   Does get N/T down the legs to the feel   Denies any weakness in the legs   No bowel/bladder changes  No genital numbness     Does report abnormal urine color     Problem list reviewed and updated in full with patient today as necessary.  A comprehensive ROS was negative, except as documented above.       Current Outpatient Medications:     CHROMIUM PO, Take 1,000 mg by mouth, Disp: , Rfl:     nitroGLYCERIN (NITROSTAT) 0.4 MG SL tablet, Place 1 tablet under the tongue every 5 minutes as needed for Chest pain up to max of 3 total doses. If no relief after 1 dose, call 911., Disp: 25 tablet, Rfl: 1    tamoxifen (NOLVADEX) 20 MG tablet, Take 1 tablet by mouth daily, Disp: , Rfl:     rosuvastatin (CRESTOR) 20 MG tablet, Take 1 tablet by mouth daily, Disp: 90 tablet, Rfl: 1    meclizine (ANTIVERT) 12.5 MG tablet, Take 1 tablet by mouth daily as needed

## 2025-05-05 DIAGNOSIS — R39.89 ABNORMAL URINE COLOR: ICD-10-CM

## 2025-05-05 LAB
BILIRUBIN, URINE: NEGATIVE
COLOR, UA: YELLOW
COMMENT: ABNORMAL
GLUCOSE URINE: >=1000 MG/DL
KETONES, URINE: NEGATIVE MG/DL
LEUKOCYTE ESTERASE, URINE: NEGATIVE
NITRITE, URINE: NEGATIVE
PH, URINE: 6 (ref 5–8)
PROTEIN UA: NEGATIVE MG/DL
SPECIFIC GRAVITY UA: 1.01 (ref 1–1.03)
TURBIDITY: CLEAR
URINE HGB: NEGATIVE
UROBILINOGEN, URINE: 0.2 EU/DL (ref 0–1)

## 2025-05-06 ENCOUNTER — RESULTS FOLLOW-UP (OUTPATIENT)
Dept: PRIMARY CARE CLINIC | Age: 85
End: 2025-05-06

## 2025-05-06 LAB
CULTURE: NORMAL
SPECIMEN DESCRIPTION: NORMAL

## 2025-05-08 ENCOUNTER — OFFICE VISIT (OUTPATIENT)
Dept: URBAN - METROPOLITAN AREA CLINIC 44 | Facility: CLINIC | Age: 85
End: 2025-05-08
Payer: MEDICARE

## 2025-05-08 DIAGNOSIS — H43.393 OTHER VITREOUS OPACITIES, BILATERAL: ICD-10-CM

## 2025-05-08 DIAGNOSIS — H35.3132 NONEXUDATIVE MACULAR DEGENERATION, INTERMEDIATE DRY STAGE, BILATERAL: ICD-10-CM

## 2025-05-08 DIAGNOSIS — H04.123 DRY EYE SYNDROME OF BILATERAL LACRIMAL GLANDS: Primary | ICD-10-CM

## 2025-05-08 DIAGNOSIS — H26.493 OTHER SECONDARY CATARACT, BILATERAL: ICD-10-CM

## 2025-05-08 DIAGNOSIS — H40.033 ANATOMICAL NARROW ANGLE, BILATERAL: ICD-10-CM

## 2025-05-08 PROCEDURE — 99214 OFFICE O/P EST MOD 30 MIN: CPT | Performed by: OPTOMETRIST

## 2025-05-08 PROCEDURE — 92134 CPTRZ OPH DX IMG PST SGM RTA: CPT | Performed by: OPTOMETRIST

## 2025-05-08 ASSESSMENT — VISUAL ACUITY
OD: 20/20
OS: 20/20

## 2025-05-08 ASSESSMENT — INTRAOCULAR PRESSURE
OD: 15
OS: 17

## 2025-05-14 ENCOUNTER — TELEPHONE (OUTPATIENT)
Dept: PRIMARY CARE CLINIC | Age: 85
End: 2025-05-14

## 2025-05-14 DIAGNOSIS — M54.16 LUMBAR BACK PAIN WITH RADICULOPATHY AFFECTING LOWER EXTREMITY: Primary | ICD-10-CM

## 2025-05-14 RX ORDER — HYDROCODONE BITARTRATE AND ACETAMINOPHEN 5; 325 MG/1; MG/1
1 TABLET ORAL EVERY 6 HOURS PRN
Qty: 20 TABLET | Refills: 0 | Status: SHIPPED | OUTPATIENT
Start: 2025-05-14 | End: 2025-05-19

## 2025-05-14 NOTE — TELEPHONE ENCOUNTER
Patient states that her scan isn't until Monday, but her back is hurting so much she can hardly walk. She said the pain goes down her legs. I asked if she is taking anything and she said that tylenol isn't doing anything to help and she would like to know if you can send anything in for her to [facility].

## 2025-05-14 NOTE — TELEPHONE ENCOUNTER
Patient notified and showed understanding    "You can access the [facility], offered by [facility], by registering with the following website: [email or web address]"

## 2025-05-14 NOTE — TELEPHONE ENCOUNTER
Limited supply vicodin sent  Please review precautions with med  Also if pain really that bad pt should consider ER now   If any genital numbness or loss control bowels/bladder must go to ER

## 2025-05-16 ENCOUNTER — TELEPHONE (OUTPATIENT)
Dept: PRIMARY CARE CLINIC | Age: 85
End: 2025-05-16

## 2025-05-16 DIAGNOSIS — M54.16 LUMBAR BACK PAIN WITH RADICULOPATHY AFFECTING LOWER EXTREMITY: Primary | ICD-10-CM

## 2025-05-16 RX ORDER — OXYCODONE AND ACETAMINOPHEN 5; 325 MG/1; MG/1
1 TABLET ORAL EVERY 6 HOURS PRN
Qty: 12 TABLET | Refills: 0 | Status: SHIPPED | OUTPATIENT
Start: 2025-05-16 | End: 2025-05-21 | Stop reason: SDUPTHER

## 2025-05-16 NOTE — TELEPHONE ENCOUNTER
Pt called in stating she has been taking Norco and it is not helping her back pain.   She states she had surgery in January and was prescribed Percocet by dr Kebede. She asked if this could be called in instead.     Please advise.

## 2025-05-16 NOTE — TELEPHONE ENCOUNTER
Limited supply sent.  Again please emphasize that if the patient is just covering the pain up and it is that bad, she should really consider the ER now.

## 2025-05-19 ENCOUNTER — RESULTS FOLLOW-UP (OUTPATIENT)
Dept: PRIMARY CARE CLINIC | Age: 85
End: 2025-05-19

## 2025-05-19 DIAGNOSIS — K59.03 DRUG-INDUCED CONSTIPATION: Primary | ICD-10-CM

## 2025-05-19 DIAGNOSIS — R93.7 ABNORMAL MRI, LUMBAR SPINE: ICD-10-CM

## 2025-05-19 DIAGNOSIS — M54.16 LUMBAR BACK PAIN WITH RADICULOPATHY AFFECTING LOWER EXTREMITY: Primary | ICD-10-CM

## 2025-05-19 NOTE — TELEPHONE ENCOUNTER
Last Appointment:  4/29/2025  Future Appointments   Date Time Provider Department Center   9/16/2025 12:30 PM [facility] ECHO [facility]   9/16/2025  2:30 PM Marina Marti MD [place] [facility] DEP      Patient called she is having constipation due to pain medication.  She requested MOM to pharmacy.  Pended med for your review and signature.     Also informed patient of MRI Lumbar.  She is agreeable to see Dr. Tucker for pain management however not neurosurgeon just yet.      Also Dr. Tucker is to retire this month sometime.  Dr. Mayank Kebede is taking over for him.      Electronically signed by More Desir LPN on 5/19/2025 at 1:37 PM

## 2025-05-19 NOTE — TELEPHONE ENCOUNTER
Patient notified and she said she has had a bone scan and a nuclear scan and she has to be back at the hospital by 1230. I did try to express that if the pain medications are not helping she would need to be evaluated by the emergency room. Patient did confirm that the medication was not really helping

## 2025-05-20 DIAGNOSIS — S22.080S COMPRESSION FRACTURE OF T12 VERTEBRA, SEQUELA: ICD-10-CM

## 2025-05-20 DIAGNOSIS — S32.040S COMPRESSION FRACTURE OF L4 LUMBAR VERTEBRA, SEQUELA: ICD-10-CM

## 2025-05-21 ENCOUNTER — TELEPHONE (OUTPATIENT)
Dept: CARDIOLOGY CLINIC | Age: 85
End: 2025-05-21

## 2025-05-21 DIAGNOSIS — M54.16 LUMBAR BACK PAIN WITH RADICULOPATHY AFFECTING LOWER EXTREMITY: ICD-10-CM

## 2025-05-21 NOTE — TELEPHONE ENCOUNTER
Pt called in asking if you could give her more pain medication, she can not get into pain management until July 10th.

## 2025-05-21 NOTE — TELEPHONE ENCOUNTER
Patient left a message on [facility] would like to know if she is able to have a MRI  over the weekend. Looks like she already had it.

## 2025-05-22 RX ORDER — OXYCODONE AND ACETAMINOPHEN 5; 325 MG/1; MG/1
1 TABLET ORAL EVERY 8 HOURS PRN
Qty: 21 TABLET | Refills: 0 | Status: SHIPPED | OUTPATIENT
Start: 2025-05-22 | End: 2025-05-29

## 2025-05-23 ENCOUNTER — TELEPHONE (OUTPATIENT)
Dept: PRIMARY CARE CLINIC | Age: 85
End: 2025-05-23

## 2025-05-23 DIAGNOSIS — M48.062 SPINAL STENOSIS OF LUMBAR REGION WITH NEUROGENIC CLAUDICATION: Primary | ICD-10-CM

## 2025-05-23 NOTE — TELEPHONE ENCOUNTER
Pt called in asking if you can put an order in for a walker?  She stated if you can please send to [facility].  She stated you lena talked about it at her last appointment.

## 2025-05-30 ENCOUNTER — SURGERY (OUTPATIENT)
Dept: URBAN - METROPOLITAN AREA SURGERY 19 | Facility: SURGERY | Age: 85
End: 2025-05-30
Payer: MEDICARE

## 2025-05-30 PROCEDURE — 66821 AFTER CATARACT LASER SURGERY: CPT | Performed by: OPHTHALMOLOGY

## 2025-06-02 ENCOUNTER — TELEPHONE (OUTPATIENT)
Dept: PRIMARY CARE CLINIC | Age: 85
End: 2025-06-02

## 2025-06-02 DIAGNOSIS — M54.16 LUMBAR BACK PAIN WITH RADICULOPATHY AFFECTING LOWER EXTREMITY: Primary | ICD-10-CM

## 2025-06-02 DIAGNOSIS — M48.062 SPINAL STENOSIS OF LUMBAR REGION WITH NEUROGENIC CLAUDICATION: ICD-10-CM

## 2025-06-02 RX ORDER — OXYCODONE AND ACETAMINOPHEN 5; 325 MG/1; MG/1
1 TABLET ORAL EVERY 8 HOURS PRN
Qty: 21 TABLET | Refills: 0 | Status: SHIPPED | OUTPATIENT
Start: 2025-06-02 | End: 2025-06-09

## 2025-06-02 NOTE — TELEPHONE ENCOUNTER
Pt is requesting a call if/when this is sent.       Name of Medication(s) Requested:  Requested Prescriptions     Pending Prescriptions Disp Refills    oxyCODONE-acetaminophen (PERCOCET) 5-325 MG per tablet 21 tablet 0     Sig: Take 1 tablet by mouth every 8 hours as needed for Pain for up to 3 days. Intended supply: 3 days. Take lowest dose possible to manage pain Max Daily Amount: 3 tablets       Medication is on current medication list Yes    Dosage and directions were verified? Yes    Quantity verified: 30 day supply     Pharmacy Verified?  Yes    Last Appointment:  4/29/2025    Future appts:  Future Appointments   Date Time Provider Department Center   9/16/2025 12:30 PM YOGI JUAN FRANCISCO ECHO [facility] Rad/Car   9/16/2025  2:30 PM Marina Marti MD [facility] ECC DEP        (If no appt send self scheduling link. .REFILLAPPT)  Scheduling request sent?     [] Yes  [] No    Does patient need updated?  [x] Yes  [] No

## 2025-06-03 NOTE — TELEPHONE ENCOUNTER
Patient said she has no pain in her back at all. She said its from her tailbone down. She said it is mostly pain in the muscle.

## 2025-06-03 NOTE — TELEPHONE ENCOUNTER
Neurosurgery or othro spine  [facility] or [facility]

## 2025-06-03 NOTE — TELEPHONE ENCOUNTER
Patient said she hurt her back and you had suggest she see someone about it. She said that surgery would be the last resort, but she is thinking it might not be a bad thing to have a consult with someone. She said she is getting some treatment, but it doesn't seem to be doing anything. She wants to know what you suggest she do or who she should see.

## 2025-06-03 NOTE — TELEPHONE ENCOUNTER
Patient also said that she was willing to see whoever you thought would be best, as long as they are in [facility].

## 2025-06-11 DIAGNOSIS — M54.16 LUMBAR BACK PAIN WITH RADICULOPATHY AFFECTING LOWER EXTREMITY: Primary | ICD-10-CM

## 2025-06-11 DIAGNOSIS — M48.062 SPINAL STENOSIS OF LUMBAR REGION WITH NEUROGENIC CLAUDICATION: ICD-10-CM

## 2025-06-11 RX ORDER — OXYCODONE AND ACETAMINOPHEN 5; 325 MG/1; MG/1
1 TABLET ORAL EVERY 8 HOURS PRN
Qty: 21 TABLET | Refills: 0 | Status: SHIPPED | OUTPATIENT
Start: 2025-06-11 | End: 2025-06-18

## 2025-06-11 NOTE — TELEPHONE ENCOUNTER
Pt called in asking for a refill of the oxycodone (Percocet).   I asked if the pt had received a call from orthopedic surgery, and she stated she is scheduled to see them on 6/19.

## 2025-06-20 DIAGNOSIS — M54.16 LUMBAR BACK PAIN WITH RADICULOPATHY AFFECTING LOWER EXTREMITY: Primary | ICD-10-CM

## 2025-06-20 DIAGNOSIS — M48.062 SPINAL STENOSIS OF LUMBAR REGION WITH NEUROGENIC CLAUDICATION: ICD-10-CM

## 2025-06-20 RX ORDER — OXYCODONE AND ACETAMINOPHEN 5; 325 MG/1; MG/1
1 TABLET ORAL EVERY 8 HOURS PRN
Qty: 42 TABLET | Refills: 0 | Status: SHIPPED | OUTPATIENT
Start: 2025-06-20 | End: 2025-07-04

## 2025-06-20 NOTE — TELEPHONE ENCOUNTER
Patient was seen by ortho yesterday. She has decided to go ahead with surgery and she will need lab work. Surgery date has not been sent. They are planning on the surgery either end of July or August. She would like to know if she can have an order for an A1c to be done since her last one was elevated and he specifically asked what it was.

## 2025-06-26 ENCOUNTER — TELEPHONE (OUTPATIENT)
Dept: PRIMARY CARE CLINIC | Age: 85
End: 2025-06-26

## 2025-06-26 ENCOUNTER — TELEPHONE (OUTPATIENT)
Dept: CARDIOLOGY CLINIC | Age: 85
End: 2025-06-26

## 2025-06-26 NOTE — TELEPHONE ENCOUNTER
Called patient.  She kept saying Kees was going to send paperwork and everything and could not understand what I was explaining for her to do.  She finally understood that she had to call and get the cardiac clearance from the cardiologist office.  She stated ok.

## 2025-06-26 NOTE — TELEPHONE ENCOUNTER
Patient needs clearance for back surgery on 7/2/25 to be done at [facility] under anesthesia.    Patient denies any chest pain, shortness of breath or palpitations since last visit in 3/27/25.  Patient is able to complete all activities of daily living. Patient does climb one flight of stairs and walks one block without cardiac symptoms.

## 2025-06-26 NOTE — TELEPHONE ENCOUNTER
----- Message from Dr. Marina Marti MD sent at 6/26/2025 11:25 AM EDT -----  Pt has preop appt with me tomorrow. She is going to need to call her heart doctors and get cardiac clearance/ok from them. Would recommend she do that as soon as possible.

## 2025-06-27 ENCOUNTER — OFFICE VISIT (OUTPATIENT)
Dept: PRIMARY CARE CLINIC | Age: 85
End: 2025-06-27

## 2025-06-27 ENCOUNTER — RESULTS FOLLOW-UP (OUTPATIENT)
Dept: PRIMARY CARE CLINIC | Age: 85
End: 2025-06-27

## 2025-06-27 VITALS
OXYGEN SATURATION: 99 % | HEIGHT: 62 IN | HEART RATE: 74 BPM | WEIGHT: 129 LBS | TEMPERATURE: 97 F | SYSTOLIC BLOOD PRESSURE: 136 MMHG | RESPIRATION RATE: 18 BRPM | BODY MASS INDEX: 23.74 KG/M2 | DIASTOLIC BLOOD PRESSURE: 52 MMHG

## 2025-06-27 DIAGNOSIS — Z01.818 PRE-OP EXAM: Primary | ICD-10-CM

## 2025-06-27 DIAGNOSIS — E11.40 TYPE 2 DIABETES MELLITUS WITH DIABETIC NEUROPATHY, WITHOUT LONG-TERM CURRENT USE OF INSULIN (HCC): ICD-10-CM

## 2025-06-27 LAB
ANION GAP SERPL CALCULATED.3IONS-SCNC: 13 MMOL/L (ref 7–16)
BASOPHILS ABSOLUTE: 0.03 K/UL (ref 0–0.2)
BASOPHILS RELATIVE PERCENT: 0 % (ref 0–2)
BUN BLDV-MCNC: 24 MG/DL (ref 8–23)
CALCIUM SERPL-MCNC: 9.1 MG/DL (ref 8.8–10.2)
CHLORIDE BLD-SCNC: 102 MMOL/L (ref 98–107)
CO2: 24 MMOL/L (ref 22–29)
CREAT SERPL-MCNC: 1.4 MG/DL (ref 0.5–1)
EOSINOPHILS ABSOLUTE: 0.14 K/UL (ref 0.05–0.5)
EOSINOPHILS RELATIVE PERCENT: 2 % (ref 0–6)
GFR, ESTIMATED: 38 ML/MIN/1.73M2
GLUCOSE BLD-MCNC: 174 MG/DL (ref 74–99)
HBA1C MFR BLD: 8.2 %
HCT VFR BLD CALC: 41.4 % (ref 34–48)
HEMOGLOBIN: 13.1 G/DL (ref 11.5–15.5)
IMMATURE GRANULOCYTES %: 0 % (ref 0–5)
IMMATURE GRANULOCYTES ABSOLUTE: 0.03 K/UL (ref 0–0.58)
LYMPHOCYTES ABSOLUTE: 1.72 K/UL (ref 1.5–4)
LYMPHOCYTES RELATIVE PERCENT: 22 % (ref 20–42)
MCH RBC QN AUTO: 33 PG (ref 26–35)
MCHC RBC AUTO-ENTMCNC: 31.6 G/DL (ref 32–34.5)
MCV RBC AUTO: 104.3 FL (ref 80–99.9)
MONOCYTES ABSOLUTE: 0.56 K/UL (ref 0.1–0.95)
MONOCYTES RELATIVE PERCENT: 7 % (ref 2–12)
NEUTROPHILS ABSOLUTE: 5.35 K/UL (ref 1.8–7.3)
NEUTROPHILS RELATIVE PERCENT: 68 % (ref 43–80)
PDW BLD-RTO: 13.5 % (ref 11.5–15)
PLATELET # BLD: 174 K/UL (ref 130–450)
PMV BLD AUTO: 11.6 FL (ref 7–12)
POTASSIUM SERPL-SCNC: 3.9 MMOL/L (ref 3.5–5.1)
RBC # BLD: 3.97 M/UL (ref 3.5–5.5)
SODIUM BLD-SCNC: 139 MMOL/L (ref 136–145)
WBC # BLD: 7.8 K/UL (ref 4.5–11.5)

## 2025-06-27 NOTE — PROGRESS NOTES
25  Alise Covington : 1940 Sex: female  Age: 84 y.o.      Assessment and Plan:  Alise was seen today for pre-op exam.    Diagnoses and all orders for this visit:    Pre-op exam  -     EKG 12 Lead - Clinic Performed  -     Cancel: Basic Metabolic Panel; Future  -     Cancel: CBC with Auto Differential; Future  -     CBC with Auto Differential  -     Basic Metabolic Panel    Type 2 diabetes mellitus with diabetic neuropathy, without long-term current use of insulin (HCC)  -     POCT glycosylated hemoglobin (Hb A1C)  Improving. Cont current treatment as documented below.       Assessment & Plan      EKG today shows sinus rhythm with PACs  It does otherwise look to be fairly consistent with most recent EKG from her cardiologist office back in March  A1c has improved, and patient is under fair control for her age  Given her diabetes, as well as her cardiac issues as noted below, patient is intermediate risk for planned procedure presuming her other preoperative blood work taken today    She was encouraged her amlodipine and Coreg the morning of surgery with just a small sip of water unless she is otherwise instructed  Patient reports that she is already been holding her Plavix since   She will hold Jardiance and Januvia x 3 days prior to surgery, and of course will not take her glipizide day of surgery as she will be fasting      Return for As scheduled.    The patient (or guardian, if applicable) and other individuals in attendance with the patient were advised that Artificial Intelligence will be utilized during this visit to record, process the conversation to generate a clinical note, and support improvement of the AI technology. The patient (or guardian, if applicable) and other individuals in attendance at the appointment consented to the use of AI, including the recording.        Chief Complaint   Patient presents with    Pre-op Exam            HPI  Pt here for preoperative

## 2025-07-01 ENCOUNTER — HOSPITAL ENCOUNTER (OUTPATIENT)
Age: 85
Setting detail: OUTPATIENT SURGERY
Discharge: HOME | End: 2025-07-01
Attending: ORTHOPAEDIC SURGERY | Admitting: ORTHOPAEDIC SURGERY
Payer: MEDICARE

## 2025-07-01 ENCOUNTER — PREP FOR PROCEDURE (OUTPATIENT)
Dept: ORTHOPEDIC SURGERY | Age: 85
End: 2025-07-01

## 2025-07-01 DIAGNOSIS — Z01.812 PRE-OPERATIVE LABORATORY EXAMINATION: Primary | ICD-10-CM

## 2025-07-01 DIAGNOSIS — M48.062 LUMBAR STENOSIS WITH NEUROGENIC CLAUDICATION: Primary | ICD-10-CM

## 2025-07-01 RX ORDER — SODIUM CHLORIDE 0.9 % (FLUSH) 0.9 %
5-40 SYRINGE (ML) INJECTION EVERY 12 HOURS SCHEDULED
Status: CANCELLED | OUTPATIENT
Start: 2025-07-01

## 2025-07-01 RX ORDER — ACETAMINOPHEN 325 MG/1
1000 TABLET ORAL ONCE
Status: CANCELLED | OUTPATIENT
Start: 2025-07-01 | End: 2025-07-01

## 2025-07-01 RX ORDER — SODIUM CHLORIDE 9 MG/ML
INJECTION, SOLUTION INTRAVENOUS PRN
Status: CANCELLED | OUTPATIENT
Start: 2025-07-01

## 2025-07-01 RX ORDER — SODIUM CHLORIDE 0.9 % (FLUSH) 0.9 %
5-40 SYRINGE (ML) INJECTION PRN
Status: CANCELLED | OUTPATIENT
Start: 2025-07-01

## 2025-07-01 RX ORDER — SODIUM CHLORIDE 9 MG/ML
INJECTION, SOLUTION INTRAVENOUS CONTINUOUS
Status: CANCELLED | OUTPATIENT
Start: 2025-07-01

## 2025-07-01 NOTE — PROGRESS NOTES
[facility]   PRE-ADMISSION TESTING GENERAL INSTRUCTIONS  PAT Phone Number: [phone]      GENERAL INSTRUCTIONS:    [x] Antibacterial Soap Shower Night before AND the Morning of procedure.  [x] Do not wear makeup, lotions, powders, deodorant the morning of surgery.  [x] No solid food after midnight. You may have SIPS of clear liquids up until 2 hours before your arrival time to the hospital.   [x] You may brush your teeth, gargle, but do not swallow water.   [x] No tobacco products, illegal drugs, or alcohol within 24 hours of your surgery.  [x] Jewelry or valuables should not be brought to the hospital. All body and/or tongue piercing's must be removed prior to arriving to hospital. No contact lens or hair pins.   [x] Arrange transportation with a responsible adult  to and from the hospital. Arrange for someone to be with you for the remainder of the day and for 24 hours after your procedure due to having had anesthesia.          -Who will be your  for transportation? Nephew        -Who will be staying with you for 24 hrs after your procedure? Nephew  [x] Bring insurance card and photo ID.  [x] Bring copy of living will or healthcare power of  papers to be placed in your electronic record.     PARKING INSTRUCTIONS:     [x] ARRIVAL DATE & TIME: 7/2/25 @ 11:00AM  [x] Times are subject to change. We will contact you the business day before surgery if that were to occur.  [x] Enter into the [facility] Entrance. Two people may accompany you. Masks are not required.  [x] Parking Lot \"I\" is where you will park. It is located on the corner of [address] and [address]. The entrance is on [address].   Only one vehicle - per patient, is permitted in parking lot.   Upon entering the parking lot, a voucher ticket will print.    MEDICATION INSTRUCTIONS:    [x] Bring a complete list of your medications, please write the last time you took the medicine, give

## 2025-07-02 ENCOUNTER — ANESTHESIA (OUTPATIENT)
Dept: OPERATING ROOM | Age: 85
End: 2025-07-02
Payer: MEDICARE

## 2025-07-02 ENCOUNTER — TELEPHONE (OUTPATIENT)
Dept: PRIMARY CARE CLINIC | Age: 85
End: 2025-07-02

## 2025-07-02 ENCOUNTER — ANESTHESIA EVENT (OUTPATIENT)
Dept: OPERATING ROOM | Age: 85
End: 2025-07-02
Payer: MEDICARE

## 2025-07-02 VITALS
DIASTOLIC BLOOD PRESSURE: 65 MMHG | RESPIRATION RATE: 20 BRPM | WEIGHT: 129 LBS | TEMPERATURE: 97.5 F | BODY MASS INDEX: 23.74 KG/M2 | HEART RATE: 73 BPM | SYSTOLIC BLOOD PRESSURE: 159 MMHG | HEIGHT: 62 IN | OXYGEN SATURATION: 100 %

## 2025-07-02 PROBLEM — M48.062 LUMBAR STENOSIS WITH NEUROGENIC CLAUDICATION: Status: ACTIVE | Noted: 2025-07-02

## 2025-07-02 LAB — GLUCOSE BLD-MCNC: 192 MG/DL (ref 74–99)

## 2025-07-02 PROCEDURE — 82962 GLUCOSE BLOOD TEST: CPT

## 2025-07-02 PROCEDURE — 2720000010 HC SURG SUPPLY STERILE: Performed by: ORTHOPAEDIC SURGERY

## 2025-07-02 PROCEDURE — 2709999900 HC NON-CHARGEABLE SUPPLY: Performed by: ORTHOPAEDIC SURGERY

## 2025-07-02 PROCEDURE — 6360000002 HC RX W HCPCS: Performed by: ORTHOPAEDIC SURGERY

## 2025-07-02 PROCEDURE — 6370000000 HC RX 637 (ALT 250 FOR IP): Performed by: ORTHOPAEDIC SURGERY

## 2025-07-02 RX ORDER — TIZANIDINE 2 MG/1
2 TABLET ORAL 3 TIMES DAILY PRN
Qty: 30 TABLET | Refills: 0 | Status: SHIPPED | OUTPATIENT
Start: 2025-07-02

## 2025-07-02 RX ORDER — DEXAMETHASONE SODIUM PHOSPHATE 10 MG/ML
8 INJECTION, SOLUTION INTRA-ARTICULAR; INTRALESIONAL; INTRAMUSCULAR; INTRAVENOUS; SOFT TISSUE ONCE
Status: COMPLETED | OUTPATIENT
Start: 2025-07-02 | End: 2025-07-02

## 2025-07-02 RX ORDER — SODIUM CHLORIDE 0.9 % (FLUSH) 0.9 %
5-40 SYRINGE (ML) INJECTION PRN
Status: ACTIVE | OUTPATIENT
Start: 2025-07-02

## 2025-07-02 RX ORDER — SODIUM CHLORIDE 9 MG/ML
INJECTION, SOLUTION INTRAVENOUS PRN
Status: ACTIVE | OUTPATIENT
Start: 2025-07-02

## 2025-07-02 RX ORDER — SODIUM CHLORIDE 0.9 % (FLUSH) 0.9 %
5-40 SYRINGE (ML) INJECTION EVERY 12 HOURS SCHEDULED
Status: ACTIVE | OUTPATIENT
Start: 2025-07-02

## 2025-07-02 RX ORDER — SODIUM CHLORIDE 9 MG/ML
INJECTION, SOLUTION INTRAVENOUS CONTINUOUS
Status: ACTIVE | OUTPATIENT
Start: 2025-07-02

## 2025-07-02 RX ORDER — ACETAMINOPHEN 500 MG
1000 TABLET ORAL ONCE
Status: COMPLETED | OUTPATIENT
Start: 2025-07-02 | End: 2025-07-02

## 2025-07-02 RX ADMIN — ACETAMINOPHEN 1000 MG: 500 TABLET ORAL at 11:38

## 2025-07-02 RX ADMIN — DEXAMETHASONE SODIUM PHOSPHATE 8 MG: 10 INJECTION INTRAMUSCULAR; INTRAVENOUS at 11:49

## 2025-07-02 ASSESSMENT — PAIN - FUNCTIONAL ASSESSMENT: PAIN_FUNCTIONAL_ASSESSMENT: 0-10

## 2025-07-02 NOTE — ANESTHESIA PRE PROCEDURE
Medications   Medication Dose Route Frequency Provider Last Rate Last Admin    sodium chloride flush 0.9 % injection 5-40 mL  5-40 mL IntraVENous 2 times per day Juan C Condon DO        sodium chloride flush 0.9 % injection 5-40 mL  5-40 mL IntraVENous PRN Juan C Condon DO        0.9 % sodium chloride infusion   IntraVENous PRN Juan C Condon DO        dexAMETHasone (DECADRON) injection 8 mg  8 mg IntraVENous Once Juan C Condon DO        0.9 % sodium chloride infusion   IntraVENous Continuous Juan C Condon DO        ceFAZolin (ANCEF) 2,000 mg in sterile water 20 mL IV syringe  2,000 mg IntraVENous On Call to OR Juan C Condon DO           Allergies:    Allergies   Allergen Reactions    Naproxen     Penicillins        Problem List:    Patient Active Problem List   Diagnosis Code    Type 2 diabetes mellitus with diabetic neuropathy, without long-term current use of insulin ([facility]) E11.40    Essential hypertension I10    Hyperlipidemia E78.5    Goiter E04.9    Osteoporosis M81.0    Stage 3b chronic kidney disease ([facility]) N18.32    Moderate to severe aortic stenosis I35.0    Type 2 diabetes mellitus, without long-term current use of insulin ([facility]) E11.9    Obesity (BMI 30-39.9) E66.9    Nonrheumatic mitral valve regurgitation I34.0    Nonrheumatic tricuspid valve regurgitation I36.1    Left main coronary artery disease I25.10    Status post insertion of drug eluting coronary artery stent Z95.5    Cellulitis of left groin L03.314    CKD (chronic kidney disease) N18.9    Anemia D64.9    CAD (coronary artery disease) I25.10    S/P drug eluting coronary stent placement Z95.5    Carotid stenosis, right I65.21    Abscess of groin, left L02.214    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

## 2025-07-02 NOTE — TELEPHONE ENCOUNTER
Tizanidine sent  Just pls pass on caution about potential for sedation, dizziness etc with these medications

## 2025-07-02 NOTE — TELEPHONE ENCOUNTER
Pt was suppose to have surgery today and anesthesia cancelled it and said she needed to be cleared by cardiology. She is asking for a muscle relaxer for her muscle spasms until she is able to have this surgery done. halle gore

## 2025-07-02 NOTE — H&P
[facility]  [address], [facility] [address], [place] [address]  [phone]    Ortho & Spine Office Visit/H&P   Signed    Patient: Alise Covington  MR#: [id number]  : 1940  Acct:[id number]  Age/Sex: 84 / F  ADM Date: 25  Loc: SPS.023            Provider Location:   cc: ~        Intake  Intake  Visit Reasons: Discussing Surgery  Fall Within Last 3 Months: No  Date of Osteoporosis Screenin25  Current Pain: Yes  Allergies    naproxen Allergy (Verified 25 14:49)  Nausea/Vomiting  Penicillins Allergy (Verified 25 14:49)  Rash      Safety Concerns: Feels Safe At This Time    [facility]  Social History (Reviewed 25 @ 14:51 by Madeleine Calabrese LPN)  Have You Traveled Out of the [place] in the Last Month:  No   Smoking Status:  Never Smoker         HPI  H&P for Procedures  H&P for Procedure: Yes  Long/Short H&P: Long H&P  Chief Complaint: lumbar stenosis    Exam  H/P  Long/Short H&P: Long H&P  Const'l  General appearance IM: Well Nourished, Well Developed and No Acute Distress  Orientation: Awake, Alert and Oriented x4  EENT  Eye: EOMI  Ears: Normal to Inspection  Nose: External Nose Normal  Face and Sinus: Normal Facial Exam  Throat: Posterior Oropharynx Normal  Cardiovascular  Cardiovascular: RRR  Respiratory  Respiratory: CTAB  Neck  Neck Exam: Normal Inspection    Genitourinary: Deferred  MS  Extremities: Normal to Inspection  Motor: Normal Strength All Extremities  Neurological  Neurological exam: Alert, Oriented X3 and Normal level of consciousness  Psych  Mental Health exam: Normal Affect  Skin  Skin Exam: Warm    Assessment and Plan  Assessment and Plan  (1) Lumbar stenosis with neurogenic claudication:         Code(s):  M48.062 - Spinal stenosis, lumbar region with neurogenic claudication        Status: None  (2) Other spondylosis with radiculopathy, lumbar region:         Code(s):  M47.26 - Other spondylosis with radiculopathy, lumbar region

## 2025-07-03 DIAGNOSIS — M54.16 LUMBAR BACK PAIN WITH RADICULOPATHY AFFECTING LOWER EXTREMITY: ICD-10-CM

## 2025-07-03 DIAGNOSIS — M48.062 SPINAL STENOSIS OF LUMBAR REGION WITH NEUROGENIC CLAUDICATION: ICD-10-CM

## 2025-07-03 RX ORDER — OXYCODONE AND ACETAMINOPHEN 5; 325 MG/1; MG/1
1 TABLET ORAL EVERY 8 HOURS PRN
Qty: 42 TABLET | Refills: 0 | Status: SHIPPED | OUTPATIENT
Start: 2025-07-03 | End: 2025-07-17

## 2025-07-03 NOTE — TELEPHONE ENCOUNTER
Name of Medication(s) Requested:  Requested Prescriptions     Pending Prescriptions Disp Refills    oxyCODONE-acetaminophen (PERCOCET) 5-325 MG per tablet 42 tablet 0     Sig: Take 1 tablet by mouth every 8 hours as needed for Pain for up to 14 days. Intended supply: 3 days. Take lowest dose possible to manage pain Max Daily Amount: 3 tablets       Medication is on current medication list Yes    Dosage and directions were verified? Yes    Quantity verified: 30 day supply     Pharmacy Verified?  Yes    Last Appointment:  6/27/2025    Future appts:  Future Appointments   Date Time Provider Department Center   9/16/2025 12:30 PM YOGI CHICAS ECHO 1 [facility] Rad/Car   9/16/2025  2:30 PM Marina Marti MD [facility] ECC DEP        (If no appt send self scheduling link. .REFILLAPPT)  Scheduling request sent?     [] Yes  [x] No    Does patient need updated?  [] Yes  [x] No

## 2025-07-03 NOTE — DISCHARGE SUMMARY
Physician Discharge Summary     Patient ID:  Alise Covington  [id number]  84 y.o.  1940    Admit date: No admission.  Case cancelled    Discharge date and time: No discharge.  Case cancelled    Admitting Physician: Juan C Condon DO     Discharge Physician: Juan C Condon DO    Admission Diagnoses: Pseudoclaudication syndrome [M48.062]  Other spondylosis with radiculopathy, lumbar region [M47.26]  Scoliosis of lumbar spine, unspecified scoliosis type [M41.9]  Lumbar stenosis with neurogenic claudication [M48.062]    Discharge Diagnoses: Lumbar spinal stenosis.  Aortic stenosis    Admission Condition: stable    Discharged Condition: stable    Hospital Course: The patient arrived for Lumbar laminectomy.  After review of patient history, anesthesia felt case too high risk to perform without more recent in depth cardiac work up.  After discussion with staff and patient case was cancelled until further cardiac work up    Disposition: The patient is planning to follow up with her cardiologist for further cardiac work up.  She will follow up in office after cardiac work up      Signed:  Juan C Condon DO  7/3/2025  8:40 AM

## 2025-07-22 ENCOUNTER — HOSPITAL ENCOUNTER (OUTPATIENT)
Dept: CARDIOLOGY | Age: 85
Discharge: HOME | End: 2025-07-24
Attending: INTERNAL MEDICINE
Payer: MEDICARE

## 2025-07-22 VITALS — WEIGHT: 129 LBS | HEIGHT: 62 IN | BODY MASS INDEX: 23.74 KG/M2

## 2025-07-22 DIAGNOSIS — R01.1 HEART MURMUR: ICD-10-CM

## 2025-07-22 LAB
ECHO AR MAX VEL PISA: 3.9 M/S
ECHO AV AREA PEAK VELOCITY: 0.6 CM2
ECHO AV AREA VTI: 0.7 CM2
ECHO AV AREA/BSA PEAK VELOCITY: 0.4 CM2/M2
ECHO AV AREA/BSA VTI: 0.4 CM2/M2
ECHO AV CUSP MM: 0.8 CM
ECHO AV MEAN GRADIENT: 29 MMHG
ECHO AV MEAN VELOCITY: 2.6 M/S
ECHO AV PEAK GRADIENT: 42 MMHG
ECHO AV PEAK VELOCITY: 3.3 M/S
ECHO AV REGURGITANT PHT: 496.1 MS
ECHO AV VELOCITY RATIO: 0.21
ECHO AV VTI: 90 CM
ECHO BSA: 1.6 M2
ECHO LA DIAMETER INDEX: 2.64 CM/M2
ECHO LA DIAMETER: 4.2 CM
ECHO LA VOL A-L A2C: 71 ML (ref 22–52)
ECHO LA VOL A-L A4C: 68 ML (ref 22–52)
ECHO LA VOL MOD A2C: 65 ML (ref 22–52)
ECHO LA VOL MOD A4C: 66 ML (ref 22–52)
ECHO LA VOLUME AREA LENGTH: 69 ML
ECHO LA VOLUME INDEX A-L A2C: 45 ML/M2 (ref 16–34)
ECHO LA VOLUME INDEX A-L A4C: 43 ML/M2 (ref 16–34)
ECHO LA VOLUME INDEX AREA LENGTH: 43 ML/M2 (ref 16–34)
ECHO LA VOLUME INDEX MOD A2C: 41 ML/M2 (ref 16–34)
ECHO LA VOLUME INDEX MOD A4C: 42 ML/M2 (ref 16–34)
ECHO LV FRACTIONAL SHORTENING: 28 % (ref 28–44)
ECHO LV INTERNAL DIMENSION DIASTOLE INDEX: 2.52 CM/M2
ECHO LV INTERNAL DIMENSION DIASTOLIC: 4 CM (ref 3.9–5.3)
ECHO LV INTERNAL DIMENSION SYSTOLIC INDEX: 1.82 CM/M2
ECHO LV INTERNAL DIMENSION SYSTOLIC: 2.9 CM
ECHO LV IVSD: 1.2 CM (ref 0.6–0.9)
ECHO LV IVSS: 1.6 CM
ECHO LV MASS 2D: 165.5 G (ref 67–162)
ECHO LV MASS INDEX 2D: 104.1 G/M2 (ref 43–95)
ECHO LV POSTERIOR WALL DIASTOLIC: 1.2 CM (ref 0.6–0.9)
ECHO LV POSTERIOR WALL SYSTOLIC: 1.5 CM
ECHO LV RELATIVE WALL THICKNESS RATIO: 0.6
ECHO LVOT AREA: 2.8 CM2
ECHO LVOT AV VTI INDEX: 0.24
ECHO LVOT DIAM: 1.9 CM
ECHO LVOT MEAN GRADIENT: 1 MMHG
ECHO LVOT PEAK GRADIENT: 2 MMHG
ECHO LVOT PEAK VELOCITY: 0.7 M/S
ECHO LVOT STROKE VOLUME INDEX: 37.8 ML/M2
ECHO LVOT SV: 60.1 ML
ECHO LVOT VTI: 21.2 CM
ECHO MV AREA PHT: 3.1 CM2
ECHO MV AREA VTI: 1.8 CM2
ECHO MV LVOT VTI INDEX: 1.55
ECHO MV MAX VELOCITY: 1.3 M/S
ECHO MV MEAN GRADIENT: 2 MMHG
ECHO MV MEAN VELOCITY: 0.7 M/S
ECHO MV PEAK GRADIENT: 7 MMHG
ECHO MV PRESSURE HALF TIME (PHT): 70 MS
ECHO MV VTI: 32.8 CM
ECHO RV INTERNAL DIMENSION: 2.7 CM
ECHO TV REGURGITANT MAX VELOCITY: 2.5 M/S
ECHO TV REGURGITANT PEAK GRADIENT: 25 MMHG

## 2025-07-22 PROCEDURE — 93321 DOPPLER ECHO F-UP/LMTD STD: CPT

## 2025-07-28 ENCOUNTER — TELEPHONE (OUTPATIENT)
Dept: CARDIOLOGY CLINIC | Age: 85
End: 2025-07-28

## 2025-07-28 LAB
ECHO AR MAX VEL PISA: 3.9 M/S
ECHO AV AREA PEAK VELOCITY: 0.6 CM2
ECHO AV AREA VTI: 0.7 CM2
ECHO AV AREA/BSA PEAK VELOCITY: 0.4 CM2/M2
ECHO AV AREA/BSA VTI: 0.4 CM2/M2
ECHO AV CUSP MM: 0.8 CM
ECHO AV MEAN GRADIENT: 29 MMHG
ECHO AV MEAN VELOCITY: 2.6 M/S
ECHO AV PEAK GRADIENT: 42 MMHG
ECHO AV PEAK VELOCITY: 3.3 M/S
ECHO AV REGURGITANT PHT: 496.1 MS
ECHO AV VELOCITY RATIO: 0.21
ECHO AV VTI: 90 CM
ECHO BSA: 1.6 M2
ECHO EST RA PRESSURE: 3 MMHG
ECHO LA DIAMETER INDEX: 2.64 CM/M2
ECHO LA DIAMETER: 4.2 CM
ECHO LA VOL A-L A2C: 71 ML (ref 22–52)
ECHO LA VOL A-L A4C: 68 ML (ref 22–52)
ECHO LA VOL MOD A2C: 65 ML (ref 22–52)
ECHO LA VOL MOD A4C: 66 ML (ref 22–52)
ECHO LA VOLUME AREA LENGTH: 69 ML
ECHO LA VOLUME INDEX A-L A2C: 45 ML/M2 (ref 16–34)
ECHO LA VOLUME INDEX A-L A4C: 43 ML/M2 (ref 16–34)
ECHO LA VOLUME INDEX AREA LENGTH: 43 ML/M2 (ref 16–34)
ECHO LA VOLUME INDEX MOD A2C: 41 ML/M2 (ref 16–34)
ECHO LA VOLUME INDEX MOD A4C: 42 ML/M2 (ref 16–34)
ECHO LV EF PHYSICIAN: 50 %
ECHO LV FRACTIONAL SHORTENING: 28 % (ref 28–44)
ECHO LV INTERNAL DIMENSION DIASTOLE INDEX: 2.52 CM/M2
ECHO LV INTERNAL DIMENSION DIASTOLIC: 4 CM (ref 3.9–5.3)
ECHO LV INTERNAL DIMENSION SYSTOLIC INDEX: 1.82 CM/M2
ECHO LV INTERNAL DIMENSION SYSTOLIC: 2.9 CM
ECHO LV IVSD: 1.2 CM (ref 0.6–0.9)
ECHO LV IVSS: 1.6 CM
ECHO LV MASS 2D: 165.5 G (ref 67–162)
ECHO LV MASS INDEX 2D: 104.1 G/M2 (ref 43–95)
ECHO LV POSTERIOR WALL DIASTOLIC: 1.2 CM (ref 0.6–0.9)
ECHO LV POSTERIOR WALL SYSTOLIC: 1.5 CM
ECHO LV RELATIVE WALL THICKNESS RATIO: 0.6
ECHO LVOT AREA: 2.8 CM2
ECHO LVOT AV VTI INDEX: 0.24
ECHO LVOT DIAM: 1.9 CM
ECHO LVOT MEAN GRADIENT: 1 MMHG
ECHO LVOT PEAK GRADIENT: 2 MMHG
ECHO LVOT PEAK VELOCITY: 0.7 M/S
ECHO LVOT STROKE VOLUME INDEX: 37.8 ML/M2
ECHO LVOT SV: 60.1 ML
ECHO LVOT VTI: 21.2 CM
ECHO MV AREA PHT: 3.1 CM2
ECHO MV AREA VTI: 1.8 CM2
ECHO MV LVOT VTI INDEX: 1.55
ECHO MV MAX VELOCITY: 1.3 M/S
ECHO MV MEAN GRADIENT: 2 MMHG
ECHO MV MEAN VELOCITY: 0.7 M/S
ECHO MV PEAK GRADIENT: 7 MMHG
ECHO MV PRESSURE HALF TIME (PHT): 70 MS
ECHO MV VTI: 32.8 CM
ECHO PULMONARY ARTERY SYSTOLIC PRESSURE (PASP): 28 MMHG
ECHO RIGHT VENTRICULAR SYSTOLIC PRESSURE (RVSP): 28 MMHG
ECHO RV INTERNAL DIMENSION: 2.7 CM
ECHO TV REGURGITANT MAX VELOCITY: 2.5 M/S
ECHO TV REGURGITANT PEAK GRADIENT: 25 MMHG

## 2025-07-28 NOTE — TELEPHONE ENCOUNTER
Patient requesting results on her echo that was done on 7/22/25. Also asking if you could compare it to her last one done in 2022. Please advise.

## (undated) DEVICE — GLOVE ORANGE PI 8 1/2   MSG9085

## (undated) DEVICE — STRAP POS MP 30X3 IN HK LOOP CLOSURE FOAM DISP

## (undated) DEVICE — 5.0MM PRECISION ROUND

## (undated) DEVICE — HYPODERMIC SAFETY NEEDLE: Brand: MAGELLAN

## (undated) DEVICE — SYRINGE MED 20ML STD CLR PLAS LUERLOCK TIP N CTRL DISP

## (undated) DEVICE — 3M™ IOBAN™ 2 ANTIMICROBIAL INCISE DRAPE 6650EZ: Brand: IOBAN™ 2

## (undated) DEVICE — JACKSON TABLE POSITIONER KIT: Brand: MEDLINE INDUSTRIES, INC.

## (undated) DEVICE — NEPTUNE E-SEP 125MM SUCTION SLEEVE: Brand: NEPTUNE E-SEP

## (undated) DEVICE — LUMBAR LAMINECTOMY: Brand: MEDLINE INDUSTRIES, INC.

## (undated) DEVICE — SYRINGE MED 10ML LUERLOCK TIP W/O SFTY DISP

## (undated) DEVICE — BLADE ES L6IN ELASTOMERIC COAT EXT DURABLE BEND UPTO 90DEG

## (undated) DEVICE — DRESSING ADH W3.5XL6IN SFT CLTH PD COMP MEDIPORE +

## (undated) DEVICE — TUBING SUCT 12FR MAL ALUM SHFT FN CAP VENT UNIV CONN W/ OBT

## (undated) DEVICE — NEEDLE SPNL L3.5IN PNK HUB S STL REG WALL FIT STYL W/ QNCKE

## (undated) DEVICE — ALCOHOL RUBBING ISO 16OZ 70%

## (undated) DEVICE — 1000 S-DRAPE TOWEL DRAPE 10/BX: Brand: STERI-DRAPE™

## (undated) DEVICE — GLOVE SURG 8.5 PF POLYMER WHT STRL SIGN LTX ESSENTIAL LTX

## (undated) DEVICE — APPLICATOR MEDICATED 26 CC SOLUTION HI LT ORNG CHLORAPREP

## (undated) DEVICE — DRAPE,REIN 53X77,STERILE: Brand: MEDLINE